# Patient Record
Sex: MALE | Race: WHITE | NOT HISPANIC OR LATINO | Employment: FULL TIME | URBAN - METROPOLITAN AREA
[De-identification: names, ages, dates, MRNs, and addresses within clinical notes are randomized per-mention and may not be internally consistent; named-entity substitution may affect disease eponyms.]

---

## 2017-02-10 ENCOUNTER — ALLSCRIPTS OFFICE VISIT (OUTPATIENT)
Dept: OTHER | Facility: OTHER | Age: 62
End: 2017-02-10

## 2017-03-01 ENCOUNTER — GENERIC CONVERSION - ENCOUNTER (OUTPATIENT)
Dept: OTHER | Facility: OTHER | Age: 62
End: 2017-03-01

## 2017-03-01 ENCOUNTER — APPOINTMENT (OUTPATIENT)
Dept: LAB | Facility: HOSPITAL | Age: 62
End: 2017-03-01
Attending: INTERNAL MEDICINE
Payer: COMMERCIAL

## 2017-03-01 DIAGNOSIS — I10 ESSENTIAL (PRIMARY) HYPERTENSION: ICD-10-CM

## 2017-03-01 LAB
ALBUMIN SERPL BCP-MCNC: 3.8 G/DL (ref 3.5–5)
ALP SERPL-CCNC: 44 U/L (ref 46–116)
ALT SERPL W P-5'-P-CCNC: 35 U/L (ref 12–78)
ANION GAP SERPL CALCULATED.3IONS-SCNC: 9 MMOL/L (ref 4–13)
AST SERPL W P-5'-P-CCNC: 22 U/L (ref 5–45)
BILIRUB SERPL-MCNC: 0.8 MG/DL (ref 0.2–1)
BUN SERPL-MCNC: 11 MG/DL (ref 5–25)
CALCIUM SERPL-MCNC: 9.1 MG/DL (ref 8.3–10.1)
CHLORIDE SERPL-SCNC: 101 MMOL/L (ref 100–108)
CHOLEST SERPL-MCNC: 158 MG/DL (ref 50–200)
CO2 SERPL-SCNC: 30 MMOL/L (ref 21–32)
CREAT SERPL-MCNC: 0.84 MG/DL (ref 0.6–1.3)
ERYTHROCYTE [DISTWIDTH] IN BLOOD BY AUTOMATED COUNT: 12.7 % (ref 11.6–15.1)
GFR SERPL CREATININE-BSD FRML MDRD: >60 ML/MIN/1.73SQ M
GLUCOSE SERPL-MCNC: 103 MG/DL (ref 65–140)
HCT VFR BLD AUTO: 43.1 % (ref 36.5–49.3)
HDLC SERPL-MCNC: 52 MG/DL (ref 40–60)
HGB BLD-MCNC: 15 G/DL (ref 12–17)
LDLC SERPL CALC-MCNC: 84 MG/DL (ref 0–100)
MCH RBC QN AUTO: 32.3 PG (ref 26.8–34.3)
MCHC RBC AUTO-ENTMCNC: 34.8 G/DL (ref 31.4–37.4)
MCV RBC AUTO: 93 FL (ref 82–98)
PLATELET # BLD AUTO: 195 THOUSANDS/UL (ref 149–390)
PMV BLD AUTO: 11.6 FL (ref 8.9–12.7)
POTASSIUM SERPL-SCNC: 3.9 MMOL/L (ref 3.5–5.3)
PROT SERPL-MCNC: 6.8 G/DL (ref 6.4–8.2)
RBC # BLD AUTO: 4.64 MILLION/UL (ref 3.88–5.62)
SODIUM SERPL-SCNC: 140 MMOL/L (ref 136–145)
TRIGL SERPL-MCNC: 109 MG/DL
TSH SERPL DL<=0.05 MIU/L-ACNC: 1.58 UIU/ML (ref 0.36–3.74)
WBC # BLD AUTO: 8.01 THOUSAND/UL (ref 4.31–10.16)

## 2017-03-01 PROCEDURE — 80053 COMPREHEN METABOLIC PANEL: CPT

## 2017-03-01 PROCEDURE — 85027 COMPLETE CBC AUTOMATED: CPT

## 2017-03-01 PROCEDURE — 36415 COLL VENOUS BLD VENIPUNCTURE: CPT

## 2017-03-01 PROCEDURE — 80061 LIPID PANEL: CPT

## 2017-03-01 PROCEDURE — 84443 ASSAY THYROID STIM HORMONE: CPT

## 2017-03-30 ENCOUNTER — GENERIC CONVERSION - ENCOUNTER (OUTPATIENT)
Dept: OTHER | Facility: OTHER | Age: 62
End: 2017-03-30

## 2017-05-02 ENCOUNTER — TRANSCRIBE ORDERS (OUTPATIENT)
Dept: ADMINISTRATIVE | Facility: HOSPITAL | Age: 62
End: 2017-05-02

## 2017-05-02 ENCOUNTER — ALLSCRIPTS OFFICE VISIT (OUTPATIENT)
Dept: OTHER | Facility: OTHER | Age: 62
End: 2017-05-02

## 2017-05-02 DIAGNOSIS — R07.89 OTHER CHEST PAIN: Primary | ICD-10-CM

## 2017-05-02 DIAGNOSIS — R07.89 OTHER CHEST PAIN: ICD-10-CM

## 2017-05-15 ENCOUNTER — HOSPITAL ENCOUNTER (OUTPATIENT)
Dept: NON INVASIVE DIAGNOSTICS | Facility: CLINIC | Age: 62
Discharge: HOME | End: 2017-05-15
Payer: COMMERCIAL

## 2017-05-15 ENCOUNTER — GENERIC CONVERSION - ENCOUNTER (OUTPATIENT)
Dept: OTHER | Facility: OTHER | Age: 62
End: 2017-05-15

## 2017-05-15 DIAGNOSIS — R07.89 OTHER CHEST PAIN: ICD-10-CM

## 2017-05-15 LAB
CHEST PAIN STATEMENT: NORMAL
MAX DIASTOLIC BP: 69 MMHG
MAX HEART RATE: 150 BPM
MAX PREDICTED HEART RATE: 161 BPM
MAX. SYSTOLIC BP: 164 MMHG
PROTOCOL NAME: NORMAL
TARGET HR FORMULA: NORMAL
TEST INDICATION: NORMAL
TIME IN EXERCISE PHASE: 545 S

## 2017-05-15 PROCEDURE — A9502 TC99M TETROFOSMIN: HCPCS

## 2017-05-15 PROCEDURE — 78452 HT MUSCLE IMAGE SPECT MULT: CPT

## 2017-05-15 PROCEDURE — 93017 CV STRESS TEST TRACING ONLY: CPT

## 2017-11-03 ENCOUNTER — ALLSCRIPTS OFFICE VISIT (OUTPATIENT)
Dept: OTHER | Facility: OTHER | Age: 62
End: 2017-11-03

## 2017-11-04 NOTE — PROGRESS NOTES
Assessment  1  Acute maxillary sinusitis, recurrence not specified (461 0) (J01 00)    Plan  Acute maxillary sinusitis, recurrence not specified    · Amoxicillin-Pot Clavulanate 875-125 MG Oral Tablet; TAKE 1 TABLET EVERY 12  HOURS WITH MEALS UNTIL GONE  H/O venous thrombosis and embolism    · Xarelto 15 MG Oral Tablet    Chief Complaint  Pt is here complaining cough, congestion  symptoms not improving over one week  CR      History of Present Illness  Cold Symptoms:   [facility] Lloyd presents with complaints of gradual onset of constant episodes of moderate cold symptoms  Episodes started about 1 week ago  Symptoms are not improved by OTC cold medications  Symptoms are made worse by activity  Symptoms are worsening  Associated symptoms include runny nose,-- post nasal drainage,-- sore throat,-- productive cough,-- facial pressure,-- facial pain,-- fatigue-- and-- weakness  Review of Systems    Constitutional: as noted in HPI  Cardiovascular: no chest pain  Respiratory: as noted in HPI  Active Problems  1  Active advance directive (V49 89) (Z78 9)   2  Benign prostatic hyperplasia without lower urinary tract symptoms, unspecified   morphology   3  Essential hypertension (401 9) (I10)   4  H/O venous thrombosis and embolism (V12 51) (Z86 718)   5  Hearing loss of left ear (389 9) (H91 92)   6  Hyperlipidemia (272 4) (E78 5)   7  Impaired fasting glucose (790 21) (R73 01)   8  Denied: History of Mental health problem   9  No advance directive on file (V49 89) (Z78 9)   10  Organic impotence (607 84) (N52 9)   11  Denied: History of Substance abuse    Past Medical History  Active Problems And Past Medical History Reviewed: The active problems and past medical history were reviewed and updated today        Social History   · Active advance directive (V49 89) (Z78 9)   · Cultural background   · non-   · Good dental hygiene   · Living Situation: Supportive and safe   ·    · Never smoker   · No advance directive on file (V49 98) (Z78 9)   · No drug use   · Primary language is English   · Racial background   · white   · Social alcohol use   · Spouse/family support    Current Meds   1  Fluticasone Propionate 50 MCG/ACT Nasal Suspension; USE 2 SPRAYS IN EACH   NOSTRIL ONCE DAILY; Therapy: 17ETP7651 to (Last Rx:06Srp2163)  Requested for: 07Ial8349 Ordered   2  Simvastatin 10 MG Oral Tablet; Take 1 tablet daily; Therapy: 01GXF5602 to (Evaluate:21Apr2018)  Requested for: 26Apr2017; Last   Rx:[id number] Ordered   3  Valsartan-Hydrochlorothiazide 320-25 MG Oral Tablet; TAKE 1 TABLET DAILY   PATIENTMAKE APPOINTMENT FOR JULY; Therapy: 66UFY2655 to (Evaluate:85Son7212)  Requested for: 08Jkd0585; Last   Rx:78Rlf7306 Ordered   4  Xarelto 15 MG Oral Tablet; Take 1 tablet daily; Therapy: 85QAX8862 to (Evaluate:17Uqd9965) Recorded   5  Xarelto 20 MG Oral Tablet; take 1 tablet by mouth every day; Therapy: 84UAI3514 to (Evaluate:25Dex7715)  Requested for: 55NGP3662; Last   Rx:30Xba7984 Ordered    The medication list was reviewed and updated today  Allergies  1  ACE Inhibitors   2  Vancocin HCl    Vitals   Recorded: 51CSW8519 11:36AM   Temperature 99 2 F   Heart Rate 82, L Radial   Pulse Quality Normal, L Radial   Systolic 775, LUE, Sitting   Diastolic 94, LUE, Sitting   Height 6 ft 1 in   Weight 232 lb    BMI Calculated 30 61   BSA Calculated 2 29     Physical Exam    Constitutional   General appearance: No acute distress, well appearing and well nourished  Ears, Nose, Mouth, and Throat   Nasal mucosa, septum, and turbinates: Abnormal   There was a purulent discharge from both nares  The bilateral nasal mucosa was edematous  Pulmonary   Auscultation of lungs: Clear to auscultation, equal breath sounds bilaterally, no wheezes, no rales, no rhonci  Cardiovascular   Auscultation of heart: Normal rate and rhythm, normal S1 and S2, without murmurs           Signatures   Electronically signed by : Bonita Bryan TI King ; Nov  3 2017 11:52AM EST                       (Author)

## 2017-11-27 ENCOUNTER — GENERIC CONVERSION - ENCOUNTER (OUTPATIENT)
Dept: OTHER | Facility: OTHER | Age: 62
End: 2017-11-27

## 2018-01-09 ENCOUNTER — TRANSCRIBE ORDERS (OUTPATIENT)
Dept: ADMINISTRATIVE | Facility: HOSPITAL | Age: 63
End: 2018-01-09

## 2018-01-09 ENCOUNTER — ALLSCRIPTS OFFICE VISIT (OUTPATIENT)
Dept: OTHER | Facility: OTHER | Age: 63
End: 2018-01-09

## 2018-01-09 DIAGNOSIS — R39.14 BENIGN PROSTATIC HYPERPLASIA WITH INCOMPLETE BLADDER EMPTYING: ICD-10-CM

## 2018-01-09 DIAGNOSIS — N40.1 INCOMPLETE EMPTYING OF BLADDER DUE TO BENIGN PROSTATIC HYPERPLASIA: Primary | ICD-10-CM

## 2018-01-09 DIAGNOSIS — N40.1 BENIGN PROSTATIC HYPERPLASIA WITH INCOMPLETE BLADDER EMPTYING: ICD-10-CM

## 2018-01-09 DIAGNOSIS — R33.9 INCOMPLETE EMPTYING OF BLADDER DUE TO BENIGN PROSTATIC HYPERPLASIA: Primary | ICD-10-CM

## 2018-01-10 NOTE — PROGRESS NOTES
Assessment   1  Essential hypertension (401 9) (I10)   2  Benign prostatic hyperplasia with incomplete bladder emptying (600 01,788 21) (N40 1,R39 14)   3  Other male erectile dysfunction (607 84) (N52 8)    Plan   Benign prostatic hyperplasia with incomplete bladder emptying    · 1 - Alicia Martinez MD, Nyla Alejandra (Urology) Co-Management  *  Status: Active  Requested for: 66LXV1423  Care Summary provided  : Yes  Essential hypertension    · Valsartan-Hydrochlorothiazide 320-25 MG Oral Tablet; TAKE 1 TABLET DAILY    PATIENTMAKE APPOINTMENT FOR JULY   · (1) COMPREHENSIVE METABOLIC PANEL; Status:Active; Requested KKU:76LIJ5776;    · (1) LIPID PANEL, FASTING; Status:Active; Requested for:01Mar2018;    · (1) TSH; Status:Active; Requested FJX:65EKQ2023; Other male erectile dysfunction    · Sildenafil Citrate 25 MG Oral Tablet (Viagra); TAKE AS DIRECTED    Chief Complaint   Chief Complaint Free Text Note Form: Pt is here for lab review  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  CR      History of Present Illness   Hypertension (Follow-Up): The patient presents for follow-up of essential hypertension  The patient states he has been doing well with his blood pressure control since the last visit  Symptoms: denies dyspnea-- and-- denies chest pain  Medications: the patient is adherent with his medication regimen  -- He denies medication side effects  Benign Prostatic Hyperplasia (Brief): The patient is being seen for an initial evaluation of benign prostatic hyperplasia  Symptoms:  sensation of incomplete bladder emptying--       The patient presents with complaints of nocturia (1x /night  )  Associated symptoms:  no abdominal pain-- and-- no hematuria  (PSA is increasing: it was 3 1 in 2016 and increased to 4 0 in 11/2017)    Erectile Dysfunction:    [facility] Lloyd presents with complaints of gradual onset of mild erectile dysfunction about months ago  Symptoms are worsening        Associated symptoms include inability to maintain erection  HPI: PSA is increasing      Review of Systems   Complete-Male:      Constitutional: not feeling tired  Eyes: no eyesight problems  Cardiovascular: no chest pain-- and-- no palpitations  Respiratory: no shortness of breath-- and-- no cough  Gastrointestinal: no abdominal pain  Genitourinary: as noted in HPI  Neurological: no headache  Psychiatric: no anxiety-- and-- no depression  Active Problems   1  Essential hypertension (401 9) (I10)   2  H/O venous thrombosis and embolism (V12 51) (Z86 718)   3  Hearing loss of left ear (389 9) (H91 92)   4  Hyperlipidemia (272 4) (E78 5)   5  Impaired fasting glucose (790 21) (R73 01)    Past Medical History   1  Acute maxillary sinusitis, recurrence not specified (461 0) (J01 00)   2  History of Asthmatic bronchitis (493 90) (J45 909)   3  History of Cervical disc disease (722 91) (M50 90)   4  History of Cervicalgia (723 1) (M54 2)   5  Fatigue (780 79) (R53 83)   6  History of Hypercholesterolemia (272 0) (E78 00)   7  Other chest pain (786 59) (R07 89)   8  History of Screening for colon cancer (V76 51) (Z12 11)   9  History of Screening for prostate cancer (V76 44) (Z12 5)  Active Problems And Past Medical History Reviewed: The active problems and past medical history were reviewed and updated today  Surgical History   1  History of Back Surgery   2  History of Cholecystectomy    Family History   Mother    1  Family history of CAD (coronary artery disease)  Sister    2  Family history of CAD (coronary artery disease)  Family History    3  Family history of Cardiovascular disease   4  Denied: Family history of substance abuse   5  Family history of Ischemic heart disease   6  Family history of Malignancy   7   Denied: Family history of Mental problem    Social History    · Cultural background   · Good dental hygiene   · Living Situation: Supportive and safe   ·    · Never smoker   · No drug use   · Primary language is English   · Racial background   · Social alcohol use   · Spouse/family support    Current Meds    1  Fluticasone Propionate 50 MCG/ACT Nasal Suspension; USE 2 SPRAYS IN EACH NOSTRIL ONCE     DAILY; Therapy: 30TDQ9455 to (Last Rx:33Eda0168)  Requested for: 90Gvl8285 Ordered   2  Simvastatin 10 MG Oral Tablet; Take 1 tablet daily; Therapy: 39KFK2111 to (Evaluate:21Apr2018)  Requested for: 26Apr2017; Last Rx:35Epv3843     Ordered   3  Valsartan-Hydrochlorothiazide 320-25 MG Oral Tablet; TAKE 1 TABLET DAILY PATIENTMAKE     APPOINTMENT FOR JULY; Therapy: 33YBD9469 to (Evaluate:21Apr2018)  Requested for: 26Apr2017; Last Rx:11Nkq0222     Ordered   4  Xarelto 20 MG Oral Tablet; take 1 tablet by mouth every day; Therapy: 35QQV3599 to (Evaluate:23Sep2018)  Requested for: 98MYW2982; Last Rx:79Tnf5171     Ordered  Medication List Reviewed: The medication list was reviewed and updated today  Allergies   1  ACE Inhibitors   2  Vancocin HCl    Vitals   Vital Signs    Recorded: 28GND8021 02:16PM   Heart Rate 83   Systolic 165, LUE, Sitting   Diastolic 78, LUE, Sitting   BP CUFF SIZE Large   Height 6 ft 1 in   Weight 237 lb    BMI Calculated 31 27   BSA Calculated 2 31     Physical Exam        Constitutional      General appearance: No acute distress, well appearing and well nourished  Ears, Nose, Mouth, and Throat      Oropharynx: Normal with no erythema, edema, exudate or lesions  Pulmonary      Auscultation of lungs: Clear to auscultation, equal breath sounds bilaterally, no wheezes, no rales, no rhonci  Cardiovascular      Auscultation of heart: Normal rate and rhythm, normal S1 and S2, without murmurs  Abdomen      Abdomen: Non-tender, no masses         Psychiatric      Orientation to person, place and time: Normal        Mood and affect: Normal           Signatures    Electronically signed by : TI Marquez ; Jan 9 2018  2:51PM EST (Author)

## 2018-01-11 NOTE — RESULT NOTES
Verified Results  (1) COMPREHENSIVE METABOLIC PANEL 56LVK8064 89:31JF Brain Hashimoto     Test Name Result Flag Reference   GLUCOSE,RANDM 99 mg/dL     If the patient is fasting, the ADA then defines impaired fasting glucose as > 100 mg/dL and diabetes as > or equal to 123 mg/dL  SODIUM 141 mmol/L  136-145   POTASSIUM 3 8 mmol/L  3 5-5 3   CHLORIDE 103 mmol/L  100-108   CARBON DIOXIDE 31 mmol/L  21-32   ANION GAP (CALC) 7 mmol/L  4-13   BLOOD UREA NITROGEN 10 mg/dL  5-25   CREATININE 0 91 mg/dL  0 60-1 30   Standardized to IDMS reference method   CALCIUM 9 1 mg/dL  8 3-10 1   BILI, TOTAL 0 70 mg/dL  0 20-1 00   ALK PHOSPHATAS 45 U/L L    ALT (SGPT) 33 U/L  12-78   AST(SGOT) 20 U/L  5-45   ALBUMIN 3 5 g/dL  3 5-5 0   TOTAL PROTEIN 6 5 g/dL  6 4-8 2   eGFR Non-African American      >60 0 ml/min/1 73sq [facility] Disease Education Program recommendations are as follows:  GFR calculation is accurate only with a steady state creatinine  Chronic Kidney disease less than 60 ml/min/1 73 sq  meters  Kidney failure less than 15 ml/min/1 73 sq  meters  (1) PSA (SCREEN) (Dx V76 44 Screen for Prostate Cancer) 27Oct2016 08:31AM Brain Hashimoto     Test Name Result Flag Reference   PROSTATE SPECIFIC ANTIGEN 3 0 ng/mL  0 0-4 0   American Urological Association Guidelines define biochemical recurrence of prostate cancer as a detectable or rising PSA value post-radical prostatectomy that is greater than or equal to 0 2 ng/mL with a second confirmatory level of greater than or equal to 0 2 ng/mL  (1) HEMOGLOBIN A1C 27Oct2016 08:31AM Brain Hashimoto     Test Name Result Flag Reference   HEMOGLOBIN A1C 6 1 %  4 2-6 3   EST  AVG   GLUCOSE 128 mg/dl

## 2018-01-11 NOTE — RESULT NOTES
Verified Results  NM MYOCARDIAL PERFUSION SPECT (RX STRESS AND/OR REST) 27JOG3970 08:14AM Claudia Carver     Test Name Result Flag Reference   NM MYOCARDIAL PERFUSION SPECT (RX STRESS AND/OR REST) (Report)     [address] [place], [address]   [phone]     Rest/Stress Gated SPECT Myocardial Perfusion Imaging After Exercise     Patient: Jorge Hill   MR number: [id number]   Account number: [de-identified]   : 1955   Age: 63 years   Gender: Male   Status: Outpatient   Location: [facility] [place]   Height: 73 in   Weight: 233 lb   BP: 144/ 80 mmHg     Allergies: ACE INHIBITORS, VANCOMYCIN     Diagnosis: R07 89 - Other chest pain     Primary Physician: Adri Carlton MD   RN: Stiven Marroquin RN   Referring Physician: Adri Carlton MD   Technician: Abernathy Room   Group: [facility]   Interpreting Physician: Apryl Calderón MD     INDICATIONS: Detection of coronary artery disease  HISTORY: The patient is a 63year old  male  Chest pain status: recent onset chest pain  Other symptoms: dyspnea of recent onset  Coronary artery disease risk factors: dyslipidemia, hypertension, and family history of premature   coronary artery disease  Cardiovascular history: none significant  PHYSICAL EXAM: Baseline physical exam screening: no wheezes audible  REST ECG: Normal sinus rhythm  PROCEDURE: The study was performed at the [facility]  Treadmill exercise testing was performed, using the Gold protocol  Gated SPECT myocardial perfusion imaging was performed after stress and at rest  Systolic   blood pressure was 144 mmHg, at the start of the study  Diastolic blood pressure was 80 mmHg, at the start of the study  The heart rate was 83 bpm, at the start of the study  IV double checked       GOLD PROTOCOL:   HR bpm SBP mmHg DBP mmHg Symptoms ST change Rhythm/conduct   Baseline 83 144 80 none none NSR   Stage 1 118 155 77 none none sinus tach   Stage 2 133 160 70 dyspnea none sinus tach   Stage 3 150 164 69 dyspnea, fatigue none sinus tach   Recovery 1 127 140 65 subsiding none sinus tach   Recovery 2 115 -- -- subsiding -- --   Recovery 3 97 139 74 none -- --   Recovery 5 104 124 78 none -- --   No medications or fluids given  STRESS SUMMARY: Duration of exercise was 9 min and 5 sec  The patient exercised to protocol stage 3  Maximal work rate was 10 4 METs  Maximal heart rate during stress was 150 bpm ( 93 % of maximal predicted heart rate)  The heart rate   response to stress was normal  There was resting hypertension with an appropriate blood pressure response to stress  The rate-pressure product for the peak heart rate and blood pressure was 66521  There was no chest pain during stress  The   stress test was terminated due to dyspnea and fatigue  Pre oxygen saturation: 96 %  Peak oxygen saturation: 95 %  The stress ECG was negative for ischemia  There were no stress arrhythmias or conduction abnormalities  ISOTOPE ADMINISTRATION:   Resting isotope administration Stress isotope administration   Agent Tetrofosmin Tetrofosmin   Dose 10 71 mCi 30 95 mCi   Date 05/15/2017 05/15/2017   Injection time 08:35 09:51   Imaging time 09:11 10:32   Injection-image interval 36 min 35 min     The radiopharmaceutical was injected one minute before the end of exercise  MYOCARDIAL PERFUSION IMAGING:   The image quality was good  Left ventricular size was normal  The TID ratio was 0 9  PERFUSION DEFECTS:   - There were no perfusion defects  GATED SPECT:   The calculated left ventricular ejection fraction was 59 %  Left ventricular ejection fraction was within normal limits by visual estimate  There was no left ventricular regional abnormality  SUMMARY:   - Stress results: Duration of exercise was 9 min and 5 sec  Target heart rate was achieved   There was resting hypertension with an appropriate blood pressure response to stress  There was no chest pain during stress  - ECG conclusions: The stress ECG was negative for ischemia  - Perfusion imaging: There were no perfusion defects    - Gated SPECT: The calculated left ventricular ejection fraction was 59 %  Left ventricular ejection fraction was within normal limits by visual estimate  There was no left ventricular regional abnormality  IMPRESSIONS: Normal study after pharmacologic stress without reproduction of symptoms  Myocardial perfusion imaging was normal at rest and with stress   Left ventricular systolic function was normal      Prepared and signed by     Too Bal MD   Signed 05/15/2017 14:44:06

## 2018-01-13 VITALS
BODY MASS INDEX: 30.75 KG/M2 | HEART RATE: 82 BPM | HEIGHT: 73 IN | SYSTOLIC BLOOD PRESSURE: 138 MMHG | WEIGHT: 232 LBS | TEMPERATURE: 99.2 F | DIASTOLIC BLOOD PRESSURE: 94 MMHG

## 2018-01-13 NOTE — RESULT NOTES
Verified Results  * XR CHEST PA & LATERAL 15Apr2016 03:00PM Jil Richardson [facility] Number: [de-identified]     Test Name Result Flag Reference   XR CHEST PA & LATERAL (Report)     CHEST      INDICATION: Intermittent cough for several months  COMPARISON: November 27, 2011     VIEWS: Frontal and lateral projections; 3 images     FINDINGS:        Cardiomediastinal silhouette appears unremarkable  Lungs are hypoinflated  There is mild elevation of the right hemidiaphragm  Linear scarring again seen within the left lung base  Visualized osseous structures appear within normal limits for the patient's age  IMPRESSION:     No active pulmonary disease         Workstation performed: [id number]     Signed by:   Nabil Self MD   4/15/16

## 2018-01-14 VITALS
HEIGHT: 73 IN | WEIGHT: 233 LBS | RESPIRATION RATE: 16 BRPM | HEART RATE: 78 BPM | BODY MASS INDEX: 30.88 KG/M2 | DIASTOLIC BLOOD PRESSURE: 86 MMHG | SYSTOLIC BLOOD PRESSURE: 120 MMHG

## 2018-01-14 VITALS
WEIGHT: 231.31 LBS | BODY MASS INDEX: 30.66 KG/M2 | DIASTOLIC BLOOD PRESSURE: 74 MMHG | HEIGHT: 73 IN | SYSTOLIC BLOOD PRESSURE: 110 MMHG | TEMPERATURE: 97.4 F

## 2018-01-15 NOTE — RESULT NOTES
Verified Results  (1) PT WITH INR [id number] 12:00AM Alex Berumen     Test Name Result Flag Reference   INR 3 1 H 0 8-1 2   Reference interval is for non-anticoagulated patients                   Suggested INR therapeutic range for Vitamin K                 antagonist therapy:                    Standard Dose (moderate intensity                                   therapeutic range):       2 0 - 3 0                    Higher intensity therapeutic range       2 5 - 3 5   Prothrombin Time 32 9 sec H 9 1-12 0

## 2018-01-17 NOTE — RESULT NOTES
Verified Results  (1) COMPREHENSIVE METABOLIC PANEL [id number] 90:95RA Maame Fake Order Number: [de-identified]     Test Name Result Flag Reference   GLUCOSE,RANDM 103 mg/dL     If the patient is fasting, the ADA then defines impaired fasting glucose as > 100 mg/dL and diabetes as > or equal to 123 mg/dL  SODIUM 140 mmol/L  136-145   POTASSIUM 3 9 mmol/L  3 5-5 3   CHLORIDE 101 mmol/L  100-108   CARBON DIOXIDE 30 mmol/L  21-32   ANION GAP (CALC) 9 mmol/L  4-13   BLOOD UREA NITROGEN 11 mg/dL  5-25   CREATININE 0 84 mg/dL  0 60-1 30   Standardized to IDMS reference method   CALCIUM 9 1 mg/dL  8 3-10 1   BILI, TOTAL 0 80 mg/dL  0 20-1 00   ALK PHOSPHATAS 44 U/L L    ALT (SGPT) 35 U/L  12-78   AST(SGOT) 22 U/L  5-45   ALBUMIN 3 8 g/dL  3 5-5 0   TOTAL PROTEIN 6 8 g/dL  6 4-8 2   eGFR Non-African American      >60 0 ml/min/1 73sq m   - Patient Instructions: This is a fasting blood test  Water,black tea or black  coffee only after 9:00pm the night before test Drink 2 glasses of water the morning of test - Patient Instructions: This bloodwork is non-fasting  Please drink two glasses of   water morning of bloodwork  National Kidney Disease Education Program recommendations are as follows:  GFR calculation is accurate only with a steady state creatinine  Chronic Kidney disease less than 60 ml/min/1 73 sq  meters  Kidney failure less than 15 ml/min/1 73 sq  meters  (1) LIPID PANEL, FASTING [id number] 10:22AM Maame Fake Order Number: [[email or web address]-identified]     Test Name Result Flag Reference   CHOLESTEROL 158 mg/dL     HDL,DIRECT 52 mg/dL  40-60   Specimen collection should occur prior to Metamizole administration due to the potential for falsely depressed results  LDL CHOLESTEROL CALCULATED 84 mg/dL  0-100   - Patient Instructions:  This is a fasting blood test  Water,black tea or black  coffee only after 9:00pm the night before test   Drink 2 glasses of water the morning of test     - Patient Instructions: This is a fasting blood test  Water,black tea or black  coffee only after 9:00pm the night before test Drink 2 glasses of water the morning of test - Patient Instructions: This bloodwork is non-fasting  Please drink two glasses of   water morning of bloodwork  Triglyceride:         Normal              <150 mg/dl       Borderline High    150-199 mg/dl       High               200-499 mg/dl       Very High          >499 mg/dl  Cholesterol:         Desirable        <200 mg/dl      Borderline High  200-239 mg/dl      High             >239 mg/dl  HDL Cholesterol:        High    >59 mg/dL      Low     <41 mg/dL  LDL CALCULATED:    This screening LDL is a calculated result  It does not have the accuracy of the Direct Measured LDL in the monitoring of patients with hyperlipidemia and/or statin therapy  Direct Measure LDL (OHM590) must be ordered separately in these patients  TRIGLYCERIDES 109 mg/dL  <=150   Specimen collection should occur prior to N-Acetylcysteine or Metamizole administration due to the potential for falsely depressed results  (1) TSH [id number] 10:22AM Vida Valenzuela Order Number: [de-identified]     Test Name Result Flag Reference   TSH 1 581 uIU/mL  0 358-3 740   - Patient Instructions: This bloodwork is non-fasting  Please drink two glasses of water morning of bloodwork  - Patient Instructions: This is a fasting blood test  Water,black tea or black  coffee only after 9:00pm the night before test Drink 2 glasses of water the morning of test - Patient Instructions: This bloodwork is non-fasting  Please drink two glasses of   water morning of bloodwork  Patients undergoing fluorescein dye angiography may retain small amounts of fluorescein in the body for 48-72 hours post procedure  Samples containing fluorescein can produce falsely depressed TSH values  If the patient had this procedure,a specimen should be resubmitted post fluorescein clearance  (1) CBC/ PLT (NO DIFF) [id number] 10:22AM [facility] Order Number: [de-identified]     Test Name Result Flag Reference   HEMATOCRIT 43 1 %  36 5-49 3   HEMOGLOBIN 15 0 g/dL  12 0-17 0   MCHC 34 8 g/dL  31 4-37 4   MCH 32 3 pg  26 8-34 3   MCV 93 fL  82-98   PLATELET COUNT 151 Thousands/uL  149-390   RBC COUNT 4 64 Million/uL  3 88-5 62   RDW 12 7 %  11 6-15 1   WBC COUNT 8 01 Thousand/uL  4 31-10 16   MPV 11 6 fL  8 9-12 7

## 2018-01-18 NOTE — RESULT NOTES
Verified Results  (1) PT WITH INR [id number] 10:00AM Mello Abel     Test Name Result Flag Reference   INR 2 42 H 0 86-1 16   PT 25 6 seconds H 12 0-14 3

## 2018-01-23 VITALS
HEIGHT: 73 IN | DIASTOLIC BLOOD PRESSURE: 78 MMHG | SYSTOLIC BLOOD PRESSURE: 116 MMHG | BODY MASS INDEX: 31.41 KG/M2 | HEART RATE: 83 BPM | WEIGHT: 237 LBS

## 2018-01-31 ENCOUNTER — OFFICE VISIT (OUTPATIENT)
Dept: UROLOGY | Facility: AMBULATORY SURGERY CENTER | Age: 63
End: 2018-01-31
Payer: COMMERCIAL

## 2018-01-31 VITALS
HEART RATE: 68 BPM | SYSTOLIC BLOOD PRESSURE: 132 MMHG | WEIGHT: 236.8 LBS | DIASTOLIC BLOOD PRESSURE: 78 MMHG | BODY MASS INDEX: 31.38 KG/M2 | HEIGHT: 73 IN

## 2018-01-31 DIAGNOSIS — R97.20 ELEVATED PSA: Primary | ICD-10-CM

## 2018-01-31 DIAGNOSIS — N40.1 INCOMPLETE EMPTYING OF BLADDER DUE TO BENIGN PROSTATIC HYPERPLASIA: ICD-10-CM

## 2018-01-31 DIAGNOSIS — N52.9 ERECTILE DYSFUNCTION, UNSPECIFIED ERECTILE DYSFUNCTION TYPE: ICD-10-CM

## 2018-01-31 DIAGNOSIS — N48.6 PEYRONIE DISEASE: ICD-10-CM

## 2018-01-31 DIAGNOSIS — R33.9 INCOMPLETE EMPTYING OF BLADDER DUE TO BENIGN PROSTATIC HYPERPLASIA: ICD-10-CM

## 2018-01-31 LAB
SL AMB  POCT GLUCOSE, UA: NORMAL
SL AMB LEUKOCYTE ESTERASE,UA: NORMAL
SL AMB POCT BILIRUBIN,UA: NORMAL
SL AMB POCT BLOOD,UA: NORMAL
SL AMB POCT CLARITY,UA: NORMAL
SL AMB POCT COLOR,UA: YELLOW
SL AMB POCT KETONES,UA: NORMAL
SL AMB POCT NITRITE,UA: NORMAL
SL AMB POCT PH,UA: 5
SL AMB POCT SPECIFIC GRAVITY,UA: 1.02

## 2018-01-31 PROCEDURE — 81002 URINALYSIS NONAUTO W/O SCOPE: CPT | Performed by: UROLOGY

## 2018-01-31 PROCEDURE — 99244 OFF/OP CNSLTJ NEW/EST MOD 40: CPT | Performed by: UROLOGY

## 2018-01-31 RX ORDER — RIVAROXABAN 20 MG/1
20 TABLET, FILM COATED ORAL DAILY
Refills: 3 | COMMUNITY
Start: 2018-01-04 | End: 2018-10-10 | Stop reason: SDUPTHER

## 2018-01-31 RX ORDER — SILDENAFIL 25 MG/1
25 TABLET, FILM COATED ORAL DAILY PRN
COMMUNITY
End: 2018-04-16

## 2018-01-31 RX ORDER — FLUTICASONE PROPIONATE 50 MCG
2 SPRAY, SUSPENSION (ML) NASAL AS NEEDED
Refills: 3 | COMMUNITY
Start: 2017-11-03

## 2018-01-31 RX ORDER — VALSARTAN AND HYDROCHLOROTHIAZIDE 320; 25 MG/1; MG/1
TABLET, FILM COATED ORAL
COMMUNITY
End: 2018-04-13 | Stop reason: SDUPTHER

## 2018-01-31 RX ORDER — SIMVASTATIN 10 MG
TABLET ORAL DAILY
COMMUNITY
Start: 2018-01-15 | End: 2018-06-25 | Stop reason: SDUPTHER

## 2018-01-31 NOTE — PROGRESS NOTES
1/31/2018    Hailee Coles  1955  [id number]      Assessment  -Lower urinary symptoms due to BPH  -Peyronie's Disease  -Erectile dysfunction  -Elevated BPH    Discussion/Plan  Ayanna Morales is a 63 y o  male referred by PCP, Dr Kerri Singh, for incomplete bladder emptying      BPH: We discussed in detail the medication options, with alpha blockers and Cialis, for treating BPH and his lower urinary symptoms, patient deferring medication at this time as symptoms are not bothersome  ED: Patient will continue taking sildenafil for erectile dysfunction  We reviewed providing patient with generic prescription, including potential risks and benefits  Peyronie's Disease: Discussed peyronie's disease in detail, including causes and treatment options  We will continue to monitor, and follow up in 1 year to assess for any changes or worsening condition  Elevated PSA: We reviewed patient's PSA, and will repeat in 3 months  If PSA remains elevated, we discussed potential in office biopsy  -All questions answered, patient and wife agrees with plan      History of Present Illness  63 y o  male with a history of lower urinary symptoms, erectile dysfunction, and elevated PSA presenting today as new consult  Patient states having urinary hesitancy, difficulty starting stream, and nocturia x1 for the last several years  He denies any previous history of enlarged prostate, and has not been treated before  His PSA in 2016 was 3 1  He states PSA was drawn November 2017 which was reported to be 4 0  He denies any significant history of prostate cancer  Complaints of obtaining and sustaining erections, with reports of slight penile curvature to dorsal right side  He denies any pain with erections, or difficulty with voiding and intercourse  Patient was provided with samples of Viagra from PCP, which he states has been successful  Review of Systems  Review of Systems   Constitutional: Negative  HENT: Negative      Respiratory: Negative  Cardiovascular: Negative  Gastrointestinal: Negative  Genitourinary: Positive for decreased urine volume and difficulty urinating (Difficulty starting urinary stream, hesistancy)  Negative for dysuria, flank pain, frequency (Nocturia x2), hematuria, penile pain and urgency  Musculoskeletal: Negative  Skin: Negative  Neurological: Negative  Psychiatric/Behavioral: Negative  Past Medical History  Past Medical History:   Diagnosis Date    Asthmatic bronchitis     Cervical disc disease     Cervicalgia     DVT [facility]deep venous thrombosis) ([facility] [address] ) 2004    Erectile dysfunction     Hypertension     Screening for colon cancer     Screening for prostate cancer        Past Social History  Past Surgical History:   Procedure Laterality Date    BACK SURGERY  2004    CHOLECYSTECTOMY  2009       Past Family History  Family History   Problem Relation Age of Onset    Coronary artery disease Mother     Hypertrophic cardiomyopathy Mother     Thyroid disease Mother     Coronary artery disease Sister     Irritable bowel syndrome Sister     Crohn's disease Sister        Past Social history  Social History     Social History    Marital status: /Civil Union     Spouse name: N/A    Number of children: N/A    Years of education: N/A     Occupational History    Not on file       Social History Main Topics    Smoking status: Never Smoker    Smokeless tobacco: Never Used    Alcohol use Yes      Comment: Socail     Drug use: No    Sexual activity: Yes     Other Topics Concern    Not on file     Social History Narrative    No narrative on file       Current Medications  Current Outpatient Prescriptions   Medication Sig Dispense Refill    fluticasone (FLONASE) 50 mcg/act nasal spray 2 sprays daily  3    sildenafil (VIAGRA) 25 MG tablet Take 25 mg by mouth daily as needed for erectile dysfunction      simvastatin (ZOCOR) 10 mg tablet       valsartan-hydrochlorothiazide (DIOVAN-HCT) 320-25 MG per tablet Take by mouth      XARELTO 20 MG tablet Take 20 mg by mouth daily  3     No current facility-administered medications for this visit  Allergies  Allergies   Allergen Reactions    Ace Inhibitors     Vancomycin        Past Medical History, Social History, Family History, medications and allergies were reviewed  Vitals  Vitals:    01/31/18 1345   BP: 132/78   Pulse: 68   Weight: 107 kg (236 lb 12 8 oz)   Height: 6' 1" (1 854 m)       Physical Exam  Physical Exam   Constitutional: He is oriented to person, place, and time  He appears well-developed and well-nourished  HENT:   Head: Normocephalic  Eyes: Pupils are equal, round, and reactive to light  Neck: Normal range of motion  Cardiovascular: Normal rate  Pulmonary/Chest: Effort normal    Abdominal: Soft  Genitourinary: Rectum normal and penis normal  Prostate is enlarged  Prostate is not tender  Musculoskeletal: Normal range of motion  Neurological: He is alert and oriented to person, place, and time  He has normal reflexes  Skin: Skin is warm  Psychiatric: He has a normal mood and affect   His behavior is normal  Judgment and thought content normal        Results  Lab Results   Component Value Date    PSA 3 0 10/27/2016    PSA 3 1 04/12/2016    PSA 2 3 06/21/2014     Lab Results   Component Value Date    GLUCOSE 103 03/01/2017    CALCIUM 9 1 03/01/2017     03/01/2017    K 3 9 03/01/2017    CO2 30 03/01/2017     03/01/2017    BUN 11 03/01/2017    CREATININE 0 84 03/01/2017     Lab Results   Component Value Date    WBC 8 01 03/01/2017    HGB 15 0 03/01/2017    HCT 43 1 03/01/2017    MCV 93 03/01/2017     03/01/2017     Recent Results (from the past 1 hour(s))   POCT urine dip    Collection Time: 01/31/18  1:56 PM   Result Value Ref Range     COLOR,UA yellow      CLARITY,UA transparent      SPECIFIC GRAVITY,UA 1 020      PH,UA 5 0     LEUKOCYTE ESTERASE,UA -      NITRITE,UA -     GLUCOSE, UA - [place] trace      Lab Results   Component Value Date    PSA 3 0 10/27/2016    PSA 3 1 04/12/2016    PSA 2 3 06/21/2014

## 2018-01-31 NOTE — PATIENT INSTRUCTIONS
Benign Prostatic Hypertrophy   WHAT YOU NEED TO KNOW:   Benign prostatic hypertrophy (BPH) is a condition that causes your prostate gland to grow larger than normal  The prostate gland is the male sex gland that produces a fluid that is part of semen  It is about the size of a walnut and it is located under the bladder  As the prostate grows, it can squeeze the urethra  This can block urine flow and cause urinary problems  DISCHARGE INSTRUCTIONS:   Medicines:   · Alpha blockers: This medicine relaxes the muscles in your prostate and bladder  It may help you urinate more easily  · 5 alpha reductase inhibitors: These medicines block the production of a hormone that causes the prostate to get larger  It may help to slow the growth of the prostate or shrink the prostate  · Take your medicine as directed  Contact your healthcare provider if you think your medicine is not helping or if you have side effects  Tell him of her if you are allergic to any medicine  Keep a list of the medicines, vitamins, and herbs you take  Include the amounts, and when and why you take them  Bring the list or the pill bottles to follow-up visits  Carry your medicine list with you in case of an emergency  Follow up with your healthcare provider as directed:  Write down your questions so you remember to ask them during your visits  Manage BPH:   · Do not let your bladder get too full before you empty it  Urinate when you feel the urge  · Limit alcohol  Do not drink large amounts of any liquid at one time  · Decrease the amount of salt you eat  Examples of salty foods are chips, cured meats, and canned soups  Do not use table salt  · Healthcare providers may tell you not to eat spicy foods such as chilli peppers  This may help you find out if spicy food makes your BPH symptoms worse  · You may have sex if you feel well  Contact your healthcare provider if:   · There is a large amount of blood in your urine  · Your signs and symptoms get worse  · You have a fever  · You have questions or concerns about your condition or care  Return to the emergency department if:   · You are unable to urinate  · Your bladder feels very full and painful  © 2017 [employer] Information is for End User's use only and may not be sold, redistributed or otherwise used for commercial purposes  All illustrations and images included in CareNotes® are the copyrighted property of A [employer] M [employer]  or K[employer]em [employer]  The above information is an  only  It is not intended as medical advice for individual conditions or treatments  Talk to your doctor, nurse or pharmacist before following any medical regimen to see if it is safe and effective for you

## 2018-01-31 NOTE — LETTER
January 31, 2018     MD Briseida Payne  [address] [address]    Patient: Norm Alfonso   YOB: 1955   Date of Visit: 1/31/2018       Dear Dr Adrienne Nava: Thank you for referring Funmilayo Owens to me for evaluation  Below are my notes for this consultation  If you have questions, please do not hesitate to call me  I look forward to following your patient along with you  Sincerely,        Yaz Leon MD        CC: No Recipients  JENNA Anna  1/31/2018  2:22 PM  Incomplete  1/31/2018    Kristin Yenangelic Coles  1955  [id number]      Assessment  -***    Discussion/Plan  Xander Christy is a 63 y o  male being managed by Dr Ravi Waller       -***  -***  -All questions answered, patient agrees with plan      History of Present Illness  63 y o  male with a history of *** presents today for follow up   ***       Review of Systems  Review of Systems   Constitutional: Negative  HENT: Negative  Respiratory: Negative  Cardiovascular: Negative  Gastrointestinal: Negative  Genitourinary: Positive for decreased urine volume and difficulty urinating (Difficulty starting urinary stream)  Negative for dysuria, flank pain, frequency (Nocturia x2), hematuria, penile pain and urgency  Musculoskeletal: Negative  Skin: Negative  Neurological: Negative  Psychiatric/Behavioral: Negative          Past Medical History  Past Medical History:   Diagnosis Date    Asthmatic bronchitis     Cervical disc disease     Cervicalgia     DVT (deep venous thrombosis) ([facility] [address] ) 2004    Erectile dysfunction     Hypertension     Screening for colon cancer     Screening for prostate cancer        Past Social History  Past Surgical History:   Procedure Laterality Date    BACK SURGERY  2004    CHOLECYSTECTOMY  2009       Past Family History  Family History   Problem Relation Age of Onset    Coronary artery disease Mother     Hypertrophic cardiomyopathy Mother     Thyroid disease Mother  Coronary artery disease Sister     Irritable bowel syndrome Sister     Crohn's disease Sister        Past Social history  Social History     Social History    Marital status: /Civil Union     Spouse name: N/A    Number of children: N/A    Years of education: N/A     Occupational History    Not on file  Social History Main Topics    Smoking status: Never Smoker    Smokeless tobacco: Never Used    Alcohol use Yes      Comment: Socail     Drug use: No    Sexual activity: Yes     Other Topics Concern    Not on file     Social History Narrative    No narrative on file       Current Medications  Current Outpatient Prescriptions   Medication Sig Dispense Refill    fluticasone (FLONASE) 50 mcg/act nasal spray 2 sprays daily  3    sildenafil (VIAGRA) 25 MG tablet Take 25 mg by mouth daily as needed for erectile dysfunction      simvastatin (ZOCOR) 10 mg tablet       valsartan-hydrochlorothiazide (DIOVAN-HCT) 320-25 MG per tablet Take by mouth      XARELTO 20 MG tablet Take 20 mg by mouth daily  3     No current facility-administered medications for this visit  Allergies  Allergies   Allergen Reactions    Ace Inhibitors     Vancomycin        Past Medical History, Social History, Family History, medications and allergies were reviewed  Vitals  Vitals:    01/31/18 1345   BP: 132/78   Pulse: 68   Weight: 107 kg (236 lb 12 8 oz)   Height: 6' 1" (1 854 m)       Physical Exam  Physical Exam   Constitutional: He is oriented to person, place, and time  He appears well-developed and well-nourished  HENT:   Head: Normocephalic  Eyes: Pupils are equal, round, and reactive to light  Neck: Normal range of motion  Cardiovascular: Normal rate  Pulmonary/Chest: Effort normal    Abdominal: Soft  Genitourinary: Rectum normal  Prostate is enlarged  Prostate is not tender  Musculoskeletal: Normal range of motion  Neurological: He is alert and oriented to person, place, and time   He has normal reflexes  Skin: Skin is warm  Psychiatric: He has a normal mood and affect   His behavior is normal  Judgment and thought content normal        Results  Lab Results   Component Value Date    PSA 3 0 10/27/2016    PSA 3 1 04/12/2016    PSA 2 3 06/21/2014     Lab Results   Component Value Date    GLUCOSE 103 03/01/2017    CALCIUM 9 1 03/01/2017     03/01/2017    K 3 9 03/01/2017    CO2 30 03/01/2017     03/01/2017    BUN 11 03/01/2017    CREATININE 0 84 03/01/2017     Lab Results   Component Value Date    WBC 8 01 03/01/2017    HGB 15 0 03/01/2017    HCT 43 1 03/01/2017    MCV 93 03/01/2017     03/01/2017     Recent Results (from the past 1 hour(s))   POCT urine dip    Collection Time: 01/31/18  1:56 PM   Result Value Ref Range     COLOR,UA yellow      CLARITY,UA transparent      SPECIFIC GRAVITY,UA 1 020      PH,UA 5 0     LEUKOCYTE ESTERASE,UA -      NITRITE,UA -     GLUCOSE, UA -      Vertell Kyle -      BLOOD,UA trace      Lab Results   Component Value Date    PSA 3 0 10/27/2016    PSA 3 1 04/12/2016    PSA 2 3 06/21/2014

## 2018-01-31 NOTE — LETTER
January 31, 2018     MD Briseida Freitas  [address] [address]    Patient: Mateo Hutchinson   YOB: 1955   Date of Visit: 1/31/2018       Dear Dr Mega Martin: Thank you for referring Annia Williamson to me for evaluation  Below are my notes for this consultation  If you have questions, please do not hesitate to call me  I look forward to following your patient along with you  Sincerely,        Roslyn Owens MD        CC: No Recipients  JENNA Rowley  1/31/2018  3:20 PM  Attested  1/31/2018    Moses Coles  1955  [id number]      Assessment  -Lower urinary symptoms due to BPH  -Peyronie's Disease  -Erectile dysfunction  -Elevated BPH    Discussion/Plan  Mian Roberson is a 63 y o  male referred by PCP, Dr Mega Martin, for incomplete bladder emptying      BPH: We discussed in detail the medication options, with alpha blockers and Cialis, for treating BPH and his lower urinary symptoms, patient deferring medication at this time as symptoms are not bothersome  ED: Patient will continue taking sildenafil for erectile dysfunction  We reviewed providing patient with generic prescription, including potential risks and benefits  Peyronie's Disease: Discussed peyronie's disease in detail, including causes and treatment options  We will continue to monitor, and follow up in 1 year to assess for any changes or worsening condition  Elevated PSA: We reviewed patient's PSA, and will repeat in 3 months  If PSA remains elevated, we discussed potential in office biopsy  -All questions answered, patient and wife agrees with plan      History of Present Illness  63 y o  male with a history of lower urinary symptoms, erectile dysfunction, and elevated PSA presenting today as new consult  Patient states having urinary hesitancy, difficulty starting stream, and nocturia x1 for the last several years  He denies any previous history of enlarged prostate, and has not been treated before    His PSA in 2016 was 3 1  He states PSA was drawn November 2017 which was reported to be 4 0  He denies any significant history of prostate cancer  Complaints of obtaining and sustaining erections, with reports of slight penile curvature to dorsal right side  He denies any pain with erections, or difficulty with voiding and intercourse  Patient was provided with samples of Viagra from PCP, which he states has been successful  Review of Systems  Review of Systems   Constitutional: Negative  HENT: Negative  Respiratory: Negative  Cardiovascular: Negative  Gastrointestinal: Negative  Genitourinary: Positive for decreased urine volume and difficulty urinating (Difficulty starting urinary stream, hesistancy)  Negative for dysuria, flank pain, frequency (Nocturia x2), hematuria, penile pain and urgency  Musculoskeletal: Negative  Skin: Negative  Neurological: Negative  Psychiatric/Behavioral: Negative  Past Medical History  Past Medical History:   Diagnosis Date    Asthmatic bronchitis     Cervical disc disease     Cervicalgia     DVT (deep venous thrombosis) ([facility] [address] ) 2004    Erectile dysfunction     Hypertension     Screening for colon cancer     Screening for prostate cancer        Past Social History  Past Surgical History:   Procedure Laterality Date    BACK SURGERY  2004    CHOLECYSTECTOMY  2009       Past Family History  Family History   Problem Relation Age of Onset    Coronary artery disease Mother     Hypertrophic cardiomyopathy Mother     Thyroid disease Mother     Coronary artery disease Sister     Irritable bowel syndrome Sister     Crohn's disease Sister        Past Social history  Social History     Social History    Marital status: /Civil Union     Spouse name: N/A    Number of children: N/A    Years of education: N/A     Occupational History    Not on file       Social History Main Topics    Smoking status: Never Smoker    Smokeless tobacco: Never Used    Alcohol use Yes      Comment: Socail     Drug use: No    Sexual activity: Yes     Other Topics Concern    Not on file     Social History Narrative    No narrative on file       Current Medications  Current Outpatient Prescriptions   Medication Sig Dispense Refill    fluticasone (FLONASE) 50 mcg/act nasal spray 2 sprays daily  3    sildenafil (VIAGRA) 25 MG tablet Take 25 mg by mouth daily as needed for erectile dysfunction      simvastatin (ZOCOR) 10 mg tablet       valsartan-hydrochlorothiazide (DIOVAN-HCT) 320-25 MG per tablet Take by mouth      XARELTO 20 MG tablet Take 20 mg by mouth daily  3     No current facility-administered medications for this visit  Allergies  Allergies   Allergen Reactions    Ace Inhibitors     Vancomycin        Past Medical History, Social History, Family History, medications and allergies were reviewed  Vitals  Vitals:    01/31/18 1345   BP: 132/78   Pulse: 68   Weight: 107 kg (236 lb 12 8 oz)   Height: 6' 1" (1 854 m)       Physical Exam  Physical Exam   Constitutional: He is oriented to person, place, and time  He appears well-developed and well-nourished  HENT:   Head: Normocephalic  Eyes: Pupils are equal, round, and reactive to light  Neck: Normal range of motion  Cardiovascular: Normal rate  Pulmonary/Chest: Effort normal    Abdominal: Soft  Genitourinary: Rectum normal and penis normal  Prostate is enlarged  Prostate is not tender  Musculoskeletal: Normal range of motion  Neurological: He is alert and oriented to person, place, and time  He has normal reflexes  Skin: Skin is warm  Psychiatric: He has a normal mood and affect   His behavior is normal  Judgment and thought content normal        Results  Lab Results   Component Value Date    PSA 3 0 10/27/2016    PSA 3 1 04/12/2016    PSA 2 3 06/21/2014     Lab Results   Component Value Date    GLUCOSE 103 03/01/2017    CALCIUM 9 1 03/01/2017     03/01/2017    K 3 9 03/01/2017    CO2 30 03/01/2017     03/01/2017    BUN 11 03/01/2017    CREATININE 0 84 03/01/2017     Lab Results   Component Value Date    WBC 8 01 03/01/2017    HGB 15 0 03/01/2017    HCT 43 1 03/01/2017    MCV 93 03/01/2017     03/01/2017     Recent Results (from the past 1 hour(s))   POCT urine dip    Collection Time: 01/31/18  1:56 PM   Result Value Ref Range     COLOR,UA yellow      CLARITY,UA transparent      SPECIFIC GRAVITY,UA 1 020      PH,UA 5 0     LEUKOCYTE ESTERASE,UA -      NITRITE,UA -     GLUCOSE, UA -      Ali Kind -      BLOOD,UA trace      Lab Results   Component Value Date    PSA 3 0 10/27/2016    PSA 3 1 04/12/2016    PSA 2 3 06/21/2014                         Attestation signed by Tonia Lam MD at 1/31/2018  4:43 PM:  I saw and examined the patient with the nurse practitioner  I supervised the nurse practitioner throughout the visit  I agree with the findings and plan above

## 2018-02-22 ENCOUNTER — OFFICE VISIT (OUTPATIENT)
Dept: BARIATRICS | Facility: CLINIC | Age: 63
End: 2018-02-22
Payer: COMMERCIAL

## 2018-02-22 VITALS
HEART RATE: 76 BPM | RESPIRATION RATE: 16 BRPM | SYSTOLIC BLOOD PRESSURE: 134 MMHG | DIASTOLIC BLOOD PRESSURE: 86 MMHG | BODY MASS INDEX: 31.95 KG/M2 | WEIGHT: 241.06 LBS | HEIGHT: 73 IN | TEMPERATURE: 97.6 F

## 2018-02-22 DIAGNOSIS — I10 ESSENTIAL HYPERTENSION: ICD-10-CM

## 2018-02-22 DIAGNOSIS — E66.9 OBESITY, CLASS I, BMI 30-34.9: ICD-10-CM

## 2018-02-22 DIAGNOSIS — R73.01 IMPAIRED FASTING GLUCOSE: ICD-10-CM

## 2018-02-22 DIAGNOSIS — E78.5 HYPERLIPIDEMIA, UNSPECIFIED HYPERLIPIDEMIA TYPE: ICD-10-CM

## 2018-02-22 DIAGNOSIS — R63.5 WEIGHT GAIN: Primary | ICD-10-CM

## 2018-02-22 DIAGNOSIS — R29.818 SUSPECTED SLEEP APNEA: ICD-10-CM

## 2018-02-22 PROBLEM — Z86.711 HISTORY OF PULMONARY EMBOLISM: Status: ACTIVE | Noted: 2018-02-22

## 2018-02-22 PROBLEM — E66.811 OBESITY, CLASS I, BMI 30-34.9: Status: ACTIVE | Noted: 2018-02-22

## 2018-02-22 PROCEDURE — 99204 OFFICE O/P NEW MOD 45 MIN: CPT | Performed by: INTERNAL MEDICINE

## 2018-02-22 NOTE — ASSESSMENT & PLAN NOTE
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

## 2018-02-22 NOTE — ASSESSMENT & PLAN NOTE
-Discussed options of [facility]-Intensive Lifestyle Intervention Program, Very Low Calorie Diet-VLCD and Conservative Program and the role of weight loss medications   -Initial weight loss goal of 5-10% weight loss for improved health  -Screening labs-reviewed from 11/2017 and within acceptable limits  -Patient is interested in pursuing [facility]-alternate

## 2018-02-22 NOTE — PROGRESS NOTES
Assessment/Plan:    Hyperlipidemia  Stable on statin, LDL, HDL, trig reviewed from 11/2017    Essential hypertension  Stable on valsartan/hctz    Impaired fasting glucose  Gluc 103  Consider metformin  Lifestyle changes for now  Obesity, Class I, BMI 30-34 9  -Discussed options of Healthy[facility]-Intensive Lifestyle Intervention Program, Very Low Calorie Diet-VLCD and Conservative Program and the role of weight loss medications   -Initial weight loss goal of 5-10% weight loss for improved health  -Screening labs-reviewed from 11/2017 and within acceptable limits  -Patient is interested in pursuing         History of pulmonary embolism  2004 w/ DVT/PE  Stopped coumadin and had a repeat DVT  Suspected due to inactive lifestyle and currently  Now on Xarelto        Follow up with Non-Surgical Dietician for HealthyCORE-alternate start, 6 week medical provider follow up  Diagnoses and all orders for this visit:    Weight gain    Impaired fasting glucose    Essential hypertension    Hyperlipidemia, unspecified hyperlipidemia type    Obesity, Class I, BMI 30-34 9          Subjective:   Chief Complaint   Patient presents with    Consult     Pt is here for MWM consult       Patient ID: Adán Severe  is a 63 y o  male with excess weight here to pursue weight managment      Past Medical History:   Diagnosis Date    Acute maxillary sinusitis     Asthmatic bronchitis     Benign prostatic hyperplasia with urinary incontinence without sensory awareness     Cervical disc disease     Cervicalgia     Chest pain     DVT (deep venous thrombosis) ([facility] [address] ) 2004    Erectile dysfunction     Fatigue     Hypercholesterolemia     Hyperlipidemia     Hypertension     IFG (impaired fasting glucose)     Screening for colon cancer     Screening for prostate cancer          HPI:  Obesity/Excess Weight:  Severity: Mild  Onset:  Past 20 years    Modifiers: Diet and Exercise  Contributing factors: Poor Food Choices and Insufficient Physical Activity  Associated symptoms: fatigue and increased joint pain    Goals: feel better, get off medications,keep     The following portions of the patient's history were reviewed and updated as appropriate: allergies, current medications, past family history, past medical history, past social history, past surgical history and problem list     Review of Systems   Constitutional: Negative for fever  HENT: Positive for sore throat  Respiratory: Positive for shortness of breath (w/ exertion)  Cardiovascular: Negative for chest pain and palpitations  Gastrointestinal: Negative for abdominal pain, constipation and diarrhea  Endocrine: Negative for cold intolerance  Genitourinary: Positive for difficulty urinating  Musculoskeletal: Positive for arthralgias  Skin: Negative for rash  Neurological: Negative for dizziness and headaches  Psychiatric/Behavioral: The patient is not nervous/anxious  Objective:     Physical Exam   Constitutional: He is oriented to person, place, and time  He appears well-developed and well-nourished  HENT:   Head: Normocephalic and atraumatic  Eyes: Conjunctivae are normal    Neck: Normal range of motion  Neck supple  Cardiovascular: Normal rate, regular rhythm and normal heart sounds  Pulmonary/Chest: Effort normal and breath sounds normal    Abdominal: Soft  There is no tenderness  Musculoskeletal: Normal range of motion  He exhibits edema  Neurological: He is alert and oriented to person, place, and time  Psychiatric: He has a normal mood and affect  Nursing note and vitals reviewed

## 2018-02-28 ENCOUNTER — OFFICE VISIT (OUTPATIENT)
Dept: BARIATRICS | Facility: CLINIC | Age: 63
End: 2018-02-28

## 2018-02-28 VITALS — HEIGHT: 73 IN | BODY MASS INDEX: 31.7 KG/M2 | WEIGHT: 239.2 LBS

## 2018-02-28 DIAGNOSIS — R63.5 ABNORMAL WEIGHT GAIN: ICD-10-CM

## 2018-02-28 PROCEDURE — RECHECK

## 2018-02-28 PROCEDURE — WMPRO12

## 2018-03-01 DIAGNOSIS — I10 ESSENTIAL (PRIMARY) HYPERTENSION: ICD-10-CM

## 2018-03-12 ENCOUNTER — LAB (OUTPATIENT)
Dept: LAB | Facility: HOSPITAL | Age: 63
End: 2018-03-12
Attending: INTERNAL MEDICINE
Payer: COMMERCIAL

## 2018-03-12 DIAGNOSIS — I10 ESSENTIAL (PRIMARY) HYPERTENSION: ICD-10-CM

## 2018-03-12 LAB
ALBUMIN SERPL BCP-MCNC: 3.8 G/DL (ref 3.5–5)
ALP SERPL-CCNC: 35 U/L (ref 46–116)
ALT SERPL W P-5'-P-CCNC: 53 U/L (ref 12–78)
ANION GAP SERPL CALCULATED.3IONS-SCNC: 7 MMOL/L (ref 4–13)
AST SERPL W P-5'-P-CCNC: 38 U/L (ref 5–45)
BILIRUB SERPL-MCNC: 0.9 MG/DL (ref 0.2–1)
BUN SERPL-MCNC: 16 MG/DL (ref 5–25)
CALCIUM SERPL-MCNC: 8.7 MG/DL (ref 8.3–10.1)
CHLORIDE SERPL-SCNC: 101 MMOL/L (ref 100–108)
CHOLEST SERPL-MCNC: 130 MG/DL (ref 50–200)
CO2 SERPL-SCNC: 31 MMOL/L (ref 21–32)
CREAT SERPL-MCNC: 0.86 MG/DL (ref 0.6–1.3)
GFR SERPL CREATININE-BSD FRML MDRD: 94 ML/MIN/1.73SQ M
GLUCOSE P FAST SERPL-MCNC: 90 MG/DL (ref 65–99)
HDLC SERPL-MCNC: 43 MG/DL (ref 40–60)
LDLC SERPL CALC-MCNC: 73 MG/DL (ref 0–100)
POTASSIUM SERPL-SCNC: 3.7 MMOL/L (ref 3.5–5.3)
PROT SERPL-MCNC: 6.6 G/DL (ref 6.4–8.2)
SODIUM SERPL-SCNC: 139 MMOL/L (ref 136–145)
TRIGL SERPL-MCNC: 69 MG/DL
TSH SERPL DL<=0.05 MIU/L-ACNC: 2.29 UIU/ML (ref 0.36–3.74)

## 2018-03-12 PROCEDURE — 36415 COLL VENOUS BLD VENIPUNCTURE: CPT

## 2018-03-12 PROCEDURE — 84443 ASSAY THYROID STIM HORMONE: CPT

## 2018-03-12 PROCEDURE — 80061 LIPID PANEL: CPT

## 2018-03-12 PROCEDURE — 80053 COMPREHEN METABOLIC PANEL: CPT

## 2018-03-15 ENCOUNTER — OFFICE VISIT (OUTPATIENT)
Dept: BARIATRICS | Facility: CLINIC | Age: 63
End: 2018-03-15

## 2018-03-15 VITALS — HEIGHT: 73 IN | WEIGHT: 232.1 LBS | BODY MASS INDEX: 30.76 KG/M2

## 2018-03-15 DIAGNOSIS — R63.5 ABNORMAL WEIGHT GAIN: Primary | ICD-10-CM

## 2018-03-15 PROCEDURE — RECHECK

## 2018-03-15 NOTE — PROGRESS NOTES
Weight Management Medical Nutrition Assessment  Is here for f/u  Current wt: 232 1 lbs  Loss of 7 1 lbs x 2 wks  Did well with travel this week despite being in a hotel  Was able to choose fruit vs  Chocolate and candy at work seminar  Going to bed early as was his goal  Reports some difficulty with bowels  Prior to start of program was eating raisin bran and then started non fiber containing replacement which may be contributing to this  Recommend he switch to fiber containing replacement which he was accepting of  Anthropometric Measurements  Start Weight (lbs): 239 2 lbs  Today: 232 1 lbs   Ideal Body Weight (lbs): 184 lbs  Goal Weight (lbs): 200 lbs  Highest: 241 lbs, Lowest: 215 lbs    Weight Loss History  Previous weight loss attempts: High Protein/Low CHO diets (Atkins, Union, etc )    Food and Nutrition Related History  Wake up: 6:00  Breakfast: 7-7:30: replacement, lactaid  Snack: 10: pb on low chacho bread OR nuts OR yogurt OR cottage cheese OR bar  Lunch: 12-1: meal replacement + salad OR when out chicken caesar salad  Snack: as above  Dinner: 6 p m : 6 oz lean protein, small baked potatoes, 1/2 cup veggies, low fat sour cream  Snack: 8 p m  As above    Go to bed: 11-11:30  Beverages: water, diet soda and coffee/tea  Volume of beverage intake: >80 oz water, 2-5 cups coffee black  Weekends: Same, sometimes easier    Cravings: 8 p m  Frequency of Eating out: depends on schedule   Food restrictions: lactose intol to milk & ice cream  Cooking:wife  Food Shopping: wife    Physical Activity Intake  Activity: n/a  Frequency:never  Physical limitations/barriers to exercise: n/a    Estimated Needs  [employer] Needs: 1917, wt loss w/o exercise 1290-8792 calories  Protein: 100-125 gm (1 2-1 5g/kg IBW)  Fluid:98 oz (35mL/kg IBW)    Nutrition Diagnosis  Yes;     Overweight/obesity  related to Excess energy intake as evidenced by  BMI more than normative standard for age and sex (obesity-grade I 30-34  9)       Nutrition Intervention  Meal Plan  Breakfast: replacement  Snack: food snack or bar  Lunch: replacement  Snack: food snack or bar  Dinner: 6 oz lean pro, 2 carbs, veggies, 2 fat  Snack: food snack or bar    Nutrition Education: Healthy Core    Nutrition Counseling:  Strategies: meal planning, portion sizes, healthy snack choices, hydration, fiber intake, protein intake, exercise, food journal     Monitoring and Evaluation:  Evaluation criteria:   Portions: measuring carb  Dining out: making better choices  Exercise: evaluating day to determine where exercise can be incorporated    Barriers to learning:none  Readiness to change: Preparation  Comprehension: good  Expected Compliance: good

## 2018-03-29 NOTE — PROGRESS NOTES
Weight Management Medical Nutrition Assessment  Is here for f/u  Current wt: 227 3 lbs  Loss of  11 9 lbs x 4 wks  Traveled this week and reports some undesirable choices but in hindsight did make good choices as compared to the past  Difficulty continues with bowels despite using fiber containing supplement  Recommend he go back to Raisin Bran but portion out  Discuss high protein milk in conjunction  Also discuss he can do this every other day which may be enough to help with bowels  Anthropometric Measurements  Start Weight (lbs): 239 2 lbs  Today:  227 3  Lbs  %TBW Loss: 5%   Ideal Body Weight (lbs): 184 lbs  Goal Weight (lbs): 200 lbs  Highest: 241 lbs, Lowest: 215 lbs    Weight Loss History  Previous weight loss attempts: High Protein/Low CHO diets (Atkins, Union, etc )    Food and Nutrition Related History  Wake up: 6:00  Breakfast: 7-7:30: replacement, lactaid  Snack: 10:   yogurt w/nuts OR cottage cheese    Lunch: 12-1: meal replacement + bread OR when out chicken caesar salad  Snack: as above  Dinner: 6 p m : 6 oz lean protein, small baked potatoes, 1/2 cup veggies, low fat sour cream OR prepared meal from [employer]  Snack: 8 p m  skip    Go to bed: 11-11:30  Beverages: water, diet soda and coffee/tea  Volume of beverage intake: >80 oz water, 2-5 cups coffee black  Weekends: Same, sometimes easier    Cravings: 8 p m better  Frequency of Eating out: depends on schedule   Food restrictions: lactose intol to milk & ice cream  Cooking:wife  Food Shopping: wife    Physical Activity Intake  Activity: n/a  Frequency:never  Physical limitations/barriers to exercise: n/a    Estimated Needs  [employer] Needs:  1895, wt loss w/o exercise 2370-2668  calories  Protein: 100-125 gm (1 2-1 5g/kg IBW)  Fluid:98 oz (35mL/kg IBW)    Nutrition Diagnosis  Yes;     Overweight/obesity  related to Excess energy intake as evidenced by  BMI more than normative standard for age and sex (obesity-grade I 30-34  9)       Nutrition Intervention  Meal Plan  Breakfast: replacement OR 1 cup raisin bran w/1 cup low fat lactaid or fairlife  Snack: food snack or bar  Lunch: replacement  Snack: food snack or bar  Dinner: 6 oz lean pro, 2 carbs, veggies, 2 fat  Snack: food snack or bar    Nutrition Education: Healthy Core    Nutrition Counseling:  Strategies: meal planning, portion sizes, healthy snack choices, hydration, fiber intake, protein intake, exercise, food journal     Monitoring and Evaluation:  Evaluation criteria:   Portions: measuring carb  Dining out: making better choices  Exercise: evaluating day to determine where exercise can be incorporated  Bowel improvement    Barriers to learning:none  Readiness to change: Action  Comprehension: good  Expected Compliance: good

## 2018-03-30 ENCOUNTER — OFFICE VISIT (OUTPATIENT)
Dept: BARIATRICS | Facility: CLINIC | Age: 63
End: 2018-03-30

## 2018-03-30 VITALS — WEIGHT: 227.3 LBS | HEIGHT: 73 IN | BODY MASS INDEX: 30.12 KG/M2

## 2018-03-30 DIAGNOSIS — R63.5 ABNORMAL WEIGHT GAIN: Primary | ICD-10-CM

## 2018-03-30 PROCEDURE — RECHECK

## 2018-04-03 ENCOUNTER — OFFICE VISIT (OUTPATIENT)
Dept: SLEEP CENTER | Facility: HOSPITAL | Age: 63
End: 2018-04-03

## 2018-04-03 VITALS
BODY MASS INDEX: 30.3 KG/M2 | HEART RATE: 72 BPM | WEIGHT: 228.6 LBS | SYSTOLIC BLOOD PRESSURE: 120 MMHG | HEIGHT: 73 IN | DIASTOLIC BLOOD PRESSURE: 82 MMHG

## 2018-04-03 DIAGNOSIS — N52.9 ERECTILE DYSFUNCTION: ICD-10-CM

## 2018-04-03 DIAGNOSIS — R73.01 IMPAIRED FASTING GLUCOSE: ICD-10-CM

## 2018-04-03 DIAGNOSIS — J31.0 CHRONIC RHINITIS: ICD-10-CM

## 2018-04-03 DIAGNOSIS — I10 ESSENTIAL HYPERTENSION: ICD-10-CM

## 2018-04-03 DIAGNOSIS — R29.818 SUSPECTED SLEEP APNEA: Primary | ICD-10-CM

## 2018-04-03 DIAGNOSIS — E66.9 OBESITY, CLASS I, BMI 30-34.9: ICD-10-CM

## 2018-04-03 PROBLEM — R63.5 WEIGHT GAIN: Status: ACTIVE | Noted: 2018-04-03

## 2018-04-03 NOTE — PROGRESS NOTES
Consultation - Sleep Center   Vic Coles  63 y o  male  DFM:[birth year]  DZV:[id number]    Physician Requesting Consult: Citlalli Rivas MD     Reason for Consult : [At your kind request] I saw this patient for initial sleep evaluation today  The patient is here to evaluate for suspected Obstructive Sleep Apnea  Medications, Past, Family and Social Histories were reviewed  He  has a past medical history of Acute maxillary sinusitis; Asthmatic bronchitis; Benign prostatic hyperplasia with urinary incontinence without sensory awareness; Cervical disc disease; Cervicalgia; Chest pain; DVT (deep venous thrombosis) ([facility] [address] ) (2004); Erectile dysfunction; Fatigue; Hypercholesterolemia; Hyperlipidemia; Hypertension; IFG (impaired fasting glucose); Screening for colon cancer; and Screening for prostate cancer  He has a current medication list which includes the following prescription(s): fluticasone, sildenafil, simvastatin, valsartan-hydrochlorothiazide, and xarelto  HPI:  He reports loud snoring that started a year ago in association with weight gain, but states it has significantly decrease since 10 lb intentional weight loss in the past 6 weeks  He has never experienced or been told about breathing difficulties during sleep  He reported no features of movement disorder of parasomnia  Sleep Routine:  He is spending 8 hr in bed  He typically falls asleep in 30 min  Sleep is interrupted no more than once  He awakens spontaneously and is not always refreshed  He had excessive daytime drowsiness in the past and would doze off whenever sedentary but states this no longer occurs  He rated himself at Total score: 6 /24 on the [employer] Sleepiness Scale  Habits:  [ reports that he has never smoked  He has never used smokeless tobacco ], [ reports that he drinks alcohol ], [ reports that he does not use drugs ], [he uses moderate amount of caffeine until around 3:00 p m    He does not exercise but plans to start a program shortly ]   Family History:  [Negative for sleep disturbance ]    ROS: reviewed & as attached  [Significant for nasal congestion, postnasal drip and frequent sinus headaches  Presently he is not reporting any respiratory symptoms  He reports only occasional difficulty with concentration or morning headaches ]     EXAM: Blood pressure 120/82, pulse 72, height 6' 1" (1 854 m), weight 104 kg (228 lb 9 6 oz)  Body mass index is 30 16 kg/m²  General:This is a [well] groomed male, well appearing [at] their stated age, in no distress  Speech is clear and coherent  Psych: Alert, orientated & cooperative  Mental state appears normal Judgement & insight [are good]  Extremities:Skin is warm and dry  Color and hydration are good  There is [no] pedal edema  Head and neck: Craniofacial anatomy slight prognathia  TMJ is normal   There is no sinus tenderness  Neck Circumference: 42 cm,  [appears thick] [& there is extra fatty tissue]  There [are no abnormal masses or] Lymhadenopathy  Thyroid is [normal]  Trachea is [central]  Nasal airway: The nares are narrow  Septum is [central ] Mucous membranes appeared [normal]  Turbinates are [normal ]  There is [no] rhinorrhea  Oral airway: [is crowded ] Base of tongue is at Modified Mallampati class [IV]  Hard palate [appears normal]  Soft palate [is redundant]  There is [no] tonsillar hypertrophy  [place] are normal]  CVS: Heart sounds are [regular]  There [are no] murmur[s]  Resp: Effort is [normal]  Air entry is [good] bilaterally  There are [no] wheezes or rales  Abdomen: obese, soft & non-tender  CNS: is intact  Gait is normal  Rombergs is negative  MSK: Muscle bulk, tone and power are [normal]  IMPRESSION:     1  Suspected sleep apnea  Ambulatory referral to Sleep Medicine   2  Chronic rhinitis     3  Obesity, Class I, BMI 30-34 9  Ambulatory referral to Sleep Medicine   4  Essential hypertension  Ambulatory referral to Sleep Medicine   5  Impaired fasting glucose  Ambulatory referral to Sleep Medicine   6  Erectile dysfunction      7  comorbidities as outlined above    PLAN:   1  With respect to above conditions, I counseled on pathophysiology, diagnosis, treatment options, risks and benefits; interrelationship and effects on symptoms and comorbidities; risks of no treatment; costs and insurance aspects  2  I advised on weight reduction, avoiding sleeping supine, using alcohol or sedating medications close to bed time and on safe driving practices  3  Nocturnal polysomnography is necessary to evaluate for sleep disordered breathing  However, in view of improvement of his symptoms, he declined a diagnostic study at this time  4  He plans to continue with his efforts at weight reduction  5   I also advised adding regular nasal saline rinse prior to using Flonase  If symptoms persists, he may warrant ENT evaluation  6   He wanted to Follow-up in around 6 months and will consider further evaluation if symptoms persist or escalate  Thank you for allowing me to participate in the care of this patient  I will keep you apprised of developments      Sincerely,    Brian Castellanos MD  Board Certified Specialist

## 2018-04-03 NOTE — PROGRESS NOTES
Review of Systems      Genitourinary erectile dysfunction   Cardiology none   Gastrointestinal none   Neurology headaches and decreased concentration   Constitutional weight change of 10 or more pounds in the past year loss of 10 pounds   Integumentary none   Psychiatry anxiety and irritability   Musculoskeletal none   Pulmonary none   ENT nasal or sinus congestion, post nasal drip and ringing in ears   Endocrine none   Hematological none

## 2018-04-11 ENCOUNTER — OFFICE VISIT (OUTPATIENT)
Dept: BARIATRICS | Facility: CLINIC | Age: 63
End: 2018-04-11
Payer: COMMERCIAL

## 2018-04-11 VITALS
BODY MASS INDEX: 29.33 KG/M2 | DIASTOLIC BLOOD PRESSURE: 82 MMHG | HEART RATE: 82 BPM | RESPIRATION RATE: 16 BRPM | HEIGHT: 73 IN | WEIGHT: 221.31 LBS | SYSTOLIC BLOOD PRESSURE: 124 MMHG | TEMPERATURE: 97.6 F

## 2018-04-11 DIAGNOSIS — R63.5 ABNORMAL WEIGHT GAIN: ICD-10-CM

## 2018-04-11 DIAGNOSIS — I10 ESSENTIAL HYPERTENSION: Primary | ICD-10-CM

## 2018-04-11 DIAGNOSIS — E66.3 OVERWEIGHT (BMI 25.0-29.9): ICD-10-CM

## 2018-04-11 PROCEDURE — 99214 OFFICE O/P EST MOD 30 MIN: CPT | Performed by: INTERNAL MEDICINE

## 2018-04-11 NOTE — PATIENT INSTRUCTIONS
Minimum calories 8546-7399 calories  Get in at least 64oz of water  25-35 grams of fiber  Walk 3 days per week 15-20 minutes

## 2018-04-11 NOTE — PROGRESS NOTES
Assessment/Plan:    Overweight (BMI 25 0-29 9)  -Patient is pursuing Healthy[facility]-Intensive Lifestyle Intervention Program  -Initial weight loss goal of 5-10% weight loss for improved health (met)  -Obese-->overweight    Initial: 241 1  Current: 221 5  Change: -19 6 lbs (8%)        Essential hypertension  Stable on valsartan/hctz  Denies dizziness or lightheadedness  Continue  Follow up in approximately 2 months with Non-Surgical Physician/Advanced Practitioner  Diagnoses and all orders for this visit:    Essential hypertension    Abnormal weight gain    Overweight (BMI 25 0-29  9)          Subjective:   Chief Complaint   Patient presents with    Follow-up     Pt is here for MW follow up         Patient ID: Kym Johnson  is a 63 y o  male with excess weight/obesity here to pursue weight managment  Patient is pursuing Healthy[facility]-Intensive Lifestyle Intervention Program      HPI    Stopped extra alcohol and extra snacks  Travel has been a struggle  Doing well, voices no complaints      B-shake  L-bar  D-frozen meals-healthy choice    The following portions of the patient's history were reviewed and updated as appropriate: allergies, current medications, past family history, past medical history, past social history, past surgical history and problem list     Review of Systems   Respiratory: Negative for shortness of breath  Cardiovascular: Negative for chest pain and palpitations  Gastrointestinal: Positive for constipation  Neurological: Negative for dizziness and headaches  Objective:    /82 (BP Location: Left arm, Patient Position: Sitting, Cuff Size: Large)   Pulse 82   Temp 97 6 °F (36 4 °C) (Tympanic)   Resp 16   Ht 6' 1" (1 854 m)   Wt 100 kg (221 lb 5 oz)   BMI 29 20 kg/m²      Physical Exam   Constitutional: He is oriented to person, place, and time  He appears well-developed and well-nourished  HENT:   Head: Normocephalic and atraumatic     Eyes: Conjunctivae are normal    Neck: Normal range of motion  Neck supple  Cardiovascular: Normal rate, regular rhythm and normal heart sounds  Pulmonary/Chest: Effort normal and breath sounds normal    Abdominal: Soft  There is no tenderness  Musculoskeletal: Normal range of motion  He exhibits no edema  Neurological: He is alert and oriented to person, place, and time  Psychiatric: He has a normal mood and affect  Nursing note and vitals reviewed

## 2018-04-11 NOTE — ASSESSMENT & PLAN NOTE
-Patient is pursuing HealthyCORE-Intensive Lifestyle Intervention Program  -Initial weight loss goal of 5-10% weight loss for improved health (met)  -Obese-->overweight    Initial: 241 1  Current: 221 5  Change: -19 6 lbs (8%)

## 2018-04-12 ENCOUNTER — TELEPHONE (OUTPATIENT)
Dept: UROLOGY | Facility: AMBULATORY SURGERY CENTER | Age: 63
End: 2018-04-12

## 2018-04-12 LAB — PSA SERPL-MCNC: 3.5 NG/ML (ref 0–4)

## 2018-04-13 ENCOUNTER — OFFICE VISIT (OUTPATIENT)
Dept: BARIATRICS | Facility: CLINIC | Age: 63
End: 2018-04-13

## 2018-04-13 VITALS — BODY MASS INDEX: 29.51 KG/M2 | HEIGHT: 73 IN | WEIGHT: 222.7 LBS

## 2018-04-13 DIAGNOSIS — I10 ESSENTIAL HYPERTENSION: Primary | ICD-10-CM

## 2018-04-13 DIAGNOSIS — R63.5 ABNORMAL WEIGHT GAIN: Primary | ICD-10-CM

## 2018-04-13 PROCEDURE — RECHECK

## 2018-04-13 RX ORDER — VALSARTAN AND HYDROCHLOROTHIAZIDE 320; 25 MG/1; MG/1
TABLET, FILM COATED ORAL
Qty: 90 TABLET | Refills: 3 | Status: SHIPPED | OUTPATIENT
Start: 2018-04-13 | End: 2018-07-17 | Stop reason: ALTCHOICE

## 2018-04-13 NOTE — PROGRESS NOTES
I have reviewed the notes, assessments, and/or procedures , I CONCUR with her/his documentation of Jayne Coles

## 2018-04-13 NOTE — PROGRESS NOTES
Weight Management Medical Nutrition Assessment  Is here for f/u  Current wt: 222 7 lbs  Loss of  16 5 lbs x 6 wks  Feeling more confident with travel, packing snacks and making good choices  Constipation appears to be improved with addition of Raisin Bran as needed  Using [employer] milk  Discussed incorporating exercise to preserve lean body mass  He will try to incorporate walking 3x/wk as recommended by MD at last visit  He will continue to use 2 replacements at this time  Sometimes finds he skips daytime snacks and is working on being more diligent with this  Typically not having HS snack and stressed importance of this if he finds he has skipped an earlier snack  Anthropometric Measurements  Start Weight (lbs): 239 2 lbs  Today:  222 7  Lbs  %TBW Loss: 7%   Ideal Body Weight (lbs): 184 lbs  Goal Weight (lbs): 200 lbs  Highest: 241 lbs, Lowest: 215 lbs    Weight Loss History  Previous weight loss attempts: High Protein/Low CHO diets (Atkins, Union, etc )    Food and Nutrition Related History  Wake up: 6:00  Breakfast: 7-7:30: replacement, lactaid OR a few times raisin bran  Snack: 10:   yogurt w/nuts OR cottage cheese  OR apple or banana  Lunch: 12-1: meal replacement + salad  Snack: as above  Dinner: 6 p m : 6 oz lean protein, some carb, veggies  Snack: 8 p m  skip    Go to bed: 11-11:30  Beverages: water, diet soda and coffee/tea  Volume of beverage intake: >80 oz water, 2-5 cups coffee black  Weekends: Same, sometimes easier    Cravings: 8 p m better  Frequency of Eating out: depends on schedule   Food restrictions: lactose intol to milk & ice cream  Cooking:wife  Food Shopping: wife    Physical Activity Intake  Activity: n/a  Frequency:never  Physical limitations/barriers to exercise: n/a    Estimated Needs  [employer] Needs:  2479, wt loss w/o exercise 1852-0154  calories  Protein: 100-125 gm (1 2-1 5g/kg IBW)  Fluid:98 oz (35mL/kg IBW)    Nutrition Diagnosis  Yes;     Overweight/obesity related to Excess energy intake as evidenced by  BMI more than normative standard for age and sex (overweight 25-29  9)       Nutrition Intervention  Meal Plan  Breakfast: replacement OR 1 cup raisin bran w/1 cup low fat lactaid or fairlife  Snack: food snack or bar  Lunch: replacement  Snack: food snack or bar  Dinner: 6 oz lean pro, 2 carbs, veggies, 2 fat  Snack: food snack or bar    Nutrition Education: Healthy Core    Nutrition Counseling:  Strategies: meal planning, portion sizes, healthy snack choices, hydration, fiber intake, protein intake, exercise, food journal     Monitoring and Evaluation:  Evaluation criteria:   Portions: measuring carb  Dining out: making better choices  Exercise: evaluating day to determine where exercise can be incorporated       Barriers to learning:none  Readiness to change: Action  Comprehension: very good  Expected Compliance: very good

## 2018-04-16 ENCOUNTER — OFFICE VISIT (OUTPATIENT)
Dept: UROLOGY | Facility: HOSPITAL | Age: 63
End: 2018-04-16
Payer: COMMERCIAL

## 2018-04-16 VITALS
WEIGHT: 224.2 LBS | DIASTOLIC BLOOD PRESSURE: 82 MMHG | SYSTOLIC BLOOD PRESSURE: 122 MMHG | BODY MASS INDEX: 29.72 KG/M2 | HEART RATE: 72 BPM | HEIGHT: 73 IN

## 2018-04-16 DIAGNOSIS — R39.11 BENIGN PROSTATIC HYPERPLASIA WITH URINARY HESITANCY: ICD-10-CM

## 2018-04-16 DIAGNOSIS — R97.20 ELEVATED PSA: ICD-10-CM

## 2018-04-16 DIAGNOSIS — Z12.5 SCREENING FOR PROSTATE CANCER: ICD-10-CM

## 2018-04-16 DIAGNOSIS — N52.9 ERECTILE DYSFUNCTION, UNSPECIFIED ERECTILE DYSFUNCTION TYPE: Primary | ICD-10-CM

## 2018-04-16 DIAGNOSIS — N40.1 BENIGN PROSTATIC HYPERPLASIA WITH URINARY HESITANCY: ICD-10-CM

## 2018-04-16 PROCEDURE — 99213 OFFICE O/P EST LOW 20 MIN: CPT | Performed by: NURSE PRACTITIONER

## 2018-04-16 RX ORDER — SILDENAFIL CITRATE 20 MG/1
TABLET ORAL
Qty: 90 TABLET | Refills: 3 | Status: SHIPPED | OUTPATIENT
Start: 2018-04-16 | End: 2021-11-10 | Stop reason: ALTCHOICE

## 2018-04-16 NOTE — PROGRESS NOTES
4/16/2018    Jayne Coles  1955  [id number]        Assessment  BPH with lower urinary tract symptoms  Peyronies disease  ED  Elevated PSA    Discussion  Jason Osborne is a 63 y o  male being managed by Dr Anaid Stanford  His PSA is 3 5, previously 4 0  His baseline PSA has been around 3 0  We discussed treatment options for his lower urinary tract symptoms  He defers medical management at this time  He was instructed on diet and hydration modifications to prevent bladder irritation  We also discussed ED treatment options  He was prescribed sildenafil 20 mg  Use and side effects reviewed  He will return in 1 year for follow-up with PSA  He was instructed to call with any worsening symptoms  All questions were answered  History of Present Illness  63 y o  male with a history of BPH, ED, peyronie's disease and elevated PSA presents today for 3 month follow up  He was last evaluated in January 2018 at which time he is found to have a PSA of 4 0  He returns today with repeat PS A  He continues to have lower urinary tract symptoms including weak stream, nocturia 1 time a night and occasional urinary urgency and hesitancy  He does not feel symptoms have worsened since his last visit  He was unable to obtained prescription for generic sildenafil due to ordering issues  He denies any changes in his overall health and is otherwise doing well  Review of Systems  Review of Systems   Constitutional: Negative  HENT: Negative  Respiratory: Negative  Cardiovascular: Negative  Gastrointestinal: Negative  Genitourinary:        As per HPI   Musculoskeletal: Negative  Skin: Negative  Neurological: Negative  Hematological: Negative  Urinary Incontinence Screening    Flowsheet Row Most Recent Value   Urinary Incontinence   Urinary Incontinence? No   Incomplete emptying? No   Urinary frequency? No   Urinary urgency? Yes   Urinary hesitancy?   Yes [OCC]   Dysuria (painful difficult urination)? No   Nocturia (waking up to use the bathroom)? Yes [1X]   Straining (having to push to go)? Yes   Weak stream?  Yes   Intermittent stream?  Yes   Post void dribbling? No          Past Medical History  Past Medical History:   Diagnosis Date    Acute maxillary sinusitis     Asthmatic bronchitis     Benign prostatic hyperplasia with urinary incontinence without sensory awareness     Cervical disc disease     Cervicalgia     Chest pain     DVT (deep venous thrombosis) ([address] ) 2004    Erectile dysfunction     Fatigue     Hypercholesterolemia     Hyperlipidemia     Hypertension     IFG (impaired fasting glucose)     Screening for colon cancer     Screening for prostate cancer        Past Surgical History  Past Surgical History:   Procedure Laterality Date    BACK SURGERY  2004    CHOLECYSTECTOMY  2009        Past Family History  Family History   Problem Relation Age of Onset    Coronary artery disease Mother     Hypertrophic cardiomyopathy Mother     Thyroid disease Mother     Coronary artery disease Sister     Irritable bowel syndrome Sister     Crohn's disease Sister        Past Social history  Social History     Social History    Marital status: /Civil Union     Spouse name: N/A    Number of children: N/A    Years of education: N/A     Occupational History    Not on file       Social History Main Topics    Smoking status: Never Smoker    Smokeless tobacco: Never Used    Alcohol use Yes      Comment: SOCIAL    Drug use: No    Sexual activity: Yes     Other Topics Concern    Not on file     Social History Narrative    No narrative on file       Current Medications  Current Outpatient Prescriptions   Medication Sig Dispense Refill    fluticasone (FLONASE) 50 mcg/act nasal spray 2 sprays as needed    3    simvastatin (ZOCOR) 10 mg tablet daily        valsartan-hydrochlorothiazide (DIOVAN-HCT) 320-25 MG per tablet TAKE 1 TABLET DAILY PATIENTMAKE APPOINTMENT FOR JULY 90 tablet 3    XARELTO 20 MG tablet Take 20 mg by mouth daily  3    sildenafil (REVATIO) 20 mg tablet Use 3 to 5 tablets as needed 90 tablet 3     No current facility-administered medications for this visit  Allergies  Allergies   Allergen Reactions    Vancomycin Rash     Peeling of skin    Ace Inhibitors Cough       Past Medical History, Social History, Family History, medications and allergies were reviewed  Vitals  Vitals:    04/16/18 0915   BP: 122/82   Pulse: 72   Weight: 102 kg (224 lb 3 2 oz)   Height: 6' 1" (1 854 m)       Physical Exam  Skin: warm, dry, intact  Pulmonary: Non-labored breathing  Abdomen: Soft, non-tender, non-distended  Musculoskeletal: AROM with no joint deformity or tenderness    Neurology: Alert and oriented      Results  Lab Results   Component Value Date    PSA 3 0 10/27/2016    PSA 3 1 04/12/2016    PSA 2 3 06/21/2014     Lab Results   Component Value Date    GLUCOSE 103 03/01/2017    CALCIUM 8 7 03/12/2018     03/12/2018    K 3 7 03/12/2018    CO2 31 03/12/2018     03/12/2018    BUN 16 03/12/2018    CREATININE 0 86 03/12/2018     Lab Results   Component Value Date    WBC 8 01 03/01/2017    HGB 15 0 03/01/2017    HCT 43 1 03/01/2017    MCV 93 03/01/2017     03/01/2017

## 2018-04-18 ENCOUNTER — OFFICE VISIT (OUTPATIENT)
Dept: URGENT CARE | Facility: CLINIC | Age: 63
End: 2018-04-18
Payer: COMMERCIAL

## 2018-04-18 VITALS
OXYGEN SATURATION: 95 % | WEIGHT: 226 LBS | DIASTOLIC BLOOD PRESSURE: 80 MMHG | HEART RATE: 78 BPM | RESPIRATION RATE: 16 BRPM | TEMPERATURE: 97.9 F | SYSTOLIC BLOOD PRESSURE: 130 MMHG | HEIGHT: 73 IN | BODY MASS INDEX: 29.95 KG/M2

## 2018-04-18 DIAGNOSIS — J01.00 ACUTE MAXILLARY SINUSITIS, RECURRENCE NOT SPECIFIED: Primary | ICD-10-CM

## 2018-04-18 PROCEDURE — 99213 OFFICE O/P EST LOW 20 MIN: CPT | Performed by: EMERGENCY MEDICINE

## 2018-04-18 RX ORDER — AMOXICILLIN AND CLAVULANATE POTASSIUM 875; 125 MG/1; MG/1
1 TABLET, FILM COATED ORAL EVERY 12 HOURS SCHEDULED
Qty: 14 TABLET | Refills: 0 | Status: SHIPPED | OUTPATIENT
Start: 2018-04-18 | End: 2018-04-25

## 2018-04-19 NOTE — PROGRESS NOTES
This patient became ill over the last few days with frontal and paranasal sinus pressure more so on the right side than the left  Also some fullness in the right ear  No sore throat  Some nasal congestion  No cough  No fever or chills  Patient is alert oriented pleasant and in no distress  He does not appear toxic  He states he is prone to sinus infections  Pupils equal react to light sclerae white conjunctiva pink  Nose is congested  Throat is clear and moist without inflammation  Tms are normal   Neck is supple without nodes  Lungs are clear    1  Acute maxillary sinusitis, recurrence not specified

## 2018-05-02 ENCOUNTER — OFFICE VISIT (OUTPATIENT)
Dept: FAMILY MEDICINE CLINIC | Facility: HOSPITAL | Age: 63
End: 2018-05-02
Payer: COMMERCIAL

## 2018-05-02 VITALS
BODY MASS INDEX: 28.89 KG/M2 | HEIGHT: 73 IN | SYSTOLIC BLOOD PRESSURE: 100 MMHG | WEIGHT: 218 LBS | RESPIRATION RATE: 16 BRPM | DIASTOLIC BLOOD PRESSURE: 68 MMHG | HEART RATE: 87 BPM

## 2018-05-02 DIAGNOSIS — H60.502 ACUTE OTITIS EXTERNA OF LEFT EAR, UNSPECIFIED TYPE: Primary | ICD-10-CM

## 2018-05-02 PROBLEM — R63.5 WEIGHT GAIN: Status: RESOLVED | Noted: 2018-04-03 | Resolved: 2018-05-02

## 2018-05-02 PROCEDURE — 99213 OFFICE O/P EST LOW 20 MIN: CPT | Performed by: INTERNAL MEDICINE

## 2018-05-02 PROCEDURE — 3008F BODY MASS INDEX DOCD: CPT | Performed by: INTERNAL MEDICINE

## 2018-05-02 RX ORDER — CIPROFLOXACIN AND DEXAMETHASONE 3; 1 MG/ML; MG/ML
4 SUSPENSION/ DROPS AURICULAR (OTIC) 2 TIMES DAILY
Qty: 7.5 ML | Refills: 0 | Status: SHIPPED | OUTPATIENT
Start: 2018-05-02 | End: 2018-10-29 | Stop reason: ALTCHOICE

## 2018-05-02 NOTE — PROGRESS NOTES
Assessment/Plan:    No problem-specific Assessment & Plan notes found for this encounter  Diagnoses and all orders for this visit:    Acute otitis externa of left ear, unspecified type  -     ciprofloxacin-dexamethasone (CIPRODEX) otic suspension; Administer 4 drops into the left ear 2 (two) times a day          Subjective:      Patient ID: Merced Butler is a 63 y o  male  Earache    There is pain in the left (pt injured ear canal with q-tip that resulted in bleeding) ear  This is a new problem  The current episode started in the past 7 days  The problem has been unchanged  There has been no fever  The pain is mild  He has tried nothing for the symptoms  The following portions of the patient's history were reviewed and updated as appropriate: current medications, past family history, past medical history, past social history, past surgical history and problem list     Review of Systems   HENT: Positive for ear pain  Objective:    /68   Pulse 87   Resp 16   Ht 6' 1" (1 854 m)   Wt 98 9 kg (218 lb)   BMI 28 76 kg/m²      Physical Exam   Constitutional: He appears well-developed  No distress  HENT:   Head: Normocephalic  Mouth/Throat: No oropharyngeal exudate     Dried blood in the left external canal   Eyes: Conjunctivae are normal            Crispin Meek MD

## 2018-06-24 ENCOUNTER — PATIENT MESSAGE (OUTPATIENT)
Dept: FAMILY MEDICINE CLINIC | Facility: HOSPITAL | Age: 63
End: 2018-06-24

## 2018-06-24 DIAGNOSIS — E78.00 HYPERCHOLESTEREMIA: Primary | ICD-10-CM

## 2018-06-25 RX ORDER — SIMVASTATIN 10 MG
10 TABLET ORAL DAILY
Qty: 30 TABLET | Refills: 5 | Status: SHIPPED | OUTPATIENT
Start: 2018-06-25 | End: 2018-12-22 | Stop reason: SDUPTHER

## 2018-06-25 NOTE — TELEPHONE ENCOUNTER
From: Arsenio Coles  To: Sai Gallegos MD  Sent: 6/24/2018 11:04 AM EDT  Subject: Prescription Question    I presently am taking simvastatin, 10 mg   I need a refill and it is not listed on medication refill page  Thank you!   Tye Andrews

## 2018-07-17 ENCOUNTER — TELEPHONE (OUTPATIENT)
Dept: FAMILY MEDICINE CLINIC | Facility: HOSPITAL | Age: 63
End: 2018-07-17

## 2018-07-17 DIAGNOSIS — I10 ESSENTIAL HYPERTENSION: Primary | ICD-10-CM

## 2018-07-17 RX ORDER — LOSARTAN POTASSIUM AND HYDROCHLOROTHIAZIDE 25; 100 MG/1; MG/1
1 TABLET ORAL DAILY
Qty: 90 TABLET | Refills: 3 | Status: SHIPPED | OUTPATIENT
Start: 2018-07-17 | End: 2019-04-22 | Stop reason: SDUPTHER

## 2018-10-10 DIAGNOSIS — Z86.711 HISTORY OF PULMONARY EMBOLISM: Primary | ICD-10-CM

## 2018-10-10 RX ORDER — RIVAROXABAN 20 MG/1
TABLET, FILM COATED ORAL
Qty: 90 TABLET | Refills: 3 | Status: SHIPPED | OUTPATIENT
Start: 2018-10-10 | End: 2019-10-25 | Stop reason: SDUPTHER

## 2018-10-29 ENCOUNTER — OFFICE VISIT (OUTPATIENT)
Dept: URGENT CARE | Facility: CLINIC | Age: 63
End: 2018-10-29
Payer: COMMERCIAL

## 2018-10-29 VITALS
BODY MASS INDEX: 29.29 KG/M2 | RESPIRATION RATE: 16 BRPM | TEMPERATURE: 97 F | SYSTOLIC BLOOD PRESSURE: 140 MMHG | WEIGHT: 221 LBS | DIASTOLIC BLOOD PRESSURE: 90 MMHG | HEIGHT: 73 IN | OXYGEN SATURATION: 96 % | HEART RATE: 80 BPM

## 2018-10-29 DIAGNOSIS — J01.00 ACUTE NON-RECURRENT MAXILLARY SINUSITIS: Primary | ICD-10-CM

## 2018-10-29 PROCEDURE — 99213 OFFICE O/P EST LOW 20 MIN: CPT | Performed by: NURSE PRACTITIONER

## 2018-10-29 RX ORDER — AMOXICILLIN AND CLAVULANATE POTASSIUM 875; 125 MG/1; MG/1
1 TABLET, FILM COATED ORAL EVERY 12 HOURS SCHEDULED
Qty: 20 TABLET | Refills: 0 | Status: SHIPPED | OUTPATIENT
Start: 2018-10-29 | End: 2018-11-08

## 2018-10-29 NOTE — PATIENT INSTRUCTIONS
Rest, increase fluids  Follow up with pcp   Take meds as directed  Take motrin and tylenol for fever and pain   Go to ER if symptoms worsen   Treatment plan discussed and verbalized understanding    Take or use flonase as directed  Use zyrtec or allegra daily to help with symptoms   Rest increase fluids

## 2018-10-29 NOTE — PROGRESS NOTES
NAME: Nolvia Tovar is a 66 y o  male  : 1955    MRN: [id number]      Assessment and Plan   Acute non-recurrent maxillary sinusitis [J01 00]  1  Acute non-recurrent maxillary sinusitis  amoxicillin-clavulanate (AUGMENTIN) 875-125 mg per tablet       Leela Anna was seen today for sinusitis  Diagnoses and all orders for this visit:    Acute non-recurrent maxillary sinusitis  -     amoxicillin-clavulanate (AUGMENTIN) 875-125 mg per tablet; Take 1 tablet by mouth every 12 (twelve) hours for 10 days        Patient Instructions   Patient Instructions   Rest, increase fluids  Follow up with pcp   Take meds as directed  Take motrin and tylenol for fever and pain   Go to ER if symptoms worsen   Treatment plan discussed and verbalized understanding    Take or use flonase as directed  Use zyrtec or allegra daily to help with symptoms   Rest increase fluids  Proceed to ER if symptoms worsen  Chief Complaint     Chief Complaint   Patient presents with    Sinusitis     Pt states he feels like he has a sinus infection  C/o headache, cough and congestion x 5 days          History of Present Illness     Pt has been having sinus congestion since Wednesday, taking sinus and cough medicine over the counter no relief, and taking mucus relief med and no change  Pt was also using flonase as well  He is currently not using any ear drop that may be on chart  Review of Systems   Review of Systems   Constitutional: Negative for fatigue and fever  HENT: Positive for congestion, postnasal drip, sinus pain, sinus pressure and sore throat  Negative for ear pain and sneezing  Eyes: Negative  Respiratory: Positive for cough  Cardiovascular: Negative  Gastrointestinal: Negative  Genitourinary: Negative  Musculoskeletal: Negative  Neurological: Positive for headaches           Current Medications       Current Outpatient Prescriptions:     fluticasone (FLONASE) 50 mcg/act nasal spray, 2 sprays as needed  , Disp: , Rfl: 3    losartan-hydrochlorothiazide (HYZAAR) 100-25 MG per tablet, Take 1 tablet by mouth daily, Disp: 90 tablet, Rfl: 3    sildenafil (REVATIO) 20 mg tablet, Use 3 to 5 tablets as needed, Disp: 90 tablet, Rfl: 3    simvastatin (ZOCOR) 10 mg tablet, Take 1 tablet (10 mg total) by mouth daily, Disp: 30 tablet, Rfl: 5    XARELTO 20 MG tablet, TAKE 1 TABLET BY MOUTH EVERY DAY, Disp: 90 tablet, Rfl: 3    amoxicillin-clavulanate (AUGMENTIN) 875-125 mg per tablet, Take 1 tablet by mouth every 12 (twelve) hours for 10 days, Disp: 20 tablet, Rfl: 0    Current Allergies     Allergies as of 10/29/2018 - Reviewed 10/29/2018   Allergen Reaction Noted    Vancomycin Rash 01/21/2015    Ace inhibitors Cough 05/15/2017              Past Medical History:   Diagnosis Date    Acute maxillary sinusitis     Asthmatic bronchitis     Benign prostatic hyperplasia with urinary incontinence without sensory awareness     Cervical disc disease     Cervicalgia     Chest pain     DVT (deep venous thrombosis) ([facility] [address] ) 2004    Erectile dysfunction     Fatigue     Hypercholesterolemia     Hyperlipidemia     Hypertension     IFG (impaired fasting glucose)     Screening for colon cancer     Screening for prostate cancer        Past Surgical History:   Procedure Laterality Date    BACK SURGERY  2004    CHOLECYSTECTOMY  2009       Family History   Problem Relation Age of Onset    Coronary artery disease Mother     Hypertrophic cardiomyopathy Mother     Thyroid disease Mother     Coronary artery disease Sister     Irritable bowel syndrome Sister     Crohn's disease Sister     Substance Abuse Sister     Mental illness Sister     Substance Abuse Brother     Mental illness Brother          Medications have been verified      The following portions of the patient's history were reviewed and updated as appropriate: allergies, current medications, past family history, past medical history, past social history, past surgical history and problem list     Objective   /90   Pulse 80   Temp (!) 97 °F (36 1 °C)   Resp 16   Ht 6' 1" (1 854 m)   Wt 100 kg (221 lb)   SpO2 96%   BMI 29 16 kg/m²      Physical Exam     Physical Exam   Constitutional: He is oriented to person, place, and time  Vital signs are normal  He appears well-developed and well-nourished  He is cooperative  HENT:   Head: Normocephalic  Right Ear: Hearing and tympanic membrane normal    Left Ear: Hearing and tympanic membrane normal    Nose: Mucosal edema and sinus tenderness present  Mouth/Throat: Uvula is midline, oropharynx is clear and moist and mucous membranes are normal    Turbinates are red and swollen b/l, the left side is wores than the right, thick mucus in the left nare, the TM is dull to light reflex in both ears, worse in left ear, some wax noted as well, discussed debrox use with pt  Throat normal   Neck: Trachea normal and normal range of motion  Neck supple  Cardiovascular: Normal rate, regular rhythm and normal pulses  Pulmonary/Chest: Effort normal and breath sounds normal    Neurological: He is alert and oriented to person, place, and time         JENNA Serra

## 2018-11-19 PROBLEM — R29.818 SUSPECTED SLEEP APNEA: Status: RESOLVED | Noted: 2018-02-22 | Resolved: 2018-11-19

## 2018-11-20 ENCOUNTER — OFFICE VISIT (OUTPATIENT)
Dept: FAMILY MEDICINE CLINIC | Facility: HOSPITAL | Age: 63
End: 2018-11-20
Payer: COMMERCIAL

## 2018-11-20 VITALS
SYSTOLIC BLOOD PRESSURE: 128 MMHG | TEMPERATURE: 97.5 F | HEART RATE: 72 BPM | WEIGHT: 219 LBS | RESPIRATION RATE: 16 BRPM | HEIGHT: 73 IN | DIASTOLIC BLOOD PRESSURE: 76 MMHG | BODY MASS INDEX: 29.03 KG/M2

## 2018-11-20 DIAGNOSIS — J01.00 ACUTE MAXILLARY SINUSITIS, RECURRENCE NOT SPECIFIED: Primary | ICD-10-CM

## 2018-11-20 DIAGNOSIS — I10 ESSENTIAL HYPERTENSION: ICD-10-CM

## 2018-11-20 PROCEDURE — 3078F DIAST BP <80 MM HG: CPT | Performed by: INTERNAL MEDICINE

## 2018-11-20 PROCEDURE — 99214 OFFICE O/P EST MOD 30 MIN: CPT | Performed by: INTERNAL MEDICINE

## 2018-11-20 PROCEDURE — 1036F TOBACCO NON-USER: CPT | Performed by: INTERNAL MEDICINE

## 2018-11-20 PROCEDURE — 3074F SYST BP LT 130 MM HG: CPT | Performed by: INTERNAL MEDICINE

## 2018-11-20 PROCEDURE — 3008F BODY MASS INDEX DOCD: CPT | Performed by: INTERNAL MEDICINE

## 2018-11-20 RX ORDER — PREDNISONE 10 MG/1
TABLET ORAL
Qty: 18 TABLET | Refills: 0 | Status: SHIPPED | OUTPATIENT
Start: 2018-11-20 | End: 2019-04-15 | Stop reason: ALTCHOICE

## 2018-11-20 RX ORDER — LEVOFLOXACIN 750 MG/1
750 TABLET ORAL EVERY 24 HOURS
Qty: 7 TABLET | Refills: 0 | Status: SHIPPED | OUTPATIENT
Start: 2018-11-20 | End: 2018-11-27

## 2018-11-20 NOTE — PROGRESS NOTES
Assessment/Plan:    Essential hypertension  bp is controlled on losartan/hctz       Diagnoses and all orders for this visit:    Acute maxillary sinusitis, recurrence not specified  -     predniSONE 10 mg tablet; 30mg qdx3d then 20mg qdx3d then 10mg qdx3d  -     levofloxacin (LEVAQUIN) 750 mg tablet; Take 1 tablet (750 mg total) by mouth every 24 hours for 7 days    Essential hypertension  -     Basic metabolic panel; Future          Subjective:      Patient ID: Micheline Frausto is a 66 y o  male  URI    This is a new problem  Episode onset: about 4 weeks ago  The problem has been unchanged  There has been no fever  Associated symptoms include coughing, ear pain, headaches, rhinorrhea and sinus pain  Pertinent negatives include no abdominal pain, chest pain, diarrhea, rash or wheezing  He has tried decongestant (augmentin and flonase haven't helped) for the symptoms  The treatment provided no relief  The following portions of the patient's history were reviewed and updated as appropriate: current medications, past family history, past medical history, past social history, past surgical history and problem list     Review of Systems   HENT: Positive for ear pain, rhinorrhea and sinus pain  Respiratory: Positive for cough  Negative for shortness of breath and wheezing  Cardiovascular: Negative for chest pain  Gastrointestinal: Negative for abdominal pain and diarrhea  Skin: Negative for rash  Neurological: Positive for headaches  Objective:    /76   Pulse 72   Temp 97 5 °F (36 4 °C) (Tympanic)   Resp 16   Ht 6' 1" (1 854 m)   Wt 99 3 kg (219 lb)   BMI 28 89 kg/m²      Physical Exam   Constitutional: He is oriented to person, place, and time  He appears well-developed  No distress  HENT:   Head: Normocephalic  Erythema and clear discharge in the nostrils   Eyes: Conjunctivae are normal    Neck: Neck supple  Cardiovascular: Normal rate and regular rhythm      Pulmonary/Chest: Effort normal and breath sounds normal    Lymphadenopathy:     He has no cervical adenopathy  Neurological: He is alert and oriented to person, place, and time  Psychiatric: He has a normal mood and affect             Gregory Ramon MD

## 2018-12-01 LAB
BUN SERPL-MCNC: 16 MG/DL (ref 8–27)
BUN/CREAT SERPL: 22 (ref 10–24)
CALCIUM SERPL-MCNC: 9.9 MG/DL (ref 8.6–10.2)
CHLORIDE SERPL-SCNC: 100 MMOL/L (ref 96–106)
CO2 SERPL-SCNC: 29 MMOL/L (ref 20–29)
CREAT SERPL-MCNC: 0.73 MG/DL (ref 0.76–1.27)
GLUCOSE SERPL-MCNC: 90 MG/DL (ref 65–99)
LABCORP COMMENT: NORMAL
POTASSIUM SERPL-SCNC: 3.7 MMOL/L (ref 3.5–5.2)
SL AMB EGFR AFRICAN AMERICAN: 116 ML/MIN/1.73
SL AMB EGFR NON AFRICAN AMERICAN: 100 ML/MIN/1.73
SODIUM SERPL-SCNC: 141 MMOL/L (ref 134–144)

## 2018-12-22 DIAGNOSIS — E78.00 HYPERCHOLESTEREMIA: ICD-10-CM

## 2018-12-22 RX ORDER — SIMVASTATIN 10 MG
TABLET ORAL
Qty: 30 TABLET | Refills: 1 | Status: SHIPPED | OUTPATIENT
Start: 2018-12-22 | End: 2019-02-23 | Stop reason: SDUPTHER

## 2018-12-31 ENCOUNTER — OFFICE VISIT (OUTPATIENT)
Dept: URGENT CARE | Facility: CLINIC | Age: 63
End: 2018-12-31
Payer: COMMERCIAL

## 2018-12-31 DIAGNOSIS — Z23 NEED FOR INFLUENZA VACCINATION: Primary | ICD-10-CM

## 2018-12-31 PROCEDURE — 90682 RIV4 VACC RECOMBINANT DNA IM: CPT

## 2018-12-31 PROCEDURE — 90686 IIV4 VACC NO PRSV 0.5 ML IM: CPT

## 2018-12-31 PROCEDURE — 90471 IMMUNIZATION ADMIN: CPT

## 2019-02-23 DIAGNOSIS — E78.00 HYPERCHOLESTEREMIA: ICD-10-CM

## 2019-02-24 RX ORDER — SIMVASTATIN 10 MG
TABLET ORAL
Qty: 30 TABLET | Refills: 1 | Status: SHIPPED | OUTPATIENT
Start: 2019-02-24 | End: 2019-04-29 | Stop reason: SDUPTHER

## 2019-04-15 ENCOUNTER — OFFICE VISIT (OUTPATIENT)
Dept: FAMILY MEDICINE CLINIC | Facility: HOSPITAL | Age: 64
End: 2019-04-15
Payer: COMMERCIAL

## 2019-04-15 VITALS
HEART RATE: 73 BPM | DIASTOLIC BLOOD PRESSURE: 90 MMHG | RESPIRATION RATE: 16 BRPM | BODY MASS INDEX: 30.88 KG/M2 | TEMPERATURE: 97.7 F | SYSTOLIC BLOOD PRESSURE: 148 MMHG | HEIGHT: 73 IN | WEIGHT: 233 LBS

## 2019-04-15 DIAGNOSIS — R40.0 DAYTIME SOMNOLENCE: ICD-10-CM

## 2019-04-15 DIAGNOSIS — I10 ESSENTIAL HYPERTENSION: Primary | ICD-10-CM

## 2019-04-15 DIAGNOSIS — N52.8 OTHER MALE ERECTILE DYSFUNCTION: ICD-10-CM

## 2019-04-15 PROCEDURE — 99214 OFFICE O/P EST MOD 30 MIN: CPT | Performed by: INTERNAL MEDICINE

## 2019-04-15 RX ORDER — DOXAZOSIN 2 MG/1
2 TABLET ORAL
Qty: 30 TABLET | Refills: 3 | Status: SHIPPED | OUTPATIENT
Start: 2019-04-15 | End: 2019-07-06 | Stop reason: SDUPTHER

## 2019-04-17 LAB
ALBUMIN SERPL-MCNC: 4.4 G/DL (ref 3.6–4.8)
ALBUMIN/GLOB SERPL: 2.4 {RATIO} (ref 1.2–2.2)
ALP SERPL-CCNC: 43 IU/L (ref 39–117)
ALT SERPL-CCNC: 22 IU/L (ref 0–44)
AST SERPL-CCNC: 26 IU/L (ref 0–40)
BASOPHILS # BLD AUTO: 0 X10E3/UL (ref 0–0.2)
BASOPHILS NFR BLD AUTO: 0 %
BILIRUB SERPL-MCNC: 0.9 MG/DL (ref 0–1.2)
BUN SERPL-MCNC: 14 MG/DL (ref 8–27)
BUN/CREAT SERPL: 17 (ref 10–24)
CALCIUM SERPL-MCNC: 9.8 MG/DL (ref 8.6–10.2)
CHLORIDE SERPL-SCNC: 98 MMOL/L (ref 96–106)
CHOLEST SERPL-MCNC: 168 MG/DL (ref 100–199)
CO2 SERPL-SCNC: 26 MMOL/L (ref 20–29)
CREAT SERPL-MCNC: 0.82 MG/DL (ref 0.76–1.27)
EOSINOPHIL # BLD AUTO: 0.1 X10E3/UL (ref 0–0.4)
EOSINOPHIL NFR BLD AUTO: 1 %
ERYTHROCYTE [DISTWIDTH] IN BLOOD BY AUTOMATED COUNT: 13.3 % (ref 12.3–15.4)
GLOBULIN SER-MCNC: 1.8 G/DL (ref 1.5–4.5)
GLUCOSE SERPL-MCNC: 109 MG/DL (ref 65–99)
HCT VFR BLD AUTO: 47.4 % (ref 37.5–51)
HDLC SERPL-MCNC: 46 MG/DL
HGB BLD-MCNC: 15.4 G/DL (ref 13–17.7)
IMM GRANULOCYTES # BLD: 0 X10E3/UL (ref 0–0.1)
IMM GRANULOCYTES NFR BLD: 0 %
LABCORP COMMENT: NORMAL
LDLC SERPL CALC-MCNC: 89 MG/DL (ref 0–99)
LYMPHOCYTES # BLD AUTO: 2.1 X10E3/UL (ref 0.7–3.1)
LYMPHOCYTES NFR BLD AUTO: 32 %
MCH RBC QN AUTO: 31.6 PG (ref 26.6–33)
MCHC RBC AUTO-ENTMCNC: 32.5 G/DL (ref 31.5–35.7)
MCV RBC AUTO: 97 FL (ref 79–97)
MONOCYTES # BLD AUTO: 0.5 X10E3/UL (ref 0.1–0.9)
MONOCYTES NFR BLD AUTO: 8 %
NEUTROPHILS # BLD AUTO: 3.9 X10E3/UL (ref 1.4–7)
NEUTROPHILS NFR BLD AUTO: 59 %
PLATELET # BLD AUTO: 181 X10E3/UL (ref 150–379)
POTASSIUM SERPL-SCNC: 3.9 MMOL/L (ref 3.5–5.2)
PROT SERPL-MCNC: 6.2 G/DL (ref 6–8.5)
RBC # BLD AUTO: 4.88 X10E6/UL (ref 4.14–5.8)
SL AMB EGFR AFRICAN AMERICAN: 110 ML/MIN/1.73
SL AMB EGFR NON AFRICAN AMERICAN: 95 ML/MIN/1.73
SL AMB VLDL CHOLESTEROL CALC: 33 MG/DL (ref 5–40)
SODIUM SERPL-SCNC: 142 MMOL/L (ref 134–144)
TRIGL SERPL-MCNC: 167 MG/DL (ref 0–149)
TSH SERPL DL<=0.005 MIU/L-ACNC: 2.24 UIU/ML (ref 0.45–4.5)
WBC # BLD AUTO: 6.6 X10E3/UL (ref 3.4–10.8)

## 2019-04-22 DIAGNOSIS — I10 ESSENTIAL HYPERTENSION: ICD-10-CM

## 2019-04-22 RX ORDER — LOSARTAN POTASSIUM AND HYDROCHLOROTHIAZIDE 25; 100 MG/1; MG/1
1 TABLET ORAL DAILY
Qty: 14 TABLET | Refills: 0 | Status: SHIPPED | OUTPATIENT
Start: 2019-04-22 | End: 2019-08-05 | Stop reason: SDUPTHER

## 2019-04-25 ENCOUNTER — TELEPHONE (OUTPATIENT)
Dept: FAMILY MEDICINE CLINIC | Facility: HOSPITAL | Age: 64
End: 2019-04-25

## 2019-04-29 DIAGNOSIS — E78.00 HYPERCHOLESTEREMIA: ICD-10-CM

## 2019-04-29 RX ORDER — SIMVASTATIN 10 MG
TABLET ORAL
Qty: 30 TABLET | Refills: 1 | Status: SHIPPED | OUTPATIENT
Start: 2019-04-29 | End: 2019-06-30 | Stop reason: SDUPTHER

## 2019-06-30 DIAGNOSIS — E78.00 HYPERCHOLESTEREMIA: ICD-10-CM

## 2019-07-01 RX ORDER — SIMVASTATIN 10 MG
TABLET ORAL
Qty: 30 TABLET | Refills: 1 | Status: SHIPPED | OUTPATIENT
Start: 2019-07-01 | End: 2019-08-25 | Stop reason: SDUPTHER

## 2019-07-06 DIAGNOSIS — I10 ESSENTIAL HYPERTENSION: ICD-10-CM

## 2019-07-07 RX ORDER — DOXAZOSIN 2 MG/1
2 TABLET ORAL
Qty: 90 TABLET | Refills: 1 | Status: SHIPPED | OUTPATIENT
Start: 2019-07-07 | End: 2020-01-11

## 2019-08-04 DIAGNOSIS — I10 ESSENTIAL HYPERTENSION: ICD-10-CM

## 2019-08-05 RX ORDER — LOSARTAN POTASSIUM AND HYDROCHLOROTHIAZIDE 25; 100 MG/1; MG/1
TABLET ORAL
Qty: 90 TABLET | Refills: 3 | Status: SHIPPED | OUTPATIENT
Start: 2019-08-05 | End: 2020-07-19

## 2019-08-13 ENCOUNTER — OFFICE VISIT (OUTPATIENT)
Dept: FAMILY MEDICINE CLINIC | Facility: HOSPITAL | Age: 64
End: 2019-08-13
Payer: COMMERCIAL

## 2019-08-13 VITALS — SYSTOLIC BLOOD PRESSURE: 128 MMHG | BODY MASS INDEX: 30.6 KG/M2 | DIASTOLIC BLOOD PRESSURE: 80 MMHG | WEIGHT: 231.9 LBS

## 2019-08-13 DIAGNOSIS — B34.9 ACUTE VIRAL SYNDROME: Primary | ICD-10-CM

## 2019-08-13 PROCEDURE — 99214 OFFICE O/P EST MOD 30 MIN: CPT | Performed by: INTERNAL MEDICINE

## 2019-08-13 RX ORDER — METHYLPREDNISOLONE 4 MG/1
TABLET ORAL
Qty: 21 TABLET | Refills: 0 | Status: SHIPPED | OUTPATIENT
Start: 2019-08-13 | End: 2020-08-24 | Stop reason: ALTCHOICE

## 2019-08-13 RX ORDER — LOSARTAN POTASSIUM 25 MG/1
25 TABLET ORAL
COMMUNITY
End: 2020-08-24 | Stop reason: ALTCHOICE

## 2019-08-13 NOTE — PROGRESS NOTES
Subjective:   No chief complaint on file  Patient ID: Yareli Mcfadden is a 66 y o  male  Has had loose bowels for about 2 weeks    Also c/o sore and achy muscles , fatigue for 2 weeks  No fever,  No rash  The following portions of the patient's history were reviewed and updated as appropriate: allergies, current medications, past family history, past medical history, past social history, past surgical history and problem list     Review of Systems   Constitutional: Positive for fatigue and fever  HENT: Negative for hearing loss  Eyes: Negative for visual disturbance  Respiratory: Negative for cough, chest tightness, shortness of breath and wheezing  Cardiovascular: Negative for chest pain, palpitations and leg swelling  Gastrointestinal: Positive for abdominal distention, abdominal pain and diarrhea  Negative for nausea  Genitourinary: Negative for dysuria and hematuria  Musculoskeletal: Positive for arthralgias and back pain  Neurological: Positive for weakness  Negative for dizziness, numbness and headaches  Psychiatric/Behavioral: Negative for confusion and dysphoric mood  All other systems reviewed and are negative  Current Outpatient Medications on File Prior to Visit   Medication Sig Dispense Refill    doxazosin (CARDURA) 2 mg tablet TAKE 1 TABLET (2 MG TOTAL) BY MOUTH DAILY AT BEDTIME 90 tablet 1    fluticasone (FLONASE) 50 mcg/act nasal spray 2 sprays as needed    3    losartan (COZAAR) 25 mg tablet Take 25 mg by mouth      losartan-hydrochlorothiazide (HYZAAR) 100-25 MG per tablet TAKE 1 TABLET DAILY 90 tablet 3    sildenafil (REVATIO) 20 mg tablet Use 3 to 5 tablets as needed 90 tablet 3    simvastatin (ZOCOR) 10 mg tablet TAKE 1 TABLET BY MOUTH EVERY DAY 30 tablet 1    XARELTO 20 MG tablet TAKE 1 TABLET BY MOUTH EVERY DAY 90 tablet 3     No current facility-administered medications on file prior to visit          Objective:  Vitals:    08/13/19 1143   BP: 128/80   Weight: 105 kg (231 lb 14 4 oz)      Physical Exam   Constitutional: He is oriented to person, place, and time  He appears well-developed and well-nourished  Eyes: Pupils are equal, round, and reactive to light  Neck: Normal range of motion  Neck supple  No thyromegaly present  Cardiovascular: Normal rate, regular rhythm, normal heart sounds and intact distal pulses  No murmur heard  Pulmonary/Chest: Effort normal and breath sounds normal  He has no wheezes  He has no rales  Abdominal: Soft  Bowel sounds are normal  There is no tenderness  Musculoskeletal: Normal range of motion  He exhibits no edema, tenderness or deformity  Lymphadenopathy:     He has no cervical adenopathy  Neurological: He is alert and oriented to person, place, and time  He has normal reflexes  Skin: Skin is warm and dry  Psychiatric: He has a normal mood and affect  Nursing note and vitals reviewed  Assessment/Plan:    No problem-specific Assessment & Plan notes found for this encounter  Diagnoses and all orders for this visit:    Acute viral syndrome  -     Lyme Antibody Profile with reflex to WB; Future  -     Parvovirus B19 antibody, IgG and IgM; Future  -     CBC and differential; Future  -     Comprehensive metabolic panel; Future  -     EBV acute panel; Future  -     methylPREDNISolone 4 MG tablet therapy pack; Use as directed on package    Other orders  -     losartan (COZAAR) 25 mg tablet;  Take 25 mg by mouth

## 2019-08-13 NOTE — PATIENT INSTRUCTIONS
Rest and fluids    Clear liquids are advised and advance to [facility]; Imodium OTC can help  Rx sent for medrol pack to pharmacy  OK to use tylenol with this  Diarrhea is usually a self-limited event and can be caused by an infection, some food intolerance, medication side effect, or even significant dehydration in elderly people  If diarrhea is not severe, you should limit diet to clear liquids for a few days or a week  Be sure you are getting enough liquids daily and even add 5 oz of gatorade daily for electrolyte support  If symptoms are continuous, OTC imodium can effectively halt diarrhea for a day or two  As you get better, avoid all diary in diet for 7 days and slowly advance to the BRAT diet (banana, rice,applesauce,toast) for 2 days before beginning regular food  If abdominal bloating, fever, blood in stool or other concerning symptoms occur, call office or seek care at ER, office or urgent care

## 2019-08-15 ENCOUNTER — TELEPHONE (OUTPATIENT)
Dept: FAMILY MEDICINE CLINIC | Facility: HOSPITAL | Age: 64
End: 2019-08-15

## 2019-08-16 LAB
ALBUMIN SERPL-MCNC: 4.4 G/DL (ref 3.6–4.8)
ALBUMIN/GLOB SERPL: 2.1 {RATIO} (ref 1.2–2.2)
ALP SERPL-CCNC: 39 IU/L (ref 39–117)
ALT SERPL-CCNC: 30 IU/L (ref 0–44)
AST SERPL-CCNC: 23 IU/L (ref 0–40)
B BURGDOR IGG+IGM SER-ACNC: <0.91 ISR (ref 0–0.9)
B BURGDOR IGM SER IA-ACNC: <0.8 INDEX (ref 0–0.79)
B19V IGG SER IA-ACNC: 5 INDEX (ref 0–0.8)
B19V IGM SER IA-ACNC: 0.1 INDEX (ref 0–0.8)
BASOPHILS # BLD AUTO: 0 X10E3/UL (ref 0–0.2)
BASOPHILS NFR BLD AUTO: 0 %
BILIRUB SERPL-MCNC: 0.6 MG/DL (ref 0–1.2)
BUN SERPL-MCNC: 10 MG/DL (ref 8–27)
BUN/CREAT SERPL: 12 (ref 10–24)
CALCIUM SERPL-MCNC: 10 MG/DL (ref 8.6–10.2)
CHLORIDE SERPL-SCNC: 99 MMOL/L (ref 96–106)
CO2 SERPL-SCNC: 25 MMOL/L (ref 20–29)
CREAT SERPL-MCNC: 0.85 MG/DL (ref 0.76–1.27)
EBV EA IGG SER-ACNC: <9 U/ML (ref 0–8.9)
EBV NA IGG SER IA-ACNC: 20.9 U/ML (ref 0–17.9)
EBV PATRN SPEC IB-IMP: ABNORMAL
EBV VCA IGG SER IA-ACNC: 104 U/ML (ref 0–17.9)
EBV VCA IGM SER IA-ACNC: <36 U/ML (ref 0–35.9)
EOSINOPHIL # BLD AUTO: 0.1 X10E3/UL (ref 0–0.4)
EOSINOPHIL NFR BLD AUTO: 1 %
ERYTHROCYTE [DISTWIDTH] IN BLOOD BY AUTOMATED COUNT: 13.6 % (ref 12.3–15.4)
GLOBULIN SER-MCNC: 2.1 G/DL (ref 1.5–4.5)
GLUCOSE SERPL-MCNC: 100 MG/DL (ref 65–99)
HCT VFR BLD AUTO: 44.7 % (ref 37.5–51)
HGB BLD-MCNC: 15.5 G/DL (ref 13–17.7)
IMM GRANULOCYTES # BLD: 0 X10E3/UL (ref 0–0.1)
IMM GRANULOCYTES NFR BLD: 0 %
LYMPHOCYTES # BLD AUTO: 1.8 X10E3/UL (ref 0.7–3.1)
LYMPHOCYTES NFR BLD AUTO: 22 %
MCH RBC QN AUTO: 32.3 PG (ref 26.6–33)
MCHC RBC AUTO-ENTMCNC: 34.7 G/DL (ref 31.5–35.7)
MCV RBC AUTO: 93 FL (ref 79–97)
MONOCYTES # BLD AUTO: 0.8 X10E3/UL (ref 0.1–0.9)
MONOCYTES NFR BLD AUTO: 10 %
NEUTROPHILS # BLD AUTO: 5.5 X10E3/UL (ref 1.4–7)
NEUTROPHILS NFR BLD AUTO: 67 %
PLATELET # BLD AUTO: 205 X10E3/UL (ref 150–450)
POTASSIUM SERPL-SCNC: 3.9 MMOL/L (ref 3.5–5.2)
PROT SERPL-MCNC: 6.5 G/DL (ref 6–8.5)
RBC # BLD AUTO: 4.8 X10E6/UL (ref 4.14–5.8)
SL AMB EGFR AFRICAN AMERICAN: 109 ML/MIN/1.73
SL AMB EGFR NON AFRICAN AMERICAN: 94 ML/MIN/1.73
SODIUM SERPL-SCNC: 137 MMOL/L (ref 134–144)
WBC # BLD AUTO: 8.2 X10E3/UL (ref 3.4–10.8)

## 2019-08-19 ENCOUNTER — TELEPHONE (OUTPATIENT)
Dept: FAMILY MEDICINE CLINIC | Facility: HOSPITAL | Age: 64
End: 2019-08-19

## 2019-08-19 NOTE — RESULT ENCOUNTER NOTE
Please call pt  Final test results also indicate a reactivation of EBV virus which is a form of mono  Likely that his immune system was depressed by the EBV virus and he contracted parvovirus as well  Both are viral syndromes which improve slowly over time  No specific treatment  Continue rest, fluids, NSAIDs as needed  Might take several months to feel better

## 2019-08-19 NOTE — TELEPHONE ENCOUNTER
Pt is aware DD      ----- Message from Suyapa Gomes MD sent at 8/19/2019  9:30 AM EDT -----  Regarding: RE: Parvovirus  The medrol is only 6 days and he should finish all of this  As for diarrhea, it can be and can last for several weeks  No diary in diet  Stay hydrated with water and 4 oz gatorade daily  Add fiber capsules twice a day, use immodium if needed  If develops, fever over 100, severe abdominal pain or blood in stool, call us asap  Otherwise if diarrhea still present in 4 weeks, should see GI    ----- Message -----  From: David Anderson  Sent: 8/19/2019   8:34 AM EDT  To: Suyapa Gomes MD  Subject: FW: Parvovirus                                       ----- Message -----  From: Susie Ferris MD  Sent: 8/16/2019   1:54 PM EDT  To: David Anderson  Subject: RE: Presley Ramesh, Dr Parag Buckley saw the patient!  ----- Message -----  From: David Anderson  Sent: 8/15/2019   4:49 PM EDT  To: Susie Ferris MD  Subject: Parvovirus                                       I gave pt message he wanted to know if diarrhea is a symptom of parvovirus and also should he continue on the methylprednisolone?  Please advise DD

## 2019-08-25 DIAGNOSIS — E78.00 HYPERCHOLESTEREMIA: ICD-10-CM

## 2019-08-25 RX ORDER — SIMVASTATIN 10 MG
TABLET ORAL
Qty: 30 TABLET | Refills: 1 | Status: SHIPPED | OUTPATIENT
Start: 2019-08-25 | End: 2019-09-23 | Stop reason: SDUPTHER

## 2019-08-26 ENCOUNTER — CONSULT (OUTPATIENT)
Dept: GASTROENTEROLOGY | Facility: CLINIC | Age: 64
End: 2019-08-26
Payer: COMMERCIAL

## 2019-08-26 VITALS
HEART RATE: 76 BPM | SYSTOLIC BLOOD PRESSURE: 140 MMHG | BODY MASS INDEX: 30.22 KG/M2 | WEIGHT: 228 LBS | HEIGHT: 73 IN | DIASTOLIC BLOOD PRESSURE: 84 MMHG

## 2019-08-26 DIAGNOSIS — R10.10 PAIN OF UPPER ABDOMEN: Primary | ICD-10-CM

## 2019-08-26 DIAGNOSIS — R19.4 CHANGE IN BOWEL HABIT: ICD-10-CM

## 2019-08-26 DIAGNOSIS — Z86.010 HISTORY OF COLON POLYPS: ICD-10-CM

## 2019-08-26 PROCEDURE — 99214 OFFICE O/P EST MOD 30 MIN: CPT | Performed by: INTERNAL MEDICINE

## 2019-08-26 NOTE — PROGRESS NOTES
[facility] - Outpatient Follow-up Note  Ayanna Coles 66 y o  male MRN: [id number]  Encounter: [id number]    ASSESSMENT AND PLAN:      1  Pain of upper abdomen  Vague generalized abdominal discomfort associated with change in bowels  The differential diagnosis includes a post infectious gastroparesis or irritable bowel like syndrome  Small intestine bacterial overgrowth would be a possibility as would celiac disease  He did have a negative colonoscopy a little more than a year ago which is reassuring  Ulcer disease is possible but would seem to be less likely he had normal blood work including liver enzymes making common duct stone unlikely  The present time I would like to repeat his blood work and check celiac studies I would also like to obtain of breath test for bacterial overgrowth will see him in follow-up  - CBC; Future  - C-reactive protein; Future  - Celiac Disease Antibody Profile; Future  - Comprehensive metabolic panel; Future  - CBC  - C-reactive protein  - Celiac Disease Antibody Profile  - Comprehensive metabolic panel    2  Change in bowel habit  As above    3  History of colon polyps  Last colonoscopy 2018 3 year recall      Followup Appointment:  6 weeks  ______________________________________________________________________    Chief Complaint   Patient presents with    Abdominal Pain    Diarrhea     HPI:  Patient is a very pleasant 62-year-old male known to us from the past with a history of colon polyps and a deep vein thrombosis  He underwent colonoscopy in October of 03005 with removal of benign polyps  At that time a 3 year recall was recommended  He was well until about a month ago when he developed constellation of symptoms including generalized pain including in his upper abdomen associated with joint aches  He felt weak tired and run down with somewhat of a loss of appetite and loose stools    He was felt to have a viral syndrome he was put steroids for his joint was  He has improved gradually over the last couple weeks but still reports abdominal discomfort alternating diarrhea and constipation he reports his stomach made nor eases at the height of this  He has only lost about 5 lb he does have some early satiety some nausea no vomiting no rectal bleeding or melena      Historical Information   Past Medical History:   Diagnosis Date    Acute maxillary sinusitis     Asthmatic bronchitis     Benign prostatic hyperplasia with urinary incontinence without sensory awareness     Cervical disc disease     Cervicalgia     Chest pain     DVT [facility]deep [facility]) ([facility] [address] ) 2004    Erectile dysfunction     Fatigue     Hypercholesterolemia     Hyperlipidemia     Hypertension     IFG (impaired fasting glucose)     Screening for colon cancer     Screening for prostate cancer      Past Surgical History:   Procedure Laterality Date    BACK SURGERY  2004    CHOLECYSTECTOMY  2009     Social History     Substance and Sexual Activity   Alcohol Use Yes    Comment: SOCIAL     Social History     Substance and Sexual Activity   Drug Use No     Social History     Tobacco Use   Smoking Status Never Smoker   Smokeless Tobacco Never Used     Family History   Problem Relation Age of Onset    Coronary artery disease Mother     Hypertrophic cardiomyopathy Mother     Thyroid disease Mother     Coronary artery disease Sister     Irritable bowel syndrome Sister     Crohn's disease Sister     Substance Abuse Sister     Mental illness Sister     Substance Abuse Brother     Mental illness Brother     Heart disease Family         cardiovascular disease, ischemic heart disease    Cancer Family          Current Outpatient Medications:     doxazosin (CARDURA) 2 mg tablet    fluticasone (FLONASE) 50 mcg/act nasal spray    losartan (COZAAR) 25 mg tablet    losartan-hydrochlorothiazide (HYZAAR) 100-25 MG per tablet    methylPREDNISolone 4 MG tablet therapy pack    sildenafil (REVATIO) 20 mg tablet    simvastatin (ZOCOR) 10 mg tablet    XARELTO 20 MG tablet  Allergies   Allergen Reactions    Vancomycin Rash     Peeling of skin    Ace Inhibitors Cough       10 Point REVIEW OF SYSTEMS IS OTHERWISE NEGATIVE  PHYSICAL EXAM:    Blood pressure 140/84, pulse 76, height 6' 1" (1 854 m), weight 103 kg (228 lb)  Body mass index is 30 08 kg/m²  General Appearance: NAD, cooperative, alert  Eyes: Anicteric, PERRLA, EOMI  ENT:  Normocephalic, atraumatic, normal mucosa  Neck:  Supple, symmetrical, trachea midline  Resp:  Clear to auscultation bilaterally; no rales, rhonchi or wheezing; respirations unlabored   CV:  S1 S2, Regular rate and rhythm; no murmur, rub, or gallop  GI:  Soft, non-tender, non-distended; normal bowel sounds; no masses, no organomegaly   Rectal: Deferred  :  Deferred   Musculoskeletal: No cyanosis, clubbing or edema  Normal ROM  Skin:  No jaundice, rashes, or lesions   Heme/Lymph: No palpable cervical lymphadenopathy  Psych: Normal affect, good eye contact  Neuro: No gross deficits, AAOx3    Lab Results:   Lab Results   Component Value Date    WBC 8 2 08/13/2019    HGB 15 5 08/13/2019    HCT 44 7 08/13/2019    MCV 93 08/13/2019     08/13/2019     Lab Results   Component Value Date     06/21/2014    K 3 9 08/13/2019    CL 99 08/13/2019    CO2 25 08/13/2019    ANIONGAP 8 06/21/2014    BUN 10 08/13/2019    CREATININE 0 85 08/13/2019    GLUCOSE 93 06/21/2014    GLUF 90 03/12/2018    CALCIUM 8 7 03/12/2018    AST 23 08/13/2019    ALT 30 08/13/2019    ALKPHOS 35 (L) 03/12/2018    PROT 6 4 06/21/2014    BILITOT 0 7 06/21/2014    EGFR 94 03/12/2018     No results found for: IRON, TIBC, FERRITIN  No results found for: LIPASE    Radiology Results:   No results found

## 2019-09-10 LAB
ALBUMIN SERPL-MCNC: 4.5 G/DL (ref 3.6–4.8)
ALBUMIN/GLOB SERPL: 2.3 {RATIO} (ref 1.2–2.2)
ALP SERPL-CCNC: 43 IU/L (ref 39–117)
ALT SERPL-CCNC: 25 IU/L (ref 0–44)
AST SERPL-CCNC: 23 IU/L (ref 0–40)
BILIRUB SERPL-MCNC: 0.6 MG/DL (ref 0–1.2)
BUN SERPL-MCNC: 13 MG/DL (ref 8–27)
BUN/CREAT SERPL: 16 (ref 10–24)
CALCIUM SERPL-MCNC: 9.8 MG/DL (ref 8.6–10.2)
CHLORIDE SERPL-SCNC: 102 MMOL/L (ref 96–106)
CO2 SERPL-SCNC: 25 MMOL/L (ref 20–29)
CREAT SERPL-MCNC: 0.79 MG/DL (ref 0.76–1.27)
CRP SERPL-MCNC: 1 MG/L (ref 0–10)
ENDOMYSIUM IGA SER QL: NEGATIVE
ERYTHROCYTE [DISTWIDTH] IN BLOOD BY AUTOMATED COUNT: 13.8 % (ref 12.3–15.4)
GLIADIN PEPTIDE IGA SER-ACNC: 3 UNITS (ref 0–19)
GLIADIN PEPTIDE IGG SER-ACNC: 5 UNITS (ref 0–19)
GLOBULIN SER-MCNC: 2 G/DL (ref 1.5–4.5)
GLUCOSE SERPL-MCNC: 96 MG/DL (ref 65–99)
HCT VFR BLD AUTO: 44.2 % (ref 37.5–51)
HGB BLD-MCNC: 15.1 G/DL (ref 13–17.7)
IGA SERPL-MCNC: 123 MG/DL (ref 61–437)
MCH RBC QN AUTO: 32 PG (ref 26.6–33)
MCHC RBC AUTO-ENTMCNC: 34.2 G/DL (ref 31.5–35.7)
MCV RBC AUTO: 94 FL (ref 79–97)
PLATELET # BLD AUTO: 194 X10E3/UL (ref 150–450)
POTASSIUM SERPL-SCNC: 4.2 MMOL/L (ref 3.5–5.2)
PROT SERPL-MCNC: 6.5 G/DL (ref 6–8.5)
RBC # BLD AUTO: 4.72 X10E6/UL (ref 4.14–5.8)
SL AMB EGFR AFRICAN AMERICAN: 111 ML/MIN/1.73
SL AMB EGFR NON AFRICAN AMERICAN: 96 ML/MIN/1.73
SODIUM SERPL-SCNC: 140 MMOL/L (ref 134–144)
TTG IGA SER-ACNC: <2 U/ML (ref 0–3)
TTG IGG SER-ACNC: <2 U/ML (ref 0–5)
WBC # BLD AUTO: 7.3 X10E3/UL (ref 3.4–10.8)

## 2019-09-11 ENCOUNTER — TELEPHONE (OUTPATIENT)
Dept: GASTROENTEROLOGY | Facility: CLINIC | Age: 64
End: 2019-09-11

## 2019-09-11 NOTE — TELEPHONE ENCOUNTER
Pt states he is calling to cancel an appt  Pt sched'd for breath test 9/23; states all his issues have results  Pt will call if he has ques or concerns    Appt cancelled

## 2019-09-23 DIAGNOSIS — E78.00 HYPERCHOLESTEREMIA: ICD-10-CM

## 2019-09-23 RX ORDER — SIMVASTATIN 10 MG
TABLET ORAL
Qty: 30 TABLET | Refills: 1 | Status: SHIPPED | OUTPATIENT
Start: 2019-09-23 | End: 2019-10-21 | Stop reason: SDUPTHER

## 2019-10-21 DIAGNOSIS — E78.00 HYPERCHOLESTEREMIA: ICD-10-CM

## 2019-10-21 RX ORDER — SIMVASTATIN 10 MG
TABLET ORAL
Qty: 30 TABLET | Refills: 1 | Status: SHIPPED | OUTPATIENT
Start: 2019-10-21 | End: 2019-11-18 | Stop reason: SDUPTHER

## 2019-10-25 DIAGNOSIS — Z86.711 HISTORY OF PULMONARY EMBOLISM: ICD-10-CM

## 2019-10-25 RX ORDER — RIVAROXABAN 20 MG/1
TABLET, FILM COATED ORAL
Qty: 90 TABLET | Refills: 2 | Status: SHIPPED | OUTPATIENT
Start: 2019-10-25 | End: 2020-06-24

## 2019-11-18 DIAGNOSIS — E78.00 HYPERCHOLESTEREMIA: ICD-10-CM

## 2019-11-19 ENCOUNTER — TELEPHONE (OUTPATIENT)
Dept: FAMILY MEDICINE CLINIC | Facility: HOSPITAL | Age: 64
End: 2019-11-19

## 2019-11-19 RX ORDER — SIMVASTATIN 10 MG
TABLET ORAL
Qty: 30 TABLET | Refills: 1 | Status: SHIPPED | OUTPATIENT
Start: 2019-11-19 | End: 2019-12-22 | Stop reason: SDUPTHER

## 2019-11-21 ENCOUNTER — IMMUNIZATIONS (OUTPATIENT)
Dept: FAMILY MEDICINE CLINIC | Facility: HOSPITAL | Age: 64
End: 2019-11-21
Payer: COMMERCIAL

## 2019-11-21 DIAGNOSIS — Z23 ENCOUNTER FOR IMMUNIZATION: ICD-10-CM

## 2019-11-21 PROCEDURE — 90471 IMMUNIZATION ADMIN: CPT

## 2019-11-21 PROCEDURE — 90682 RIV4 VACC RECOMBINANT DNA IM: CPT

## 2019-12-22 DIAGNOSIS — E78.00 HYPERCHOLESTEREMIA: ICD-10-CM

## 2019-12-22 RX ORDER — SIMVASTATIN 10 MG
TABLET ORAL
Qty: 30 TABLET | Refills: 1 | Status: SHIPPED | OUTPATIENT
Start: 2019-12-22 | End: 2020-01-14

## 2020-01-11 DIAGNOSIS — I10 ESSENTIAL HYPERTENSION: ICD-10-CM

## 2020-01-11 RX ORDER — DOXAZOSIN 2 MG/1
2 TABLET ORAL
Qty: 90 TABLET | Refills: 0 | Status: SHIPPED | OUTPATIENT
Start: 2020-01-11 | End: 2020-04-17

## 2020-01-14 DIAGNOSIS — E78.00 HYPERCHOLESTEREMIA: ICD-10-CM

## 2020-01-14 RX ORDER — SIMVASTATIN 10 MG
TABLET ORAL
Qty: 30 TABLET | Refills: 0 | Status: SHIPPED | OUTPATIENT
Start: 2020-01-14 | End: 2020-02-17

## 2020-02-16 DIAGNOSIS — E78.00 HYPERCHOLESTEREMIA: ICD-10-CM

## 2020-02-17 RX ORDER — SIMVASTATIN 10 MG
TABLET ORAL
Qty: 30 TABLET | Refills: 0 | Status: SHIPPED | OUTPATIENT
Start: 2020-02-17 | End: 2020-03-19

## 2020-03-19 DIAGNOSIS — E78.00 HYPERCHOLESTEREMIA: ICD-10-CM

## 2020-03-19 RX ORDER — SIMVASTATIN 10 MG
TABLET ORAL
Qty: 30 TABLET | Refills: 0 | Status: SHIPPED | OUTPATIENT
Start: 2020-03-19 | End: 2020-04-12 | Stop reason: SDUPTHER

## 2020-04-12 DIAGNOSIS — E78.00 HYPERCHOLESTEREMIA: ICD-10-CM

## 2020-04-12 RX ORDER — SIMVASTATIN 10 MG
TABLET ORAL
Qty: 30 TABLET | Refills: 0 | Status: SHIPPED | OUTPATIENT
Start: 2020-04-12 | End: 2020-05-15

## 2020-04-17 DIAGNOSIS — I10 ESSENTIAL HYPERTENSION: ICD-10-CM

## 2020-04-17 RX ORDER — DOXAZOSIN 2 MG/1
TABLET ORAL
Qty: 90 TABLET | Refills: 0 | Status: SHIPPED | OUTPATIENT
Start: 2020-04-17 | End: 2020-07-15

## 2020-05-15 DIAGNOSIS — E78.00 HYPERCHOLESTEREMIA: ICD-10-CM

## 2020-05-15 RX ORDER — SIMVASTATIN 10 MG
TABLET ORAL
Qty: 30 TABLET | Refills: 0 | Status: SHIPPED | OUTPATIENT
Start: 2020-05-15 | End: 2020-06-14

## 2020-06-14 DIAGNOSIS — E78.00 HYPERCHOLESTEREMIA: ICD-10-CM

## 2020-06-14 RX ORDER — SIMVASTATIN 10 MG
TABLET ORAL
Qty: 30 TABLET | Refills: 0 | Status: SHIPPED | OUTPATIENT
Start: 2020-06-14 | End: 2020-07-19

## 2020-06-24 DIAGNOSIS — Z86.711 HISTORY OF PULMONARY EMBOLISM: ICD-10-CM

## 2020-06-24 RX ORDER — RIVAROXABAN 20 MG/1
TABLET, FILM COATED ORAL
Qty: 90 TABLET | Refills: 2 | Status: SHIPPED | OUTPATIENT
Start: 2020-06-24 | End: 2021-03-16 | Stop reason: SDUPTHER

## 2020-07-13 ENCOUNTER — TELEPHONE (OUTPATIENT)
Dept: FAMILY MEDICINE CLINIC | Facility: HOSPITAL | Age: 65
End: 2020-07-13

## 2020-07-13 NOTE — TELEPHONE ENCOUNTER
Pt scheduled a physical on 8/24  Does Dr Snehal Clayton want pt to have bloodwork prior to appt? Pt must go to [facility] for his bloodwork

## 2020-07-15 DIAGNOSIS — I10 ESSENTIAL HYPERTENSION: ICD-10-CM

## 2020-07-15 RX ORDER — DOXAZOSIN 2 MG/1
TABLET ORAL
Qty: 90 TABLET | Refills: 0 | Status: SHIPPED | OUTPATIENT
Start: 2020-07-15 | End: 2020-10-14

## 2020-07-19 DIAGNOSIS — I10 ESSENTIAL HYPERTENSION: ICD-10-CM

## 2020-07-19 DIAGNOSIS — E78.00 HYPERCHOLESTEREMIA: ICD-10-CM

## 2020-07-19 RX ORDER — SIMVASTATIN 10 MG
TABLET ORAL
Qty: 30 TABLET | Refills: 0 | Status: SHIPPED | OUTPATIENT
Start: 2020-07-19 | End: 2020-08-14

## 2020-07-19 RX ORDER — LOSARTAN POTASSIUM AND HYDROCHLOROTHIAZIDE 25; 100 MG/1; MG/1
TABLET ORAL
Qty: 90 TABLET | Refills: 3 | Status: SHIPPED | OUTPATIENT
Start: 2020-07-19 | End: 2021-07-01 | Stop reason: SDUPTHER

## 2020-08-14 DIAGNOSIS — E78.00 HYPERCHOLESTEREMIA: ICD-10-CM

## 2020-08-14 RX ORDER — SIMVASTATIN 10 MG
TABLET ORAL
Qty: 30 TABLET | Refills: 0 | Status: SHIPPED | OUTPATIENT
Start: 2020-08-14 | End: 2020-09-15

## 2020-08-24 ENCOUNTER — OFFICE VISIT (OUTPATIENT)
Dept: FAMILY MEDICINE CLINIC | Facility: HOSPITAL | Age: 65
End: 2020-08-24
Payer: COMMERCIAL

## 2020-08-24 VITALS
HEIGHT: 73 IN | WEIGHT: 227 LBS | TEMPERATURE: 98.1 F | RESPIRATION RATE: 16 BRPM | BODY MASS INDEX: 30.09 KG/M2 | DIASTOLIC BLOOD PRESSURE: 70 MMHG | SYSTOLIC BLOOD PRESSURE: 120 MMHG | HEART RATE: 82 BPM

## 2020-08-24 DIAGNOSIS — Z12.5 SCREENING FOR PROSTATE CANCER: ICD-10-CM

## 2020-08-24 DIAGNOSIS — Z00.00 ANNUAL PHYSICAL EXAM: Primary | ICD-10-CM

## 2020-08-24 DIAGNOSIS — Z13.6 SCREENING FOR CARDIOVASCULAR CONDITION: ICD-10-CM

## 2020-08-24 DIAGNOSIS — Z13.1 SCREENING FOR DIABETES MELLITUS: ICD-10-CM

## 2020-08-24 DIAGNOSIS — Z23 ENCOUNTER FOR IMMUNIZATION: ICD-10-CM

## 2020-08-24 PROCEDURE — 1036F TOBACCO NON-USER: CPT | Performed by: INTERNAL MEDICINE

## 2020-08-24 PROCEDURE — 99396 PREV VISIT EST AGE 40-64: CPT | Performed by: INTERNAL MEDICINE

## 2020-08-24 PROCEDURE — 3074F SYST BP LT 130 MM HG: CPT | Performed by: INTERNAL MEDICINE

## 2020-08-24 PROCEDURE — 3008F BODY MASS INDEX DOCD: CPT | Performed by: INTERNAL MEDICINE

## 2020-08-24 PROCEDURE — 3078F DIAST BP <80 MM HG: CPT | Performed by: INTERNAL MEDICINE

## 2020-08-24 PROCEDURE — 3725F SCREEN DEPRESSION PERFORMED: CPT | Performed by: INTERNAL MEDICINE

## 2020-08-24 NOTE — PROGRESS NOTES
Depression Screening Follow-up Plan: Patient's depression screening was positive with a PHQ-2 score of 0  Their PHQ-9 score was   Clinically patient does not have depression  No treatment is required  ADULT ANNUAL [address] [facility]    NAME: Moses Coles  AGE: 60 y o  SEX: male  : 1955     DATE: 2020     Assessment and Plan:     Problem List Items Addressed This Visit     None      Visit Diagnoses     Annual physical exam    -  Primary    Screening for prostate cancer        Relevant Orders    PSA, total and free    Screening for diabetes mellitus        Relevant Orders    Comprehensive metabolic panel    Screening for cardiovascular condition        Relevant Orders    Lipid panel    Encounter for immunization        Relevant Medications    Zoster Vac Recomb Adjuvanted (Shingrix) 50 MCG/0 5ML SUSR    BMI 29 0-29 9,adult              Immunizations and preventive care screenings were discussed with patient today  Appropriate education was printed on patient's after visit summary  Counseling:  · Exercise: the importance of regular exercise/physical activity was discussed  Recommend exercise 3-5 times per week for at least 30 minutes  No follow-ups on file  Chief Complaint:     Chief Complaint   Patient presents with    Annual Exam      History of Present Illness:     Adult Annual Physical   Patient here for a comprehensive physical exam  The patient reports no problems  Diet and Physical Activity  · Diet/Nutrition: well balanced diet  · Exercise: walking  Depression Screening  PHQ-9 Depression Screening    PHQ-9:    Frequency of the following problems over the past two weeks:       Little interest or pleasure in doing things:  0 - not at all  Feeling down, depressed, or hopeless:  0 - not at all  PHQ-2 Score:  0       General Health  · Sleep: sleeps well     · Hearing: normal - bilateral   · Vision: wears glasses  · Dental: regular dental visits   Health  · Symptoms include: urinary frequency     Review of Systems:     Review of Systems   Constitutional: Negative for fever  HENT: Negative for congestion  Eyes: Negative for visual disturbance  Respiratory: Negative for cough and shortness of breath  Cardiovascular: Negative for chest pain, palpitations and leg swelling  Gastrointestinal: Negative for abdominal pain, blood in stool and diarrhea  Endocrine: Negative for polydipsia and polyphagia  Genitourinary: Negative for difficulty urinating, dysuria and hematuria  Musculoskeletal: Negative for joint swelling, myalgias and neck stiffness  Skin: Negative for rash  Neurological: Negative for weakness, numbness and headaches  Hematological: Negative for adenopathy  Psychiatric/Behavioral: Negative for dysphoric mood  All other systems reviewed and are negative       Past Medical History:     Past Medical History:   Diagnosis Date    Acute maxillary sinusitis     Asthmatic bronchitis     Benign prostatic hyperplasia with urinary incontinence without sensory awareness     Cervical disc disease     Cervicalgia     Chest pain     [facility]    Erectile dysfunction     Fatigue     Hypercholesterolemia     Hyperlipidemia     Hypertension     IFG (impaired fasting glucose)     Screening for colon cancer     Screening for prostate cancer       Past Surgical History:     Past Surgical History:   Procedure Laterality Date    BACK SURGERY  2004    CHOLECYSTECTOMY  2009      Family History:     Family History   Problem Relation Age of Onset    Coronary artery disease Mother     Hypertrophic cardiomyopathy Mother     Thyroid disease Mother     Coronary artery disease Sister     Irritable bowel syndrome Sister     Crohn's disease Sister     Substance Abuse Sister     Mental illness Sister     Substance Abuse Brother     Mental illness Brother     Heart disease Family         cardiovascular disease, ischemic heart disease    Cancer Family       Social History:        Social History     Socioeconomic History    Marital status: /Civil Union     Spouse name: None    Number of children: None    Years of education: None    Highest education level: None   Occupational History    None   Social Needs    Financial resource strain: None    Food insecurity     Worry: None     Inability: None    Transportation needs     Medical: No     Non-medical: No   Tobacco Use    Smoking status: Never Smoker    Smokeless tobacco: Never Used   Substance and Sexual Activity    Alcohol use: Yes     Comment: SOCIAL    Drug use: No    Sexual activity: Yes   Lifestyle    Physical activity     Days per week: 0 days     Minutes per session: 0 min    Stress: Only a little   Relationships    Social connections     Talks on phone: Twice a week     Gets together: Twice a week     Attends Uats[employer] service: Never     Active member of club or organization: No     Attends meetings of clubs or organizations: Never     Relationship status:     Intimate partner violence     Fear of current or ex partner: No     Emotionally abused: No     Physically abused: No     Forced sexual activity: No   Other Topics Concern    None   Social History Narrative    Lives with wife  Feels safe at home  Has living will  Sees dentist reg       Good dental hygiene    Living situation: supportive and safe      Current Medications:     Current Outpatient Medications   Medication Sig Dispense Refill    doxazosin (CARDURA) 2 mg tablet TAKE 1 TABLET BY MOUTH EVERYDAY AT BEDTIME 90 tablet 0    fluticasone (FLONASE) 50 mcg/act nasal spray 2 sprays as needed    3    losartan-hydrochlorothiazide (HYZAAR) 100-25 MG per tablet TAKE 1 TABLET DAILY 90 tablet 3    sildenafil (REVATIO) 20 mg tablet Use 3 to 5 tablets as needed 90 tablet 3    simvastatin (ZOCOR) 10 mg tablet TAKE 1 TABLET BY MOUTH EVERY DAY 30 tablet 0    XARELTO 20 MG tablet TAKE 1 TABLET BY MOUTH EVERY DAY 90 tablet 2    Zoster Vac Recomb Adjuvanted (Shingrix) 50 MCG/0 5ML SUSR 0 5mL IM for one dose, followed by 0 5mL IM 2-6 months after first dose 1 each 1     No current facility-administered medications for this visit  Allergies: Allergies   Allergen Reactions    Vancomycin Rash     Peeling of skin    Ace Inhibitors Cough      Physical Exam:     /70   Pulse 82   Temp 98 1 °F (36 7 °C) (Tympanic)   Resp 16   Ht 6' 1" (1 854 m)   Wt 103 kg (227 lb)   BMI 29 95 kg/m²     Physical Exam  Constitutional:       Appearance: He is well-developed  HENT:      Head: Normocephalic  Eyes:      Conjunctiva/sclera: Conjunctivae normal    Neck:      Musculoskeletal: Neck supple  Cardiovascular:      Rate and Rhythm: Normal rate and regular rhythm  Pulmonary:      Effort: No respiratory distress  Breath sounds: No wheezing or rales  Abdominal:      Tenderness: There is no abdominal tenderness  Musculoskeletal:         General: No tenderness  Lymphadenopathy:      Cervical: No cervical adenopathy  Skin:     General: Skin is warm and dry  Findings: No erythema  Neurological:      Mental Status: He is alert and oriented to person, place, and time  Cranial Nerves: No cranial nerve deficit  Mariely Meeks MD  [facility]  BMI Counseling: Body mass index is 29 95 kg/m²  The BMI is above normal  Nutrition recommendations include reducing portion sizes

## 2020-08-24 NOTE — PATIENT INSTRUCTIONS

## 2020-09-12 LAB
ALBUMIN SERPL-MCNC: 4.3 G/DL (ref 3.8–4.8)
ALBUMIN/GLOB SERPL: 2.3 {RATIO} (ref 1.2–2.2)
ALP SERPL-CCNC: 37 IU/L (ref 39–117)
ALT SERPL-CCNC: 26 IU/L (ref 0–44)
AST SERPL-CCNC: 28 IU/L (ref 0–40)
BILIRUB SERPL-MCNC: 0.9 MG/DL (ref 0–1.2)
BUN SERPL-MCNC: 15 MG/DL (ref 8–27)
BUN/CREAT SERPL: 18 (ref 10–24)
CALCIUM SERPL-MCNC: 10.1 MG/DL (ref 8.6–10.2)
CHLORIDE SERPL-SCNC: 100 MMOL/L (ref 96–106)
CHOLEST SERPL-MCNC: 156 MG/DL (ref 100–199)
CHOLEST/HDLC SERPL: 3.3 RATIO (ref 0–5)
CO2 SERPL-SCNC: 27 MMOL/L (ref 20–29)
CREAT SERPL-MCNC: 0.82 MG/DL (ref 0.76–1.27)
GLOBULIN SER-MCNC: 1.9 G/DL (ref 1.5–4.5)
GLUCOSE SERPL-MCNC: 107 MG/DL (ref 65–99)
HDLC SERPL-MCNC: 48 MG/DL
LDLC SERPL CALC-MCNC: 92 MG/DL (ref 0–99)
POTASSIUM SERPL-SCNC: 4 MMOL/L (ref 3.5–5.2)
PROT SERPL-MCNC: 6.2 G/DL (ref 6–8.5)
PSA FREE MFR SERPL: 16.6 %
PSA FREE SERPL-MCNC: 0.63 NG/ML
PSA SERPL-MCNC: 3.8 NG/ML (ref 0–4)
SL AMB EGFR AFRICAN AMERICAN: 109 ML/MIN/1.73
SL AMB EGFR NON AFRICAN AMERICAN: 94 ML/MIN/1.73
SL AMB VLDL CHOLESTEROL CALC: 16 MG/DL (ref 5–40)
SODIUM SERPL-SCNC: 138 MMOL/L (ref 134–144)
TRIGL SERPL-MCNC: 86 MG/DL (ref 0–149)

## 2020-09-15 DIAGNOSIS — E78.00 HYPERCHOLESTEREMIA: ICD-10-CM

## 2020-09-15 RX ORDER — SIMVASTATIN 10 MG
TABLET ORAL
Qty: 30 TABLET | Refills: 0 | Status: SHIPPED | OUTPATIENT
Start: 2020-09-15 | End: 2020-10-15

## 2020-10-09 ENCOUNTER — OFFICE VISIT (OUTPATIENT)
Dept: OBGYN CLINIC | Facility: CLINIC | Age: 65
End: 2020-10-09
Payer: COMMERCIAL

## 2020-10-09 ENCOUNTER — APPOINTMENT (OUTPATIENT)
Dept: RADIOLOGY | Facility: CLINIC | Age: 65
End: 2020-10-09
Payer: COMMERCIAL

## 2020-10-09 VITALS
HEIGHT: 73 IN | SYSTOLIC BLOOD PRESSURE: 120 MMHG | DIASTOLIC BLOOD PRESSURE: 82 MMHG | TEMPERATURE: 98.4 F | WEIGHT: 225 LBS | BODY MASS INDEX: 29.82 KG/M2 | HEART RATE: 81 BPM

## 2020-10-09 DIAGNOSIS — M25.512 LEFT SHOULDER PAIN, UNSPECIFIED CHRONICITY: ICD-10-CM

## 2020-10-09 DIAGNOSIS — M75.102 TEAR OF LEFT ROTATOR CUFF, UNSPECIFIED TEAR EXTENT, UNSPECIFIED WHETHER TRAUMATIC: Primary | ICD-10-CM

## 2020-10-09 DIAGNOSIS — M19.012 PRIMARY OSTEOARTHRITIS OF LEFT SHOULDER: ICD-10-CM

## 2020-10-09 PROCEDURE — 20610 DRAIN/INJ JOINT/BURSA W/O US: CPT | Performed by: ORTHOPAEDIC SURGERY

## 2020-10-09 PROCEDURE — 3074F SYST BP LT 130 MM HG: CPT | Performed by: ORTHOPAEDIC SURGERY

## 2020-10-09 PROCEDURE — 73030 X-RAY EXAM OF SHOULDER: CPT

## 2020-10-09 PROCEDURE — 99203 OFFICE O/P NEW LOW 30 MIN: CPT | Performed by: ORTHOPAEDIC SURGERY

## 2020-10-09 PROCEDURE — 3079F DIAST BP 80-89 MM HG: CPT | Performed by: ORTHOPAEDIC SURGERY

## 2020-10-09 PROCEDURE — 1036F TOBACCO NON-USER: CPT | Performed by: ORTHOPAEDIC SURGERY

## 2020-10-09 RX ORDER — METHYLPREDNISOLONE ACETATE 40 MG/ML
1 INJECTION, SUSPENSION INTRA-ARTICULAR; INTRALESIONAL; INTRAMUSCULAR; SOFT TISSUE
Status: COMPLETED | OUTPATIENT
Start: 2020-10-09 | End: 2020-10-09

## 2020-10-09 RX ORDER — LIDOCAINE HYDROCHLORIDE 10 MG/ML
3 INJECTION, SOLUTION INFILTRATION; PERINEURAL
Status: COMPLETED | OUTPATIENT
Start: 2020-10-09 | End: 2020-10-09

## 2020-10-09 RX ADMIN — METHYLPREDNISOLONE ACETATE 1 ML: 40 INJECTION, SUSPENSION INTRA-ARTICULAR; INTRALESIONAL; INTRAMUSCULAR; SOFT TISSUE at 14:41

## 2020-10-09 RX ADMIN — LIDOCAINE HYDROCHLORIDE 3 ML: 10 INJECTION, SOLUTION INFILTRATION; PERINEURAL at 14:41

## 2020-10-13 ENCOUNTER — EVALUATION (OUTPATIENT)
Dept: PHYSICAL THERAPY | Facility: CLINIC | Age: 65
End: 2020-10-13
Payer: COMMERCIAL

## 2020-10-13 DIAGNOSIS — M19.012 PRIMARY OSTEOARTHRITIS OF LEFT SHOULDER: ICD-10-CM

## 2020-10-13 DIAGNOSIS — M25.512 LEFT SHOULDER PAIN, UNSPECIFIED CHRONICITY: ICD-10-CM

## 2020-10-13 DIAGNOSIS — M75.102 TEAR OF LEFT ROTATOR CUFF, UNSPECIFIED TEAR EXTENT, UNSPECIFIED WHETHER TRAUMATIC: Primary | ICD-10-CM

## 2020-10-13 PROCEDURE — 97161 PT EVAL LOW COMPLEX 20 MIN: CPT | Performed by: PHYSICAL THERAPIST

## 2020-10-13 PROCEDURE — 97110 THERAPEUTIC EXERCISES: CPT | Performed by: PHYSICAL THERAPIST

## 2020-10-14 DIAGNOSIS — I10 ESSENTIAL HYPERTENSION: ICD-10-CM

## 2020-10-14 RX ORDER — DOXAZOSIN 2 MG/1
TABLET ORAL
Qty: 90 TABLET | Refills: 0 | Status: SHIPPED | OUTPATIENT
Start: 2020-10-14 | End: 2021-03-08 | Stop reason: SDUPTHER

## 2020-10-15 DIAGNOSIS — E78.00 HYPERCHOLESTEREMIA: ICD-10-CM

## 2020-10-15 RX ORDER — SIMVASTATIN 10 MG
TABLET ORAL
Qty: 30 TABLET | Refills: 0 | Status: SHIPPED | OUTPATIENT
Start: 2020-10-15 | End: 2020-11-07

## 2020-10-16 ENCOUNTER — OFFICE VISIT (OUTPATIENT)
Dept: PHYSICAL THERAPY | Facility: CLINIC | Age: 65
End: 2020-10-16
Payer: COMMERCIAL

## 2020-10-16 DIAGNOSIS — M25.512 LEFT SHOULDER PAIN, UNSPECIFIED CHRONICITY: Primary | ICD-10-CM

## 2020-10-16 DIAGNOSIS — M19.012 PRIMARY OSTEOARTHRITIS OF LEFT SHOULDER: ICD-10-CM

## 2020-10-16 PROCEDURE — 97112 NEUROMUSCULAR REEDUCATION: CPT | Performed by: PHYSICAL THERAPIST

## 2020-10-16 PROCEDURE — 97110 THERAPEUTIC EXERCISES: CPT | Performed by: PHYSICAL THERAPIST

## 2020-10-20 ENCOUNTER — OFFICE VISIT (OUTPATIENT)
Dept: PHYSICAL THERAPY | Facility: CLINIC | Age: 65
End: 2020-10-20
Payer: COMMERCIAL

## 2020-10-20 DIAGNOSIS — M25.512 LEFT SHOULDER PAIN, UNSPECIFIED CHRONICITY: Primary | ICD-10-CM

## 2020-10-20 PROCEDURE — 97112 NEUROMUSCULAR REEDUCATION: CPT | Performed by: PHYSICAL THERAPIST

## 2020-10-20 PROCEDURE — 97110 THERAPEUTIC EXERCISES: CPT | Performed by: PHYSICAL THERAPIST

## 2020-10-23 ENCOUNTER — OFFICE VISIT (OUTPATIENT)
Dept: PHYSICAL THERAPY | Facility: CLINIC | Age: 65
End: 2020-10-23
Payer: COMMERCIAL

## 2020-10-23 DIAGNOSIS — M75.102 TEAR OF LEFT ROTATOR CUFF, UNSPECIFIED TEAR EXTENT, UNSPECIFIED WHETHER TRAUMATIC: ICD-10-CM

## 2020-10-23 DIAGNOSIS — M19.012 PRIMARY OSTEOARTHRITIS OF LEFT SHOULDER: ICD-10-CM

## 2020-10-23 DIAGNOSIS — M25.512 LEFT SHOULDER PAIN, UNSPECIFIED CHRONICITY: Primary | ICD-10-CM

## 2020-10-23 PROCEDURE — 97112 NEUROMUSCULAR REEDUCATION: CPT

## 2020-10-23 PROCEDURE — 97110 THERAPEUTIC EXERCISES: CPT

## 2020-10-27 ENCOUNTER — OFFICE VISIT (OUTPATIENT)
Dept: PHYSICAL THERAPY | Facility: CLINIC | Age: 65
End: 2020-10-27
Payer: COMMERCIAL

## 2020-10-27 DIAGNOSIS — M25.512 LEFT SHOULDER PAIN, UNSPECIFIED CHRONICITY: Primary | ICD-10-CM

## 2020-10-27 PROCEDURE — 97112 NEUROMUSCULAR REEDUCATION: CPT | Performed by: PHYSICAL THERAPIST

## 2020-10-27 PROCEDURE — 97110 THERAPEUTIC EXERCISES: CPT | Performed by: PHYSICAL THERAPIST

## 2020-10-30 ENCOUNTER — OFFICE VISIT (OUTPATIENT)
Dept: PHYSICAL THERAPY | Facility: CLINIC | Age: 65
End: 2020-10-30
Payer: COMMERCIAL

## 2020-10-30 DIAGNOSIS — M25.512 LEFT SHOULDER PAIN, UNSPECIFIED CHRONICITY: Primary | ICD-10-CM

## 2020-10-30 PROCEDURE — 97112 NEUROMUSCULAR REEDUCATION: CPT | Performed by: PHYSICAL THERAPIST

## 2020-10-30 PROCEDURE — 97110 THERAPEUTIC EXERCISES: CPT | Performed by: PHYSICAL THERAPIST

## 2020-11-03 ENCOUNTER — OFFICE VISIT (OUTPATIENT)
Dept: PHYSICAL THERAPY | Facility: CLINIC | Age: 65
End: 2020-11-03
Payer: COMMERCIAL

## 2020-11-03 DIAGNOSIS — M75.102 TEAR OF LEFT ROTATOR CUFF, UNSPECIFIED TEAR EXTENT, UNSPECIFIED WHETHER TRAUMATIC: ICD-10-CM

## 2020-11-03 DIAGNOSIS — M25.512 LEFT SHOULDER PAIN, UNSPECIFIED CHRONICITY: Primary | ICD-10-CM

## 2020-11-03 DIAGNOSIS — M19.012 PRIMARY OSTEOARTHRITIS OF LEFT SHOULDER: ICD-10-CM

## 2020-11-03 PROCEDURE — 97110 THERAPEUTIC EXERCISES: CPT

## 2020-11-03 PROCEDURE — 97112 NEUROMUSCULAR REEDUCATION: CPT

## 2020-11-06 ENCOUNTER — OFFICE VISIT (OUTPATIENT)
Dept: PHYSICAL THERAPY | Facility: CLINIC | Age: 65
End: 2020-11-06
Payer: COMMERCIAL

## 2020-11-06 DIAGNOSIS — M19.012 PRIMARY OSTEOARTHRITIS OF LEFT SHOULDER: ICD-10-CM

## 2020-11-06 DIAGNOSIS — M25.512 LEFT SHOULDER PAIN, UNSPECIFIED CHRONICITY: Primary | ICD-10-CM

## 2020-11-06 DIAGNOSIS — E78.00 HYPERCHOLESTEREMIA: ICD-10-CM

## 2020-11-06 DIAGNOSIS — M75.102 TEAR OF LEFT ROTATOR CUFF, UNSPECIFIED TEAR EXTENT, UNSPECIFIED WHETHER TRAUMATIC: ICD-10-CM

## 2020-11-06 PROCEDURE — 97110 THERAPEUTIC EXERCISES: CPT

## 2020-11-06 PROCEDURE — 97112 NEUROMUSCULAR REEDUCATION: CPT

## 2020-11-07 RX ORDER — SIMVASTATIN 10 MG
TABLET ORAL
Qty: 30 TABLET | Refills: 0 | Status: SHIPPED | OUTPATIENT
Start: 2020-11-07 | End: 2020-12-18 | Stop reason: SDUPTHER

## 2020-11-10 ENCOUNTER — OFFICE VISIT (OUTPATIENT)
Dept: PHYSICAL THERAPY | Facility: CLINIC | Age: 65
End: 2020-11-10
Payer: COMMERCIAL

## 2020-11-10 DIAGNOSIS — M25.512 LEFT SHOULDER PAIN, UNSPECIFIED CHRONICITY: Primary | ICD-10-CM

## 2020-11-10 PROCEDURE — 97110 THERAPEUTIC EXERCISES: CPT | Performed by: PHYSICAL THERAPIST

## 2020-11-10 PROCEDURE — 97112 NEUROMUSCULAR REEDUCATION: CPT | Performed by: PHYSICAL THERAPIST

## 2020-11-13 ENCOUNTER — OFFICE VISIT (OUTPATIENT)
Dept: PHYSICAL THERAPY | Facility: CLINIC | Age: 65
End: 2020-11-13
Payer: COMMERCIAL

## 2020-11-13 DIAGNOSIS — M25.512 LEFT SHOULDER PAIN, UNSPECIFIED CHRONICITY: Primary | ICD-10-CM

## 2020-11-13 DIAGNOSIS — M75.102 TEAR OF LEFT ROTATOR CUFF, UNSPECIFIED TEAR EXTENT, UNSPECIFIED WHETHER TRAUMATIC: ICD-10-CM

## 2020-11-13 PROCEDURE — 97112 NEUROMUSCULAR REEDUCATION: CPT

## 2020-11-13 PROCEDURE — 97110 THERAPEUTIC EXERCISES: CPT

## 2020-11-17 ENCOUNTER — OFFICE VISIT (OUTPATIENT)
Dept: PHYSICAL THERAPY | Facility: CLINIC | Age: 65
End: 2020-11-17
Payer: COMMERCIAL

## 2020-11-17 DIAGNOSIS — M25.512 LEFT SHOULDER PAIN, UNSPECIFIED CHRONICITY: Primary | ICD-10-CM

## 2020-11-17 PROCEDURE — 97110 THERAPEUTIC EXERCISES: CPT | Performed by: PHYSICAL THERAPIST

## 2020-11-17 PROCEDURE — 97112 NEUROMUSCULAR REEDUCATION: CPT | Performed by: PHYSICAL THERAPIST

## 2020-11-17 PROCEDURE — 97140 MANUAL THERAPY 1/> REGIONS: CPT | Performed by: PHYSICAL THERAPIST

## 2020-11-20 ENCOUNTER — OFFICE VISIT (OUTPATIENT)
Dept: PHYSICAL THERAPY | Facility: CLINIC | Age: 65
End: 2020-11-20
Payer: COMMERCIAL

## 2020-11-20 DIAGNOSIS — M25.512 LEFT SHOULDER PAIN, UNSPECIFIED CHRONICITY: Primary | ICD-10-CM

## 2020-11-20 DIAGNOSIS — M75.102 TEAR OF LEFT ROTATOR CUFF, UNSPECIFIED TEAR EXTENT, UNSPECIFIED WHETHER TRAUMATIC: ICD-10-CM

## 2020-11-20 DIAGNOSIS — M19.012 PRIMARY OSTEOARTHRITIS OF LEFT SHOULDER: ICD-10-CM

## 2020-11-20 PROCEDURE — 97110 THERAPEUTIC EXERCISES: CPT | Performed by: PHYSICAL THERAPIST

## 2020-11-20 PROCEDURE — 97140 MANUAL THERAPY 1/> REGIONS: CPT | Performed by: PHYSICAL THERAPIST

## 2020-11-24 ENCOUNTER — OFFICE VISIT (OUTPATIENT)
Dept: PHYSICAL THERAPY | Facility: CLINIC | Age: 65
End: 2020-11-24
Payer: COMMERCIAL

## 2020-11-24 DIAGNOSIS — M25.512 LEFT SHOULDER PAIN, UNSPECIFIED CHRONICITY: Primary | ICD-10-CM

## 2020-11-24 DIAGNOSIS — M75.102 TEAR OF LEFT ROTATOR CUFF, UNSPECIFIED TEAR EXTENT, UNSPECIFIED WHETHER TRAUMATIC: ICD-10-CM

## 2020-11-24 DIAGNOSIS — M19.012 PRIMARY OSTEOARTHRITIS OF LEFT SHOULDER: ICD-10-CM

## 2020-11-24 PROCEDURE — 97110 THERAPEUTIC EXERCISES: CPT

## 2020-11-24 PROCEDURE — 97112 NEUROMUSCULAR REEDUCATION: CPT

## 2020-11-24 PROCEDURE — 97140 MANUAL THERAPY 1/> REGIONS: CPT

## 2020-11-27 ENCOUNTER — EVALUATION (OUTPATIENT)
Dept: PHYSICAL THERAPY | Facility: CLINIC | Age: 65
End: 2020-11-27
Payer: COMMERCIAL

## 2020-11-27 DIAGNOSIS — M25.512 LEFT SHOULDER PAIN, UNSPECIFIED CHRONICITY: Primary | ICD-10-CM

## 2020-11-27 PROCEDURE — 97110 THERAPEUTIC EXERCISES: CPT | Performed by: PHYSICAL THERAPIST

## 2020-11-27 PROCEDURE — 97140 MANUAL THERAPY 1/> REGIONS: CPT | Performed by: PHYSICAL THERAPIST

## 2020-11-27 PROCEDURE — 97112 NEUROMUSCULAR REEDUCATION: CPT | Performed by: PHYSICAL THERAPIST

## 2020-12-18 DIAGNOSIS — E78.00 HYPERCHOLESTEREMIA: ICD-10-CM

## 2020-12-18 RX ORDER — SIMVASTATIN 10 MG
10 TABLET ORAL DAILY
Qty: 30 TABLET | Refills: 5 | Status: SHIPPED | OUTPATIENT
Start: 2020-12-18 | End: 2021-06-14 | Stop reason: SDUPTHER

## 2021-01-28 ENCOUNTER — OFFICE VISIT (OUTPATIENT)
Dept: OBGYN CLINIC | Facility: MEDICAL CENTER | Age: 66
End: 2021-01-28
Payer: COMMERCIAL

## 2021-01-28 VITALS
DIASTOLIC BLOOD PRESSURE: 78 MMHG | BODY MASS INDEX: 29.82 KG/M2 | WEIGHT: 225 LBS | SYSTOLIC BLOOD PRESSURE: 115 MMHG | HEIGHT: 73 IN | HEART RATE: 80 BPM

## 2021-01-28 DIAGNOSIS — S46.012D TRAUMATIC TEAR OF LEFT ROTATOR CUFF, UNSPECIFIED TEAR EXTENT, SUBSEQUENT ENCOUNTER: Primary | ICD-10-CM

## 2021-01-28 PROCEDURE — 1036F TOBACCO NON-USER: CPT | Performed by: ORTHOPAEDIC SURGERY

## 2021-01-28 PROCEDURE — 3074F SYST BP LT 130 MM HG: CPT | Performed by: ORTHOPAEDIC SURGERY

## 2021-01-28 PROCEDURE — 99213 OFFICE O/P EST LOW 20 MIN: CPT | Performed by: ORTHOPAEDIC SURGERY

## 2021-01-28 PROCEDURE — 3008F BODY MASS INDEX DOCD: CPT | Performed by: ORTHOPAEDIC SURGERY

## 2021-01-28 PROCEDURE — 3078F DIAST BP <80 MM HG: CPT | Performed by: ORTHOPAEDIC SURGERY

## 2021-01-28 NOTE — PROGRESS NOTES
Ortho Sports Medicine Shoulder Follow Up Visit     Assesment:     63 y o  male left shoulder possible rotator cuff tear      Plan:    Conservative treatment:    Ice to shoulder 1-2 times daily, for 20 minutes at a time  Imaging: All imaging from today was reviewed by myself and explained to the patient  We will obtain an MRI of the shoulder to rule out rotator cuff tear  Follow up in 1-2 weeks for MRI review  Will make a definitive treatment plan based on the results of the MRI  Injection:    No Injection planned at this time  Surgery:     No surgery is recommended at this point, continue with conservative treatment plan as noted  Follow up:    Return for 1 week after MRI  Chief Complaint   Patient presents with    Left Shoulder - Follow-up         History of Present Illness: The patient is returns for follow up of Left shoulder pain  Since the prior visit, He reports no improvement  Patient states that he has had new injuries of the left shoulder that is creating an exacerbation of pain  Prior to this, he received a corticosteroid injection and tried greater than 6 weeks of physical therapy  He states that with this he had 98% improvement  He denied having pain have full range of motion and strength  Patient states that then right before Christmas with holding open a door when his grandson came pushing through on the other side  He rosy the left shoulder causing excruciating pain  Patient states that he used ice and analgesics but it took many days before the pain subsided slightly  He then had weakness and difficulty with ROM  Patient then states about 3 weeks ago he was playing around with his grandchildren when he felt a snap sensation in the biceps region  He denies having any swelling or bruising at that time  Patient states that now he is having new pain within the anterior and lateral aspect of the shoulder    He states that this is present with any reaching out type motion  He is experiencing weakness, he has to support the left arm with the right  He also has difficulty sleeping at night due to pain  Pain is improved by rest   Pain is aggravated by overhead activity, reaching back, rotation, lifting  and exercising  Symptoms include clicking, catching and popping  The patient has weakness  The patient has tried rest, ice, NSAIDS, physical therapy and injection          Shoulder Surgical History:  None    Past Medical, Social and Family History:  Past Medical History:   Diagnosis Date    Acute maxillary sinusitis     Asthmatic bronchitis     Benign prostatic hyperplasia with urinary incontinence without sensory awareness     Cervical disc disease     Cervicalgia     Chest pain     [facility]deep [facility]) ([facility] [address] ) 2004    Erectile dysfunction     Fatigue     Hypercholesterolemia     Hyperlipidemia     Hypertension     IFG (impaired fasting glucose)     Screening for colon cancer     Screening for prostate cancer      Past Surgical History:   Procedure Laterality Date    BACK SURGERY  2004    CHOLECYSTECTOMY  2009     Allergies   Allergen Reactions    Vancomycin Rash     Peeling of skin    Ace Inhibitors Cough     Current Outpatient Medications on File Prior to Visit   Medication Sig Dispense Refill    doxazosin (CARDURA) 2 mg tablet TAKE 1 TABLET BY MOUTH EVERYDAY AT BEDTIME 90 tablet 0    fluticasone (FLONASE) 50 mcg/act nasal spray 2 sprays as needed    3    losartan-hydrochlorothiazide (HYZAAR) 100-25 MG per tablet TAKE 1 TABLET DAILY 90 tablet 3    sildenafil (REVATIO) 20 mg tablet Use 3 to 5 tablets as needed 90 tablet 3    simvastatin (ZOCOR) 10 mg tablet Take 1 tablet (10 mg total) by mouth daily 30 tablet 5    XARELTO 20 MG tablet TAKE 1 TABLET BY MOUTH EVERY DAY 90 tablet 2    Zoster Vac Recomb Adjuvanted (Shingrix) 50 MCG/0 5ML SUSR 0 5mL IM for one dose, followed by 0 5mL IM 2-6 months after first dose 1 each 1 No current facility-administered medications on file prior to visit  Social History     Socioeconomic History    Marital status: /Civil Union     Spouse name: Not on file    Number of children: Not on file    Years of education: Not on file    Highest education level: Not on file   Occupational History    Not on file   Social Needs    Financial resource strain: Not on file    Food insecurity     Worry: Not on file     Inability: Not on file    Transportation needs     Medical: No     Non-medical: No   Tobacco Use    Smoking status: Never Smoker    Smokeless tobacco: Never Used   Substance and Sexual Activity    Alcohol use: Yes     Comment: SOCIAL    Drug use: No    Sexual activity: Yes   Lifestyle    Physical activity     Days per week: 0 days     Minutes per session: 0 min    Stress: Only a little   Relationships    Social connections     Talks on phone: Twice a week     Gets together: Twice a week     Attends Mu-ism service: Never     Active member of club or organization: No     Attends meetings of clubs or organizations: Never     Relationship status:     Intimate partner violence     Fear of current or ex partner: No     Emotionally abused: No     Physically abused: No     Forced sexual activity: No   Other Topics Concern    Not on file   Social History Narrative    Lives with wife  Feels safe at home  Has living will  Sees dentist reg  Good dental hygiene    Living situation: supportive and safe       I have reviewed the past medical, surgical, social and family history, medications and allergies as documented in the EMR  Review of systems: ROS is negative other than that noted in the HPI  Constitutional: Negative for fatigue and fever  Physical Exam:    Blood pressure 115/78, pulse 80, height 6' 1" (1 854 m), weight 102 kg (225 lb)      General/Constitutional: NAD, well developed, well nourished  HENT: Normocephalic, atraumatic  CV: Intact distal pulses, regular rate  Resp: No respiratory distress or labored breathing  Lymphatic: No lymphadenopathy palpated  Neuro: Alert and Oriented x 3, no focal deficits  Psych: Normal mood, normal affect, normal judgement, normal behavior  Skin: Warm, dry, no rashes, no erythema      Shoulder focused exam:       RIGHT LEFT    Scapula Atrophy Negative Negative     Winging Negative Negative     Protraction Negative Negative    Rotator cuff SS 5/5 4+/5     IS 5/5 5/5     SubS 5/5 5/5    ROM     170 with pain     ER0 60 60                   IRb T6    T6    TTP: AC Negative Negative     Biceps Negative Negative     Coracoid Negative Negative    Special Tests: O'Briens Negative Negative     Morris-shear Negative Negative     Cross body Adduction Negative Negative     Speeds  Negative Negative     Kirill's Negative Negative     Whipple Negative Positive 4+/5        Neer Negative Negative     Parrish Negative Negative    Instability: Apprehension & relocation not tested not tested     Load & shift not tested not tested    Other: Crank Negative Negative               UE NV Exam: +2 Radial pulses bilaterally  Sensation intact to light touch C5-T1 bilaterally, Radial/median/ulnar nerve motor intact    Cervical ROM is full without pain, numbness or tingling      Shoulder Imaging    No imaging was performed today

## 2021-02-09 ENCOUNTER — HOSPITAL ENCOUNTER (OUTPATIENT)
Dept: RADIOLOGY | Facility: HOSPITAL | Age: 66
Discharge: HOME | End: 2021-02-09
Payer: COMMERCIAL

## 2021-02-09 DIAGNOSIS — S46.012D TRAUMATIC TEAR OF LEFT ROTATOR CUFF, UNSPECIFIED TEAR EXTENT, SUBSEQUENT ENCOUNTER: ICD-10-CM

## 2021-02-09 PROCEDURE — G1004 CDSM NDSC: HCPCS

## 2021-02-09 PROCEDURE — 73221 MRI JOINT UPR EXTREM W/O DYE: CPT

## 2021-02-11 ENCOUNTER — APPOINTMENT (OUTPATIENT)
Dept: LAB | Facility: MEDICAL CENTER | Age: 66
End: 2021-02-11
Payer: COMMERCIAL

## 2021-02-11 ENCOUNTER — TELEPHONE (OUTPATIENT)
Dept: SURGICAL ONCOLOGY | Facility: CLINIC | Age: 66
End: 2021-02-11

## 2021-02-11 ENCOUNTER — OFFICE VISIT (OUTPATIENT)
Dept: OBGYN CLINIC | Facility: MEDICAL CENTER | Age: 66
End: 2021-02-11
Payer: COMMERCIAL

## 2021-02-11 ENCOUNTER — CLINICAL SUPPORT (OUTPATIENT)
Dept: URGENT CARE | Facility: MEDICAL CENTER | Age: 66
End: 2021-02-11
Payer: COMMERCIAL

## 2021-02-11 VITALS
TEMPERATURE: 97.7 F | WEIGHT: 230 LBS | BODY MASS INDEX: 30.48 KG/M2 | DIASTOLIC BLOOD PRESSURE: 70 MMHG | SYSTOLIC BLOOD PRESSURE: 138 MMHG | HEIGHT: 73 IN

## 2021-02-11 DIAGNOSIS — S46.012A TRAUMATIC INCOMPLETE TEAR OF LEFT ROTATOR CUFF, INITIAL ENCOUNTER: Primary | ICD-10-CM

## 2021-02-11 DIAGNOSIS — S46.012A STRAIN OF TENDON OF LEFT ROTATOR CUFF, INITIAL ENCOUNTER: Primary | ICD-10-CM

## 2021-02-11 DIAGNOSIS — S46.012A TRAUMATIC INCOMPLETE TEAR OF LEFT ROTATOR CUFF, INITIAL ENCOUNTER: ICD-10-CM

## 2021-02-11 DIAGNOSIS — Z01.818 PRE-OP TESTING: ICD-10-CM

## 2021-02-11 DIAGNOSIS — M19.012 OSTEOARTHRITIS OF LEFT AC (ACROMIOCLAVICULAR) JOINT: ICD-10-CM

## 2021-02-11 DIAGNOSIS — Z01.818 OTHER SPECIFIED PRE-OPERATIVE EXAMINATION: ICD-10-CM

## 2021-02-11 LAB
ATRIAL RATE: 67 BPM
BASOPHILS # BLD AUTO: 0.05 THOUSANDS/ΜL (ref 0–0.1)
BASOPHILS NFR BLD AUTO: 1 % (ref 0–1)
EOSINOPHIL # BLD AUTO: 0.12 THOUSAND/ΜL (ref 0–0.61)
EOSINOPHIL NFR BLD AUTO: 2 % (ref 0–6)
ERYTHROCYTE [DISTWIDTH] IN BLOOD BY AUTOMATED COUNT: 12.3 % (ref 11.6–15.1)
HCT VFR BLD AUTO: 44.1 % (ref 36.5–49.3)
HGB BLD-MCNC: 15.1 G/DL (ref 12–17)
IMM GRANULOCYTES # BLD AUTO: 0.02 THOUSAND/UL (ref 0–0.2)
IMM GRANULOCYTES NFR BLD AUTO: 0 % (ref 0–2)
LYMPHOCYTES # BLD AUTO: 2.26 THOUSANDS/ΜL (ref 0.6–4.47)
LYMPHOCYTES NFR BLD AUTO: 28 % (ref 14–44)
MCH RBC QN AUTO: 32.2 PG (ref 26.8–34.3)
MCHC RBC AUTO-ENTMCNC: 34.2 G/DL (ref 31.4–37.4)
MCV RBC AUTO: 94 FL (ref 82–98)
MONOCYTES # BLD AUTO: 0.79 THOUSAND/ΜL (ref 0.17–1.22)
MONOCYTES NFR BLD AUTO: 10 % (ref 4–12)
NEUTROPHILS # BLD AUTO: 4.84 THOUSANDS/ΜL (ref 1.85–7.62)
NEUTS SEG NFR BLD AUTO: 59 % (ref 43–75)
NRBC BLD AUTO-RTO: 0 /100 WBCS
P AXIS: 46 DEGREES
PLATELET # BLD AUTO: 203 THOUSANDS/UL (ref 149–390)
PMV BLD AUTO: 11.9 FL (ref 8.9–12.7)
PR INTERVAL: 174 MS
QRS AXIS: -12 DEGREES
QRSD INTERVAL: 112 MS
QT INTERVAL: 366 MS
QTC INTERVAL: 386 MS
RBC # BLD AUTO: 4.69 MILLION/UL (ref 3.88–5.62)
T WAVE AXIS: 19 DEGREES
VENTRICULAR RATE: 67 BPM
WBC # BLD AUTO: 8.08 THOUSAND/UL (ref 4.31–10.16)

## 2021-02-11 PROCEDURE — 99214 OFFICE O/P EST MOD 30 MIN: CPT | Performed by: ORTHOPAEDIC SURGERY

## 2021-02-11 PROCEDURE — 36415 COLL VENOUS BLD VENIPUNCTURE: CPT

## 2021-02-11 PROCEDURE — 93005 ELECTROCARDIOGRAM TRACING: CPT

## 2021-02-11 PROCEDURE — 85025 COMPLETE CBC W/AUTO DIFF WBC: CPT

## 2021-02-11 PROCEDURE — 93010 ELECTROCARDIOGRAM REPORT: CPT | Performed by: INTERNAL MEDICINE

## 2021-02-11 RX ORDER — TRAMADOL HYDROCHLORIDE 50 MG/1
50 TABLET ORAL EVERY 6 HOURS SCHEDULED
Status: CANCELLED | OUTPATIENT
Start: 2021-02-11

## 2021-02-11 RX ORDER — CHLORHEXIDINE GLUCONATE 0.12 MG/ML
15 RINSE ORAL ONCE
Status: CANCELLED | OUTPATIENT
Start: 2021-02-11 | End: 2021-02-11

## 2021-02-11 RX ORDER — ACETAMINOPHEN 325 MG/1
650 TABLET ORAL EVERY 6 HOURS PRN
Status: CANCELLED | OUTPATIENT
Start: 2021-02-11

## 2021-02-11 RX ORDER — CEFAZOLIN SODIUM 2 G/50ML
2000 SOLUTION INTRAVENOUS ONCE
Status: CANCELLED | OUTPATIENT
Start: 2021-03-09 | End: 2021-02-11

## 2021-02-11 NOTE — TELEPHONE ENCOUNTER
New Patient Encounter    New Patient Intake Form   Patient Details:  Nikita Mcpherson  1955  [id number]    Background Information:  [address] starts by opening a telephone encounter and gathering the following information   Who is calling to schedule? If not self, relationship to patient? provider   Referring Provider Dr Fior Castrejon   What is the diagnosis? Z01 818 (ICD-10-CM) - Pre-op testing   Is this diagnosis confirmed? Pt had PE and DVT in 2004   When was the diagnosis? 2004   Is there a confirmed diagnosis from a biopsy/tissue reviewed by pathology? Were outside slides requested? NA   Is patient aware of diagnosis? No   Is there a personal history and what kind? Is there a family history and what kind? Reason for visit? Pre op clearance   Have you had any imaging or labs done? If so: when, where? yes  [facility]   Are records in [place]? yes   If patient has a prior history of breast cancer were old records obtained? NA   Was the patient told to bring a disk? no   Does the patient smoke or Vape? no   If yes, how many packs or cartridges per day? Scheduling Information:   Preferred [place]:  Any     Are there any dates/time the patient cannot be seen? Miscellaneous:    After completing the above information, please route to Financial Counselor and the appropriate Nurse Navigator for review

## 2021-02-11 NOTE — PROGRESS NOTES
Ortho Sports Medicine Shoulder Follow Up Visit     Assesment:     63 y o  male left shoulder rotator cuff tear    Plan:    Conservative treatment:    Ice to shoulder 1-2 times daily, for 20 minutes at a time  Post-op PT written  Post-op Sling given       Imaging: All imaging from today was reviewed by myself and explained to the patient  Injection:    No Injection planned at this time  Surgery: All of the risks and benefits of operative treatment were explained to the patient, as well as the risks and benefits of any alternative treatment options, including nonoperative care  This risks of surgery include, but are not limited to, infection, bleeding, blood clot, damage to nerves/arteries, need for further surgyer, cardiovascular risk, anesthesia risk, and continued pain  The patient understood this and elects to proceed forward with surgical intervention  We will proceed forward with surgical arthroscopy of the shoulder with rotator cuff repair with possible arthroscopic bicep tenodesis vs tenotomy and open distal clavicle excision  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 and is currently on his were also  Due to this a referral to hematology was written to provide surgical clearance and postop DVT recommendations  We would like them to comment on is Xarelto should be continued or few should be placed on a different blood thinner postoperatively  Follow up:    Return for 1 week post-op  Chief Complaint   Patient presents with    Left Shoulder - Follow-up         History of Present Illness: The patient is returns for follow up of Left shoulder MRI  Since the prior visit, He reports no improvement  Patient continues to have left shoulder pain that is worse at night and wakes him up out of sleep  He does have pain that radiates into the biceps but never past the elbow    He continues to have weakness and difficulty living his day-to-day life due to left shoulder pain     Pain is improved by rest   Pain is aggravated by overhead activity, reaching back, rotation and lifting   Symptoms include clicking, catching and popping  The patient has weakness  The patient has tried rest, ice, NSAIDS, physical therapy and injection  Shoulder Surgical History:  None    Past Medical, Social and Family History:  Past Medical History:   Diagnosis Date    Acute maxillary sinusitis     Asthmatic bronchitis     Benign prostatic hyperplasia with urinary incontinence without sensory awareness     Cervical disc disease     Cervicalgia     Chest pain     DVT (deep venous thrombosis) ([facility] [address] ) 2004    Erectile dysfunction     Fatigue     Hypercholesterolemia     Hyperlipidemia     Hypertension     IFG (impaired fasting glucose)     Screening for colon cancer     Screening for prostate cancer      Past Surgical History:   Procedure Laterality Date    BACK SURGERY  2004    CHOLECYSTECTOMY  2009     Allergies   Allergen Reactions    Vancomycin Rash     Peeling of skin    Ace Inhibitors Cough     Current Outpatient Medications on File Prior to Visit   Medication Sig Dispense Refill    doxazosin (CARDURA) 2 mg tablet TAKE 1 TABLET BY MOUTH EVERYDAY AT BEDTIME 90 tablet 0    fluticasone (FLONASE) 50 mcg/act nasal spray 2 sprays as needed    3    losartan-hydrochlorothiazide (HYZAAR) 100-25 MG per tablet TAKE 1 TABLET DAILY 90 tablet 3    sildenafil (REVATIO) 20 mg tablet Use 3 to 5 tablets as needed 90 tablet 3    simvastatin (ZOCOR) 10 mg tablet Take 1 tablet (10 mg total) by mouth daily 30 tablet 5    XARELTO 20 MG tablet TAKE 1 TABLET BY MOUTH EVERY DAY 90 tablet 2    Zoster Vac Recomb Adjuvanted (Shingrix) 50 MCG/0 5ML SUSR 0 5mL IM for one dose, followed by 0 5mL IM 2-6 months after first dose 1 each 1     No current facility-administered medications on file prior to visit        Social History     Socioeconomic History    Marital status: /Civil Union Spouse name: Not on file    Number of children: Not on file    Years of education: Not on file    Highest education level: Not on file   Occupational History    Not on file   Social Needs    Financial resource strain: Not on file    Food insecurity     Worry: Not on file     Inability: Not on file    Transportation needs     Medical: No     Non-medical: No   Tobacco Use    Smoking status: Never Smoker    Smokeless tobacco: Never Used   Substance and Sexual Activity    Alcohol use: Yes     Comment: SOCIAL    Drug use: No    Sexual activity: Yes   Lifestyle    Physical activity     Days per week: 0 days     Minutes per session: 0 min    Stress: Only a little   Relationships    Social connections     Talks on phone: Twice a week     Gets together: Twice a week     Attends [employer] witness service: Never     Active member of club or organization: No     Attends meetings of clubs or organizations: Never     Relationship status:     Intimate partner violence     Fear of current or ex partner: No     Emotionally abused: No     Physically abused: No     Forced sexual activity: No   Other Topics Concern    Not on file   Social History Narrative    Lives with wife  Feels safe at home  Has living will  Sees dentist reg  Good dental hygiene    Living situation: supportive and safe       I have reviewed the past medical, surgical, social and family history, medications and allergies as documented in the EMR  Review of systems: ROS is negative other than that noted in the HPI  Constitutional: Negative for fatigue and fever  Physical Exam:    Blood pressure 138/70, temperature 97 7 °F (36 5 °C), height 6' 1" (1 854 m), weight 104 kg (230 lb)      General/Constitutional: NAD, well developed, well nourished  HENT: Normocephalic, atraumatic  CV: Intact distal pulses, regular rate  Resp: No respiratory distress or labored breathing  Lymphatic: No lymphadenopathy palpated  Neuro: Alert and Oriented x 3, no focal deficits  Psych: Normal mood, normal affect, normal judgement, normal behavior  Skin: Warm, dry, no rashes, no erythema      Shoulder focused exam:       RIGHT LEFT    Scapula Atrophy Negative Negative     Winging Negative Negative     Protraction Negative Negative    Rotator cuff SS 5/5 4+/5     IS 5/5 5/5     SubS 5/5 5/5    ROM     170 with pain     ER0 60 60                   IRb T6    T6    TTP: AC Negative Positive     Biceps Negative Negative     Coracoid Negative Negative    Special Tests: O'Briens Negative Negative     Morris-shear Negative Negative     Cross body Adduction Negative Negative     Speeds  Negative Negative     Kirill's Negative Negative     Whipple Negative Positive 4+/5       Neer Negative Negative     Parrish Negative Negative    Instability: Apprehension & relocation not tested not tested     Load & shift not tested not tested    Other: Crank Negative Negative               UE NV Exam: +2 Radial pulses bilaterally  Sensation intact to light touch C5-T1 bilaterally, Radial/median/ulnar nerve motor intact    Cervical ROM is full without pain, numbness or tingling      Shoulder Imaging    MRI of the Left shoulder was reviewed and demonstrated anterior leading edge supraspinatus tear with bicep tendonitis  I have reviewed the radiologist report and agree with their impression

## 2021-02-11 NOTE — H&P (VIEW-ONLY)
Ortho Sports Medicine Shoulder Follow Up Visit     Assesment:     63 y o  male left shoulder rotator cuff tear    Plan:    Conservative treatment:    Ice to shoulder 1-2 times daily, for 20 minutes at a time  Post-op PT written  Post-op Sling given       Imaging: All imaging from today was reviewed by myself and explained to the patient  Injection:    No Injection planned at this time  Surgery: All of the risks and benefits of operative treatment were explained to the patient, as well as the risks and benefits of any alternative treatment options, including nonoperative care  This risks of surgery include, but are not limited to, infection, bleeding, blood clot, damage to nerves/arteries, need for further surgyer, cardiovascular risk, anesthesia risk, and continued pain  The patient understood this and elects to proceed forward with surgical intervention  We will proceed forward with surgical arthroscopy of the shoulder with rotator cuff repair with possible arthroscopic bicep tenodesis vs tenotomy and open distal clavicle excision  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 and is currently on his were also  Due to this a referral to hematology was written to provide surgical clearance and postop DVT recommendations  We would like them to comment on is Xarelto should be continued or few should be placed on a different blood thinner postoperatively  Follow up:    Return for 1 week post-op  Chief Complaint   Patient presents with    Left Shoulder - Follow-up         History of Present Illness: The patient is returns for follow up of Left shoulder MRI  Since the prior visit, He reports no improvement  Patient continues to have left shoulder pain that is worse at night and wakes him up out of sleep  He does have pain that radiates into the biceps but never past the elbow    He continues to have weakness and difficulty living his day-to-day life due to left shoulder pain     Pain is improved by rest   Pain is aggravated by overhead activity, reaching back, rotation and lifting   Symptoms include clicking, catching and popping  The patient has weakness  The patient has tried rest, ice, NSAIDS, physical therapy and injection  Shoulder Surgical History:  None    Past Medical, Social and Family History:  Past Medical History:   Diagnosis Date    Acute maxillary sinusitis     Asthmatic bronchitis     Benign prostatic hyperplasia with urinary incontinence without sensory awareness     Cervical disc disease     Cervicalgia     Chest pain     DVT (deep venous thrombosis) ([facility] [address] ) 2004    Erectile dysfunction     Fatigue     Hypercholesterolemia     Hyperlipidemia     Hypertension     IFG (impaired fasting glucose)     Screening for colon cancer     Screening for prostate cancer      Past Surgical History:   Procedure Laterality Date    BACK SURGERY  2004    CHOLECYSTECTOMY  2009     Allergies   Allergen Reactions    Vancomycin Rash     Peeling of skin    Ace Inhibitors Cough     Current Outpatient Medications on File Prior to Visit   Medication Sig Dispense Refill    doxazosin (CARDURA) 2 mg tablet TAKE 1 TABLET BY MOUTH EVERYDAY AT BEDTIME 90 tablet 0    fluticasone (FLONASE) 50 mcg/act nasal spray 2 sprays as needed    3    losartan-hydrochlorothiazide (HYZAAR) 100-25 MG per tablet TAKE 1 TABLET DAILY 90 tablet 3    sildenafil (REVATIO) 20 mg tablet Use 3 to 5 tablets as needed 90 tablet 3    simvastatin (ZOCOR) 10 mg tablet Take 1 tablet (10 mg total) by mouth daily 30 tablet 5    XARELTO 20 MG tablet TAKE 1 TABLET BY MOUTH EVERY DAY 90 tablet 2    Zoster Vac Recomb Adjuvanted (Shingrix) 50 MCG/0 5ML SUSR 0 5mL IM for one dose, followed by 0 5mL IM 2-6 months after first dose 1 each 1     No current facility-administered medications on file prior to visit        Social History     Socioeconomic History    Marital status: /Civil Union Spouse name: Not on file    Number of children: Not on file    Years of education: Not on file    Highest education level: Not on file   Occupational History    Not on file   Social Needs    Financial resource strain: Not on file    Food insecurity     Worry: Not on file     Inability: Not on file    Transportation needs     Medical: No     Non-medical: No   Tobacco Use    Smoking status: Never Smoker    Smokeless tobacco: Never Used   Substance and Sexual Activity    Alcohol use: Yes     Comment: SOCIAL    Drug use: No    Sexual activity: Yes   Lifestyle    Physical activity     Days per week: 0 days     Minutes per session: 0 min    Stress: Only a little   Relationships    Social connections     Talks on phone: Twice a week     Gets together: Twice a week     Attends Congregation service: Never     Active member of club or organization: No     Attends meetings of clubs or organizations: Never     Relationship status:     Intimate partner violence     Fear of current or ex partner: No     Emotionally abused: No     Physically abused: No     Forced sexual activity: No   Other Topics Concern    Not on file   Social History Narrative    Lives with wife  Feels safe at home  Has living will  Sees dentist reg  Good dental hygiene    Living situation: supportive and safe       I have reviewed the past medical, surgical, social and family history, medications and allergies as documented in the EMR  Review of systems: ROS is negative other than that noted in the HPI  Constitutional: Negative for fatigue and fever  Physical Exam:    Blood pressure 138/70, temperature 97 7 °F (36 5 °C), height 6' 1" (1 854 m), weight 104 kg (230 lb)      General/Constitutional: NAD, well developed, well nourished  HENT: Normocephalic, atraumatic  CV: Intact distal pulses, regular rate  Resp: No respiratory distress or labored breathing  Lymphatic: No lymphadenopathy palpated  Neuro: Alert and Oriented x 3, no focal deficits  Psych: Normal mood, normal affect, normal judgement, normal behavior  Skin: Warm, dry, no rashes, no erythema      Shoulder focused exam:       RIGHT LEFT    Scapula Atrophy Negative Negative     Winging Negative Negative     Protraction Negative Negative    Rotator cuff SS 5/5 4+/5     IS 5/5 5/5     SubS 5/5 5/5    ROM     170 with pain     ER0 60 60                   IRb T6    T6    TTP: AC Negative Positive     Biceps Negative Negative     Coracoid Negative Negative    Special Tests: O'Briens Negative Negative     Morris-shear Negative Negative     Cross body Adduction Negative Negative     Speeds  Negative Negative     Kirill's Negative Negative     Whipple Negative Positive 4+/5       Neer Negative Negative     Parrish Negative Negative    Instability: Apprehension & relocation not tested not tested     Load & shift not tested not tested    Other: Crank Negative Negative               UE NV Exam: +2 Radial pulses bilaterally  Sensation intact to light touch C5-T1 bilaterally, Radial/median/ulnar nerve motor intact    Cervical ROM is full without pain, numbness or tingling      Shoulder Imaging    MRI of the Left shoulder was reviewed and demonstrated anterior leading edge supraspinatus tear with bicep tendonitis  I have reviewed the radiologist report and agree with their impression

## 2021-02-26 ENCOUNTER — CONSULT (OUTPATIENT)
Dept: HEMATOLOGY ONCOLOGY | Facility: CLINIC | Age: 66
End: 2021-02-26
Payer: COMMERCIAL

## 2021-02-26 VITALS
OXYGEN SATURATION: 97 % | HEART RATE: 75 BPM | TEMPERATURE: 98.6 F | BODY MASS INDEX: 31.04 KG/M2 | DIASTOLIC BLOOD PRESSURE: 78 MMHG | HEIGHT: 73 IN | SYSTOLIC BLOOD PRESSURE: 150 MMHG | WEIGHT: 234.2 LBS | RESPIRATION RATE: 18 BRPM

## 2021-02-26 DIAGNOSIS — Z01.818 PRE-OP TESTING: ICD-10-CM

## 2021-02-26 DIAGNOSIS — Z86.718 HISTORY OF DVT IN ADULTHOOD: ICD-10-CM

## 2021-02-26 DIAGNOSIS — S46.012A TRAUMATIC INCOMPLETE TEAR OF LEFT ROTATOR CUFF, INITIAL ENCOUNTER: ICD-10-CM

## 2021-02-26 DIAGNOSIS — Z79.01 CHRONIC ANTICOAGULATION: Primary | ICD-10-CM

## 2021-02-26 PROCEDURE — 99244 OFF/OP CNSLTJ NEW/EST MOD 40: CPT | Performed by: INTERNAL MEDICINE

## 2021-02-26 NOTE — PROGRESS NOTES
[facility] - Hematology & Medical Oncology  Outpatient Visit Encounter Note      Alexei Ngo 63 y o  male [id number] Date:  2/26/2021    HEMATOLOGICAL HISTORY        Clotting History DVT and PE, 2004 from provoked causes - immobility he tells me, negative thrombophilia work-up   Repeat DVT in [id number] when he came off the Coumadin and then indefinite AC since  Bleeding History Denies   Cancer History Skin Cancer         SUBJECTIVE      Alexei Ngo is a 63 y o  here for new consultation with me today  The patient is referred by Dr Cammie Espinal and the reason for consultation is perioperative anticoagulation management  In review of her chart and talking with the patient, he is diagnosed with left shoulder rotator cuff tear and his orthopedic surgeon plans for surgical arthroscopy of the shoulder with rotator cuff repair with possible arthroscopic bicep tenodesis vs tenotomy and open distal clavicle excision  From a hematological perspective, he has two lifetime episodes of VTE  His first episode is when he was hospitalized and immobile in 2004 and got DVT and PE at same time  He was placed on Coumadin and then when he came off of it (can't remember the time frame), he got another DVT in [place] and was placed on Coumadin  He switched over to Xarelto some years ago  He denies bleeding history  He denies thrombophilia history as well  Besides his chronic complaint of the shoulder pain and issues with rotator cuff, he has no specific complaints  I have reviewed the relevant past medical, surgical, social and family history  I have also reviewed allergies and medications for this patient      Review of Systems  ROS      OBJECTIVE     Physical Exam  Vitals:    02/26/21 1355   BP: 150/78   BP Location: Right arm   Patient Position: Sitting   Cuff Size: Large   Pulse: 75   Resp: 18   Temp: 98 6 °F (37 °C)   TempSrc: Tympanic   SpO2: 97%   Weight: 106 kg (234 lb 3 2 oz)   Height: 6' 1" (1 854 m)       Physical Exam  Constitutional:       General: He is not in acute distress  Appearance: He is not ill-appearing, toxic-appearing or diaphoretic  HENT:      Head: Normocephalic and atraumatic  Eyes:      Extraocular Movements: Extraocular movements intact  Neck:      Musculoskeletal: Normal range of motion  Cardiovascular:      Rate and Rhythm: Normal rate  Pulmonary:      Effort: Pulmonary effort is normal  No respiratory distress  Skin:     General: Skin is dry  Neurological:      General: No focal deficit present  Mental Status: He is alert and oriented to person, place, and time  Cranial Nerves: No cranial nerve deficit  Gait: Gait normal           Imaging  Relevant imaging reviewed in chart    Labs  Relevant labs reviewed in chart   ASSESSMENT & PLAN      Diagnosis ICD-10-CM Associated Orders   1  Chronic anticoagulation  Z79 01    2  History of DVT in adulthood  Z86 718    3  Traumatic incomplete tear of left rotator cuff, initial encounter  S46 [id number] Ambulatory referral to Hematology / Oncology   4  Pre-op testing  Z01 818 Ambulatory referral to Hematology / Oncology         74-year-old male with history of 2 lifetime episodes of VTE - 1st in 2004 with concordant DVT/PE and 2nd in 2004/5 with DVT felt to be from possible inadequate anticoagulation timing  Since the second episode, he has been on indefinite anticoagulation with Coumadin and then Xarelto  He denies bleeding complications from anticoagulation  Based on his history, he has a moderate thrombotic risk given recurrent episodes  With the planned shoulder surgery, he is undergoing a hig risk bleeding procedure   Based on these two important classifications, I recommend the following for perioperative anticoagulation:      · March 7th = last day of full dose Xarelto  · March 8th = no Xarelto  · March 9th = no Xarelto  · March 10th (Date of surgery) = no Xarelto  · March 11th (POD1) = no Xarelto  · March 12th (POD2) = if no major bleeding episodes (upon surgeon eval), then resume full dose Xarelto        Follow Up   3/19 to assess his hematological recovery from surgery      All questions were answered to the patient's satisfaction during this encounter  They appreciated and thanked me for spending time with them  The patient knows the contact information for our office and know to reach out for any relevant concerns related to this encounter  For all other listed problems and medical diagnosis in his chart - they are managed by PCP and/or other specialists, which patient acknowledges  Dr Jam Neal MD  Hematology & Medical Oncology

## 2021-03-01 ENCOUNTER — TELEPHONE (OUTPATIENT)
Dept: HEMATOLOGY ONCOLOGY | Facility: CLINIC | Age: 66
End: 2021-03-01

## 2021-03-01 NOTE — TELEPHONE ENCOUNTER
Patient advised of above  Verified understanding of above and does have a call in to his surgeon  He wanted to follow up on his additional question  Will the vaccine effect him stopping his Xarelto prior to surgery?

## 2021-03-01 NOTE — TELEPHONE ENCOUNTER
Please advise patient from a hematologic standpoint he is ok to get the vaccine, however, he will have to check with his surgeon on when the best time to get the vaccine

## 2021-03-01 NOTE — TELEPHONE ENCOUNTER
Patient is scheduled to get the COVID vaccine on Thursday 3/4/21  Patient is asking if Dr Jeremy Brown thinks this will be ok from a hematology standpoint  Will the vaccine effect him stopping his Xarelto prior to surgery? Patient forgot to tell Dr Jeremy Brown that he does wear a compression stocking on his left leg where he had his DVT    He develops swelling in his foot if he does not wear his stocking    Will send to RN to review with MD  [phone]

## 2021-03-03 ENCOUNTER — TELEPHONE (OUTPATIENT)
Dept: OBGYN CLINIC | Facility: MEDICAL CENTER | Age: 66
End: 2021-03-03

## 2021-03-03 ENCOUNTER — TELEPHONE (OUTPATIENT)
Dept: OBGYN CLINIC | Facility: OTHER | Age: 66
End: 2021-03-03

## 2021-03-03 DIAGNOSIS — S46.012A TRAUMATIC INCOMPLETE TEAR OF LEFT ROTATOR CUFF, INITIAL ENCOUNTER: ICD-10-CM

## 2021-03-03 DIAGNOSIS — Z01.818 PRE-OP TESTING: ICD-10-CM

## 2021-03-03 PROCEDURE — U0003 INFECTIOUS AGENT DETECTION BY NUCLEIC ACID (DNA OR RNA); SEVERE ACUTE RESPIRATORY SYNDROME CORONAVIRUS 2 (SARS-COV-2) (CORONAVIRUS DISEASE [COVID-19]), AMPLIFIED PROBE TECHNIQUE, MAKING USE OF HIGH THROUGHPUT TECHNOLOGIES AS DESCRIBED BY CMS-2020-01-R: HCPCS | Performed by: PHYSICIAN ASSISTANT

## 2021-03-03 PROCEDURE — U0005 INFEC AGEN DETEC AMPLI PROBE: HCPCS | Performed by: PHYSICIAN ASSISTANT

## 2021-03-03 NOTE — TELEPHONE ENCOUNTER
Patient's surgery date and location were changed  [address] on 3/09 from 3/10 at [facility]

## 2021-03-03 NOTE — TELEPHONE ENCOUNTER
Patient called in to find out if getting the First COVID Vaccine on 03/04 would do any harm in his upcoming surgery on 03/09      C/b # [phone]

## 2021-03-03 NOTE — TELEPHONE ENCOUNTER
Patient left message regarding Covid immunization  Since his shoulder is very painful, will have that surgery next week and reschedule immunization for a later time  He was in line at the Covid tent for testing preop when I returned his call

## 2021-03-04 LAB — SARS-COV-2 RNA RESP QL NAA+PROBE: NEGATIVE

## 2021-03-04 NOTE — PRE-PROCEDURE INSTRUCTIONS
Pre-Surgery Instructions:   Medication Instructions    doxazosin (CARDURA) 2 mg tablet Instructed patient per Anesthesia Guidelines  continue, take night of sx as usual    fluticasone (FLONASE) 50 mcg/act nasal spray Instructed patient per Anesthesia Guidelines  continue, take dos if needed    losartan-hydrochlorothiazide (HYZAAR) 100-25 MG per tablet Instructed patient per Anesthesia Guidelines  continue, do not take dos    Probiotic Product (PROBIOTIC PO) Instructed patient per Anesthesia Guidelines  continue, do not take dos    sildenafil (REVATIO) 20 mg tablet Instructed patient per Anesthesia Guidelines  continue, do not take dos    simvastatin (ZOCOR) 10 mg tablet Instructed patient per Anesthesia Guidelines  continue, may take dos    VITAMIN D PO Instructed patient per Anesthesia Guidelines  continue, do not take dos    XARELTO 20 MG tablet Patient was instructed by Physician and understands  last dose 3/6    ACE/ARB Med Class    Continue this medication up to the evening before surgery/procedure, but do not take the morning of the day of surgery  Alpha-1 adrenergic blocker Med Class    Continue to take this medication on your normal schedule  If this is an oral medication and you take it in the morning, then you may take this medicine with a sip of water  Direct Xa Inhibitor Med Class    Stop taking this medication at least 3 days prior to surgery/procedure with prescribing Physician and Surgeon consultation  Statin Med Class    Continue to take this medication on your normal schedule  If this is an oral medication and you take it in the morning, then you may take this medicine with a sip of water  Vitamin Med Class    You may continue to take any vitamin that your surgeon has prescribed to you up to the day before surgery  If your surgeon has not specifically prescribed this vitamin or instructed you to continue then stop taking 7 days prior to surgery      You will receive a phone call from hospital for arrival time  Please call surgeons office if any changes in your condition  Wear easy on/off clothing; consider type of surgery;  Valuables, jewelry piercing's please keep at home  **COVID-19  education/surgical guidelines      Please: No contact lenses or eye make up, artificial eyelashes    Please bring special ordered sling or braces if needed for  Your particular surgery  Patient will bring sling/immobilizer dos    Please secure transportation     Follow pre surgery showering or cleaning instructions as  Reviewed by nurse or surgeons office      Questions answered and concerns addressed

## 2021-03-05 ENCOUNTER — OFFICE VISIT (OUTPATIENT)
Dept: FAMILY MEDICINE CLINIC | Facility: HOSPITAL | Age: 66
End: 2021-03-05
Payer: COMMERCIAL

## 2021-03-05 VITALS
BODY MASS INDEX: 31.28 KG/M2 | SYSTOLIC BLOOD PRESSURE: 130 MMHG | HEIGHT: 73 IN | HEART RATE: 69 BPM | RESPIRATION RATE: 16 BRPM | WEIGHT: 236 LBS | DIASTOLIC BLOOD PRESSURE: 72 MMHG

## 2021-03-05 DIAGNOSIS — I10 ESSENTIAL HYPERTENSION: ICD-10-CM

## 2021-03-05 DIAGNOSIS — N40.1 BENIGN PROSTATIC HYPERPLASIA WITH URINARY HESITANCY: ICD-10-CM

## 2021-03-05 DIAGNOSIS — Z86.711 HISTORY OF PULMONARY EMBOLISM: ICD-10-CM

## 2021-03-05 DIAGNOSIS — R39.11 BENIGN PROSTATIC HYPERPLASIA WITH URINARY HESITANCY: ICD-10-CM

## 2021-03-05 DIAGNOSIS — M75.122 NONTRAUMATIC COMPLETE TEAR OF LEFT ROTATOR CUFF: Primary | ICD-10-CM

## 2021-03-05 PROCEDURE — 3725F SCREEN DEPRESSION PERFORMED: CPT | Performed by: INTERNAL MEDICINE

## 2021-03-05 PROCEDURE — 1036F TOBACCO NON-USER: CPT | Performed by: INTERNAL MEDICINE

## 2021-03-05 PROCEDURE — 99243 OFF/OP CNSLTJ NEW/EST LOW 30: CPT | Performed by: INTERNAL MEDICINE

## 2021-03-05 NOTE — PATIENT INSTRUCTIONS
Do not take Xarelto on March 7th and 8th and 9th! Take doxazosin on March 8th in the evening  Hold losartan on March 9th in the morning  Do not take any medications in the morning of March 9th

## 2021-03-05 NOTE — ASSESSMENT & PLAN NOTE
Patient may undergo the planned left rotator cuff surgery  He has low cardiovascular risk  He needs no further cardiac risk assessment      His preop EKG shows normal sinus rhythm    I gave him instructions on how to take losartan, doxazosin and Xarelto

## 2021-03-05 NOTE — ASSESSMENT & PLAN NOTE
Blood pressure is controlled on doxazosin and losartan/HCTZ    CBC was normal    I asked patient not to take any medication the morning of the surgery

## 2021-03-05 NOTE — PROGRESS NOTES
Assessment/Plan:    Functional capacity:  Very good:  Patient walks 3 miles 3 times a day on a treadmill at a pace of 3 4 mph without any chest pain or shortness of breath    Medical conditions that increase cardiac risk:  None    Denies history of coronary disease, CHF, arrhythmia, TIA, CVA, diabetes, peripheral vascular disease, chronic kidney disease  Cardiac Risk Estimation:  Low cardiovascular risk    Patient may undergo the planned surgical procedure! Nontraumatic complete tear of left rotator cuff  Patient may undergo the planned left rotator cuff surgery  He has low cardiovascular risk  He needs no further cardiac risk assessment  His preop EKG shows normal sinus rhythm    I gave him instructions on how to take losartan, doxazosin and Xarelto    Essential hypertension  Blood pressure is controlled on doxazosin and losartan/HCTZ    CBC was normal    I asked patient not to take any medication the morning of the surgery    Benign prostatic hyperplasia with urinary hesitancy  Urination is stable, will continue doxazosin and he will take a dose in the evening on March 8th    History of pulmonary embolism  Patient's history of DVT and P E  He has been on Xarelto  He denies any signs of bleeding  He will hold Xarelto on March 7th, March 8th and March 9th and resume it on March 10th after surgery       Diagnoses and all orders for this visit:    Nontraumatic complete tear of left rotator cuff    Essential hypertension    History of pulmonary embolism    Benign prostatic hyperplasia with urinary hesitancy              Subjective:     Robert Carter is a 63 y o  male who presents to the office today for a preoperative consultation at the request of surgeon Dr Caridad Garcia who plans on performing left rotator cuff surgery on March 9   This consultation is requested for preoperative evaluation       Past Medical History:   Diagnosis Date    Acute maxillary sinusitis     Asthmatic bronchitis     Benign prostatic hyperplasia with urinary incontinence without sensory awareness     Cervical disc disease     Cervicalgia     Chest pain     [facility]VT [facility]deep [facility] thrombosis) ([facility] [address] ) 2004    Erectile dysfunction     Fatigue     Hypercholesterolemia     Hyperlipidemia     Hypertension     IFG (impaired fasting glucose)     [facility]     Screening for colon cancer     Screening for prostate cancer      Past Surgical History:   Procedure Laterality Date    BACK SURGERY  2004    CHOLECYSTECTOMY  2009     Social History   Family History   Problem Relation Age of Onset    Coronary artery disease Mother     Hypertrophic cardiomyopathy Mother     Thyroid disease Mother     Coronary artery disease Sister     Irritable bowel syndrome Sister     Crohn's disease Sister     Substance Abuse Sister     Mental illness Sister     Substance Abuse Brother     Mental illness Brother     Heart disease Family         cardiovascular disease, ischemic heart disease    Cancer Family      Patient has had shoulder pain since college after a pitching incident  His pain got worse the last several months  Patient underwent physical therapy with improvement  He had an injury couple of weeks ago and since then he has not been able to elevate his left arm  MRI shows left rotator cuff tear patient is scheduled for surgery        Allergies   Allergen Reactions    Vancomycin Rash     Peeling of skin    Ace Inhibitors Cough       Current Outpatient Medications:     doxazosin (CARDURA) 2 mg tablet, TAKE 1 TABLET BY MOUTH EVERYDAY AT BEDTIME, Disp: 90 tablet, Rfl: 0    fluticasone (FLONASE) 50 mcg/act nasal spray, 2 sprays as needed  , Disp: , Rfl: 3    losartan-hydrochlorothiazide (HYZAAR) 100-25 MG per tablet, TAKE 1 TABLET DAILY, Disp: 90 tablet, Rfl: 3    Multiple Vitamin (MULTIVITAMIN ADULT PO), Take by mouth, Disp: , Rfl:     Probiotic Product (PROBIOTIC PO), Take by mouth, Disp: , Rfl:    sildenafil (REVATIO) 20 mg tablet, Use 3 to 5 tablets as needed, Disp: 90 tablet, Rfl: 3    simvastatin (ZOCOR) 10 mg tablet, Take 1 tablet (10 mg total) by mouth daily, Disp: 30 tablet, Rfl: 5    VITAMIN D PO, Take by mouth, Disp: , Rfl:     XARELTO 20 MG tablet, TAKE 1 TABLET BY MOUTH EVERY DAY, Disp: 90 tablet, Rfl: 2    Zoster Vac Recomb Adjuvanted (Shingrix) 50 MCG/0 5ML SUSR, 0 5mL IM for one dose, followed by 0 5mL IM 2-6 months after first dose, Disp: 1 each, Rfl: 1       The following portions of the patient's history were reviewed and updated as appropriate: current medications, past family history, past medical history, past social history, past surgical history and problem list     Review of Systems   Constitutional: Negative for fever  HENT: Negative for congestion  Eyes: Negative for visual disturbance  Respiratory: Negative for cough and shortness of breath  Cardiovascular: Negative for chest pain, palpitations and leg swelling  Gastrointestinal: Negative for abdominal pain, blood in stool and diarrhea  Endocrine: Negative for polydipsia  Genitourinary: Negative for difficulty urinating and hematuria  Musculoskeletal:        Left shoulder pain   Skin: Negative for rash  Neurological: Negative for weakness, numbness and headaches  Psychiatric/Behavioral: Negative for dysphoric mood  All other systems reviewed and are negative  Objective:    /72   Pulse 69   Resp 16   Ht 6' 1" (1 854 m)   Wt 107 kg (236 lb)   BMI 31 14 kg/m²      Physical Exam  HENT:      Head: Normocephalic  Eyes:      Conjunctiva/sclera: Conjunctivae normal    Neck:      Musculoskeletal: Neck supple  Cardiovascular:      Rate and Rhythm: Normal rate and regular rhythm  Heart sounds: No murmur  Pulmonary:      Effort: No respiratory distress  Breath sounds: No wheezing or rales  Abdominal:      General: Bowel sounds are normal       Tenderness:  There is no abdominal tenderness  Musculoskeletal:      Comments: Patient is not able to abduct his left arm to 90° due to pain   Skin:     General: Skin is warm and dry  Neurological:      Mental Status: He is alert and oriented to person, place, and time  Motor: No weakness  Gait: Gait normal    Psychiatric:         Mood and Affect: Mood normal              I reviewed available labs, imaging and ECG          Ramez Lomeli MD

## 2021-03-05 NOTE — ASSESSMENT & PLAN NOTE
Patient's history of DVT and P E  He has been on Xarelto  He denies any signs of bleeding      He will hold Xarelto on March 7th, March 8th and March 9th and resume it on March 10th after surgery

## 2021-03-08 ENCOUNTER — ANESTHESIA EVENT (OUTPATIENT)
Dept: PERIOP | Facility: HOSPITAL | Age: 66
End: 2021-03-08
Payer: COMMERCIAL

## 2021-03-08 DIAGNOSIS — I10 ESSENTIAL HYPERTENSION: ICD-10-CM

## 2021-03-08 RX ORDER — DOXAZOSIN 2 MG/1
2 TABLET ORAL
Qty: 90 TABLET | Refills: 1 | Status: SHIPPED | OUTPATIENT
Start: 2021-03-08 | End: 2021-08-30 | Stop reason: SDUPTHER

## 2021-03-09 ENCOUNTER — HOSPITAL ENCOUNTER (OUTPATIENT)
Facility: HOSPITAL | Age: 66
Setting detail: OUTPATIENT SURGERY
Discharge: HOME | End: 2021-03-09
Attending: ORTHOPAEDIC SURGERY | Admitting: ORTHOPAEDIC SURGERY
Payer: COMMERCIAL

## 2021-03-09 ENCOUNTER — ANESTHESIA (OUTPATIENT)
Dept: PERIOP | Facility: HOSPITAL | Age: 66
End: 2021-03-09
Payer: COMMERCIAL

## 2021-03-09 VITALS
RESPIRATION RATE: 18 BRPM | OXYGEN SATURATION: 94 % | SYSTOLIC BLOOD PRESSURE: 132 MMHG | DIASTOLIC BLOOD PRESSURE: 74 MMHG | HEART RATE: 85 BPM | TEMPERATURE: 97.6 F

## 2021-03-09 DIAGNOSIS — Z86.711 HISTORY OF PULMONARY EMBOLISM: ICD-10-CM

## 2021-03-09 DIAGNOSIS — M75.122 NONTRAUMATIC COMPLETE TEAR OF LEFT ROTATOR CUFF: Primary | ICD-10-CM

## 2021-03-09 PROCEDURE — C9290 INJ, BUPIVACAINE LIPOSOME: HCPCS | Performed by: ANESTHESIOLOGY

## 2021-03-09 PROCEDURE — 29828 SHO ARTHRS SRG BICP TENODSIS: CPT | Performed by: PHYSICIAN ASSISTANT

## 2021-03-09 PROCEDURE — 23120 CLAVICULECTOMY PARTIAL: CPT | Performed by: ORTHOPAEDIC SURGERY

## 2021-03-09 PROCEDURE — 23120 CLAVICULECTOMY PARTIAL: CPT | Performed by: PHYSICIAN ASSISTANT

## 2021-03-09 PROCEDURE — C1713 ANCHOR/SCREW BN/BN,TIS/BN: HCPCS | Performed by: ORTHOPAEDIC SURGERY

## 2021-03-09 PROCEDURE — 29827 SHO ARTHRS SRG RT8TR CUF RPR: CPT | Performed by: PHYSICIAN ASSISTANT

## 2021-03-09 PROCEDURE — 29827 SHO ARTHRS SRG RT8TR CUF RPR: CPT | Performed by: ORTHOPAEDIC SURGERY

## 2021-03-09 PROCEDURE — 29828 SHO ARTHRS SRG BICP TENODSIS: CPT | Performed by: ORTHOPAEDIC SURGERY

## 2021-03-09 DEVICE — SPEEDBRG IMP SYS W/BIO-COMP SWVLK
Type: IMPLANTABLE DEVICE | Site: SHOULDER | Status: FUNCTIONAL
Brand: ARTHREX®

## 2021-03-09 DEVICE — SUTR ANCH,BIO-COMP P-LCK,2.9X 12.5MM
Type: IMPLANTABLE DEVICE | Site: SHOULDER | Status: FUNCTIONAL
Brand: ARTHREX®

## 2021-03-09 RX ORDER — MAGNESIUM HYDROXIDE 1200 MG/15ML
LIQUID ORAL AS NEEDED
Status: DISCONTINUED | OUTPATIENT
Start: 2021-03-09 | End: 2021-03-09 | Stop reason: HOSPADM

## 2021-03-09 RX ORDER — BUPIVACAINE HYDROCHLORIDE 5 MG/ML
INJECTION, SOLUTION PERINEURAL
Status: COMPLETED | OUTPATIENT
Start: 2021-03-09 | End: 2021-03-09

## 2021-03-09 RX ORDER — NEOSTIGMINE METHYLSULFATE 1 MG/ML
INJECTION INTRAVENOUS AS NEEDED
Status: DISCONTINUED | OUTPATIENT
Start: 2021-03-09 | End: 2021-03-09

## 2021-03-09 RX ORDER — ONDANSETRON 2 MG/ML
4 INJECTION INTRAMUSCULAR; INTRAVENOUS ONCE AS NEEDED
Status: DISCONTINUED | OUTPATIENT
Start: 2021-03-09 | End: 2021-03-09 | Stop reason: HOSPADM

## 2021-03-09 RX ORDER — DEXAMETHASONE SODIUM PHOSPHATE 10 MG/ML
INJECTION, SOLUTION INTRAMUSCULAR; INTRAVENOUS AS NEEDED
Status: DISCONTINUED | OUTPATIENT
Start: 2021-03-09 | End: 2021-03-09

## 2021-03-09 RX ORDER — FENTANYL CITRATE 50 UG/ML
INJECTION, SOLUTION INTRAMUSCULAR; INTRAVENOUS AS NEEDED
Status: DISCONTINUED | OUTPATIENT
Start: 2021-03-09 | End: 2021-03-09

## 2021-03-09 RX ORDER — GLYCOPYRROLATE 0.2 MG/ML
INJECTION INTRAMUSCULAR; INTRAVENOUS AS NEEDED
Status: DISCONTINUED | OUTPATIENT
Start: 2021-03-09 | End: 2021-03-09

## 2021-03-09 RX ORDER — HYDROMORPHONE HCL/PF 1 MG/ML
0.5 SYRINGE (ML) INJECTION
Status: DISCONTINUED | OUTPATIENT
Start: 2021-03-09 | End: 2021-03-09 | Stop reason: HOSPADM

## 2021-03-09 RX ORDER — PROPOFOL 10 MG/ML
INJECTION, EMULSION INTRAVENOUS AS NEEDED
Status: DISCONTINUED | OUTPATIENT
Start: 2021-03-09 | End: 2021-03-09

## 2021-03-09 RX ORDER — TRAMADOL HYDROCHLORIDE 50 MG/1
50 TABLET ORAL EVERY 6 HOURS SCHEDULED
Status: DISCONTINUED | OUTPATIENT
Start: 2021-03-09 | End: 2021-03-09 | Stop reason: HOSPADM

## 2021-03-09 RX ORDER — OXYCODONE HYDROCHLORIDE 5 MG/1
5 TABLET ORAL EVERY 4 HOURS PRN
Qty: 15 TABLET | Refills: 0 | Status: SHIPPED | OUTPATIENT
Start: 2021-03-09 | End: 2021-11-10 | Stop reason: ALTCHOICE

## 2021-03-09 RX ORDER — LIDOCAINE HYDROCHLORIDE 10 MG/ML
INJECTION, SOLUTION EPIDURAL; INFILTRATION; INTRACAUDAL; PERINEURAL AS NEEDED
Status: DISCONTINUED | OUTPATIENT
Start: 2021-03-09 | End: 2021-03-09

## 2021-03-09 RX ORDER — ROCURONIUM BROMIDE 10 MG/ML
INJECTION, SOLUTION INTRAVENOUS AS NEEDED
Status: DISCONTINUED | OUTPATIENT
Start: 2021-03-09 | End: 2021-03-09

## 2021-03-09 RX ORDER — ONDANSETRON 2 MG/ML
INJECTION INTRAMUSCULAR; INTRAVENOUS AS NEEDED
Status: DISCONTINUED | OUTPATIENT
Start: 2021-03-09 | End: 2021-03-09

## 2021-03-09 RX ORDER — CEFAZOLIN SODIUM 2 G/50ML
2000 SOLUTION INTRAVENOUS ONCE
Status: DISCONTINUED | OUTPATIENT
Start: 2021-03-09 | End: 2021-03-09 | Stop reason: HOSPADM

## 2021-03-09 RX ORDER — SODIUM CHLORIDE, SODIUM LACTATE, POTASSIUM CHLORIDE, CALCIUM CHLORIDE 600; 310; 30; 20 MG/100ML; MG/100ML; MG/100ML; MG/100ML
125 INJECTION, SOLUTION INTRAVENOUS CONTINUOUS
Status: CANCELLED | OUTPATIENT
Start: 2021-03-09

## 2021-03-09 RX ORDER — MIDAZOLAM HYDROCHLORIDE 2 MG/2ML
INJECTION, SOLUTION INTRAMUSCULAR; INTRAVENOUS AS NEEDED
Status: DISCONTINUED | OUTPATIENT
Start: 2021-03-09 | End: 2021-03-09

## 2021-03-09 RX ORDER — SODIUM CHLORIDE, SODIUM LACTATE, POTASSIUM CHLORIDE, CALCIUM CHLORIDE 600; 310; 30; 20 MG/100ML; MG/100ML; MG/100ML; MG/100ML
125 INJECTION, SOLUTION INTRAVENOUS CONTINUOUS
Status: DISCONTINUED | OUTPATIENT
Start: 2021-03-09 | End: 2021-03-09 | Stop reason: HOSPADM

## 2021-03-09 RX ORDER — ACETAMINOPHEN 325 MG/1
650 TABLET ORAL EVERY 6 HOURS PRN
Status: DISCONTINUED | OUTPATIENT
Start: 2021-03-09 | End: 2021-03-09 | Stop reason: HOSPADM

## 2021-03-09 RX ORDER — FENTANYL CITRATE/PF 50 MCG/ML
25 SYRINGE (ML) INJECTION
Status: DISCONTINUED | OUTPATIENT
Start: 2021-03-09 | End: 2021-03-09 | Stop reason: HOSPADM

## 2021-03-09 RX ORDER — CEFAZOLIN SODIUM 2 G/50ML
SOLUTION INTRAVENOUS AS NEEDED
Status: DISCONTINUED | OUTPATIENT
Start: 2021-03-09 | End: 2021-03-09

## 2021-03-09 RX ORDER — EPHEDRINE SULFATE 50 MG/ML
INJECTION INTRAVENOUS AS NEEDED
Status: DISCONTINUED | OUTPATIENT
Start: 2021-03-09 | End: 2021-03-09

## 2021-03-09 RX ORDER — CHLORHEXIDINE GLUCONATE 0.12 MG/ML
15 RINSE ORAL ONCE
Status: DISCONTINUED | OUTPATIENT
Start: 2021-03-09 | End: 2021-03-09 | Stop reason: HOSPADM

## 2021-03-09 RX ADMIN — SODIUM CHLORIDE, SODIUM LACTATE, POTASSIUM CHLORIDE, AND CALCIUM CHLORIDE: .6; .31; .03; .02 INJECTION, SOLUTION INTRAVENOUS at 11:33

## 2021-03-09 RX ADMIN — DEXAMETHASONE SODIUM PHOSPHATE 4 MG: 10 INJECTION, SOLUTION INTRAMUSCULAR; INTRAVENOUS at 11:58

## 2021-03-09 RX ADMIN — BUPIVACAINE HYDROCHLORIDE 10 ML: 5 INJECTION, SOLUTION PERINEURAL at 11:25

## 2021-03-09 RX ADMIN — GLYCOPYRROLATE 0.4 MG: 0.2 INJECTION, SOLUTION INTRAMUSCULAR; INTRAVENOUS at 13:05

## 2021-03-09 RX ADMIN — PHENYLEPHRINE HYDROCHLORIDE 100 MCG: 10 INJECTION INTRAVENOUS at 11:53

## 2021-03-09 RX ADMIN — PHENYLEPHRINE HYDROCHLORIDE 100 MCG: 10 INJECTION INTRAVENOUS at 12:35

## 2021-03-09 RX ADMIN — EPHEDRINE SULFATE 5 MG: 50 INJECTION, SOLUTION INTRAVENOUS at 12:55

## 2021-03-09 RX ADMIN — PROPOFOL 200 MG: 10 INJECTION, EMULSION INTRAVENOUS at 11:39

## 2021-03-09 RX ADMIN — MIDAZOLAM HYDROCHLORIDE 2 MG: 1 INJECTION, SOLUTION INTRAMUSCULAR; INTRAVENOUS at 11:15

## 2021-03-09 RX ADMIN — BUPIVACAINE 10 ML: 13.3 INJECTION, SUSPENSION, LIPOSOMAL INFILTRATION at 11:25

## 2021-03-09 RX ADMIN — ONDANSETRON HYDROCHLORIDE 4 MG: 2 INJECTION, SOLUTION INTRAMUSCULAR; INTRAVENOUS at 12:59

## 2021-03-09 RX ADMIN — EPHEDRINE SULFATE 5 MG: 50 INJECTION, SOLUTION INTRAVENOUS at 12:32

## 2021-03-09 RX ADMIN — EPHEDRINE SULFATE 10 MG: 50 INJECTION, SOLUTION INTRAVENOUS at 12:14

## 2021-03-09 RX ADMIN — ROCURONIUM BROMIDE 50 MG: 10 INJECTION, SOLUTION INTRAVENOUS at 11:39

## 2021-03-09 RX ADMIN — CEFAZOLIN SODIUM 2000 MG: 2 SOLUTION INTRAVENOUS at 11:47

## 2021-03-09 RX ADMIN — EPHEDRINE SULFATE 5 MG: 50 INJECTION, SOLUTION INTRAVENOUS at 12:30

## 2021-03-09 RX ADMIN — PHENYLEPHRINE HYDROCHLORIDE 100 MCG: 10 INJECTION INTRAVENOUS at 12:04

## 2021-03-09 RX ADMIN — NEOSTIGMINE METHYLSULFATE 2 MG: 1 INJECTION INTRAVENOUS at 13:05

## 2021-03-09 RX ADMIN — FENTANYL CITRATE 50 MCG: 50 INJECTION INTRAMUSCULAR; INTRAVENOUS at 12:24

## 2021-03-09 RX ADMIN — PHENYLEPHRINE HYDROCHLORIDE 100 MCG: 10 INJECTION INTRAVENOUS at 12:55

## 2021-03-09 RX ADMIN — FENTANYL CITRATE 50 MCG: 50 INJECTION INTRAMUSCULAR; INTRAVENOUS at 11:15

## 2021-03-09 RX ADMIN — PHENYLEPHRINE HYDROCHLORIDE 100 MCG: 10 INJECTION INTRAVENOUS at 11:51

## 2021-03-09 RX ADMIN — LIDOCAINE HYDROCHLORIDE 100 MG: 10 INJECTION, SOLUTION EPIDURAL; INFILTRATION; INTRACAUDAL; PERINEURAL at 11:39

## 2021-03-09 NOTE — ANESTHESIA POSTPROCEDURE EVALUATION
Post-Op Assessment Note    CV Status:  Stable  Pain Score: 0    Pain management: adequate  Multimodal analgesia used between 6 hours prior to anesthesia start to PACU discharge    Mental Status:  Alert   Hydration Status:  Stable   PONV Controlled:  None   Airway Patency:  Patent      Post Op Vitals Reviewed: Yes      Staff: CRNA         No complications documented      /90 (03/09/21 1326)    Temp 97 6 °F (36 4 °C) (03/09/21 1331)    Pulse 78 (03/09/21 1331)   Resp 16 (03/09/21 1331)    SpO2 100 % (03/09/21 1331)

## 2021-03-09 NOTE — DISCHARGE INSTRUCTIONS
POSTOPERATIVE INSTRUCTIONS following SHOULDER SURGERY    MEDICATIONS:  · Resume all home medications unless otherwise instructed by your surgeon  · Pain Medication:  Oxycodone 5 mg 1 tab oral every 4-6 hours as needed for pain  · If you were given a regional anesthetic (nerve block), please begin taking the pain medication as soon as you get home, even if you have minimal or no pain  DO NOT WAIT FOR THE NERVE BLOCK TO WEAR OFF  · Possible side effects include nausea, constipation, and urinary retention  If you experience these side effects, please call our office for assistance  · Pain med refills are authorized only during office hours (8am-4pm, Mon-Fri)  · Anti-Inflammatory/Pain:   Advil 400 mg oral every 6 hours for 3-5 days for pain control and inflammation (over the counter) and Tylenol 650 mg oral every 6 hours for 3-5 days for pain control (over the counter)  · TAKE WITH FOOD  Stop if you experience nausea, reflux, or stomach pain  · Nausea Medication:  none  · Fill prescription ONLY if you expericnce severe nausea  Blood Thinner:  Restart Xarelto on 3/11/2021 per the direction of Hematology     WOUND CARE:  · Keep the dressing clean and dry  Light drainage may occur the first 2 days postop  · You may remove the dressings and get the incision wet in the shower 4 days after surgery  DO NOT remove steri-strips or sutures  DO NOT immerse the incision under water  Carefully pat the incision dry  If there is wound drainage, re-apply a fresh dry gauze dressing    · Please call our office ([phone]) if you experience either of the following:  · Sudden increase in swelling, redness, or warmth at the surgical site  · Excessive incisional drainage that persists beyond the 3rd day after surgery  · Oral temperature greater than 101 degrees, not relieved with Tylenol  · Shortness of breath, chest pain, nausea, or any other concerning symptoms    SWELLING CONTROL:  · Cold Therapy:  Apply ice (20 min on, 20 min off) as often as you feel is necessary  Use ice every few hours during the first 3-4 days post operatively  SLING:  · Wear your sling AT ALL TIMES (including sleep) until your first postoperative office visit  You may remove the sling for showering but must keep your arm at your side  ACTIVITY:   · DO NOT lift, carry, push, or pull anything with your operative arm  · Shoulder:  DO NOT actively (on your own) raise your operative arm away from the side of you body or rotate it out away from your body unless instructed by your surgeon or physical therapist   · Place a pillow behind the elbow while lying down  · Sleeping in a more upright position (recliner) may be more comfortable initially  · Wrist / Finger Motion:  With the sling on, move your wrist and fingers through a full range of motion 20 times per hour while awake  PHYSICAL THERAPY:  · Begin therapy 5 TO 7 DAYS AFTER SURGERY  You were given a prescription for therapy at your preoperative office visit  If you do not have physical therapy scheduled yet, please call our office for assistance      FOLLOW-UP APPOINTMENT:  · 7-10 days after surgery with:    DO Ruddy Villa  [facility]  [phone]

## 2021-03-09 NOTE — OP NOTE
OPERATIVE REPORT  PATIENT NAME: Treasure Juarez    :  1955  MRN: [id number]  Pt Location:  OR ROOM 12    SURGERY DATE: 3/9/2021    Surgeon(s) and Role:     * Eliot Ibarra DO - Primary     * Lukasz Kirk PA-C - Assisting    Preop Diagnosis:  Traumatic incomplete tear of left rotator cuff, initial encounter [S46 012A]  Osteoarthritis of left AC (acromioclavicular) joint [M19 012]    Post-Op Diagnosis Codes:     * Traumatic incomplete tear of left rotator cuff, initial encounter [S46 012A]     * Osteoarthritis of left AC (acromioclavicular) joint [M19 012]    Procedure(s) (LRB):  REPAIR ROTATOR CUFF  ARTHROSCOPIC (Left)  ARTHROSCOPIC BICEPS TENODESIS (Left)  DISTAL CLAVICLE EXCISION (Left)    Specimen(s):  * No specimens in log *    Estimated Blood Loss:   Minimal    Drains:  * No LDAs found *    Anesthesia Type:   General w/ Regional    Operative Indications:  Traumatic incomplete tear of left rotator cuff, initial encounter [S46 012A]  Osteoarthritis of left AC (acromioclavicular) joint [M44 305]    Complications:   None    Procedure and Technique:    Operation:    1  Surgical arthroscopy of the Left shoulder with rotator cuff repair  2  Surgical arthroscopy of the Left shoulder with arthroscopic biceps tenodesis  3  Left shoulder open distal clavicle excision      Anesthesia:  General with interscalene block     Blood Loss:  Minimal      Indications:   Mr Vanessa Vera is a 63 y o  male with a full thickness rotator cuff tear    An MRI revealed the tear of suprespinatus  Due to the MRI findings and the fact that the patient had not improved with a conservative approach we discussed rotator cuff repair surgery  We reviewed risks and benefits of surgery  It was agreed  to proceed with surgery to address the pathology that was causing the patient's symptoms  Findings:    Arthroscopic evaluation of the Left shoulder revealed the following:   Glenoid articular surface: No notable chondral defects  Humeral head articular surface: No notable chondral defects except for some abrasion adjacent to the long head of the biceps tendon  Labrum: There was tearing of the superior labrum that was involved the bicipital attachment to the superior labrum   The labrum in this region was fragmented and getting entrapped between the humeral head and glenoid  Biceps tendon:significant erythema of tendon    Rotator cuff: The infraspinatus, teres minor, and subscapularis tendons were entirely intact without evidence of disruption on the articular or bursal surfaces  However, there was a rotator cuff tear involving the supraspinatus tendon, which measured approximately 20 mm from anterior to posterior  Inferior recess: No loose bodies or ligament disruption  Subacromial bursa: Markedly hypertrophic and hyperemic  Acromion: The anterior aspect of the acromion displayed an inferiorly directed spur  Distal clavicle: No appreciable inferior spurring  Procedure:    In the pre-operative holding area, the patient identified the correct operative extremity and I marked that extremity with my initials, using a permanent marker  The patient was then brought to the operating room and positioned supine  Following satisfactory induction of anesthesia, the patient was positioned in the beach chair position  The entire left operative extremity was draped free and the left shoulder was prepped in the usual sterile fashion for surgery of the shoulder  Before any surgical instrumentation was passed to me by the surgical technician, a formalized time-out occurred, which involves the surgeon, circulating nurse, and anesthesia staff all verifying the correct operative extremity  My initials were visible on the prepped and draped operative field  The anatomic landmarks of the scapular spine, acromion, clavicle, and coracoid process were marked   Subsequently, a posterior portal was marked in the soft recess between the glenoid and humeral head  The posterior portal was established with a scalpel  The arthroscope was introduced through this portal  Under direct visualization, the anterior portal was established with a localizing needle followed by a scalpel  A probe was then introduced into the anterior portal  A systematic diagnostic arthroscopy evaluated the following: glenoid articular surface, humeral head articular surface, labrum, biceps tendon, rotator cuff, and inferior recess  The superior labral complex had detached from the glenoid rim  Specifically, this separation extended from the anterior insertion of the long head of the biceps tendon to approximately the 10 o'clock position  The labrum in this region was grossly unstable to probing  Further, some radial tearing and central fraying was noted in this region  Consequently, the superior labrum was gently debrided with the motorized shaver  A birds beak instrument was used to pass a cinch stitch around the proximal biceps tendon, in a loop n james fashion  The labral attachment of the long head of the biceps tendon was detached using a mets  Electrocautery was then used to debride any fraying of the labrum  A 2 9 mm push lock anchor was then inserted to secure the biceps tendon into the biceps groove just above the subscapularis tendon, completing the arthroscopic biceps tenodesis  The arthroscope was withdrawn from the glenohumeral joint and placed into the subacromial bursa via a separate approach and entry  Under direct visualization, the lateral portal was established with a localizing needle followed by a scalpel  A shaver was introduced into the subacromial space and the space was further established  The acromion, distal clavicle, and rotator cuff were arthroscopically evaluated  The size and mobility of the rotator cuff tear was assessed with the tissue grasper   A bleeding bed on the rotator cuff footprint was established with the arthroscopic shaver and ed[place]  2 [employer] Speedbridge swivel lock anchor(s) was (were) placed into the greater tuberosity   Two fibertape sutures exited each of the anchors, attached together at their tips for passage through the rotator cuff  They were passed arthroscopically through the rotator cuff tear and utilizing arthroscopic techniques the rotator cuff was repaired back to the greater tuberosity by passing the fibertape in a typical double row speed bridge fashion with 2 lateral row swivel lock anchors placed  In this manner, the rotator cuff was repaired anatomically back to the greater tuberosity   There was no additional pathology  All particulate debris was removed  The shoulder was copiously rinsed and then drained  The portals were closed with interrupted 4 -0 monocryl suture, using a buried technique  In order to address the acromioclavicular joint osteoarthritis, an incision was planned starting at the posterior aspect of the acromioclavicular joint and extending anteriorly for approximately 5 cm parallel to Enrike's lines  The incision was established with a scalpel and dissection was carried sharply to the deltotrapezial fascia  Meticulous hemostasis was achieved, using the electrocautery tool  The deltotrapezial fascia and periosteal sleeve were incised longitudinally with respect to the clavicle, about 5 mm posterior to the midline of the clavicle  This dissection was continued from the posterior aspect of the acromioclavicular joint towards the anterolateral corner of the acromion for approximately 1 cm  Following this longitudinal division of the fascia and periosteal sleeve, subperiosteal dissection was continued anteriorly and posteriorly to allow sufficient visualization of the distal 12 mm of the clavicle  Mini Hohmann retractors were placed subperiosteally around the anterior and posterior aspects to optimize exposure  The clavicle was marked 10 mm proximal to its distal end   Subsequently, the microsagittal saw was utilized to resect the distal clavicle  The fragments of the intra-articular disk were resected, with care taken to preserve the acromioclavicular joint capsule  The acromioclavicular joint was copiously irrigated and then drained  The deltotrapezial fascia and periosteal sleeve were securely repaired with #1 Vicryl, using a figure-of-eight technique  Again, the wound was copiously rinsed and then drained  The deep dermal layer was approximated with 2-0 Vicryl suture, using an interrupted, buried technique  The subcuticular layer was closed with 4-0 Monocryl suture, utilizing a running technique  The skin was cleansed with sterile saline and dried before Steri-Strips were applied  Finally, a sterile dressing was secured by tape, followed by a shoulder brace  I was present for all critical portions of the operation, which included the entire diagnostic arthroscopy, limited debridement of the superior labrum, subacromial decompression with partial acromioplasty, rotator cuff repair, and open subpectoral biceps tenodesis, and immediately available to return  The patient tolerated the procedure without complication and was transported to the recovery room in stable condition       I was present for the entire procedure, A qualified resident physician was not available and A physician assistant was required during the procedure for retraction tissue handling,dissection and suturing    Patient Disposition:  PACU     SIGNATURE: Bibi Larios DO  DATE: March 9, 2021  TIME: 2:10 PM

## 2021-03-09 NOTE — ANESTHESIA PROCEDURE NOTES
Peripheral Block    Patient location during procedure: holding area  Start time: 3/9/2021 11:15 AM  Reason for block: at surgeon's request and post-op pain management  Staffing  Anesthesiologist: Ely Davis MD  Performed: anesthesiologist   Preanesthetic Checklist  Completed: patient identified, site marked, surgical consent, pre-op evaluation, timeout performed, IV checked, risks and benefits discussed and monitors and equipment checked  Peripheral Block  Patient position: supine  Prep: ChloraPrep  Patient monitoring: heart rate, cardiac monitor, continuous pulse ox and frequent blood pressure checks  Block type: interscalene  Laterality: left  Injection technique: single-shot  Procedures: ultrasound guided, Ultrasound guidance required for the procedure to increase accuracy and safety of medication placement and decrease risk of complications    Ultrasound permanent image savedbupivacaine (MARCAINE) 0 5 % perineural infiltration, 10 mL  Needle  Needle type: Stimuplex   Needle gauge: 22 G  Needle length: 10 cm  Needle localization: ultrasound guidance  Needle insertion depth: 5 cm  Assessment  Injection assessment: incremental injection, local visualized surrounding nerve on ultrasound, negative aspiration for heme, negative aspiration for CSF and transient paresthesias  Paresthesia pain: immediately resolved  Heart rate change: no  Slow fractionated injection: no  Post-procedure:  site cleaned  patient tolerated the procedure well with no immediate complications

## 2021-03-09 NOTE — ANESTHESIA PREPROCEDURE EVALUATION
Procedure:  REPAIR ROTATOR CUFF  ARTHROSCOPIC (Left Shoulder)  DISTAL CLAVICLE EXCISION- open - Baby bennetts or qamar retractors (Left Shoulder)    Relevant Problems   CARDIO   (+) Essential hypertension   (+) Other hyperlipidemia      /RENAL   (+) Benign prostatic hyperplasia with urinary hesitancy      NEURO/PSYCH   (+) History of DVT in adulthood   (+) History of pulmonary embolism             Anesthesia Plan  ASA Score- 2     Anesthesia Type- general and regional with ASA Monitors  Additional Monitors:   Airway Plan: ETT  Plan Factors-    Chart reviewed  Induction- intravenous  Postoperative Plan-     Informed Consent- Anesthetic plan and risks discussed with patient  I personally reviewed this patient with the CRNA  Discussed and agreed on the Anesthesia Plan with the CRNA  Sixto Choudhary

## 2021-03-10 DIAGNOSIS — Z23 ENCOUNTER FOR IMMUNIZATION: ICD-10-CM

## 2021-03-15 ENCOUNTER — OFFICE VISIT (OUTPATIENT)
Dept: OBGYN CLINIC | Facility: MEDICAL CENTER | Age: 66
End: 2021-03-15

## 2021-03-15 VITALS
TEMPERATURE: 97.9 F | WEIGHT: 235 LBS | BODY MASS INDEX: 31.14 KG/M2 | DIASTOLIC BLOOD PRESSURE: 84 MMHG | SYSTOLIC BLOOD PRESSURE: 134 MMHG | HEART RATE: 70 BPM | HEIGHT: 73 IN

## 2021-03-15 DIAGNOSIS — M19.012 OSTEOARTHRITIS OF LEFT AC (ACROMIOCLAVICULAR) JOINT: ICD-10-CM

## 2021-03-15 DIAGNOSIS — S46.012D TRAUMATIC TEAR OF LEFT ROTATOR CUFF, UNSPECIFIED TEAR EXTENT, SUBSEQUENT ENCOUNTER: Primary | ICD-10-CM

## 2021-03-15 PROCEDURE — 99024 POSTOP FOLLOW-UP VISIT: CPT | Performed by: ORTHOPAEDIC SURGERY

## 2021-03-15 PROCEDURE — 3008F BODY MASS INDEX DOCD: CPT | Performed by: INTERNAL MEDICINE

## 2021-03-15 NOTE — PROGRESS NOTES
Shoulder Post Operative Visit     Assesment:     63 y o  male 6 days  S/p Surgical Arthroscopy of the left shoulder with rotator cuff repair, arthroscopic biceps tenodesis and open distal clavicle excision, DOS: 3/9/2021    Plan:    Post-Operative treatment:    Ice to shoulder 1-2 times daily, for 20 minutes at a time  Let pain guide return to activities  He is to start formal physical therapy to work on passive ROM  Instructed the best ways to shower, by demonstrating a pendulum to be able to wash arm and underarm  Explained that the new steri strips will fall of on their own  He is to follow up with the hematologist in regards to how long he should be taking the blood thinner  Imaging: All imaging from today was reviewed by myself and explained to the patient  Sling:  at all times other than showering    DVT Prophylaxis:  Ambulation    Follow up:   5 weeks    Patient was advised that if they have any fevers, chills, chest pain, shortness of breath, redness or drainage from the incision, please let our office know immediately  Chief Complaint   Patient presents with    Left Shoulder - Post-op       History of Present Illness: The patient is a 63 y o  male who is being evaluated post operatively 6 days status post  Arthroscopy of the left shoulder with rotator cuff repair, arthroscopic biceps tenodesis and open distal clavicle excision, DOS: 3/9/2021  Since the prior visit, He reports minimal improvement  Pain is well controlled  The patient is using ice to control swelling  They have not started physical therapy  He starts this week  The patient is ambulating for DVT ppx  The patient is using their sling at all times other than showering    The patient denies any fevers, chills, calf pain, chest pain/shortness of breath, redness or drainage from the incision           I have reviewed the past medical, surgical, social and family history, medications and allergies as documented in the EMR  Review of systems: ROS is negative other than that noted in the HPI  Constitutional: Negative for fatigue and fever  Physical Exam:    Blood pressure 134/84, pulse 70, temperature 97 9 °F (36 6 °C), height 6' 1" (1 854 m), weight 107 kg (235 lb)  General/Constitutional: NAD, well developed, well nourished  HENT: Normocephalic, atraumatic  CV: Intact distal pulses, regular rate  Resp: No respiratory distress or labored breathing  Lymphatic: No lymphadenopathy palpated  Neuro: Alert and Oriented x 3, no focal deficits  Psych: Normal mood, normal affect, normal judgement, normal behavior  Skin: Warm, dry, no rashes, no erythema    Shoulder focused exam:       RIGHT LEFT    Scapula Atrophy Negative Negative     Winging Negative Negative     Protraction Negative Negative    Rotator cuff SS 5/5 Deferred due to post op period     IS 5/5 Deferred due to post op period     SubS 5/5 Deferred due to post op period    ROM  Deferred due to post op period     ER0 60 Deferred due to post op period     ER90 90 Deferred due to post op period     IR90 T6 Deferred due to post op period     IRb T6 Deferred due to post op period    TTP:  None Along the clavicle excision    Stability:  stable stable      Incisions show no erythema, no drainage, ecchymosis noted anterior chest and top of the shoulder  Thumb retropulsion intact, cross- finger intact, AIN intact         UE NV Exam: +2 Radial pulses bilaterally  Sensation intact to light touch C5-T1 bilaterally, Radial/median/ulnar nerve motor intact      Scribe Attestation    I,:  Melani Parnell am acting as a scribe while in the presence of the attending physician :       I,:  Isaiah Boxer, DO personally performed the services described in this documentation    as scribed in my presence :

## 2021-03-16 DIAGNOSIS — Z86.711 HISTORY OF PULMONARY EMBOLISM: ICD-10-CM

## 2021-03-17 ENCOUNTER — EVALUATION (OUTPATIENT)
Dept: PHYSICAL THERAPY | Facility: CLINIC | Age: 66
End: 2021-03-17
Payer: COMMERCIAL

## 2021-03-17 DIAGNOSIS — S46.012A TRAUMATIC INCOMPLETE TEAR OF LEFT ROTATOR CUFF, INITIAL ENCOUNTER: ICD-10-CM

## 2021-03-17 PROCEDURE — 97161 PT EVAL LOW COMPLEX 20 MIN: CPT | Performed by: PHYSICAL THERAPIST

## 2021-03-17 PROCEDURE — 97110 THERAPEUTIC EXERCISES: CPT | Performed by: PHYSICAL THERAPIST

## 2021-03-17 NOTE — PROGRESS NOTES
PT Evaluation     Today's date: 3/17/2021  Patient name: Alison Higgins  : 1955  MRN: [id number]  Referring provider: Nathan Nation  Dx:   Encounter Diagnosis     ICD-10-CM    1  Traumatic incomplete tear of left rotator cuff, initial encounter  S46 012A Ambulatory referral to Physical Therapy                  Assessment  Assessment details: Alison Higgins is a 63 y o  male who presents to physical therapy 8 days s/p Left rotator cuff repair  Naima Flor presents with pain, abnormal posture, and limitations in range of motion, strength, joint mobility, flexibility, and functional ability  The current limitations are affecting Norman's ability to function at prior level  He would benefit from skilled physical therapy to address the current impairments and functional limitations to enable him to return to daily activities at maximal level   Thank you for the referral     Impairments: abnormal gait, abnormal or restricted ROM, activity intolerance, impaired balance, impaired physical strength, lacks appropriate home exercise program, weight-bearing intolerance and poor posture     Symptom irritability: lowUnderstanding of Dx/Px/POC: good   Prognosis: good    Goals  STG  Patient will decrease pain at worst to 3/10  Patient will demonstrate full pain free L shoulder PROM  Patient will d/c sling  Patient will be independent with ADLs  Patient will be independent with basic HEP    LTG  Patient will decrease pain at worst to 1/10  Patient will demonstrate L shoulder strength of 4+/5 in all planes  Patient will demonstrate full pain free L shoulder AROM  Patient will report ability to play with his grandchildren without limitations  Patient will be independent with comprehensive HEP    Plan  Patient would benefit from: skilled physical therapy  Planned modality interventions: cryotherapy and thermotherapy: hydrocollator packs  Planned therapy interventions: manual therapy, neuromuscular re-education, patient education, therapeutic activities, therapeutic exercise, therapeutic training and home exercise program  Frequency: 2x week  Duration in weeks: 6  Treatment plan discussed with: patient        Subjective Evaluation    History of Present Illness  Date of surgery: 3/9/2021  Mechanism of injury: surgery  Mechanism of injury: Stan Anderson is a 63 y o  male presenting to physical therapy on 21  He is currently 8 days s/p left rotator cuff repair  DOS (3/9/20) He reports there were no complications with the surgery and everything went well  He was on oxycodone for several days but is now only using Tylenol now  He mentions sleeping is very difficult, he is having trouble getting comfortable  He is unable to complete ADLs/IADLs independently, he gets help from his wife  His next follow up with surgeon is   Pain  Current pain ratin  At best pain ratin  At worst pain ratin  Quality: dull ache and sharp  Relieving factors: ice and rest    Social Support  Lives with: spouse    Employment status: working ([employer] manager )  Patient Goals  Patient goals for therapy: decreased pain, independence with ADLs/IADLs, increased strength, return to sport/leisure activities and increased motion  Patient goal: playing with grandchildren         Objective    Observation: incisions clean dry intact, covered with steristrips, no signs of infection    Palpation: no tenderness noted upon examination    L Shoulder PROM:  - Flexion: 30 degrees P!  - Abduction: 55 degrees P!  - Internal Rotation: to stomach  - External Rotation: to neutral P! Shoulder Strength: deferred at this time            Precautions: RTC Repair DOS 3/9/21      Manuals 3/17            R shoulder PROM                                                    Neuro Re-Ed             Scap Retractions 5"x10                                                                                          Ther Ex             Pendulums 20x            Elbow PROM 20x            Wrist AROM 20x Towel Squeeze 3"x20            UT Stretch 20"x5                                                   Ther Activity                                       Gait Training                                       Modalities             CP PRN

## 2021-03-18 ENCOUNTER — TELEPHONE (OUTPATIENT)
Dept: HEMATOLOGY ONCOLOGY | Facility: MEDICAL CENTER | Age: 66
End: 2021-03-18

## 2021-03-18 NOTE — TELEPHONE ENCOUNTER
Inbound Calls to Reschedule/Cancel Appointments   Patient Details:     Jazmine Lentz     1955     [id number]           Who is calling? Patient    Date of original appointment 03/19/2021   Visit Type Virtual    Who is the Provider and Location? [facility] ``   Is the patient on active treatment? Chemo? Radiation? Yes    The patient would like to cancel, reschedule or make into a virtual appointment? Virtual    Reason for rescheduling or cancelation? Platform Using [employer]    Next Steps:           [employer]: After completing the above information, please route to [employer] and [id number] [facility] Team: Please review and reach out to the patient

## 2021-03-19 ENCOUNTER — OFFICE VISIT (OUTPATIENT)
Dept: PHYSICAL THERAPY | Facility: CLINIC | Age: 66
End: 2021-03-19
Payer: COMMERCIAL

## 2021-03-19 DIAGNOSIS — S46.012A TRAUMATIC INCOMPLETE TEAR OF LEFT ROTATOR CUFF, INITIAL ENCOUNTER: Primary | ICD-10-CM

## 2021-03-19 PROCEDURE — 97140 MANUAL THERAPY 1/> REGIONS: CPT

## 2021-03-19 NOTE — PROGRESS NOTES
Daily Note     Today's date: 3/19/2021  Patient name: Valeriy Choudhury  : 1955  MRN: [id number]  Referring provider: Kristofer Francis  Dx:   Encounter Diagnosis     ICD-10-CM    1  Traumatic incomplete tear of left rotator cuff, initial encounter  S46 012A                   Subjective: Pt states that pain is well controlled with only about 1/10 soreness today  Pt states that he is more anxious than anything due to nature of surgery and making sure to follow protocol  Objective: See treatment diary below      Assessment: Pt educated on expectations and guidelines for PROM milestones  Pt tolerates PROM well with minimal discomfort  Pt will benefit from continued skilled PT  Plan: Continue per plan of care        Precautions: RTC Repair DOS 3/9/21      Manuals 3/17 3/19           R shoulder PROM  DL 25'                                                  Neuro Re-Ed             Scap Retractions 5"x10                                                                                          Ther Ex             Pendulums 20x            Elbow PROM 20x            Wrist AROM 20x            Towel Squeeze 3"x20            UT Stretch 20"x5                                                   Ther Activity                                       Gait Training                                       Modalities             CP PRN

## 2021-03-23 ENCOUNTER — OFFICE VISIT (OUTPATIENT)
Dept: PHYSICAL THERAPY | Facility: CLINIC | Age: 66
End: 2021-03-23
Payer: COMMERCIAL

## 2021-03-23 DIAGNOSIS — S46.012A TRAUMATIC INCOMPLETE TEAR OF LEFT ROTATOR CUFF, INITIAL ENCOUNTER: Primary | ICD-10-CM

## 2021-03-23 PROCEDURE — 97140 MANUAL THERAPY 1/> REGIONS: CPT

## 2021-03-23 NOTE — PROGRESS NOTES
Daily Note     Today's date: 3/23/2021  Patient name: Clara Rodriguez  : 1955  MRN: [id number]  Referring provider: Janell Florian PA-C  Dx: No diagnosis found  Subjective: pt reports no L shoulder pain, upon arrival to therapy  Notes he is sleeping in a recliner, a few hours at a time, 2* pain  Objective: See treatment diary below      Assessment: Performed pendulum exercises  PROM to L shoulder, per protocol, discomfort at times passive flexion  Will monitor  Patient would benefit from continued PT      Plan: Continue per plan of care        Precautions: RTC Repair DOS 3/9/21      Manuals 3/17 3/19 3/23          R shoulder PROM  DL 25' MO 25'                                                 Neuro Re-Ed             Scap Retractions 5"x10                                                                                          Ther Ex             Pendulums 20x  20x          Elbow PROM 20x            Wrist AROM 20x            Towel Squeeze 3"x20            UT Stretch 20"x5                                                   Ther Activity                                       Gait Training                                       Modalities             CP PRN

## 2021-03-25 ENCOUNTER — IMMUNIZATIONS (OUTPATIENT)
Dept: FAMILY MEDICINE CLINIC | Facility: HOSPITAL | Age: 66
End: 2021-03-25

## 2021-03-25 DIAGNOSIS — Z23 ENCOUNTER FOR IMMUNIZATION: Primary | ICD-10-CM

## 2021-03-25 PROCEDURE — 91300 SARS-COV-2 / COVID-19 MRNA VACCINE (PFIZER-BIONTECH) 30 MCG: CPT

## 2021-03-25 PROCEDURE — 0001A SARS-COV-2 / COVID-19 MRNA VACCINE (PFIZER-BIONTECH) 30 MCG: CPT

## 2021-03-26 ENCOUNTER — OFFICE VISIT (OUTPATIENT)
Dept: PHYSICAL THERAPY | Facility: CLINIC | Age: 66
End: 2021-03-26
Payer: COMMERCIAL

## 2021-03-26 DIAGNOSIS — S46.012A TRAUMATIC INCOMPLETE TEAR OF LEFT ROTATOR CUFF, INITIAL ENCOUNTER: Primary | ICD-10-CM

## 2021-03-26 PROCEDURE — 97140 MANUAL THERAPY 1/> REGIONS: CPT

## 2021-03-26 NOTE — PROGRESS NOTES
Daily Note     Today's date: 3/26/2021  Patient name: Chary Payment  : 1955  MRN: [id number]  Referring provider: Priya Augustine  Dx:   Encounter Diagnosis     ICD-10-CM    1  Traumatic incomplete tear of left rotator cuff, initial encounter  S46 012A                   Subjective: "Pretty good, actually " Compliant w/HEP  Objective: See treatment diary below      Assessment: W/u w/pendulum exercises  Discomfort with passive flexion initially, otherwise comfortable throughout PROM to L shoulder  Tolerated treatment well  Will monitor  Patient would benefit from continued PT      Plan: Continue per plan of care        Precautions: RTC Repair DOS 3/9/21      Manuals 3/17 3/19 3/23 3/26         R shoulder PROM  DL 25' MO 25' MO  25'                                                Neuro Re-Ed             Scap Retractions 5"x10                                                                                          Ther Ex             Pendulums 20x  20x 20x         Elbow PROM 20x            Wrist AROM 20x            Towel Squeeze 3"x20            UT Stretch 20"x5                                                   Ther Activity                                       Gait Training                                       Modalities             CP PRN

## 2021-03-30 ENCOUNTER — OFFICE VISIT (OUTPATIENT)
Dept: PHYSICAL THERAPY | Facility: CLINIC | Age: 66
End: 2021-03-30
Payer: COMMERCIAL

## 2021-03-30 DIAGNOSIS — S46.012A TRAUMATIC INCOMPLETE TEAR OF LEFT ROTATOR CUFF, INITIAL ENCOUNTER: Primary | ICD-10-CM

## 2021-03-30 PROCEDURE — 97140 MANUAL THERAPY 1/> REGIONS: CPT

## 2021-03-30 NOTE — PROGRESS NOTES
Daily Note     Today's date: 3/30/2021  Patient name: Sulma Cleaning  : 1955  MRN: [id number]  Referring provider: Inocencio Salomon PA-C  Dx:   Encounter Diagnosis     ICD-10-CM    1  Traumatic incomplete tear of left rotator cuff, initial encounter  S46 012A                   Subjective: "It's getting better " Notes "I got 4 hours of sleep last night propped up in bed, then another 2 hours on the recliner "      Objective: See treatment diary below      Assessment: Performed pendulum exercises as warm up  PROM to L shoulder, per protocol, able to julio same  Will monitor  Patient would benefit from continued PT      Plan: Continue per plan of care        Precautions: RTC Repair DOS 3/9/21      Manuals 3/17 3/19 3/23 3/26 3/30        R shoulder PROM  DL 25' MO 25' MO  25' MO  25'                                               Neuro Re-Ed             Scap Retractions 5"x10                                                                                          Ther Ex             Pendulums 20x  20x 20x x20        Elbow PROM 20x            Wrist AROM 20x            Towel Squeeze 3"x20            UT Stretch 20"x5                                                   Ther Activity                                       Gait Training                                       Modalities             CP PRN

## 2021-04-02 ENCOUNTER — OFFICE VISIT (OUTPATIENT)
Dept: PHYSICAL THERAPY | Facility: CLINIC | Age: 66
End: 2021-04-02
Payer: COMMERCIAL

## 2021-04-02 DIAGNOSIS — S46.012A TRAUMATIC INCOMPLETE TEAR OF LEFT ROTATOR CUFF, INITIAL ENCOUNTER: Primary | ICD-10-CM

## 2021-04-02 PROCEDURE — 97140 MANUAL THERAPY 1/> REGIONS: CPT

## 2021-04-02 NOTE — PROGRESS NOTES
Daily Note     Today's date: 2021  Patient name: Adán Severe  : 1955  MRN: [id number]  Referring provider: Patricio Rice  Dx:   Encounter Diagnosis     ICD-10-CM    1  Traumatic incomplete tear of left rotator cuff, initial encounter  S46 012A                   Subjective: "I feel pretty good, I did my exercises prior to coming "      Objective: See treatment diary below      Assessment: Performed pendulums for w/u  PROM to L shoulder per protocol, motions are improving  Tolerated treatment well  Patient would benefit from continued PT      Plan: Continue per plan of care        Precautions: RTC Repair DOS 3/9/21      Manuals 3/17 3/19 3/23 3/26 3/30 4/2       R shoulder PROM  DL 25' MO 25' MO  25' MO  25' MO  25'                                              Neuro Re-Ed             Scap Retractions 5"x10                                                                                          Ther Ex             Pendulums 20x  20x 20x x20 x20       Elbow PROM 20x            Wrist AROM 20x            Towel Squeeze 3"x20            UT Stretch 20"x5                                                   Ther Activity                                       Gait Training                                       Modalities             CP PRN

## 2021-04-06 ENCOUNTER — OFFICE VISIT (OUTPATIENT)
Dept: PHYSICAL THERAPY | Facility: CLINIC | Age: 66
End: 2021-04-06
Payer: COMMERCIAL

## 2021-04-06 DIAGNOSIS — S46.012A TRAUMATIC INCOMPLETE TEAR OF LEFT ROTATOR CUFF, INITIAL ENCOUNTER: Primary | ICD-10-CM

## 2021-04-06 PROCEDURE — 97140 MANUAL THERAPY 1/> REGIONS: CPT

## 2021-04-06 NOTE — PROGRESS NOTES
Daily Note     Today's date: 2021  Patient name: Beth Dillard  : 1955  MRN: [id number]  Referring provider: Lavinia Valentin  Dx:   Encounter Diagnosis     ICD-10-CM    1  Traumatic incomplete tear of left rotator cuff, initial encounter  S46 012A                   Subjective: Pt reports his L shoulder feels good  Notes he is sleeping  In 2 segments, about 4 hours in bed and 2 hours in recliner/sofa  Objective: See treatment diary below      Assessment: Rec PROM to L shoulder, per protocol  Tolerated treatment well  Patient would benefit from continued PT      Plan: Continue per plan of care        Precautions: RTC Repair DOS 3/9/21      Manuals 3/17 3/19 3/23 3/26 3/30 4/2 4/6      R shoulder PROM  DL 25' MO 25' MO  25' MO  25' MO  25' MO  25'                                             Neuro Re-Ed             Scap Retractions 5"x10                                                                                          Ther Ex             Pendulums 20x  20x 20x x20 x20 x20      Elbow PROM 20x            Wrist AROM 20x            Towel Squeeze 3"x20            UT Stretch 20"x5                                                   Ther Activity                                       Gait Training                                       Modalities             CP PRN

## 2021-04-09 ENCOUNTER — OFFICE VISIT (OUTPATIENT)
Dept: PHYSICAL THERAPY | Facility: CLINIC | Age: 66
End: 2021-04-09
Payer: COMMERCIAL

## 2021-04-09 DIAGNOSIS — S46.012A TRAUMATIC INCOMPLETE TEAR OF LEFT ROTATOR CUFF, INITIAL ENCOUNTER: Primary | ICD-10-CM

## 2021-04-09 PROCEDURE — 97140 MANUAL THERAPY 1/> REGIONS: CPT

## 2021-04-09 NOTE — PROGRESS NOTES
Daily Note     Today's date: 2021  Patient name: Micheline Frausto  : 1955  MRN: [id number]  Referring provider: Ariel Estrada  Dx:   Encounter Diagnosis     ICD-10-CM    1  Traumatic incomplete tear of left rotator cuff, initial encounter  S46 012A                   Subjective: Pt reports he has been doing well  Post PT, minor soreness but not enough to warrant ice  Pt with minimal pain while PROM has been performed in previous sessions  Objective: See treatment diary below      Assessment: Pt able to tolerate PROM to at least 100° flexion and 95° ABD with no pain  Pt progressing well at 4 weeks out  Will continue toward goal of full PROM with no/minimal pain per protocol  Pt will benefit from continued skilled PT  Plan: Continue per plan of care        Precautions: RTC Repair DOS 3/9/21      Manuals 3/17 3/19 3/23 3/26 3/30 4/2 4/6 4/9     R shoulder PROM  DL 25' MO 25' MO  25' MO  25' MO  25' MO  25' DL 25'                                             Neuro Re-Ed             Scap Retractions 5"x10                                                                                          Ther Ex             Pendulums 20x  20x 20x x20 x20 x20 x20     Elbow PROM 20x            Wrist AROM 20x            Towel Squeeze 3"x20            UT Stretch 20"x5                                                   Ther Activity                                       Gait Training                                       Modalities             CP PRN

## 2021-04-13 ENCOUNTER — OFFICE VISIT (OUTPATIENT)
Dept: PHYSICAL THERAPY | Facility: CLINIC | Age: 66
End: 2021-04-13
Payer: COMMERCIAL

## 2021-04-13 DIAGNOSIS — S46.012A TRAUMATIC INCOMPLETE TEAR OF LEFT ROTATOR CUFF, INITIAL ENCOUNTER: Primary | ICD-10-CM

## 2021-04-13 PROCEDURE — 97140 MANUAL THERAPY 1/> REGIONS: CPT

## 2021-04-13 NOTE — PROGRESS NOTES
Daily Note     Today's date: 2021  Patient name: Mateo Hutchinson  : 1955  MRN: [id number]  Referring provider: Judge Aaliyah PA-C  Dx: No diagnosis found  Subjective: "Pretty good " Notes his sleeping is improving  "I did something stupid yesterday," was out of his sling, at the computer, and without thinking started typing using his L hand  Objective: See treatment diary below      Assessment: W/u with pendulum exercises  PROM to L shoulder, per protocol, able to julio same  Will monitor  Patient would benefit from continued PT      Plan: Continue per plan of care        Precautions: RTC Repair DOS 3/9/21      Manuals 3/17 3/19 3/23 3/26 3/30 4/2 4/6 4/9 4/13    R shoulder PROM  DL 25' MO 25' MO  25' MO  25' MO  25' MO  25' DL 25'  MO  25'                                           Neuro Re-Ed             Scap Retractions 5"x10                                                                                          Ther Ex             Pendulums 20x  20x 20x x20 x20 x20 x20 x20    Elbow PROM 20x            Wrist AROM 20x            Towel Squeeze 3"x20            UT Stretch 20"x5                                                   Ther Activity                                       Gait Training                                       Modalities             CP PRN

## 2021-04-16 ENCOUNTER — OFFICE VISIT (OUTPATIENT)
Dept: PHYSICAL THERAPY | Facility: CLINIC | Age: 66
End: 2021-04-16
Payer: COMMERCIAL

## 2021-04-16 ENCOUNTER — IMMUNIZATIONS (OUTPATIENT)
Dept: FAMILY MEDICINE CLINIC | Facility: HOSPITAL | Age: 66
End: 2021-04-16

## 2021-04-16 DIAGNOSIS — Z23 ENCOUNTER FOR IMMUNIZATION: Primary | ICD-10-CM

## 2021-04-16 DIAGNOSIS — S46.012A TRAUMATIC INCOMPLETE TEAR OF LEFT ROTATOR CUFF, INITIAL ENCOUNTER: Primary | ICD-10-CM

## 2021-04-16 PROCEDURE — 97140 MANUAL THERAPY 1/> REGIONS: CPT

## 2021-04-16 PROCEDURE — 91300 SARS-COV-2 / COVID-19 MRNA VACCINE (PFIZER-BIONTECH) 30 MCG: CPT

## 2021-04-16 PROCEDURE — 0002A SARS-COV-2 / COVID-19 MRNA VACCINE (PFIZER-BIONTECH) 30 MCG: CPT

## 2021-04-16 NOTE — PROGRESS NOTES
Daily Note     Today's date: 2021  Patient name: Maggy Aguirre  : 1955  MRN: [id number]  Referring provider: Barak Santana  Dx:   Encounter Diagnosis     ICD-10-CM    1  Traumatic incomplete tear of left rotator cuff, initial encounter  S46 012A                   Subjective: "It feels good "      Objective: See treatment diary below      Assessment: Pt rec PROM to L shoulder, per protocol, motions improve with repetitions  Tolerated treatment well  Patient would benefit from continued PT      Plan: Continue per plan of care        Precautions: RTC Repair DOS 3/9/21      Manuals 3/17 3/19 3/23 3/26 3/30 4/2 4/6 4/9 4/13 4/16   R shoulder PROM  DL 25' MO 25' MO  25' MO  25' MO  25' MO  25' DL 22'  MO  25' MO  25'                                          Neuro Re-Ed             Scap Retractions 5"x10                                                                                          Ther Ex             Pendulums 20x  20x 20x x20 x20 x20 x20 x20 x20   Elbow PROM 20x            Wrist AROM 20x            Towel Squeeze 3"x20            UT Stretch 20"x5                                                   Ther Activity                                       Gait Training                                       Modalities             CP PRN

## 2021-04-19 ENCOUNTER — OFFICE VISIT (OUTPATIENT)
Dept: OBGYN CLINIC | Facility: MEDICAL CENTER | Age: 66
End: 2021-04-19

## 2021-04-19 VITALS
WEIGHT: 235 LBS | SYSTOLIC BLOOD PRESSURE: 148 MMHG | DIASTOLIC BLOOD PRESSURE: 87 MMHG | BODY MASS INDEX: 31.14 KG/M2 | TEMPERATURE: 97.6 F | HEIGHT: 73 IN | HEART RATE: 67 BPM

## 2021-04-19 DIAGNOSIS — Z98.890 S/P ARTHROSCOPY OF LEFT SHOULDER: ICD-10-CM

## 2021-04-19 DIAGNOSIS — Z98.890 S/P ROTATOR CUFF REPAIR: Primary | ICD-10-CM

## 2021-04-19 PROCEDURE — 99024 POSTOP FOLLOW-UP VISIT: CPT | Performed by: ORTHOPAEDIC SURGERY

## 2021-04-19 PROCEDURE — 3008F BODY MASS INDEX DOCD: CPT | Performed by: INTERNAL MEDICINE

## 2021-04-19 NOTE — PROGRESS NOTES
Shoulder Post Operative Visit     Assesment:     63 y o  male 5 weeks S/p Surgical Arthroscopy of the left shoulder with rotator cuff repair, arthroscopic biceps tenodesis and open distal clavicle excision, DOS: 3/9/2021, with improvement     Plan:    Post-Operative treatment:    Ice to shoulder 1-2 times daily, for 20 minutes at a time  PT for ROM and strengthening to shoulder, rotator cuff, scapular stabilizers  Imaging: All imaging from today was reviewed by myself and explained to the patient  Sling:  will begin to wean from the sling with the guidance of their physical therapist over next week     DVT Prophylaxis: He is to follow up with the hematologist in regards to how long he should be taking the blood thinner    Follow up:   6 weeks    Patient was advised that if they have any fevers, chills, chest pain, shortness of breath, redness or drainage from the incision, please let our office know immediately  Chief Complaint   Patient presents with    Left Shoulder - Post-op       History of Present Illness: The patient is a 63 y o  male who is being evaluated post operatively 5 weeks S/p Surgical Arthroscopy of the left shoulder with rotator cuff repair, arthroscopic biceps tenodesis and open distal clavicle excision, DOS: 3/9/2021  Since the prior visit, He reports significant improvement  Pain is well controlled  The patient is using ice to control swelling  They have started physical therapy and has been working on passive ROM and stretching  The patient is ambulating for DVT ppx  The patient is using their sling at all times other than showering    The patient denies any fevers, chills, calf pain, chest pain/shortness of breath, redness or drainage from the incision  I have reviewed the past medical, surgical, social and family history, medications and allergies as documented in the EMR      Review of systems: ROS is negative other than that noted in the HPI   Constitutional: Negative for fatigue and fever  Physical Exam:    Blood pressure 148/87, pulse 67, temperature 97 6 °F (36 4 °C), height 6' 1" (1 854 m), weight 107 kg (235 lb)      General/Constitutional: NAD, well developed, well nourished  HENT: Normocephalic, atraumatic  CV: Intact distal pulses, regular rate  Resp: No respiratory distress or labored breathing  Lymphatic: No lymphadenopathy palpated  Neuro: Alert and Oriented x 3, no focal deficits  Psych: Normal mood, normal affect, normal judgement, normal behavior  Skin: Warm, dry, no rashes, no erythema    Shoulder focused exam:    RIGHT LEFT    Scapula Atrophy Negative Negative     Winging Negative Negative     Protraction Negative Negative    Rotator cuff SS 5/5 5/5     IS 5/5 5/5     SubS 5/5 5/5    ROM  120 passive     ER0 60 40 psasive     ER90 90 90     IR90 T6 T6     IRb T6 T6    TTP:  None None    Stability:  stable stable      Incisions show no erythema, no drainage      UE NV Exam: +2 Radial pulses bilaterally  Sensation intact to light touch C5-T1 bilaterally, Radial/median/ulnar nerve motor intact      Scribe Attestation    I,:  Goldy Roy am acting as a scribe while in the presence of the attending physician :       I,:  Sasha Ibarra, DO personally performed the services described in this documentation    as scribed in my presence :

## 2021-04-20 ENCOUNTER — OFFICE VISIT (OUTPATIENT)
Dept: PHYSICAL THERAPY | Facility: CLINIC | Age: 66
End: 2021-04-20
Payer: COMMERCIAL

## 2021-04-20 DIAGNOSIS — S46.012A TRAUMATIC INCOMPLETE TEAR OF LEFT ROTATOR CUFF, INITIAL ENCOUNTER: Primary | ICD-10-CM

## 2021-04-20 PROCEDURE — 97140 MANUAL THERAPY 1/> REGIONS: CPT

## 2021-04-20 PROCEDURE — 97112 NEUROMUSCULAR REEDUCATION: CPT

## 2021-04-20 NOTE — PROGRESS NOTES
Daily Note     Today's date: 2021  Patient name: Adán Severe  : 1955  MRN: [id number]  Referring provider: Patricio Rice  Dx:   Encounter Diagnosis     ICD-10-CM    1  Traumatic incomplete tear of left rotator cuff, initial encounter  S46 012A                   Subjective: Pt reports he had follow up with Dr Luís May which went well  Pt states that he is weaning off of sling and wearing it about 75% of the time  Objective: See treatment diary below      Assessment: Pt is 6 weeks, therefore appropriate for phase II of RTC repair protocol  Initiated gentle AAROM exercises  Pt tolerates very well with no pain, will monitor response and NV will add AAROM exercises to HEP  Pt will benefit from continued skilled PT per protocol  Plan: Continue per plan of care  Precautions: RTC Repair DOS 3/9/21      Manuals 4/20  3/23 3/26 3/30 4/2 4/6 4/9 4/13 4/16   R shoulder PROM 25°  MO 25' MO  25' MO  25' MO  25' MO  25' DL 25'  MO  25' MO  25'                                          Neuro Re-Ed             Scap Retractions                                                                                           Ther Ex             Pendulums   20x 20x x20 x20 x20 x20 x20 x20   Elbow PROM             Wrist AROM             Towel Squeeze             UT Stretch             AAROM supine wand flexion 10x10"            Standing pball abduction on table AAROM 10x10"            Pulley 10x10" flex/abd ea               AAROM supine wand ER                          Ther Activity                                       Gait Training                                       Modalities             CP PRN

## 2021-04-23 ENCOUNTER — OFFICE VISIT (OUTPATIENT)
Dept: PHYSICAL THERAPY | Facility: CLINIC | Age: 66
End: 2021-04-23
Payer: COMMERCIAL

## 2021-04-23 DIAGNOSIS — S46.012A TRAUMATIC INCOMPLETE TEAR OF LEFT ROTATOR CUFF, INITIAL ENCOUNTER: Primary | ICD-10-CM

## 2021-04-23 PROCEDURE — 97110 THERAPEUTIC EXERCISES: CPT

## 2021-04-23 PROCEDURE — 97140 MANUAL THERAPY 1/> REGIONS: CPT

## 2021-04-23 NOTE — PROGRESS NOTES
Daily Note     Today's date: 2021  Patient name: Maggy Aguirre  : 1955  MRN: [id number]  Referring provider: Barak Santana  Dx:   Encounter Diagnosis     ICD-10-CM    1  Traumatic incomplete tear of left rotator cuff, initial encounter  S46 012A                   Subjective: Pt reports that since last PT visit, has been doing well weaning out of sling  He reports mostly just wearing it to sleep  Denies any pain coming into PT today  Objective: See treatment diary below      Assessment: Pt tolerating exercises very well with no pain noted, just with subjective of gentle stretch  Updated HEP to include all AAROM per chart  Pt will benefit from continued skilled PT per protocol  Plan: Continue per plan of care  Precautions: RTC Repair DOS 3/9/21      Manuals 4/20 4/23  3/26 3/30 4/2 4/6 4/9 4/13 4/16   R shoulder PROM 25° 25'  MO  25' MO  25' MO  25' MO  25' DL 25'  MO  25' MO  25'                                          Neuro Re-Ed             Scap Retractions                                                                                           Ther Ex             Pendulums  between exercises prn  20x x20 x20 x20 x20 x20 x20   Elbow PROM             Wrist AROM             Towel Squeeze             UT Stretch             AAROM supine wand flexion 10x10" 10x10"           Standing pball abduction on table AAROM 10x10" 20x5"           Pulley 10x10" flex/abd ea    20x5"           AAROM supine wand ER  20x5"                        Ther Activity                                       Gait Training                                       Modalities             CP PRN

## 2021-04-27 ENCOUNTER — EVALUATION (OUTPATIENT)
Dept: PHYSICAL THERAPY | Facility: CLINIC | Age: 66
End: 2021-04-27
Payer: COMMERCIAL

## 2021-04-27 DIAGNOSIS — S46.012A TRAUMATIC INCOMPLETE TEAR OF LEFT ROTATOR CUFF, INITIAL ENCOUNTER: Primary | ICD-10-CM

## 2021-04-27 PROCEDURE — 97110 THERAPEUTIC EXERCISES: CPT

## 2021-04-27 PROCEDURE — 97140 MANUAL THERAPY 1/> REGIONS: CPT

## 2021-04-27 NOTE — PROGRESS NOTES
Daily Note/Re-evaluation     Today's date: 2021  Patient name: Norm Alfonso  : 1955  MRN: [id number]  Referring provider: Kyaw Alvarez  Dx:   Encounter Diagnosis     ICD-10-CM    1  Traumatic incomplete tear of left rotator cuff, initial encounter  S46 012A                   Subjective: Pt reports stretches are going very well with minimal pain  Pt does report arm out to side stretch (abduction) is the most difficult  Other than abduction, pt denies any significant pain or soreness  Pt has been only wearing sling to sleep  Objective: See treatment diary below    Assessment  Assessment details: Norm Alfonso is a 63 y o  male s/p L RTC repair, 7 weeks since DOS  Pt with significant improvement in PROM with findings appropriate per milestone ROM  Pt is in phase II per protocol, therefore initiated AAROM and to begin initiating gentle AROM in supine and progress to standing up to shoulder height  Pt will benefit from continued skilled PT per protocol to return to prior ADL's       Impairments: abnormal gait, abnormal or restricted ROM, activity intolerance, impaired balance, impaired physical strength, lacks appropriate home exercise program, weight-bearing intolerance and poor posture     Symptom irritability: lowUnderstanding of Dx/Px/POC: good   Prognosis: good    Goals  STG  Patient will decrease pain at worst to 3/10 (ongoing)  Patient will demonstrate full pain free L shoulder PROM (ongoing)  Patient will d/c sling (MET)  Patient will be independent with ADLs (MET)  Patient will be independent with basic HEP (ongoing)    LTG  Patient will decrease pain at worst to 1/10 (ongoing)  Patient will demonstrate L shoulder strength of 4+/5 in all planes (ongoing)  Patient will demonstrate full pain free L shoulder AROM (ongoing)  Patient will report ability to play with his grandchildren without limitations (ongoing)  Patient will be independent with comprehensive HEP (ongoing)    Plan  Patient would benefit from: skilled physical therapy  Planned modality interventions: cryotherapy and thermotherapy: hydrocollator packs  Planned therapy interventions: manual therapy, neuromuscular re-education, patient education, therapeutic activities, therapeutic exercise, therapeutic training and home exercise program  Frequency: 2x week  Duration in weeks: 6  Treatment plan discussed with: patient        Subjective Evaluation    History of Present Illness  Date of surgery: 3/9/2021  Pain  Current pain ratin  At best pain ratin  At worst pain ratin (w/ stretch)  Quality: dull ache and sharp  Relieving factors: ice and rest    Social Support  Lives with: spouse    Employment status: working ([employer] manager )  Patient Goals  Patient goals for therapy: decreased pain, independence with ADLs/IADLs, increased strength, return to sport/leisure activities and increased motion  Patient goal: playing with grandchildren          Objective    Observation: smooth,scars    Palpation: no tenderness noted upon examination    L Shoulder PROM:  - Flexion: 140°  - Abduction: 110°  - Internal Rotation @ 90 ABD: 45°  - External Rotation @ 90°: 35°    Shoulder Strength: deferred at this time  Plan: Continue per plan of care  Precautions: RTC Repair DOS 3/9/21      Manuals 4/20 4/23 4/27  3/30 4/2 4/6 4/9 4/13 4/16   R shoulder PROM 25° 25' 25'  MO  25' MO  25' MO  25' DL 25'  MO  25' MO  25'                                          Neuro Re-Ed             Scap Retractions                                                                                           Ther Ex             Pendulums  between exercises prn  between exercises prn    x20 x20 x20 x20 x20 x20   Elbow PROM             Wrist AROM             Towel Squeeze             UT Stretch             AAROM supine wand flexion 10x10" 10x10" 10x10"          Standing pball abduction on table AAROM 10x10" 20x5" 10x10"          Pulley 10x10" flex/abd ea    20x5" 20x5"          AAROM supine wand ER  20x5" 20x 5"                       Ther Activity                                       Gait Training                                       Modalities             CP PRN

## 2021-04-30 ENCOUNTER — OFFICE VISIT (OUTPATIENT)
Dept: PHYSICAL THERAPY | Facility: CLINIC | Age: 66
End: 2021-04-30
Payer: COMMERCIAL

## 2021-04-30 DIAGNOSIS — S46.012A TRAUMATIC INCOMPLETE TEAR OF LEFT ROTATOR CUFF, INITIAL ENCOUNTER: Primary | ICD-10-CM

## 2021-04-30 PROCEDURE — 97110 THERAPEUTIC EXERCISES: CPT

## 2021-04-30 PROCEDURE — 97140 MANUAL THERAPY 1/> REGIONS: CPT

## 2021-04-30 NOTE — PROGRESS NOTES
Daily Note     Today's date: 2021  Patient name: María Cabral  : 1955  MRN: [id number]  Referring provider: Anish Freeman PA-C  Dx:   Encounter Diagnosis     ICD-10-CM    1  Traumatic incomplete tear of left rotator cuff, initial encounter  S46 012A                   Subjective: Pt arrives with no complaints in the shoulder  Continues with most difficulty performing AAROM with abduction, other stretches with no pain  Objective: See treatment diary below      Assessment: Per protocol, continuing with AAROM with no complaints  Pt progressing well with ROM  Per ability, will attempt supine AROM flexion NV  Pt will benefit from continued skilled PT per protocol  Plan: Continue per plan of care  Precautions: RTC Repair DOS 3/9/21      Manuals    R shoulder PROM 25° 25' 25' 20'  MO  25' MO  25' DL 25'  MO  25' MO  25'                                          Neuro Re-Ed             Scap Retractions                                                                                           Ther Ex             Pendulums  between exercises prn  between exercises prn  x20 x20 x20 x20 x20   Elbow PROM             Wrist AROM             Towel Squeeze             UT Stretch             AAROM supine wand flexion 10x10" 10x10" 10x10" 10x10"         Standing pball abduction on table AAROM 10x10" 20x5" 10x10" 10x10"         Pulley 10x10" flex/abd ea  20x5" 20x5" 20x5"         AAROM supine wand ER  20x5" 20x 5" 20x 5"         Supine flex    Trial AROM NV  Finger ladder    Trial NV flex/abd            Ther Activity                                       Gait Training                                       Modalities             CP PRN

## 2021-05-04 ENCOUNTER — OFFICE VISIT (OUTPATIENT)
Dept: PHYSICAL THERAPY | Facility: CLINIC | Age: 66
End: 2021-05-04
Payer: COMMERCIAL

## 2021-05-04 DIAGNOSIS — S46.012A TRAUMATIC INCOMPLETE TEAR OF LEFT ROTATOR CUFF, INITIAL ENCOUNTER: Primary | ICD-10-CM

## 2021-05-04 PROCEDURE — 97140 MANUAL THERAPY 1/> REGIONS: CPT

## 2021-05-04 PROCEDURE — 97110 THERAPEUTIC EXERCISES: CPT

## 2021-05-04 NOTE — PROGRESS NOTES
Daily Note     Today's date: 2021  Patient name: Nolvia Tovar  : 1955  MRN: [id number]  Referring provider: Gricelda Laboy  Dx:   Encounter Diagnosis     ICD-10-CM    1  Traumatic incomplete tear of left rotator cuff, initial encounter  S46 012A                   Subjective: "Pretty good actually, every once in awhile I feel it "      Objective: See treatment diary below      Assessment: Performed new exercises and remaining ex program w/o difficulty or discomfort  Able to julio PROM to L shoulder  Tolerated treatment well  Will monitor  Patient would benefit from continued PT      Plan: Continue per plan of care  Precautions: RTC Repair DOS 3/9/21      Manuals    R shoulder PROM 25° 25' 25' 20' 20'  MO  25' DL 25'  MO  25' MO  25'                                          Neuro Re-Ed             Scap Retractions                                                                                           Ther Ex             Pendulums  between exercises prn  between exercises prn  x20 x20 x20 x20   Elbow PROM             Wrist AROM             Towel Squeeze             UT Stretch             AAROM supine wand flexion 10x10" 10x10" 10x10" 10x10" 10x10"        Standing pball abduction on table AAROM 10x10" 20x5" 10x10" 10x10" 10x10"        Pulley 10x10" flex/abd ea    20x5" 20x5" 20x5" 20x5"        AAROM supine wand ER  20x5" 20x 5" 20x 5" 20x5"        Supine flex    Trial AROM NV    10x        Finger ladder    Trial NV flex/abd    5x5"   ea        Ther Activity                                       Gait Training                                       Modalities             CP PRN

## 2021-05-07 ENCOUNTER — OFFICE VISIT (OUTPATIENT)
Dept: PHYSICAL THERAPY | Facility: CLINIC | Age: 66
End: 2021-05-07
Payer: COMMERCIAL

## 2021-05-07 DIAGNOSIS — S46.012A TRAUMATIC INCOMPLETE TEAR OF LEFT ROTATOR CUFF, INITIAL ENCOUNTER: Primary | ICD-10-CM

## 2021-05-07 PROCEDURE — 97140 MANUAL THERAPY 1/> REGIONS: CPT

## 2021-05-07 PROCEDURE — 97110 THERAPEUTIC EXERCISES: CPT

## 2021-05-07 NOTE — PROGRESS NOTES
Daily Note     Today's date: 2021  Patient name: Mateo Hutchinson  : 1955  MRN: [id number]  Referring provider: Kevin Brito  Dx:   Encounter Diagnosis     ICD-10-CM    1  Traumatic incomplete tear of left rotator cuff, initial encounter  S46 012A                   Subjective: Pt cont to report his L shoulder is feeling better  Objective: See treatment diary below      Assessment: Performed exercise program w/mild discomfort ladder ABD only  PROM to L shoulder, ROM is improving  Tolerated treatment well  Will monitor  Patient would benefit from continued PT      Plan: Continue per plan of care  Precautions: RTC Repair DOS 3/9/21      Manuals    R shoulder PROM 25° 25' 25' 20' 20' 20'  DL 25'  MO  25' MO  25'                                          Neuro Re-Ed             Scap Retractions                                                                                           Ther Ex             Pendulums  between exercises prn  between exercises prn  x20 x20 x20   Elbow PROM             Wrist AROM             Towel Squeeze             UT Stretch             AAROM supine wand flexion 10x10" 10x10" 10x10" 10x10" 10x10" 10x10"       Standing pball abduction on table AAROM 10x10" 20x5" 10x10" 10x10" 10x10" 10x10"       Pulley 10x10" flex/abd ea    20x5" 20x5" 20x5" 20x5" 20x5"       AAROM supine wand ER  20x5" 20x 5" 20x 5" 20x5" 20x5"       Supine flex    Trial AROM NV    10x  10x       Finger ladder    Trial NV flex/abd    5x5"   ea  5x5"   ea       Ther Activity                                       Gait Training                                       Modalities             CP PRN

## 2021-05-11 ENCOUNTER — OFFICE VISIT (OUTPATIENT)
Dept: PHYSICAL THERAPY | Facility: CLINIC | Age: 66
End: 2021-05-11
Payer: COMMERCIAL

## 2021-05-11 DIAGNOSIS — S46.012A TRAUMATIC INCOMPLETE TEAR OF LEFT ROTATOR CUFF, INITIAL ENCOUNTER: Primary | ICD-10-CM

## 2021-05-11 PROCEDURE — 97140 MANUAL THERAPY 1/> REGIONS: CPT

## 2021-05-11 PROCEDURE — 97110 THERAPEUTIC EXERCISES: CPT

## 2021-05-11 NOTE — PROGRESS NOTES
Daily Note     Today's date: 2021  Patient name: María Cabral  : 1955  MRN: [id number]  Referring provider: Anish Freeman PA-C  Dx: No diagnosis found  Subjective: Pt cont to report his L shoulder is feeling better  Objective: See treatment diary below      Assessment: Able to julio exercise progressions  Performed remaining ex program w/o complaint  PROM to L shoulder cont to improve,IR most limited  Tolerated treatment well  Patient would benefit from continued PT      Plan: Continue per plan of care  Precautions: RTC Repair DOS 3/9/21      Manuals    R shoulder PROM 25° 25' 25' 20' 20' 20' 20'  MO  25' MO  25'                                          Neuro Re-Ed             Scap Retractions                                                                                           Ther Ex             Pendulums  between exercises prn  between exercises prn  x20 x20   Elbow PROM             Wrist AROM             Towel Squeeze             UT Stretch             AAROM supine wand flexion 10x10" 10x10" 10x10" 10x10" 10x10" 10x10" 10x10"      Standing pball abduction on table AAROM 10x10" 20x5" 10x10" 10x10" 10x10" 10x10" 10x10"      Pulley 10x10" flex/abd ea    20x5" 20x5" 20x5" 20x5" 20x5" 20x5"      AAROM supine wand ER  20x5" 20x 5" 20x 5" 20x5" 20x5" 20x5"      Supine flex    Trial AROM NV    10x  10x  12x      Finger ladder    Trial NV flex/abd    5x5"   ea  5x5"   ea 10x5"  ea      Ther Activity                                       Gait Training                                       Modalities             CP PRN

## 2021-05-13 ENCOUNTER — OFFICE VISIT (OUTPATIENT)
Dept: PHYSICAL THERAPY | Facility: CLINIC | Age: 66
End: 2021-05-13
Payer: COMMERCIAL

## 2021-05-13 DIAGNOSIS — S46.012A TRAUMATIC INCOMPLETE TEAR OF LEFT ROTATOR CUFF, INITIAL ENCOUNTER: Primary | ICD-10-CM

## 2021-05-13 PROCEDURE — 97140 MANUAL THERAPY 1/> REGIONS: CPT

## 2021-05-13 PROCEDURE — 97110 THERAPEUTIC EXERCISES: CPT

## 2021-05-13 NOTE — PROGRESS NOTES
Daily Note     Today's date: 2021  Patient name: Sury Esparza  : 1955  MRN: [id number]  Referring provider: Andrey Alvarenga  Dx:   Encounter Diagnosis     ICD-10-CM    1  Traumatic incomplete tear of left rotator cuff, initial encounter  S46 012A                   Subjective: Patient reports his shoulder is doing well, offers no new complaints  Objective: See treatment diary below      Assessment: Tolerated treatment well  Patient would benefit from continued PT  Progress as pt is able tot tolerate  Plan: Continue per plan of care  Precautions: RTC Repair DOS 3/9/21      Manuals    L shoulder PROM 25° 25' 25' 20' 20' 20' 20' HS  MO  25'                                          Neuro Re-Ed             Scap Retractions                                                                                           Ther Ex             Pendulums  between exercises prn  between exercises prn        x20   Elbow PROM             Wrist AROM             Towel Squeeze             UT Stretch             AAROM supine wand flexion 10x10" 10x10" 10x10" 10x10" 10x10" 10x10" 10x10" 10x10"     Standing pball abduction on table AAROM 10x10" 20x5" 10x10" 10x10" 10x10" 10x10" 10x10" 10x10"     Pulley 10x10" flex/abd ea    20x5" 20x5" 20x5" 20x5" 20x5" 20x5" 20x5"     AAROM supine wand ER  20x5" 20x 5" 20x 5" 20x5" 20x5" 20x5" 20x5"     Supine flex    Trial AROM NV    10x  10x  12x 15x     Finger ladder    Trial NV flex/abd    5x5"   ea  5x5"   ea 10x5"  ea 10x5"     Ther Activity                                       Gait Training                                       Modalities             CP PRN

## 2021-05-14 ENCOUNTER — APPOINTMENT (OUTPATIENT)
Dept: PHYSICAL THERAPY | Facility: CLINIC | Age: 66
End: 2021-05-14
Payer: COMMERCIAL

## 2021-05-18 ENCOUNTER — OFFICE VISIT (OUTPATIENT)
Dept: PHYSICAL THERAPY | Facility: CLINIC | Age: 66
End: 2021-05-18
Payer: COMMERCIAL

## 2021-05-18 DIAGNOSIS — S46.012A TRAUMATIC INCOMPLETE TEAR OF LEFT ROTATOR CUFF, INITIAL ENCOUNTER: Primary | ICD-10-CM

## 2021-05-18 PROCEDURE — 97110 THERAPEUTIC EXERCISES: CPT

## 2021-05-18 PROCEDURE — 97140 MANUAL THERAPY 1/> REGIONS: CPT

## 2021-05-18 NOTE — PROGRESS NOTES
Daily Note     Today's date: 2021  Patient name: Sury Esparza  : 1955  MRN: [id number]  Referring provider: Andrey Alvarenga  Dx:   Encounter Diagnosis     ICD-10-CM    1  Traumatic incomplete tear of left rotator cuff, initial encounter  S46 012A                   Subjective: Pt reports no pain, adding "I'm actually sleeping through the night  Objective: See treatment diary below      Assessment: Performed new exercises and remaining ex program w/o difficulty or discomfort  PROM to L shoulder, mild discomfort initial ABD  Tolerated treatment well  Patient would benefit from continued PT      Plan: Continue per plan of care  Precautions: RTC Repair DOS 3/9/21      Manuals     L shoulder PROM 25° 25' 25' 20' 20' 20' 20' HS 15' MO                                           Neuro Re-Ed             Scap Retractions                                                                                           Ther Ex             Pendulums  between exercises prn  between exercises prn  Elbow PROM             Wrist AROM             Towel Squeeze             UT Stretch             AAROM supine wand flexion 10x10" 10x10" 10x10" 10x10" 10x10" 10x10" 10x10" 10x10" 10x10"    Standing pball abduction on table AAROM 10x10" 20x5" 10x10" 10x10" 10x10" 10x10" 10x10" 10x10" 10x10"    Pulley 10x10" flex/abd ea    20x5" 20x5" 20x5" 20x5" 20x5" 20x5" 20x5" 20x5"    AAROM supine wand ER  20x5" 20x 5" 20x 5" 20x5" 20x5" 20x5" 20x5" 20x5"    Supine flex    Trial AROM NV    10x  10x  12x 15x 15x    Finger ladder    Trial NV flex/abd    5x5"   ea  5x5"   ea 10x5"  ea 10x5" 10x5"    S/l ER          15x    Prone ext         90* to hip 10x      Ther Activity                                       Gait Training                                       Modalities             CP PRN

## 2021-05-21 ENCOUNTER — OFFICE VISIT (OUTPATIENT)
Dept: PHYSICAL THERAPY | Facility: CLINIC | Age: 66
End: 2021-05-21
Payer: COMMERCIAL

## 2021-05-21 DIAGNOSIS — S46.012A TRAUMATIC INCOMPLETE TEAR OF LEFT ROTATOR CUFF, INITIAL ENCOUNTER: Primary | ICD-10-CM

## 2021-05-21 PROCEDURE — 97110 THERAPEUTIC EXERCISES: CPT

## 2021-05-21 PROCEDURE — 97140 MANUAL THERAPY 1/> REGIONS: CPT

## 2021-05-21 NOTE — PROGRESS NOTES
Daily Note     Today's date: 2021  Patient name: Scott Sena  : 1955  MRN: [id number]  Referring provider: Figueroa Rowland  Dx:   Encounter Diagnosis     ICD-10-CM    1  Traumatic incomplete tear of left rotator cuff, initial encounter  S46 012A                   Subjective: Pt cont to report improvement  Objective: See treatment diary below      Assessment: Added rhythmic stabilization, as below, which triggered his tremor  Performed addition of pot capsule stretch w/o difficulty or discomfort  DL, PT added L shoulder mobs  PROM to L shoulder cont to improve  Tolerated treatment well  Issued updated written HEP instructions, reviewed same w/pt  Patient would benefit from continued PT, progressing as per protocol  Plan: Continue per plan of care  Precautions: RTC Repair DOS 3/9/21      Manuals    L shoulder PROM 25° 25' 25' 20' 20' 20' 20' HS 15' MO 15'  MO   L shoulder AP          DL  Gr II                             Neuro Re-Ed             Scap Retractions                                                                                           Ther Ex             Pendulums  between exercises prn  between exercises prn  Elbow PROM             Wrist AROM             Towel Squeeze             UT Stretch             AAROM supine wand flexion 10x10" 10x10" 10x10" 10x10" 10x10" 10x10" 10x10" 10x10" 10x10" 10x10"   Standing pball abduction on table AAROM 10x10" 20x5" 10x10" 10x10" 10x10" 10x10" 10x10" 10x10" 10x10" 10x10"   Pulley 10x10" flex/abd ea    20x5" 20x5" 20x5" 20x5" 20x5" 20x5" 20x5" 20x5" 20x5"   AAROM supine wand ER  20x5" 20x 5" 20x 5" 20x5" 20x5" 20x5" 20x5" 20x5" 10x10"   Supine flex    Trial AROM NV    10x  10x  12x 15x 15x 20x   Finger ladder    Trial NV flex/abd    5x5"   ea  5x5"   ea 10x5"  ea 10x5" 10x5" 10x5"   ea   S/l ER          15x 15x   Prone ext         90* to hip 10x   10x   Rhythmic stab  balanced 30"  tremor   Post capsule stretch          3x20"   Ther Activity                                       Gait Training                                       Modalities             CP PRN

## 2021-05-25 ENCOUNTER — APPOINTMENT (OUTPATIENT)
Dept: PHYSICAL THERAPY | Facility: CLINIC | Age: 66
End: 2021-05-25
Payer: COMMERCIAL

## 2021-05-26 ENCOUNTER — OFFICE VISIT (OUTPATIENT)
Dept: PHYSICAL THERAPY | Facility: CLINIC | Age: 66
End: 2021-05-26
Payer: COMMERCIAL

## 2021-05-26 DIAGNOSIS — S46.012A TRAUMATIC INCOMPLETE TEAR OF LEFT ROTATOR CUFF, INITIAL ENCOUNTER: Primary | ICD-10-CM

## 2021-05-26 PROCEDURE — 97110 THERAPEUTIC EXERCISES: CPT

## 2021-05-26 PROCEDURE — 97140 MANUAL THERAPY 1/> REGIONS: CPT

## 2021-05-26 NOTE — PROGRESS NOTES
Daily Note     Today's date: 2021  Patient name: Ella Perry  : 1955  MRN: [id number]  Referring provider: Cookie Burden  Dx:   Encounter Diagnosis     ICD-10-CM    1  Traumatic incomplete tear of left rotator cuff, initial encounter  S46 012A                   Subjective: Pt reports no L shoulder pain  Objective: See treatment diary below      Assessment: Performed new exercises and ex progressions w/o issue  Comfortable throughout PROM, IR improves with repetition  Tolerated treatment well  Patient would benefit from continued PT      Plan: Continue per plan of care  Precautions: RTC Repair DOS 3/9/21      Manuals    L shoulder PROM 15'  MO  25' 20' 20' 20' 20' HS 15' MO 15'  MO   L shoulder AP          DL  Gr II                             Neuro Re-Ed             Scap Retractions                                                                                           Ther Ex             Pendulums   between exercises prn            AAROM supine wand flexion 10x10"  10x10" 10x10" 10x10" 10x10" 10x10" 10x10" 10x10" 10x10"   Standing pball abduction on table AAROM np  10x10" 10x10" 10x10" 10x10" 10x10" 10x10" 10x10" 10x10"   Pulley 20x5"  20x5" 20x5" 20x5" 20x5" 20x5" 20x5" 20x5" 20x5"   AAROM supine wand ER 10x10"  20x 5" 20x 5" 20x5" 20x5" 20x5" 20x5" 20x5" 10x10"   Supine flex 20x   Trial AROM NV    10x  10x  12x 15x 15x 20x   Finger ladder 10x5"  ea   Trial NV flex/abd    5x5"   ea  5x5"   ea 10x5"  ea 10x5" 10x5" 10x5"   ea   S/l ER 20x         15x 15x   Prone ext 10x        90* to hip 10x   10x   Rhythmic stab  balanced 2x30"          30"  tremor   Post capsule stretch 3x30"         3x20"   Isometrics flx,ext,abd,IR/ER submax  5"x10  Ea manual            3 way arm lifts x10ea         10x ea   Ther Activity                                       Gait Training                                       Modalities             CP PRN

## 2021-05-28 ENCOUNTER — OFFICE VISIT (OUTPATIENT)
Dept: PHYSICAL THERAPY | Facility: CLINIC | Age: 66
End: 2021-05-28
Payer: COMMERCIAL

## 2021-05-28 DIAGNOSIS — S46.012A TRAUMATIC INCOMPLETE TEAR OF LEFT ROTATOR CUFF, INITIAL ENCOUNTER: Primary | ICD-10-CM

## 2021-05-28 PROCEDURE — 97140 MANUAL THERAPY 1/> REGIONS: CPT

## 2021-05-28 PROCEDURE — 97110 THERAPEUTIC EXERCISES: CPT

## 2021-05-28 NOTE — PROGRESS NOTES
Daily Note     Today's date: 2021  Patient name: Cesar Zuniga  : 1955  MRN: [id number]  Referring provider: Lisa Corbett  Dx:   Encounter Diagnosis     ICD-10-CM    1  Traumatic incomplete tear of left rotator cuff, initial encounter  S46 012A                   Subjective: Pt reports that his shoulder is feeling very good  Pt reports slight discomfort after last PT session but improved with ice  Objective: See treatment diary below      Assessment: Pt with AC joint compression with posterior capsule stretch, therefore performed manually with scap blocking with reduces irritation to [facility] joint  Pt educated to hold on this at home and will stretch in PT  Pt doing well with ROM, initiated s/l abduction arom  In addition, educated per protocol that pt is able to start using L arm actively with 10# weight limit below shoulder height and no weight above shoulder height as tolerated  Plan: Continue per plan of care  Precautions: RTC Repair DOS 3/9/21      Manuals    L shoulder PROM 15'  MO 13' DL  20' 20' 20' 20' HS 15' MO 15'  MO   L shoulder AP, Inf Mob    DL Gr II-III        DL  Gr II   Post capsule stretch  DL (w/ scap block)                        Neuro Re-Ed             Scap Retractions                                                                                           Ther Ex             Pendulums             AAROM supine wand flexion 10x10"   10x10" 10x10" 10x10" 10x10" 10x10" 10x10" 10x10"   Standing pball abduction on table AAROM np   10x10" 10x10" 10x10" 10x10" 10x10" 10x10" 10x10"   Pulley 20x5" 20x5"  20x5" 20x5" 20x5" 20x5" 20x5" 20x5" 20x5"   AAROM supine wand ER 10x10"   20x 5" 20x5" 20x5" 20x5" 20x5" 20x5" 10x10"   Supine flex 20x 20x  Trial AROM NV    10x  10x  12x 15x 15x 20x   S/l abd AROM  10x           Finger ladder 10x5"  ea np  Trial NV flex/abd    5x5"   ea  5x5"   ea 10x5"  ea 10x5" 10x5" 10x5"   ea   S/l ER 20x 20x        15x 15x   Prone ext 10x 10x       90* to hip 10x   10x   Rhythmic stab  balanced 2x30" 2x30"         30"  tremor   Post capsule stretch 3x30" Manually; see manual        3x20"   Isometrics flx,ext,abd,IR/ER submax  5"x10  Ea manual tball Against wall 5"x10 each              3 way arm lifts x10ea x10 ea        10x ea                Ther Activity                                       Gait Training                                       Modalities             CP PRN

## 2021-06-01 ENCOUNTER — OFFICE VISIT (OUTPATIENT)
Dept: PHYSICAL THERAPY | Facility: CLINIC | Age: 66
End: 2021-06-01
Payer: COMMERCIAL

## 2021-06-01 DIAGNOSIS — S46.012A TRAUMATIC INCOMPLETE TEAR OF LEFT ROTATOR CUFF, INITIAL ENCOUNTER: Primary | ICD-10-CM

## 2021-06-01 PROCEDURE — 97110 THERAPEUTIC EXERCISES: CPT

## 2021-06-01 NOTE — PROGRESS NOTES
Daily Note     Today's date: 2021  Patient name: Cesar Zuniga  : 1955  MRN: [id number]  Referring provider: Lisa Corbett  Dx:   Encounter Diagnosis     ICD-10-CM    1  Traumatic incomplete tear of left rotator cuff, initial encounter  S46 012A        Start Time: 845  Stop Time: 925  Total time in clinic (min): 40 minutes    Subjective: Patient has no complaints of pain  F/U with ortho this Thursday  Objective: See treatment diary below    Assessment: Patient demonstrating good PROM within the current surgical protocol  No pain t/o entire session  Very mild UT compensation during standing 3 way arm lifts  Plan: Continue per plan of care  Precautions: RTC Repair DOS 3/9/21    Manuals    L shoulder PROM 15'  MO 13' DL 15'  JM   21' 20' HS 15' MO 13'  MO   L shoulder AP, Inf Mob  DL Gr II-III        DL  Gr II   Post capsule stretch  DL (w/ scap block)                        Neuro Re-Ed             Scap Retractions                                                                                           Ther Ex             Pendulums             AAROM supine wand flexion 10x10"     10x10" 10x10" 10x10" 10x10" 10x10"   Standing pball abduction on table AAROM np     10x10" 10x10" 10x10" 10x10" 10x10"   Pulley 20x5" 20x5" 20x5"   20x5" 20x5" 20x5" 20x5" 20x5"   AAROM supine wand ER 10x10"     20x5" 20x5" 20x5" 20x5" 10x10"   Supine flex 20x 20x 20x    10x  12x 15x 15x 20x   S/l abd AROM  10x 10x          Finger ladder 10x5"  ea np     5x5"   ea 10x5"  ea 10x5" 10x5" 10x5"   ea   S/l ER 20x 20x 20x       15x 15x   Prone ext 10x 10x 10x      90* to hip 10x   10x   Rhythmic stab  balanced 2x30" 2x30" 2x30"        30"  tremor   Post capsule stretch 3x30" Manually; see manual        3x20"   Isometrics flx,ext,abd,IR/ER submax  5"x10  Ea manual tball Against wall 5"x10 each    tball Against wall 5"x10 each          3 way arm lifts x10ea x10 ea x15 ea 10x ea                Ther Activity                                       Gait Training                                       Modalities             CP PRN

## 2021-06-03 ENCOUNTER — OFFICE VISIT (OUTPATIENT)
Dept: OBGYN CLINIC | Facility: MEDICAL CENTER | Age: 66
End: 2021-06-03

## 2021-06-03 VITALS
WEIGHT: 235 LBS | HEIGHT: 73 IN | DIASTOLIC BLOOD PRESSURE: 89 MMHG | BODY MASS INDEX: 31.14 KG/M2 | HEART RATE: 76 BPM | SYSTOLIC BLOOD PRESSURE: 136 MMHG

## 2021-06-03 DIAGNOSIS — Z98.890 S/P ARTHROSCOPY OF LEFT SHOULDER: ICD-10-CM

## 2021-06-03 DIAGNOSIS — Z98.890 S/P ROTATOR CUFF REPAIR: Primary | ICD-10-CM

## 2021-06-03 PROCEDURE — 99024 POSTOP FOLLOW-UP VISIT: CPT | Performed by: ORTHOPAEDIC SURGERY

## 2021-06-03 NOTE — PROGRESS NOTES
Shoulder Post Operative Visit     Assesment:     63 y o  male 7 weeks s/p Surgical Arthroscopy of the left shoulder with arthroscopic biceps tenodesis and open distal clavicle excision, DOS: 3/09/2021, with improvement at this time  Plan:    Post-Operative treatment:    Ice to shoulder 1-2 times daily, for 20 minutes at a time  PT for ROM and strengthening to shoulder, rotator cuff, scapular stabilizers  Imaging: All imaging from today was reviewed by myself and explained to the patient  Sling:  never, as they have completed this portion of the post op period  DVT Prophylaxis:  Ambulation    Follow up:   6 weeks    Patient was advised that if they have any fevers, chills, chest pain, shortness of breath, redness or drainage from the incision, please let our office know immediately  Chief Complaint   Patient presents with    Left Shoulder - Follow-up       History of Present Illness: The patient is a 63 y o  male who is being evaluated post operatively 7 weeks   status post surgical arthroscopy of the left shoulder with rotator cuff repair, arthroscopic biceps tenodesis and open distal clavicle excision performed on 3/09/2021  Patient stated that is pain has been much improved since the surgery  He stated that when he moves certain ways at night he experiences pain about the left shoulder  Since the prior visit, He reports significant improvement  Pain is well controlled  The patient is using ice to control swelling  They have started physical therapy  The patient is ambulating for DVT ppx  The patient is using their sling never, as they have completed this portion of the post op period  The patient denies any fevers, chills, calf pain, chest pain/shortness of breath, redness or drainage from the incision  I have reviewed the past medical, surgical, social and family history, medications and allergies as documented in the EMR      Review of systems: NUVIA aguilar negative other than that noted in the HPI  Constitutional: Negative for fatigue and fever  Physical Exam:    Blood pressure 136/89, pulse 76, height 6' 1" (1 854 m), weight 107 kg (235 lb)      General/Constitutional: NAD, well developed, well nourished  HENT: Normocephalic, atraumatic  CV: Intact distal pulses, regular rate  Resp: No respiratory distress or labored breathing  Lymphatic: No lymphadenopathy palpated  Neuro: Alert and Oriented x 3, no focal deficits  Psych: Normal mood, normal affect, normal judgement, normal behavior  Skin: Warm, dry, no rashes, no erythema    Shoulder focused exam:       RIGHT LEFT    Scapula Atrophy Negative Negative     Winging Negative Negative     Protraction Negative Negative    Rotator cuff SS 5/5 5/5     IS 5/5 5/5     SubS 5/5 5/5    ROM FF Full passive;150 active  170     ER0 60 60     ER90 90 90     IR90 T6 T6     IRb T6 T6    TTP:  None None    Stability:  stable stable      Incisions show no erythema, no drainage      UE NV Exam: +2 Radial pulses bilaterally  Sensation intact to light touch C5-T1 bilaterally, Radial/median/ulnar nerve motor intact      Scribe Attestation    I,:  Andressa Contreras am acting as a scribe while in the presence of the attending physician :       I,:  Eliot Ibarra, DO personally performed the services described in this documentation    as scribed in my presence :

## 2021-06-04 ENCOUNTER — OFFICE VISIT (OUTPATIENT)
Dept: PHYSICAL THERAPY | Facility: CLINIC | Age: 66
End: 2021-06-04
Payer: COMMERCIAL

## 2021-06-04 DIAGNOSIS — S46.012A TRAUMATIC INCOMPLETE TEAR OF LEFT ROTATOR CUFF, INITIAL ENCOUNTER: Primary | ICD-10-CM

## 2021-06-04 PROCEDURE — 97110 THERAPEUTIC EXERCISES: CPT

## 2021-06-04 NOTE — PROGRESS NOTES
Daily Note     Today's date: 2021  Patient name: Nolvia Tovar  : 1955  MRN: [id number]  Referring provider: Gricelda Laboy  Dx:   Encounter Diagnosis     ICD-10-CM    1  Traumatic incomplete tear of left rotator cuff, initial encounter  S46 012A        Start Time: 0900  Stop Time: 0940  Total time in clinic (min): 40 minutes    Subjective: Patient states, "Doctors visit went well  Happy with my progress"    Objective: See treatment diary below    Assessment: Patient had no complaints of pain t/o therapy  PROM is WNL  Still with a little UT compensation during 3 way arm lifts  Progress as per surgical protocol  Plan: Continue per plan of care  Precautions: RTC Repair DOS 3/9/21    Manuals    L shoulder PROM 15'  MO 13' DL 15'  JM 13'  JM  ' 21' HS 15' MO 13'  MO   L shoulder AP, Inf Mob  DL Gr II-III        DL  Gr II   Post capsule stretch  DL (w/ scap block)                        Neuro Re-Ed             Scap Retractions                                                                                           Ther Ex             Pendulums             AAROM supine wand flexion 10x10"     10x10" 10x10" 10x10" 10x10" 10x10"   Standing pball abduction on table AAROM np     10x10" 10x10" 10x10" 10x10" 10x10"   Pulley 20x5" 20x5" 20x5" 20x5"  20x5" 20x5" 20x5" 20x5" 20x5"   AAROM supine wand ER 10x10"     20x5" 20x5" 20x5" 20x5" 10x10"   Supine flex 20x 20x 20x 20x   10x  12x 15x 15x 20x   S/l abd AROM  10x 10x 15x         Finger ladder 10x5"  ea np     5x5"   ea 10x5"  ea 10x5" 10x5" 10x5"   ea   S/l ER 20x 20x 20x 25x      15x 15x   Prone row    15x         Prone ext 10x 10x 10x 15x     90* to hip 10x   10x   Rhythmic stab  balanced 2x30" 2x30" 2x30" 2x30"       30"  tremor   Post capsule stretch 3x30" Manually; see manual        3x20"   Isometrics flx,ext,abd,IR/ER submax  5"x10  Ea manual tball Against wall 5"x10 each    tball Against wall 5"x10 each tball Against wall 5"x10 each         3 way arm lifts x10ea x10 ea x15 ea x15 ea      10x ea                Ther Activity                                       Gait Training                                       Modalities             CP PRN

## 2021-06-08 ENCOUNTER — OFFICE VISIT (OUTPATIENT)
Dept: PHYSICAL THERAPY | Facility: CLINIC | Age: 66
End: 2021-06-08
Payer: COMMERCIAL

## 2021-06-08 DIAGNOSIS — S46.012A TRAUMATIC INCOMPLETE TEAR OF LEFT ROTATOR CUFF, INITIAL ENCOUNTER: Primary | ICD-10-CM

## 2021-06-08 PROCEDURE — 97110 THERAPEUTIC EXERCISES: CPT

## 2021-06-08 PROCEDURE — 97140 MANUAL THERAPY 1/> REGIONS: CPT

## 2021-06-08 NOTE — PROGRESS NOTES
Daily Note     Today's date: 2021  Patient name: Maggy Aguirre  : 1955  MRN: [id number]  Referring provider: Barak Santana  Dx:   Encounter Diagnosis     ICD-10-CM    1  Traumatic incomplete tear of left rotator cuff, initial encounter  S46 012A                   Subjective: Pt reports no pain throughout day, no complaints of issues with activities of daily living  Objective: See treatment diary below      Assessment: Pt continues to tolerate AROM and PROM  Pt able to perform AROM with relative full ROM  Per protocol, by next week if able to perform all AROM with normal biomechanics will initiate resisted strengthening  Pt will benefit from continued skilled PT  Plan: Continue per plan of care  Precautions: RTC Repair DOS 3/9/21    Manuals    L shoulder PROM 15'  MO 15' DL 15'  JM 13'  JM 13' DL  21' HS 15' MO 13'  MO   L shoulder AP, Inf Mob    DL Gr II-III        DL  Gr II   Post capsule stretch  DL (w/ scap block)                        Neuro Re-Ed             Scap Retractions                                                                                           Ther Ex             Pendulums             AAROM supine wand flexion 10x10"      10x10" 10x10" 10x10" 10x10"   Standing pball abduction on table AAROM np      10x10" 10x10" 10x10" 10x10"   Pulley 20x5" 20x5" 20x5" 20x5" 20x5"  20x5" 20x5" 20x5" 20x5"   AAROM supine wand ER 10x10"      20x5" 20x5" 20x5" 10x10"   Supine flex 20x 20x 20x 20x 20x   12x 15x 15x 20x   S/l abd AROM  10x 10x 15x 15x        Finger ladder 10x5"  ea np     10x5"  ea 10x5" 10x5" 10x5"   ea   S/l ER 20x 20x 20x 25x 25x     15x 15x   Prone row    15x 15x        Prone ext 10x 10x 10x 15x 15x    90* to hip 10x   10x   Rhythmic stab  balanced 2x30" 2x30" 2x30" 2x30" 2x30"      30"  tremor   Post capsule stretch 3x30" Manually; see manual        3x20"   Isometrics flx,ext,abd,IR/ER submax  5"x10  Ea manual tball Against wall 5"x10 each    tball Against wall 5"x10 each tball Against wall 5"x10 each tball Against wall 5"x10 each        3 way arm lifts x10ea x10 ea x15 ea x15 ea x15 ea     10x ea                Ther Activity                                       Gait Training                                       Modalities             CP PRN

## 2021-06-11 ENCOUNTER — EVALUATION (OUTPATIENT)
Dept: PHYSICAL THERAPY | Facility: CLINIC | Age: 66
End: 2021-06-11
Payer: COMMERCIAL

## 2021-06-11 DIAGNOSIS — S46.012A TRAUMATIC INCOMPLETE TEAR OF LEFT ROTATOR CUFF, INITIAL ENCOUNTER: Primary | ICD-10-CM

## 2021-06-11 PROCEDURE — 97110 THERAPEUTIC EXERCISES: CPT

## 2021-06-11 PROCEDURE — 97140 MANUAL THERAPY 1/> REGIONS: CPT

## 2021-06-11 PROCEDURE — 97164 PT RE-EVAL EST PLAN CARE: CPT

## 2021-06-11 NOTE — PROGRESS NOTES
Daily Note/Re-evaluation     Today's date: 2021  Patient name: Mary Crabtree  : 1955  MRN: [id number]  Referring provider: Lisa Ross  Dx:   Encounter Diagnosis     ICD-10-CM    1  Traumatic incomplete tear of left rotator cuff, initial encounter  S46 012A                   Subjective: Pt reports that he was crawling on the floor with his grandchildren yesterday therefore felt some soreness; subsequently spent some time icing shoulder  Today he feels no pain in the shoulder  Objective: See treatment diary below    Assessment  Assessment details: Mary Crabtree is a 63 y o  male s/p L RTC repair, 13 weeks since DOS  Pt with WNL elevation PROM, still with small deficit in IR PROM  In addition, AROM performed with small limitation of range relative to PROM but good biomechanics  Pt is progressing very well per protocol  As pt with relative symmetrical elevation AROM, pt will be appropriate for strengthening phase of rehab NV  Pt will benefit from continued skilled PT        Impairments: abnormal gait, abnormal or restricted ROM, activity intolerance, impaired balance, impaired physical strength, lacks appropriate home exercise program, weight-bearing intolerance and poor posture     Symptom irritability: lowUnderstanding of Dx/Px/POC: good   Prognosis: good    Goals  STG  Patient will decrease pain at worst to 3/10 (ongoing)  Patient will demonstrate full pain free L shoulder PROM (MET)  Patient will d/c sling (MET)  Patient will be independent with ADLs (MET)  Patient will be independent with basic HEP (MET)    LTG  Patient will decrease pain at worst to 1/10 (ongoing)  Patient will demonstrate L shoulder strength of 4+/5 in all planes (ongoing)  Patient will demonstrate full pain free L shoulder AROM (ongoing)  Patient will report ability to play with his grandchildren without limitations (ongoing)  Patient will be independent with comprehensive HEP (ongoing)    Plan  Patient would benefit from: skilled physical therapy  Planned modality interventions: cryotherapy and thermotherapy: hydrocollator packs  Planned therapy interventions: manual therapy, neuromuscular re-education, patient education, therapeutic activities, therapeutic exercise, therapeutic training and home exercise program  Frequency: 2x week  Duration in weeks: 6  Treatment plan discussed with: patient        Subjective Evaluation    History of Present Illness  Date of surgery: 3/9/2021  Pain  Current pain ratin  At best pain ratin  At worst pain ratin (w/ stretch)  Quality: dull ache and sharp  Relieving factors: ice and rest    Social Support  Lives with: spouse    Employment status: working ([employer] manager )  Patient Goals  Patient goals for therapy: decreased pain, independence with ADLs/IADLs, increased strength, return to sport/leisure activities and increased motion  Patient goal: playing with grandchildren          Objective    Observation: smooth,scars    Palpation: no tenderness noted upon examination    L Shoulder PROM:  - Flexion: 170°  - Abduction: 160°  - Internal Rotation @ 90 ABD: 50°  - External Rotation @ 90°: 75°    L shoulder AROM in standing  Flex 160°  ABD: 160°    Shoulder Strength: at least 3/5 per AROM all directions (no scapular compensations of scapular dyskinesia during elevation)      Plan: Continue per plan of care  Precautions: RTC Repair DOS 3/9/21    Manuals    L shoulder PROM 15'  MO 13' DL 15'  JM 13'  JM 13' DL 13' DL  HS 15' MO 15'  MO   L shoulder AP, Inf Mob    DL Gr II-III        DL  Gr II   Post capsule stretch  DL (w/ scap block)                        Neuro Re-Ed             Scap Retractions                                                                                           Ther Ex             Pulley 20x5" 20x5" 20x5" 20x5" 20x5" 20x5"  20x5" 20x5" 20x5"   Supine flex 20x 20x 20x 20x 20x 20x  15x 15x 20x   S/l abd AROM  10x 10x 15x 15x 15x       Finger ladder 10x5"  ea np      10x5" 10x5" 10x5"   ea   S/l ER 20x 20x 20x 25x 25x 25x    15x 15x   Prone row    15x 15x 20x       Prone ext 10x 10x 10x 15x 15x 20x   90* to hip 10x   10x   Rhythmic stab  balanced 2x30" 2x30" 2x30" 2x30" 2x30"      30"  tremor   Post capsule stretch 3x30" Manually; see manual        3x20"   Isometrics flx,ext,abd,IR/ER submax  5"x10  Ea manual tball Against wall 5"x10 each  tball Against wall 5"x10 each tball Against wall 5"x10 each tball Against wall 5"x10 each        3 way arm lifts x10ea x10 ea x15 ea x15 ea x15 ea NV    10x ea   Tests/measures      See obj          Ther Activity                                       Gait Training                                       Modalities             CP PRN

## 2021-06-14 DIAGNOSIS — E78.00 HYPERCHOLESTEREMIA: ICD-10-CM

## 2021-06-15 ENCOUNTER — OFFICE VISIT (OUTPATIENT)
Dept: PHYSICAL THERAPY | Facility: CLINIC | Age: 66
End: 2021-06-15
Payer: COMMERCIAL

## 2021-06-15 DIAGNOSIS — S46.012A TRAUMATIC INCOMPLETE TEAR OF LEFT ROTATOR CUFF, INITIAL ENCOUNTER: Primary | ICD-10-CM

## 2021-06-15 PROCEDURE — 97140 MANUAL THERAPY 1/> REGIONS: CPT

## 2021-06-15 PROCEDURE — 97110 THERAPEUTIC EXERCISES: CPT

## 2021-06-15 RX ORDER — SIMVASTATIN 10 MG
10 TABLET ORAL DAILY
Qty: 30 TABLET | Refills: 5 | Status: SHIPPED | OUTPATIENT
Start: 2021-06-15 | End: 2021-12-12

## 2021-06-15 NOTE — PROGRESS NOTES
Daily Note     Today's date: 6/15/2021  Patient name: Oriana Waller  : 1955  MRN: [id number]  Referring provider: Tres Pozo  Dx:   Encounter Diagnosis     ICD-10-CM    1  Traumatic incomplete tear of left rotator cuff, initial encounter  S46 012A                   Subjective: "It's been pretty good "      Objective: See treatment diary below      Assessment: Mild discomfort initially with the addition of GTB rows, performed remaining new exercises and ex progressions w/o complaint  PROM to L shoulder julio well  Will monitor  Patient would benefit from continued PT      Plan: Continue per plan of care  Progress per protocol  Precautions: RTC Repair DOS 3/9/21    Manuals 5/26 5/28 6/1 6/4 6/8 6/11 6/15  5/18 5/21   L shoulder PROM 15'  MO 13' DL 15'  JM 13'  JM 13' DL 13' DL 13' MO  13' MO 13'  MO   L shoulder AP, Inf Mob  DL Gr II-III        DL  Gr II   Post capsule stretch  DL (w/ scap block)                        Neuro Re-Ed             Scap Retractions                                                                                           Ther Ex             Pulley 20x5" 20x5" 20x5" 20x5" 20x5" 20x5" 20x5"  20x5" 20x5"   Supine flex 20x 20x 20x 20x 20x 20x  20x  15x 20x   S/l abd AROM  10x 10x 15x 15x 15x  20x      Finger ladder 10x5"  ea np       10x5" 10x5"   ea   S/l ER 20x 20x 20x 25x 25x 25x  1# x10   15x 15x   Prone row    15x 15x 20x  20x      Prone ext 10x 10x 10x 15x 15x 20x  20x  90* to hip 10x   10x   Rhythmic stab  balanced 2x30" 2x30" 2x30" 2x30" 2x30"      30"  tremor   Post capsule stretch 3x30" Manually; see manual        3x20"   Isometrics flx,ext,abd,IR/ER submax  5"x10  Ea manual tball Against wall 5"x10 each  tball Against wall 5"x10 each tball Against wall 5"x10 each tball Against wall 5"x10 each        3 way arm lifts x10ea x10 ea x15 ea x15 ea x15 ea NV  x20ea   10x ea   Tests/measures      See obj          TB extension       GTB   15x      TB rows       GTB 15x      TB ER       RTB   15x      TB IR       RTB   15x      Ther Activity             UBE       NV                   Gait Training                                       Modalities             CP PRN

## 2021-06-18 ENCOUNTER — OFFICE VISIT (OUTPATIENT)
Dept: PHYSICAL THERAPY | Facility: CLINIC | Age: 66
End: 2021-06-18
Payer: COMMERCIAL

## 2021-06-18 DIAGNOSIS — S46.012A TRAUMATIC INCOMPLETE TEAR OF LEFT ROTATOR CUFF, INITIAL ENCOUNTER: Primary | ICD-10-CM

## 2021-06-18 PROCEDURE — 97140 MANUAL THERAPY 1/> REGIONS: CPT

## 2021-06-18 PROCEDURE — 97110 THERAPEUTIC EXERCISES: CPT

## 2021-06-18 NOTE — PROGRESS NOTES
Daily Note     Today's date: 2021  Patient name: Moira Elizabeth  : 1955  MRN: [id number]  Referring provider: Danial St PA-C  Dx: No diagnosis found  Subjective: Pt cont to report his L shoulder is feeling good  Objective: See treatment diary below      Assessment: Added UBE and performed exercise progressions w/o difficulty or discomfort  PROM to L shoulder w/o complaint IR was tight today  Issued RTB, GTB and updated written HEP instructions, reviewed same w/pt  Tolerated treatment well  Patient would benefit from continued PT      Plan: Continue per plan of care  Pt is on vacation next week, is scheduled upon his return  Precautions: RTC Repair DOS 3/9/21    Manuals 5/26 5/28 6/1 6/4 6/8 6/11 6/15 6/18  5/21   L shoulder PROM 15'  MO 15' DL 15'  JM 13'  JM 13' DL 13' DL 13' MO 13'  MO  13'  MO   L shoulder AP, Inf Mob  DL Gr II-III        DL  Gr II   Post capsule stretch  DL (w/ scap block)                        Neuro Re-Ed             Scap Retractions                                                                                           Ther Ex             Pulley 20x5" 20x5" 20x5" 20x5" 20x5" 20x5" 20x5" 20x5"  20x5"   Supine flex 20x 20x 20x 20x 20x 20x  20x  20x  20x   S/l abd AROM  10x 10x 15x 15x 15x  20x  20x     Finger ladder 10x5"  ea np        10x5"   ea   S/l ER 20x 20x 20x 25x 25x 25x  1# x10 1# 15x  15x   Prone row    15x 15x 20x  20x 20x     Prone ext 10x 10x 10x 15x 15x 20x  20x 20x  10x   Rhythmic stab  balanced 2x30" 2x30" 2x30" 2x30" 2x30"      30"  tremor   Post capsule stretch 3x30" Manually; see manual        3x20"   Isometrics flx,ext,abd,IR/ER submax  5"x10  Ea manual tball Against wall 5"x10 each  tball Against wall 5"x10 each tball Against wall 5"x10 each tball Against wall 5"x10 each        3 way arm lifts x10ea x10 ea x15 ea x15 ea x15 ea NV  x20ea x20ea  10x ea   Tests/measures      See obj          TB extension       GTB   15x GTB   20x TB rows       GTB   15x GTB   20x     TB ER       RTB   15x RTB   20x     TB IR       RTB   15x RTB   20x     Ther Activity             UBE       NV 2'/2'                  Gait Training                                       Modalities             CP PRN

## 2021-06-29 ENCOUNTER — OFFICE VISIT (OUTPATIENT)
Dept: PHYSICAL THERAPY | Facility: CLINIC | Age: 66
End: 2021-06-29
Payer: COMMERCIAL

## 2021-06-29 DIAGNOSIS — S46.012A TRAUMATIC INCOMPLETE TEAR OF LEFT ROTATOR CUFF, INITIAL ENCOUNTER: Primary | ICD-10-CM

## 2021-06-29 PROCEDURE — 97110 THERAPEUTIC EXERCISES: CPT

## 2021-06-29 PROCEDURE — 97140 MANUAL THERAPY 1/> REGIONS: CPT

## 2021-06-29 NOTE — PROGRESS NOTES
Daily Note     Today's date: 2021  Patient name: María Cabral  : 1955  MRN: [id number]  Referring provider: Jerome Cooney  Dx:   Encounter Diagnosis     ICD-10-CM    1  Traumatic incomplete tear of left rotator cuff, initial encounter  S46 012A                   Subjective: Pt reports that his shoulder has been feeling good  Notes slight soreness at time when reaching overhead but nothing significant  Objective: See treatment diary below      Assessment: Pt continues to tolerate progressions in strengthening with no pain  Initiated exercises as below to further strengthen periscapular and RTC mm  Will monitor and initiate further overhead strengthening with gradual progressions toward reaching full function  Plan: Continue per plan of care  Precautions: RTC Repair DOS 3/9/21    Manuals 5/26 5/28 6/1 6/4 6/8 6/11 6/15 6/18 6/29    L shoulder PROM 15'  MO 13' DL 15'  JM 13'  JM 13' DL 13' DL 13' MO 13'  MO 13' DL    L shoulder AP, Inf Mob  DL Gr II-III           Post capsule stretch  DL (w/ scap block)           Rhythmic stabilization         Flex @ 90° 2x30"; IR/ER @ 45° abd    Neuro Re-Ed             Scap Retractions                                                                                           Ther Ex             Pulley 20x5" 20x5" 20x5" 20x5" 20x5" 20x5" 20x5" 20x5" D/c    Supine flex 20x 20x 20x 20x 20x 20x  20x  20x D/c    S/l abd AROM  10x 10x 15x 15x 15x  20x  20x D/c    Finger ladder 10x5"  ea np           S/l ER 20x 20x 20x 25x 25x 25x  1# x10 1# 15x 2# 2x10    Prone row    15x 15x 20x  20x 20x D/c    Prone ext 10x 10x 10x 15x 15x 20x  20x 20x 20x 1#    Prone T         15x    Rhythmic stab  balanced 2x30" 2x30" 2x30" 2x30" 2x30"        Post capsule stretch 3x30" Manually; see manual           Isometrics flx,ext,abd,IR/ER submax  5"x10  Ea manual tball Against wall 5"x10 each    tball Against wall 5"x10 each tball Against wall 5"x10 each tball Against wall 5"x10 each        3 way arm lifts x10ea x10 ea x15 ea x15 ea x15 ea NV  x20ea x20ea X20 1#     Tests/measures      See obj          TB extension       GTB   15x GTB   20x GTB 3x10    TB rows       GTB   15x GTB   20x GTB 3x10    TB ER       RTB   15x RTB   20x RTB 3x10 (increase resis NV)    TB IR       RTB   15x RTB   20x GTB 3x10    Bent over Row         8# 15x (NV increase reps)    Bicep Curl         8# 2x10    Ther Activity             UBE       NV 2'/2' 3'/3'                 Gait Training                                       Modalities             CP PRN

## 2021-07-01 DIAGNOSIS — I10 ESSENTIAL HYPERTENSION: ICD-10-CM

## 2021-07-01 RX ORDER — LOSARTAN POTASSIUM AND HYDROCHLOROTHIAZIDE 25; 100 MG/1; MG/1
1 TABLET ORAL DAILY
Qty: 90 TABLET | Refills: 3 | Status: SHIPPED | OUTPATIENT
Start: 2021-07-01 | End: 2022-04-20 | Stop reason: SDUPTHER

## 2021-07-02 ENCOUNTER — OFFICE VISIT (OUTPATIENT)
Dept: PHYSICAL THERAPY | Facility: CLINIC | Age: 66
End: 2021-07-02
Payer: COMMERCIAL

## 2021-07-02 DIAGNOSIS — S46.012A TRAUMATIC INCOMPLETE TEAR OF LEFT ROTATOR CUFF, INITIAL ENCOUNTER: Primary | ICD-10-CM

## 2021-07-02 PROCEDURE — 97110 THERAPEUTIC EXERCISES: CPT

## 2021-07-02 PROCEDURE — 97140 MANUAL THERAPY 1/> REGIONS: CPT

## 2021-07-02 NOTE — PROGRESS NOTES
Daily Note     Today's date: 2021  Patient name: Juan Carlos Santana  : 1955  MRN: [id number]  Referring provider: Unique Mccall  Dx:   Encounter Diagnosis     ICD-10-CM    1  Traumatic incomplete tear of left rotator cuff, initial encounter  S46 012A                   Subjective: Pt reports his L shoulder feels pretty good, occasional mild discomfort only  Objective: See treatment diary below      Assessment: Performed exercise progressions w/o difficulty or discomfort  PROM to L shoulder well ujlio, IR remains most limited  Tolerated treatment well  Will monitor  Patient would benefit from continued PT      Plan: Continue per plan of care  Precautions: RTC Repair DOS 3/9/21    Manuals 5/26 5/28 6/1 6/4 6/8 6/11 6/15 6/18 6/29 7/2   L shoulder PROM 15'  MO 13' DL 15'  JM 13'  JM 13' DL 13' DL 13' MO 13'  MO 13' DL 13' MO   L shoulder AP, Inf Mob  DL Gr II-III           Post capsule stretch  DL (w/ scap block)           Rhythmic stabilization         Flex @ 90° 2x30"; IR/ER @ 45° abd Nabooru@Catacel*;  IR/ER@45*abd  2x30" ea   Neuro Re-Ed             Scap Retractions                                                                                           Ther Ex             Pulley 20x5" 20x5" 20x5" 20x5" 20x5" 20x5" 20x5" 20x5" D/c ----   Supine flex 20x 20x 20x 20x 20x 20x  20x  20x D/c  ----   S/l abd AROM  10x 10x 15x 15x 15x  20x  20x D/c  ----   Finger ladder 10x5"  ea np           S/l ER 20x 20x 20x 25x 25x 25x  1# x10 1# 15x 2# 2x10 2#x20   Prone row    15x 15x 20x  20x 20x D/c ----   Prone ext 10x 10x 10x 15x 15x 20x  20x 20x 20x 1# 20x 1#   Prone T         15x  15x   Rhythmic stab  balanced 2x30" 2x30" 2x30" 2x30" 2x30"        Post capsule stretch 3x30" Manually; see manual           Isometrics flx,ext,abd,IR/ER submax  5"x10  Ea manual tball Against wall 5"x10 each    tball Against wall 5"x10 each tball Against wall 5"x10 each tball Against wall 5"x10 each        3 way arm lifts x10ea x10 ea x15 ea x15 ea x15 ea NV  x20ea x20ea X20 1#  x20 1#   Tests/measures      See obj          TB extension       GTB   15x GTB   20x GTB 3x10 GTB  30x   TB rows       GTB   15x GTB   20x GTB 3x10 GTB  30x   TB ER       RTB   15x RTB   20x RTB 3x10 (increase resis NV) GTB  2x10   TB IR       RTB   15x RTB   20x GTB 3x10 GTB  3x10   Bent over Row         8# 15x (NV increase reps) 8#x20   Bicep Curl         8# 2x10 8# 2x10   Ther Activity             UBE       NV 2'/2' 3'/3' 3'/3'                Gait Training                                       Modalities             CP PRN

## 2021-07-06 ENCOUNTER — OFFICE VISIT (OUTPATIENT)
Dept: PHYSICAL THERAPY | Facility: CLINIC | Age: 66
End: 2021-07-06
Payer: COMMERCIAL

## 2021-07-06 DIAGNOSIS — S46.012A TRAUMATIC INCOMPLETE TEAR OF LEFT ROTATOR CUFF, INITIAL ENCOUNTER: Primary | ICD-10-CM

## 2021-07-06 PROCEDURE — 97110 THERAPEUTIC EXERCISES: CPT

## 2021-07-06 PROCEDURE — 97140 MANUAL THERAPY 1/> REGIONS: CPT

## 2021-07-06 NOTE — PROGRESS NOTES
Daily Note     Today's date: 2021  Patient name: Cliff Bullock  : 1955  MRN: [id number]  Referring provider: Joseph Berumen  Dx:   Encounter Diagnosis     ICD-10-CM    1  Traumatic incomplete tear of left rotator cuff, initial encounter  S46 012A                   Subjective: "The only thing that bothers me at all is when I'm lifting to the side (ABD)"      Objective: See treatment diary below      Assessment: Performed exercise progressions w/o issue  Comfortable throughout PROM to L shoulder  Tolerated treatment  Patient would benefit from cont PT  Plan: Continue per plan of care  Precautions: RTC Repair DOS 3/9/21    Manuals  7/6  6/1 6/4 6/8 6/11 6/15 6/18 6/29 7/2   L shoulder PROM 15'  MO  13'  JM 13'  JM 13' DL 13' DL 13' MO 13'  MO 13' DL 13' MO   L shoulder AP, Inf Mob  Post capsule stretch             Rhythmic stabilization Flex @90*;  IR/ER  @45*  abd  2x30" ea        Flex @ 90° 2x30"; IR/ER @ 45° abd [email or web address]*;  IR/ER@45*abd  2x30" ea   Neuro Re-Ed             Scap Retractions                                                                                           Ther Ex             Pulley ----  20x5" 20x5" 20x5" 20x5" 20x5" 20x5" D/c ----   Supine flex ----  20x 20x 20x 20x  20x  20x D/c  ----   S/l abd AROM ----  10x 15x 15x 15x  20x  20x D/c  ----   Finger ladder             S/l ER   20x 25x 25x 25x  1# x10 1# 15x 2# 2x10 2#x20   Prone row ----   15x 15x 20x  20x 20x D/c ----   Prone ext 20x 1#  10x 15x 15x 20x  20x 20x 20x 1# 20x 1#   Prone T 20x        15x  15x   Rhythmic stab  balanced   2x30" 2x30" 2x30"        Post capsule stretch             Isometrics flx,ext,abd,IR/ER   tball Against wall 5"x10 each tball Against wall 5"x10 each tball Against wall 5"x10 each        3 way arm lifts x20 1#  x15 ea x15 ea x15 ea NV  x20ea x20ea X20 1#  x20 1#   Tests/measures      See obj          TB extension Blue  2x10      GTB   15x GTB   20x GTB 3x10 GTB  30x   TB rows Blue  2x10      GTB   15x GTB   20x GTB 3x10 GTB  30x   TB ER GTB  2x10      RTB   15x RTB   20x RTB 3x10 (increase resis NV) GTB  2x10   TB IR GTB  30x      RTB   15x RTB   20x GTB 3x10 GTB  3x10   Bent over Row 8# x20        8# 15x (NV increase reps) 8#x20   Bicep Curl 8# 2x10        8# 2x10 8# 2x10   Ther Activity             UBE 3'/3'      NV 2'/2' 3'/3' 3'/3'                Gait Training                                       Modalities             CP PRN

## 2021-07-09 ENCOUNTER — OFFICE VISIT (OUTPATIENT)
Dept: PHYSICAL THERAPY | Facility: CLINIC | Age: 66
End: 2021-07-09
Payer: COMMERCIAL

## 2021-07-09 DIAGNOSIS — S46.012A TRAUMATIC INCOMPLETE TEAR OF LEFT ROTATOR CUFF, INITIAL ENCOUNTER: Primary | ICD-10-CM

## 2021-07-09 PROCEDURE — 97140 MANUAL THERAPY 1/> REGIONS: CPT

## 2021-07-09 PROCEDURE — 97110 THERAPEUTIC EXERCISES: CPT

## 2021-07-09 PROCEDURE — 97530 THERAPEUTIC ACTIVITIES: CPT

## 2021-07-09 NOTE — PROGRESS NOTES
Daily Note     Today's date: 2021  Patient name: Iris Johnson  : 1955  MRN: [id number]  Referring provider: Renae Steinberg PA-C  Dx:   Encounter Diagnosis     ICD-10-CM    1  Traumatic incomplete tear of left rotator cuff, initial encounter  S46 012A                   Subjective: Pt reports that his shoulder feels good today  Notes "its getting there"  Objective: See treatment diary below      Assessment: Pt tolerates initiation of new exercises  Initiated push up with low resistance, pec stretch, horizontal abduction  Pt also tolerates progressions with minimal to no discomfort  Pt may benefit from further stabilization exercises NV with body blade  Pt will benefit from continued skilled PT  Plan: Continue per plan of care  Precautions: RTC Repair DOS 3/9/21    Manuals  7/6 7/9  6/4 6/8 6/11 6/15 6/18 6/29 7/2   L shoulder PROM 15'  MO 10' DL  15'  JM 13' DL 13' DL 13' MO 13'  MO 13' DL 13' MO   L shoulder AP, Inf Mob  Post capsule stretch             Rhythmic stabilization Flex @90*;  IR/ER  @45*  abd  2x30" ea Flex @90, @120; IR/ER @ 45° @90° ABD  2x30" each  Flex @ 90° 2x30"; IR/ER @ 45° abd [email or web address]*;  IR/ER@45*abd  2x30" ea   Neuro Re-Ed             Scap Retractions                                                                                           Ther Ex             S/l ER  2# x20  25x 25x 25x  1# x10 1# 15x 2# 2x10 2#x20   Prone row ----   15x 15x 20x  20x 20x D/c ----   Prone ext 20x 1# 20x 1#  15x 15x 20x  20x 20x 20x 1# 20x 1#   Prone T 20x 20x 1#       15x  15x   3 way arm lifts x20 1# 1# 20x each  x15 ea x15 ea NV  x20ea x20ea X20 1#  x20 1#   Tests/measures      See obj          TB extension Blue  2x10 Blue 3x10     GTB   15x GTB   20x GTB 3x10 GTB  30x   TB rows Blue  2x10 Black 3x10     GTB   15x GTB   20x GTB 3x10 GTB  30x   TB ER GTB  2x10 GTB 30x     RTB   15x RTB   20x RTB 3x10 (increase resis NV) GTB  2x10   Horizontal abduction  RTB 15x TB IR GTB  30x BTB 30x     RTB   15x RTB   20x GTB 3x10 GTB  3x10   Bent over Row 8# x20 NV       8# 15x (NV increase reps) 8#x20   Bicep Curl 8# 2x10 NV       8# 2x10 8# 2x10   Body Blade  IR/ER @ 0° ABD (trial NV)           Pec stretch  30"x2           Serratus punch  2# 20x           Ther Activity             UBE 3'/3' 3'/3'     NV 2'/2' 3'/3' 3'/3'   Pushup  Against wall 2x10           Gait Training                                       Modalities             CP PRN

## 2021-07-13 ENCOUNTER — OFFICE VISIT (OUTPATIENT)
Dept: PHYSICAL THERAPY | Facility: CLINIC | Age: 66
End: 2021-07-13
Payer: COMMERCIAL

## 2021-07-13 DIAGNOSIS — S46.012A TRAUMATIC INCOMPLETE TEAR OF LEFT ROTATOR CUFF, INITIAL ENCOUNTER: Primary | ICD-10-CM

## 2021-07-13 PROCEDURE — 97140 MANUAL THERAPY 1/> REGIONS: CPT

## 2021-07-13 PROCEDURE — 97110 THERAPEUTIC EXERCISES: CPT

## 2021-07-13 NOTE — PROGRESS NOTES
Chart reviewed    Attempt x 1    RN asking therapy to follow up later in AM due to low BP and waiting for MD visit. RN asking for therapy to return at 11:45 AM       PT will follow up later in day . Daily Note     Today's date: 2021  Patient name: Mohini Street  : 1955  MRN: [id number]  Referring provider: Annie Adams  Dx:   Encounter Diagnosis     ICD-10-CM    1  Traumatic incomplete tear of left rotator cuff, initial encounter  S46 012A                   Subjective: Pt states that with new exercises performed last visit, felt very good in the L shoulder  As such, pt states that he may have gotten over confident with use of L shoulder  States that he thinks he overdid it this weekend  He states that he did a lot of different activities using his L shoulder and played in the pool with the kids; he does report during activities did not feel like he was over using it  States that since then, for the past few days and today feels increased soreness in the shoulder  He does not think he did any damage to his RTC just feels soreness; rating it 6/10  Objective: See treatment diary below      Assessment: Pt likely with overuse/mm  Soreness affecting pt's symptoms today  Held on rest of shoulder strengthening exercises secondary to increased soreness  Focus placed on manual therapy with STM as well as ROM  Pt responds well to STM of deltoids and pec major, no pain with following that up with ROM  Pt educated to allow soreness to improve prior to re-initiating strengthening but did recommend light shoulder activity to help with mm  Soreness  PT will monitor and re-initiate exercise program as tolerable NV  Plan: Continue per plan of care  Precautions: RTC Repair DOS 3/9/21    Manuals    6/8 6/11 6/15 6/18 6/29 7   L shoulder PROM 15'  MO 10' DL 10' DL  13' DL 13' DL 13' MO 13'  MO 13' DL 13' MO   STM deltoid/pec major   DL 10'           L shoulder AP, Inf Mob  Post capsule stretch             Rhythmic stabilization Flex @90*;  IR/ER  @45*  abd  2x30" ea Flex @90, @120; IR/ER @ 45° @90° ABD  2x30" each          Flex @ 90° 2x30"; IR/ER @ 45° abd Aaliyah@[employer]*;  SO/DS@42*IXO  2x30" ea   Neuro Re-Ed             Scap Retractions                                                                                           Ther Ex             S/l ER  2# x20   25x 25x  1# x10 1# 15x 2# 2x10 2#x20   Prone row ----    15x 20x  20x 20x D/c ----   Prone ext 20x 1# 20x 1#   15x 20x  20x 20x 20x 1# 20x 1#   Prone T 20x 20x 1#       15x  15x   3 way arm lifts x20 1# 1# 20x each  x15 ea NV  x20ea x20ea X20 1#  x20 1#   Tests/measures      See obj          TB extension Blue  2x10 Blue 3x10     GTB   15x GTB   20x GTB 3x10 GTB  30x   TB rows Blue  2x10 Black 3x10 Black 3x10    GTB   15x GTB   20x GTB 3x10 GTB  30x   TB ER GTB  2x10 GTB 30x     RTB   15x RTB   20x RTB 3x10 (increase resis NV) GTB  2x10   Horizontal abduction  RTB 15x           TB IR GTB  30x BTB 30x     RTB   15x RTB   20x GTB 3x10 GTB  3x10   Bent over Row 8# x20 NV       8# 15x (NV increase reps) 8#x20   Bicep Curl 8# 2x10 NV       8# 2x10 8# 2x10   Body Blade  IR/ER @ 0° ABD (trial NV)           Pec stretch  30"x2 30"x3          Serratus punch  2# 20x           Ther Activity             UBE 3'/3' 3'/3' 3'/3'    NV 2'/2' 3'/3' 3'/3'   Pushup  Against wall 2x10           Gait Training                                       Modalities             CP PRN

## 2021-07-16 ENCOUNTER — OFFICE VISIT (OUTPATIENT)
Dept: PHYSICAL THERAPY | Facility: CLINIC | Age: 66
End: 2021-07-16
Payer: COMMERCIAL

## 2021-07-16 DIAGNOSIS — S46.012A TRAUMATIC INCOMPLETE TEAR OF LEFT ROTATOR CUFF, INITIAL ENCOUNTER: Primary | ICD-10-CM

## 2021-07-16 PROCEDURE — 97140 MANUAL THERAPY 1/> REGIONS: CPT

## 2021-07-16 PROCEDURE — 97110 THERAPEUTIC EXERCISES: CPT

## 2021-07-20 ENCOUNTER — OFFICE VISIT (OUTPATIENT)
Dept: PHYSICAL THERAPY | Facility: CLINIC | Age: 66
End: 2021-07-20
Payer: COMMERCIAL

## 2021-07-20 DIAGNOSIS — S46.012A TRAUMATIC INCOMPLETE TEAR OF LEFT ROTATOR CUFF, INITIAL ENCOUNTER: Primary | ICD-10-CM

## 2021-07-20 PROCEDURE — 97110 THERAPEUTIC EXERCISES: CPT

## 2021-07-20 PROCEDURE — 97140 MANUAL THERAPY 1/> REGIONS: CPT

## 2021-07-20 NOTE — PROGRESS NOTES
Daily Note     Today's date: 2021  Patient name: Merced Butler  : 1955  MRN: [id number]  Referring provider: Mena Sexton  Dx:   Encounter Diagnosis     ICD-10-CM    1  Traumatic incomplete tear of left rotator cuff, initial encounter  S46 012A                   Subjective: Pt reports he is feeling better from flare up  Notes some soreness L deltoid  Objective: See treatment diary below      Assessment: Able to resume additional exercises today w/o issue  PROM to L shoulder w/o complaint  Tolerated treatment well  Patient would benefit from continued PT      Plan: Continue per plan of care  Precautions: RTC Repair DOS 3/9/21    Manuals  7/6 7/9 7/13 7/16 7/20  6/15 6/18 6/29 7/2   L shoulder PROM 15'  MO 8' DL 10' DL 10'  MO 8'  MO  15' MO 15'  MO 15' DL 13' MO   STM deltoid/pec major   DL 10'  nv MO  5'        L shoulder AP, Inf Mob  Post capsule stretch             Rhythmic stabilization Flex @90*;  IR/ER  @45*  abd  2x30" ea Flex @90, @120; IR/ER @ 45° @90° ABD  2x30" each  Flex @ 90° 2x30"; IR/ER @ 45° abd Darbey@[employer]*;  IR/ER@45*abd  2x30" ea   Neuro Re-Ed             Scap Retractions                                                                                           Ther Ex             S/l ER  2# x20    20x 2#  2x10   1# x10 1# 15x 2# 2x10 2#x20   Prone row ----       20x 20x D/c ----   Prone ext 20x 1# 20x 1#   20x 20x 1#   20x 20x 20x 1# 20x 1#   Prone T 20x 20x 1#   20x 1#    15x  15x   3 way arm lifts x20 1# 1# 20x each      10x ea  20x ea   x20ea x20ea X20 1#  x20 1#   Tests/measures             TB extension Blue  2x10 Blue 3x10   Black   2x10 Black  2x10  GTB   15x GTB   20x GTB 3x10 GTB  30x   TB rows Blue  2x10 Black 3x10 Black 3x10 Black  3x10 Black  3x10  GTB   15x GTB   20x GTB 3x10 GTB  30x   TB ER GTB  2x10 GTB 30x  GTB  30x GTB  30x  RTB   15x RTB   20x RTB 3x10 (increase resis NV) GTB  2x10   Horizontal abduction  RTB 15x           TB IR GTB  30x BTB 30x   GTB  30x Blue  30x    RTB   15x RTB   20x GTB 3x10 GTB  3x10   Bent over Row 8# x20 NV   8#  2x10    8# 15x (NV increase reps) 8#x20   Bicep Curl 8# 2x10 NV   8#  2x10    8# 2x10 8# 2x10   Body Blade  IR/ER @ 0° ABD (trial NV)           Pec stretch  30"x2 30"x3 30"x3 30"x3        Serratus punch  2# 20x   20x 2# 20x        Ther Activity             UBE 3'/3' 3'/3' 3'/3' 3'/3' 3'/3'  NV 2'/2' 3'/3' 3'/3'   Pushup  Against wall 2x10  2x10 2x10        Gait Training                                       Modalities             CP PRN

## 2021-07-23 ENCOUNTER — OFFICE VISIT (OUTPATIENT)
Dept: PHYSICAL THERAPY | Facility: CLINIC | Age: 66
End: 2021-07-23
Payer: COMMERCIAL

## 2021-07-23 DIAGNOSIS — S46.012A TRAUMATIC INCOMPLETE TEAR OF LEFT ROTATOR CUFF, INITIAL ENCOUNTER: Primary | ICD-10-CM

## 2021-07-23 PROCEDURE — 97110 THERAPEUTIC EXERCISES: CPT

## 2021-07-23 PROCEDURE — 97140 MANUAL THERAPY 1/> REGIONS: CPT

## 2021-07-23 PROCEDURE — 97530 THERAPEUTIC ACTIVITIES: CPT

## 2021-07-23 NOTE — PROGRESS NOTES
Daily Note     Today's date: 2021  Patient name: Raj Dash  : 1955  MRN: [id number]  Referring provider: Cheyenne Bernal  Dx:   Encounter Diagnosis     ICD-10-CM    1  Traumatic incomplete tear of left rotator cuff, initial encounter  S46 012A                   Subjective: Pt reports that soreness has completely resolved since aggravating shoulder a couple weeks ago  Pt states that shoulder is feeling "fine" today  Objective: See treatment diary below      Assessment: To further work on endurance of RTC mm, increased repetitions for tband IR/ER with fatigue noted  Introduced PNF with D2 ext as well with no complaints  Pt only with minimal soreness noted with shoulder abduction  Initiated some latissimus STM as potential soft tissue restriction with shoulder abduction  Pt to be following up with ortho next week  Plan: Continue per plan of care  Precautions: RTC Repair DOS 3/9/21    Manuals   7   L shoulder PROM 15'  MO 8' DL 10' DL 10'  MO 10'  MO 10' DL  13'  MO 13' DL 13' MO   STM deltoid/pec major   DL 10'  nv MO  5' DL + lat       L shoulder AP, Inf Mob  Post capsule stretch             Rhythmic stabilization Flex @90*;  IR/ER  @45*  abd  2x30" ea Flex @90, @120; IR/ER @ 45° @90° ABD  2x30" each  Flex @ 90° 2x30"; IR/ER @ 45° abd Vero@[employer]*;  IR/ER@45*abd  2x30" ea   Neuro Re-Ed             Scap Retractions                                                                                           Ther Ex             S/l ER  2# x20    20x 2#  2x10 2# 30x  1# 15x 2# 2x10 2#x20   Prone row ----       20x D/c ----   Prone ext 20x 1# 20x 1#   20x 20x 1# 20x 1#  20x 20x 1# 20x 1#   Prone T 20x 20x 1#   20x 1# 20x 1#   15x  15x   D2 ext PNF      Blue 20x L       3 way arm lifts x20 1# 1# 20x each  10x ea  20x ea 2# 20x flex/scap     x20ea X20 1#  x20 1#   Tests/measures             TB extension Blue  2x10 Blue 3x10 Black   2x10 Black  2x10 Black 30x  GTB   20x GTB 3x10 GTB  30x   TB rows Blue  2x10 Black 3x10 Black 3x10 Black  3x10 Black  3x10 Apollo 40# 30x  GTB   20x GTB 3x10 GTB  30x   TB ER GTB  2x10 GTB 30x  GTB  30x GTB  30x GTB 40x  RTB   20x RTB 3x10 (increase resis NV) GTB  2x10   Horizontal abduction  RTB 15x           TB IR GTB  30x BTB 30x   GTB  30x Blue  30x   Blue 40x  RTB   20x GTB 3x10 GTB  3x10   Bent over Row 8# x20 NV   8#  2x10 8# 20x   8# 15x (NV increase reps) 8#x20   Bicep Curl 8# 2x10 NV   8#  2x10 8# 20x   8# 2x10 8# 2x10   Body Blade  IR/ER @ 0° ABD (trial NV)           Pec stretch  30"x2 30"x3 30"x3 30"x3        Serratus punch  2# 20x   20x 2# 20x 3# 30x       Ther Activity             UBE 3'/3' 3'/3' 3'/3' 3'/3' 3'/3' 3'/3'  2'/2' 3'/3' 3'/3'   Pushup  Against wall 2x10  2x10 2x10 2x10 wall       Gait Training                                       Modalities             CP PRN

## 2021-07-26 ENCOUNTER — OFFICE VISIT (OUTPATIENT)
Dept: OBGYN CLINIC | Facility: MEDICAL CENTER | Age: 66
End: 2021-07-26
Payer: COMMERCIAL

## 2021-07-26 VITALS
DIASTOLIC BLOOD PRESSURE: 78 MMHG | SYSTOLIC BLOOD PRESSURE: 118 MMHG | HEART RATE: 82 BPM | BODY MASS INDEX: 30.75 KG/M2 | WEIGHT: 232 LBS | HEIGHT: 73 IN

## 2021-07-26 DIAGNOSIS — Z98.890 S/P ROTATOR CUFF REPAIR: Primary | ICD-10-CM

## 2021-07-26 DIAGNOSIS — S46.012D TRAUMATIC TEAR OF LEFT ROTATOR CUFF, UNSPECIFIED TEAR EXTENT, SUBSEQUENT ENCOUNTER: ICD-10-CM

## 2021-07-26 PROCEDURE — 99213 OFFICE O/P EST LOW 20 MIN: CPT | Performed by: ORTHOPAEDIC SURGERY

## 2021-07-26 PROCEDURE — 1036F TOBACCO NON-USER: CPT | Performed by: ORTHOPAEDIC SURGERY

## 2021-07-26 PROCEDURE — 3008F BODY MASS INDEX DOCD: CPT | Performed by: ORTHOPAEDIC SURGERY

## 2021-07-26 NOTE — PROGRESS NOTES
Ortho Sports Medicine Shoulder Follow Up Visit     Assesment:   63 y o  male left shoulder follow for s/p of Surgical arthroscopy of the left shoulder with arthroscopic biceps tenodesis and open distal clavicle excision, DOS: 3/09/2021, with improvement at this time  Plan:    Conservative treatment:    Ice to shoulder 1-2 times daily, for 20 minutes at a time  PT for ROM and strengthening to shoulder, rotator cuff, scapular stabilizers  Let pain guide return to activities  Imaging:    No imaging was available for review today  Injection:    No Injection planned at this time  Surgery:     No surgery is recommended at this point, continue with conservative treatment plan as noted  Follow up:    Return in about 3 months (around 10/26/2021)  Chief Complaint   Patient presents with    Left Shoulder - Post-op, Follow-up         History of Present Illness: The patient is returns for follow up of left shoulder arthroscopy with arthroscopic biceps tendonesis and open distal clavicle excision  DOS 3/19/2021  Since the prior visit, He reports significant improvement  He does still have some pain at night still that does wake him up occasionally, however it has improved compared to before surgery  Pain is improved by rest and physical therapy  Pain is aggravated by overhead activity and lifting  Which is sharp but then subsides after a few seconds  The patient denies weakness  The patient has tried rest and physical therapy  Has discounted ice and NSAIDs  Shoulder Surgical History:  Surgical arthroscopy of the left shoulder with arthroscopic biceps tenodesis and open distal clavicle excision  DOS 3/19/2021      Past Medical, Social and Family History:  Past Medical History:   Diagnosis Date    Acute maxillary sinusitis     Asthmatic bronchitis     Benign prostatic hyperplasia with urinary incontinence without sensory awareness     Cervical disc disease     Cervicalgia  Chest pain     DVT (deep venous thrombosis) ([facility] [address] ) 2004    Erectile dysfunction     Fatigue     Hypercholesterolemia     Hyperlipidemia     Hypertension     IFG (impaired fasting glucose)     PE (pulmonary thromboembolism) (HCC)     Screening for colon cancer     Screening for prostate cancer      Past Surgical History:   Procedure Laterality Date    BACK SURGERY  2004    CHOLECYSTECTOMY  2009    UT ARTHROSCOPY SHOULDER SURGICAL BICEPS TENODESIS Left 3/9/2021    Procedure: ARTHROSCOPIC BICEPS TENODESIS;  Surgeon: Elisa Michele DO;  Location: [address] [place] [address] MAIN OR;  Service: Orthopedics    UT PARTIAL REMOVAL, CLAVICLE Left 3/9/2021    Procedure: DISTAL CLAVICLE EXCISION;  Surgeon: Elisa Michele DO;  Location: [address] [place] [address] MAIN OR;  Service: Orthopedics    UT [address] ARTHROSCOP,SURG,W/ROTAT CUFF REPR Left 3/9/2021    Procedure: REPAIR ROTATOR CUFF  ARTHROSCOPIC;  Surgeon: Elisa Michele DO;  Location: [address] [place] [address] OR;  Service: Orthopedics     Allergies   Allergen Reactions    Vancomycin Rash     Peeling of skin    Ace Inhibitors Cough     Current Outpatient Medications on File Prior to Visit   Medication Sig Dispense Refill    doxazosin (CARDURA) 2 mg tablet Take 1 tablet (2 mg total) by mouth daily at bedtime 90 tablet 1    fluticasone (FLONASE) 50 mcg/act nasal spray 2 sprays as needed    3    losartan-hydrochlorothiazide (HYZAAR) 100-25 MG per tablet Take 1 tablet by mouth daily 90 tablet 3    Multiple Vitamin (MULTIVITAMIN ADULT PO) Take by mouth      oxyCODONE (ROXICODONE) 5 mg immediate release tablet Take 1 tablet (5 mg total) by mouth every 4 (four) hours as needed for moderate pain for up to 15 dosesMax Daily Amount: 30 mg (Patient not taking: Reported on 3/15/2021) 15 tablet 0    Probiotic Product (PROBIOTIC PO) Take by mouth      rivaroxaban (Xarelto) 20 mg tablet Take 1 tablet (20 mg total) by mouth daily 90 tablet 2    sildenafil (REVATIO) 20 mg tablet Use 3 to 5 tablets as needed 90 tablet 3    simvastatin (ZOCOR) 10 mg tablet Take 1 tablet (10 mg total) by mouth daily 30 tablet 5    VITAMIN D PO Take by mouth      Zoster Vac Recomb Adjuvanted (Shingrix) 50 MCG/0 5ML SUSR 0 5mL IM for one dose, followed by 0 5mL IM 2-6 months after first dose (Patient not taking: Reported on 4/19/2021) 1 each 1     No current facility-administered medications on file prior to visit  Social History     Socioeconomic History    Marital status: /Civil Union     Spouse name: Not on file    Number of children: Not on file    Years of education: Not on file    Highest education level: Not on file   Occupational History    Not on file   Tobacco Use    Smoking status: Never Smoker    Smokeless tobacco: Never Used   Vaping Use    Vaping Use: Never used   Substance and Sexual Activity    Alcohol use: Yes     Comment: SOCIAL    Drug use: No    Sexual activity: Yes   Other Topics Concern    Not on file   Social History Narrative    Lives with wife  Feels safe at home  Has living will  Sees dentist reg  Good dental hygiene    Living situation: supportive and safe     Social Determinants of Health     Financial Resource Strain:     Difficulty of Paying Living Expenses:    Food Insecurity:     Worried About Running Out of Food in the Last Year:     [address] in the Last Year:    Transportation Needs: No Transportation Needs    Lack of Transportation (Medical): No    Lack of Transportation (Non-Medical): No   Physical Activity: Inactive    Days of Exercise per Week: 0 days    Minutes of Exercise per Session: 0 min   Stress: No Stress Concern Present    Feeling of Stress : Only a little   Social Connections:  Moderately Isolated    Frequency of Communication with Friends and Family: Twice a week    Frequency of Social Gatherings with Friends and Family: Twice a week    Attends Anabaptist Services: Never    Active Member of Clubs or Organizations: No    Attends Club or Organization Meetings: Never    Marital Status:    Intimate Partner Violence: Not At Risk    Fear of Current or Ex-Partner: No    Emotionally Abused: No    Physically Abused: No    Sexually Abused: No       I have reviewed the past medical, surgical, social and family history, medications and allergies as documented in the EMR  Review of systems: ROS is negative other than that noted in the HPI  Constitutional: Negative for fatigue and fever  Physical Exam:    Blood pressure 118/78, pulse 82, height 6' 1" (1 854 m), weight 105 kg (232 lb)      General/Constitutional: NAD, well developed, well nourished  HENT: Normocephalic, atraumatic  CV: Intact distal pulses, regular rate  Resp: No respiratory distress or labored breathing  Lymphatic: No lymphadenopathy palpated  Neuro: Alert and Oriented x 3, no focal deficits  Psych: Normal mood, normal affect, normal judgement, normal behavior  Skin: Warm, dry, no rashes, no erythema      Shoulder focused exam:       RIGHT LEFT    Scapula Atrophy Negative Negative     Winging Negative Negative     Protraction Negative Negative    Rotator cuff SS 5/5 5/5     IS 5/5 5/5     SubS 5/5 5/5    ROM     160     ER0 60 60 with some discomfort     ER90 90    90 with some discomfort     IR90 T6    T6     IRb T6    T6    TTP: AC Negative Negative     Biceps Negative Positive     Coracoid Negative Negative    Special Tests: O'Briens Negative Positive     Morris-shear Negative Negative     Cross body Adduction Negative Negative     Speeds  Negative Negative     Kirill's Negative Negative     Whipple Negative Negative       Neer Negative Negative     Parrish Negative Negative    Instability: Apprehension & relocation not tested not tested     Load & shift not tested not tested    Other: Crank Negative Negative               UE NV Exam: +2 Radial pulses bilaterally  Sensation intact to light touch C5-T1 bilaterally, Radial/median/ulnar nerve motor intact    Cervical ROM is full without pain, numbness or tingling      Shoulder Imaging    No imaging was performed today      Scribe Attestation    I,:  Brittaney Bliss am acting as a scribe while in the presence of the attending physician :       I,:  Nolan Sever, DO personally performed the services described in this documentation    as scribed in my presence :

## 2021-07-27 ENCOUNTER — OFFICE VISIT (OUTPATIENT)
Dept: PHYSICAL THERAPY | Facility: CLINIC | Age: 66
End: 2021-07-27
Payer: COMMERCIAL

## 2021-07-27 DIAGNOSIS — S46.012A TRAUMATIC INCOMPLETE TEAR OF LEFT ROTATOR CUFF, INITIAL ENCOUNTER: Primary | ICD-10-CM

## 2021-07-27 PROCEDURE — 97140 MANUAL THERAPY 1/> REGIONS: CPT

## 2021-07-27 PROCEDURE — 97530 THERAPEUTIC ACTIVITIES: CPT

## 2021-07-27 PROCEDURE — 97110 THERAPEUTIC EXERCISES: CPT

## 2021-07-27 NOTE — PROGRESS NOTES
Daily Note     Today's date: 2021  Patient name: Raj Dash  : 1955  MRN: [id number]  Referring provider: Cheyenne Bernal  Dx:   Encounter Diagnosis     ICD-10-CM    1  Traumatic incomplete tear of left rotator cuff, initial encounter  S46 012A                   Subjective: Pt reports he saw his Dr yesterday, pleased w/progress  RTD 10-28-21  Pt reports his L shoulder feels good  Objective: See treatment diary below      Assessment: Performed exercise progression and remaining ex program w/o difficulty or discomfort  PROM to L shoulder w/o complaint, IR is limited, improved w/repetition  Tolerated treatment well  Will monitor  Patient would benefit from continued PT      Plan: Continue per plan of care  Precautions: RTC Repair DOS 3/9/21    Manuals   7   L shoulder PROM 15'  MO 8' DL 10' DL 10'  MO 10'  MO 10' DL 10' MO  13' DL 13' MO   STM deltoid/pec major   DL 10'  nv MO  5' DL + lat + lat  MO      L shoulder AP, Inf Mob  Post capsule stretch             Rhythmic stabilization Flex @90*;  IR/ER  @45*  abd  2x30" ea Flex @90, @120; IR/ER @ 45° @90° ABD  2x30" each  Flex @ 90° 2x30"; IR/ER @ 45° abd V[employer]*;  IR/ER@45*abd  2x30" ea   Neuro Re-Ed             Scap Retractions                                                                                           Ther Ex             S/l ER  2# x20    20x 2#  2x10 2# 30x 2# 30x  2# 2x10 2#x20   Prone row ----        D/c ----   Prone ext 20x 1# 20x 1#   20x 20x 1# 20x 1# 20x 1#  20x 1# 20x 1#   Prone T 20x 20x 1#   20x 1# 20x 1# 20x 1#  15x  15x   D2 ext PNF      Blue 20x L Blue  20x L      3 way arm lifts x20 1# 1# 20x each      10x ea  20x ea 2# 20x flex/scap   2# 20x  Flex/scap  X20 1#  x20 1#   Tests/measures             TB extension Blue  2x10 Blue 3x10   Black   2x10 Black  2x10 Black 30x Black  30x  GTB 3x10 GTB  30x   TB rows Blue  2x10 Black 3x10 Black 3x10 Black  3x10 Black  3x10 Apollo 40# 30x Apollo  40# 30x  GTB 3x10 GTB  30x   TB ER GTB  2x10 GTB 30x  GTB  30x GTB  30x GTB 40x Blue  2x10  RTB 3x10 (increase resis NV) GTB  2x10   Horizontal abduction  RTB 15x           TB IR GTB  30x BTB 30x   GTB  30x Blue  30x   Blue 40x Black  2x10    GTB 3x10 GTB  3x10   Bent over Row 8# x20 NV   8#  2x10 8# 20x 8# 20x  8# 15x (NV increase reps) 8#x20   Bicep Curl 8# 2x10 NV   8#  2x10 8# 20x 8# 20x  8# 2x10 8# 2x10   Body Blade  IR/ER @ 0° ABD (trial NV)           Pec stretch  30"x2 30"x3 30"x3 30"x3        Serratus punch  2# 20x   20x 2# 20x 3# 30x 3# 30x      Ther Activity             UBE 3'/3' 3'/3' 3'/3' 3'/3' 3'/3' 3'/3' 3'/3'  3'/3' 3'/3'   Pushup  Against wall 2x10  2x10 2x10 2x10 wall 2x10  wall      Gait Training                                       Modalities             CP PRN

## 2021-07-30 ENCOUNTER — OFFICE VISIT (OUTPATIENT)
Dept: PHYSICAL THERAPY | Facility: CLINIC | Age: 66
End: 2021-07-30
Payer: COMMERCIAL

## 2021-07-30 DIAGNOSIS — S46.012A TRAUMATIC INCOMPLETE TEAR OF LEFT ROTATOR CUFF, INITIAL ENCOUNTER: Primary | ICD-10-CM

## 2021-07-30 PROCEDURE — 97140 MANUAL THERAPY 1/> REGIONS: CPT

## 2021-07-30 PROCEDURE — 97110 THERAPEUTIC EXERCISES: CPT

## 2021-07-30 PROCEDURE — 97530 THERAPEUTIC ACTIVITIES: CPT

## 2021-07-30 NOTE — PROGRESS NOTES
Daily Note     Today's date: 2021  Patient name: Juan Carlos Santana  : 1955  MRN: [id number]  Referring provider: Bettie Corona PA-C  Dx:   Encounter Diagnosis     ICD-10-CM    1  Traumatic incomplete tear of left rotator cuff, initial encounter  S46 012A                   Subjective: Pt cont to report his L shoulder is feeling good  Objective: See treatment diary below      Assessment: Performed exercise progressions w/o discomfort, just some fatigue  Demonstrates good knowledge of ex program  Filipe PROM to L shoulder well, IR improves w/repetitions  Will monitor  Patient would benefit from continued PT      Plan: Continue per plan of care  Progress NV as appropriate  Precautions: RTC Repair DOS 3/9/21    Manuals     L shoulder PROM 15'  MO 8' DL 10' DL 10'  MO 10'  MO 10' DL 10' MO 8' MO  13' MO   STM deltoid/pec major   DL 10'  nv MO  5' DL + lat + lat  MO + lat   MO     L shoulder AP, Inf Mob  Post capsule stretch             Rhythmic stabilization Flex @90*;  IR/ER  @45*  abd  2x30" ea Flex @90, @120; IR/ER @ 45° @90° ABD  2x30" each  [employer]*;  AQ/HB@98*EFX  2x30" ea   Neuro Re-Ed             Scap Retractions                                                                                           Ther Ex             S/l ER  2# x20    20x 2#  2x10 2# 30x 2# 30x 2# 30x 3# 2#x20   Prone row ----         ----   Prone ext 20x 1# 20x 1#   20x 20x 1# 20x 1# 20x 1# 30x 1# 2# 20x 1#   Prone T 20x 20x 1#   20x 1# 20x 1# 20x 1# 30x 1# 2#  15x   D2 ext PNF      Blue 20x L Blue  20x L Blue 20x   L     3 way arm lifts x20 1# 1# 20x each      10x ea  20x ea 2# 20x flex/scap   2# 20x  Flex/scap 2# 30x  Flex/scap  x20 1#   Tests/measures             TB extension Blue  2x10 Blue 3x10   Black   2x10 Black  2x10 Black 30x Black  30x Apollo  30# 20x  GTB  30x   TB rows Blue  2x10 Black 3x10 Black 3x10 Black  3x10 Black  3x10 Apollo 40# 30x Apollo  40# 30x Apollo  40# 30x  GTB  30x   TB ER GTB  2x10 GTB 30x  GTB  30x GTB  30x GTB 40x Blue  2x10 Blue 30x    GTB  2x10   Horizontal abduction  RTB 15x           TB IR GTB  30x BTB 30x   GTB  30x Blue  30x   Blue 40x Black  2x10   Black 30x  GTB  3x10   Bent over Row 8# x20 NV   8#  2x10 8# 20x 8# 20x 8# 20x  8#x20   Bicep Curl 8# 2x10 NV   8#  2x10 8# 20x 8# 20x 8# 20x  8# 2x10   Body Blade  IR/ER @ 0° ABD (trial NV)           Pec stretch  30"x2 30"x3 30"x3 30"x3        Serratus punch  2# 20x   20x 2# 20x 3# 30x 3# 30x 3# 30x     Ther Activity             UBE 3'/3' 3'/3' 3'/3' 3'/3' 3'/3' 3'/3' 3'/3' 3'/3'  3'/3'   Pushup  Against wall 2x10  2x10 2x10 2x10 wall 2x10  wall 2x10  wall     Gait Training                                       Modalities             CP PRN

## 2021-08-02 ENCOUNTER — OFFICE VISIT (OUTPATIENT)
Dept: PHYSICAL THERAPY | Facility: CLINIC | Age: 66
End: 2021-08-02
Payer: COMMERCIAL

## 2021-08-02 DIAGNOSIS — S46.012A TRAUMATIC INCOMPLETE TEAR OF LEFT ROTATOR CUFF, INITIAL ENCOUNTER: Primary | ICD-10-CM

## 2021-08-02 PROCEDURE — 97110 THERAPEUTIC EXERCISES: CPT

## 2021-08-02 PROCEDURE — 97140 MANUAL THERAPY 1/> REGIONS: CPT

## 2021-08-02 PROCEDURE — 97530 THERAPEUTIC ACTIVITIES: CPT

## 2021-08-06 ENCOUNTER — APPOINTMENT (OUTPATIENT)
Dept: PHYSICAL THERAPY | Facility: CLINIC | Age: 66
End: 2021-08-06
Payer: COMMERCIAL

## 2021-08-10 ENCOUNTER — OFFICE VISIT (OUTPATIENT)
Dept: PHYSICAL THERAPY | Facility: CLINIC | Age: 66
End: 2021-08-10
Payer: COMMERCIAL

## 2021-08-10 DIAGNOSIS — S46.012A TRAUMATIC INCOMPLETE TEAR OF LEFT ROTATOR CUFF, INITIAL ENCOUNTER: Primary | ICD-10-CM

## 2021-08-10 PROCEDURE — 97530 THERAPEUTIC ACTIVITIES: CPT

## 2021-08-10 PROCEDURE — 97110 THERAPEUTIC EXERCISES: CPT

## 2021-08-10 PROCEDURE — 97140 MANUAL THERAPY 1/> REGIONS: CPT

## 2021-08-10 NOTE — PROGRESS NOTES
Daily Note     Today's date: 8/10/2021  Patient name: Joshua Berry  : 1955  MRN: [id number]  Referring provider: Melinda Snyder,*  Dx:   Encounter Diagnosis     ICD-10-CM    1  Traumatic incomplete tear of left rotator cuff, initial encounter  S46 012A                   Subjective: "Really good," no pain  Objective: See treatment diary below      Assessment: Performed exercise progressions w/o issue  Comfortable throughout PROM to L shoulder  Tolerated treatment well  Patient would benefit from continued PT      Plan: Continue per plan of care  Precautions: RTC Repair DOS 3/9/21    Manuals  7/6 7/9 7/13 7/16 7/20 7/23 7/27 7/30 8/2 8/10   L shoulder PROM 15'  MO 10' DL 10' DL 10'  MO 10'  MO 8' DL 10' MO 10' MO 10' DL 10' MO   STM deltoid/pec major   DL 10'  nv MO  5' DL + lat + lat  MO + lat   MO DL+lat + lat  MO   L shoulder AP, Inf Mob  Post capsule stretch             Rhythmic stabilization Flex @90*;  IR/ER  @45*  abd  2x30" ea Flex @90, @120; IR/ER @ 45° @90° ABD  2x30" each  Neuro Re-Ed             Scap Retractions                                                                                           Ther Ex             S/l ER  2# x20    20x 2#  2x10 2# 30x 2# 30x 2# 30x np    Prone row ----            Prone ext 20x 1# 20x 1#   20x 20x 1# 20x 1# 20x 1# 30x 1# 2# 30x 2# 30x   Prone T 20x 20x 1#   20x 1# 20x 1# 20x 1# 30x 1# 2# 30x 2# 30x   D2 ext PNF      Blue 20x L Blue  20x L Blue 20x   L Blue 20x L Blue 30x L   3 way arm lifts x20 1# 1# 20x each  10x ea  20x ea 2# 20x flex/scap   2# 20x  Flex/scap 2# 30x  Flex/scap L only 2# 30x ea   3#  2x10 ea   Tests/measures             TB extension Blue  2x10 Blue 3x10   Black   2x10 Black  2x10 Black 30x Black  30x Apollo  30# 20x Apollo 30# 30x Apollo  30# 30x   TB rows Blue  2x10 Black 3x10 Black 3x10 Black  3x10 Black  3x10 Apollo 40# 30x Apollo  40# 30x Apollo  40# 30x Apollo 50# 3x15 Apollo  50# 3x15   TB ER GTB  2x10 GTB 30x  GTB  30x GTB  30x GTB 40x Blue  2x10 Blue 30x   Blue 30x Blue 30x   Tband W (robbery)         Grn 20x Grn 20x   Horizontal abduction  RTB 15x           TB IR GTB  30x BTB 30x   GTB  30x Blue  30x   Blue 40x Black  2x10   Black 30x Black 3x15 Black  3x15   Bent over Row 8# x20 NV   8#  2x10 8# 20x 8# 20x 8# 20x NV 10# 10# 2x10   Bicep Curl 8# 2x10 NV   8#  2x10 8# 20x 8# 20x 8# 20x np    Body Blade  IR/ER @ 0° ABD (trial NV)       3x30" IR/ER @ 0°ABD  3x30"  IR/ER @  0* ABD   Pec stretch  30"x2 30"x3 30"x3 30"x3        Serratus punch  2# 20x   20x 2# 20x 3# 30x 3# 30x 3# 30x np    Ther Activity             UBE 3'/3' 3'/3' 3'/3' 3'/3' 3'/3' 3'/3' 3'/3' 3'/3' 3'/3' 3'/3'   Pushup  Against wall 2x10  2x10 2x10 2x10 wall 2x10  wall 2x10  wall counter 20x Counter  20x   Gait Training                                       Modalities             CP PRN

## 2021-08-13 ENCOUNTER — APPOINTMENT (OUTPATIENT)
Dept: PHYSICAL THERAPY | Facility: CLINIC | Age: 66
End: 2021-08-13
Payer: COMMERCIAL

## 2021-08-17 ENCOUNTER — OFFICE VISIT (OUTPATIENT)
Dept: PHYSICAL THERAPY | Facility: CLINIC | Age: 66
End: 2021-08-17
Payer: COMMERCIAL

## 2021-08-17 DIAGNOSIS — S46.012A TRAUMATIC INCOMPLETE TEAR OF LEFT ROTATOR CUFF, INITIAL ENCOUNTER: Primary | ICD-10-CM

## 2021-08-17 PROCEDURE — 97110 THERAPEUTIC EXERCISES: CPT

## 2021-08-17 PROCEDURE — 97140 MANUAL THERAPY 1/> REGIONS: CPT

## 2021-08-17 NOTE — PROGRESS NOTES
Daily Note     Today's date: 2021  Patient name: Juan Carlos Santana  : 1955  MRN: [id number]  Referring provider: Jose Peña,*  Dx:   Encounter Diagnosis     ICD-10-CM    1  Traumatic incomplete tear of left rotator cuff, initial encounter  S46 012A                   Subjective: Pt states his L shoulder feels "pretty good "      Objective: See treatment diary below      Assessment: Performed exercise progressions w/o difficulty or discomfort  Demonstrates good knowledge of same  PROM to L shoulder w/o complaint  Tolerated treatment well  Will monitor  Patient would benefit from continued PT      Plan: Continue per plan of care  Precautions: RTC Repair DOS 3/9/21    Manuals 8/17    7/20 7/23 7/27 7/30 8/2 8/10   L shoulder PROM 10' MO    10'  MO 10' DL 10' MO 10' MO 10' DL 10' MO   STM deltoid/pec major     MO  5' DL + lat + lat  MO + lat   MO DL+lat + lat  MO   Neuro Re-Ed                                                                              Ther Ex             S/l ER 3# 20x    2#  2x10 2# 30x 2# 30x 2# 30x np    Prone row             Prone ext 2# 30x    20x 1# 20x 1# 20x 1# 30x 1# 2# 30x 2# 30x   Prone T 2# 30x    20x 1# 20x 1# 20x 1# 30x 1# 2# 30x 2# 30x   D2 ext PNF Blue 30x  L     Blue 20x L Blue  20x L Blue 20x   L Blue 20x L Blue 30x L   3 way arm lifts 3#  2x10 ea       20x ea 2# 20x flex/scap   2# 20x  Flex/scap 2# 30x  Flex/scap L only 2# 30x ea   3#  2x10 ea   TB extension Apollo  30# 30x    Black  2x10 Black 30x Black  30x Apollo  30# 20x Apollo 30# 30x Apollo  30# 30x   TB rows Apollo  50# 3x15    Black  3x10 Apollo 40# 30x Apollo  40# 30x Apollo  40# 30x Apollo 50# 3x15 Apollo  50# 3x15   TB ER Black  3x10    GTB  30x GTB 40x Blue  2x10 Blue 30x   Blue 30x Blue 30x   Tband W (abebabery) Grn  30x        Grn 20x Grn 20x   Horizontal abduction             TB IR Black  3x15 Apollo     Blue  30x   Blue 40x Black  2x10   Black 30x Black 3x15 Black  3x15   Bent over Row 10#  2x10 8#  2x10 8# 20x 8# 20x 8# 20x NV 10# 10# 2x10   Bicep Curl 10# 20x    8#  2x10 8# 20x 8# 20x 8# 20x np    Body Blade 3x30"  IR/ER @  0* ABD        3x30" IR/ER @ 0°ABD  3x30"  IR/ER @  0* ABD   Pec stretch     30"x3        Serratus punch 3# 30x    2# 20x 3# 30x 3# 30x 3# 30x np    Ther Activity             UBE 3'/3'    3'/3' 3'/3' 3'/3' 3'/3' 3'/3' 3'/3'   Pushup Counter  20x    2x10 2x10 wall 2x10  wall 2x10  wall counter 20x Counter  20x   Gait Training                                       Modalities             CP PRN

## 2021-08-20 ENCOUNTER — APPOINTMENT (OUTPATIENT)
Dept: PHYSICAL THERAPY | Facility: CLINIC | Age: 66
End: 2021-08-20
Payer: COMMERCIAL

## 2021-08-24 ENCOUNTER — OFFICE VISIT (OUTPATIENT)
Dept: PHYSICAL THERAPY | Facility: CLINIC | Age: 66
End: 2021-08-24
Payer: COMMERCIAL

## 2021-08-24 DIAGNOSIS — S46.012A TRAUMATIC INCOMPLETE TEAR OF LEFT ROTATOR CUFF, INITIAL ENCOUNTER: Primary | ICD-10-CM

## 2021-08-24 PROCEDURE — 97140 MANUAL THERAPY 1/> REGIONS: CPT

## 2021-08-24 PROCEDURE — 97110 THERAPEUTIC EXERCISES: CPT

## 2021-08-24 NOTE — PROGRESS NOTES
Daily Note     Today's date: 2021  Patient name: Nadeem Isabel  : 1955  MRN: [id number]  Referring provider: Ingris Ortiz,*  Dx:   Encounter Diagnosis     ICD-10-CM    1  Traumatic incomplete tear of left rotator cuff, initial encounter  S46 012A                   Subjective: "I went bike riding the other day for the first time, it was a little sore," adding "I tried not to lean on it too much "  Pt reports he has no L shoulder pain, "I feel some limitations" during shoulder flex with elbow flexed @ 90*  Objective: See treatment diary below      Assessment: Added golf swing, pt will add to HEP, when comfortable, instructed he may try hitting a small bucket of balls  Performed exercises progressions w/o discomfort, just fatigue at times  Comfortable throughout PROM to L shoulder  Tolerated treatment well  Will monitor  Patient would benefit from continued PT      Plan: Continue per plan of care  Precautions: RTC Repair DOS 3/9/21    Manuals 8/17 8/24   7/20 7/23 7/27 7/30 8/2 8/10   L shoulder PROM 10' MO 10' MO   10'  MO 10' DL 10' MO 10' MO 10' DL 8' MO   STM deltoid/pec major     MO  5' DL + lat + lat  MO + lat   MO DL+lat + lat  MO   Neuro Re-Ed                                                                              Ther Ex             S/l ER 3# 20x  3# 30x   2#  2x10 2# 30x 2# 30x 2# 30x np    Prone row             Prone ext 2# 30x  3# 20x   20x 1# 20x 1# 20x 1# 30x 1# 2# 30x 2# 30x   Prone T 2# 30x  3# 20x   20x 1# 20x 1# 20x 1# 30x 1# 2# 30x 2# 30x   D2 ext PNF Blue 30x  L  Blue 30x  L    Blue 20x L Blue  20x L Blue 20x   L Blue 20x L Blue 30x L   3 way arm lifts 3#  2x10 ea    4#   2x10 ea    20x ea 2# 20x flex/scap   2# 20x  Flex/scap 2# 30x  Flex/scap L only 2# 30x ea   3#  2x10 ea   TB extension Apollo  30# 30x  Apollo  40# 2x10   Black  2x10 Black 30x Black  30x Apollo  30# 20x Apollo 30# 30x Apollo  30# 30x   TB rows Apollo  50# 3x15  Apollo   60# 2x10     Black  3x10 Apollo 40# 30x Apollo  40# 30x Apollo  40# 30x Apollo 50# 3x15 Apollo  50# 3x15   TB ER Black  3x10  Apollo  20# 2x10   GTB  30x GTB 40x Blue  2x10 Blue 30x   Blue 30x Blue 30x   Tband W (robbery) Grn  30x  Blue 30x         Grn 20x Grn 20x   Horizontal abduction             TB IR Black  3x15 Apollo  30# 2x10     Blue  30x   Blue 40x Black  2x10   Black 30x Black 3x15 Black  3x15   Bent over Row 10#  2x10 10# 30x   8#  2x10 8# 20x 8# 20x 8# 20x NV 10# 10# 2x10   Bicep Curl 10# 20x  10# 20x   8#  2x10 8# 20x 8# 20x 8# 20x np    Body Blade 3x30"  IR/ER @  0* ABD 3x30"  IR/ER @  0* ABD       3x30" IR/ER @ 0°ABD  3x30"  IR/ER @  0* ABD   Pec stretch     30"x3        Serratus punch 3# 30x  s/l 3#   2x10   2# 20x 3# 30x 3# 30x 3# 30x np    Ther Activity             UBE 3'/3' 3'/3'   3'/3' 3'/3' 3'/3' 3'/3' 3'/3' 3'/3'   Pushup Counter  20x Counter,  Plinth  x10ea   2x10 2x10 wall 2x10  wall 2x10  wall counter 20x Counter  20x   Swinging golf club    6x           Gait Training                                       Modalities             CP PRN

## 2021-08-27 ENCOUNTER — APPOINTMENT (OUTPATIENT)
Dept: PHYSICAL THERAPY | Facility: CLINIC | Age: 66
End: 2021-08-27
Payer: COMMERCIAL

## 2021-08-30 DIAGNOSIS — I10 ESSENTIAL HYPERTENSION: ICD-10-CM

## 2021-08-30 RX ORDER — DOXAZOSIN 2 MG/1
2 TABLET ORAL
Qty: 90 TABLET | Refills: 1 | Status: SHIPPED | OUTPATIENT
Start: 2021-08-30 | End: 2022-02-25

## 2021-08-31 ENCOUNTER — OFFICE VISIT (OUTPATIENT)
Dept: PHYSICAL THERAPY | Facility: CLINIC | Age: 66
End: 2021-08-31
Payer: COMMERCIAL

## 2021-08-31 DIAGNOSIS — S46.012A TRAUMATIC INCOMPLETE TEAR OF LEFT ROTATOR CUFF, INITIAL ENCOUNTER: Primary | ICD-10-CM

## 2021-08-31 PROCEDURE — 97110 THERAPEUTIC EXERCISES: CPT

## 2021-08-31 PROCEDURE — 97140 MANUAL THERAPY 1/> REGIONS: CPT

## 2021-08-31 NOTE — PROGRESS NOTES
Daily Note     Today's date: 2021  Patient name: Merced Butler  : 1955  MRN: [id number]  Referring provider: Geraldine Cool,*  Dx:   Encounter Diagnosis     ICD-10-CM    1  Traumatic incomplete tear of left rotator cuff, initial encounter  S46 012A                   Subjective: "It's feeling really good, I think we're coming near the end here "  Pt reports using 2# and 4# weights at home  Objective: See treatment diary below      Assessment: Performed exercise progressions w/o discomfort, although was fatigued at times during same  PROM to L shoulder w/o complaint  Tolerated treatment well  Will monitor  Patient would benefit from continued PT      Plan: Continue per plan of care  Precautions: RTC Repair DOS 3/9/21    Manuals 8/17 8/24 8/31   7/23 7/27 7/30 8/2 8/10   L shoulder PROM 10' MO 10' MO 10'  MO   10' DL 10' MO 10' MO 10' DL 10' MO   STM deltoid/pec major      DL + lat + lat  MO + lat   MO DL+lat + lat  MO   Neuro Re-Ed                                                                              Ther Ex             S/l ER 3# 20x  3# 30x 4# 3x10   2# 30x 2# 30x 2# 30x np    Prone row   2# 2x10          Prone ext 2# 30x  3# 20x 3# 30x   20x 1# 20x 1# 30x 1# 2# 30x 2# 30x   Prone T 2# 30x  3# 20x 3# 30x   20x 1# 20x 1# 30x 1# 2# 30x 2# 30x   D2 ext PNF Blue 30x  L  Blue 30x  L Blue 30x L   Blue 20x L Blue  20x L Blue 20x   L Blue 20x L Blue 30x L   3 way arm lifts 3#  2x10 ea    4#   2x10 ea 5#  20x ea   2# 20x flex/scap   2# 20x  Flex/scap 2# 30x  Flex/scap L only 2# 30x ea   3#  2x10 ea   TB extension Apollo  30# 30x  Apollo  40# 2x10 Apollo  40# 3x10   Black 30x Black  30x Apollo  30# 20x Apollo 30# 30x Apollo  30# 30x   TB rows Apollo  50# 3x15  Apollo   60# 2x10   Apollo  60#  3x10   Apollo 40# 30x Apollo  40# 30x Apollo  40# 30x Apollo 50# 3x15 Apollo  50# 3x15   TB ER Black  3x10  Apollo  20# 2x10 Apollo  20#  3x10   GTB 40x Blue  2x10 Blue 30x   Blue 30x Blue 30x   Tband W (tk) Grn  30x  Blue 30x   Blue  30x      Grn 20x Grn 20x   Horizontal abduction             TB IR Black  3x15 Apollo  30# 2x10   Apollo  30#  2x10   Blue 40x Black  2x10   Black 30x Black 3x15 Black  3x15   Bent over Row 10#  2x10 10# 30x 12#  20x;  10x   8# 20x 8# 20x 8# 20x NV 10# 10# 2x10   Bicep Curl 10# 20x  10# 20x 12#  20x   8# 20x 8# 20x 8# 20x np    Body Blade 3x30"  IR/ER @  0* ABD 3x30"  IR/ER @  0* ABD 3x30"  IR/ER @ 0*  ABD      3x30" IR/ER @ 0°ABD  3x30"  IR/ER @  0* ABD   Pec stretch             Serratus punch 3# 30x  s/l 3#   2x10 S/l 3#  30x   3# 30x 3# 30x 3# 30x np    Ther Activity             UBE 3'/3' 3'/3' 3'/3'   3'/3' 3'/3' 3'/3' 3'/3' 3'/3'   Pushup Counter  20x Counter,  Plinth  x10ea Plinth  2x10   2x10 wall 2x10  wall 2x10  wall counter 20x Counter  20x   Swinging golf club    6x 15x          Gait Training                                       Modalities             CP PRN

## 2021-09-03 ENCOUNTER — APPOINTMENT (OUTPATIENT)
Dept: PHYSICAL THERAPY | Facility: CLINIC | Age: 66
End: 2021-09-03
Payer: COMMERCIAL

## 2021-09-07 ENCOUNTER — EVALUATION (OUTPATIENT)
Dept: PHYSICAL THERAPY | Facility: CLINIC | Age: 66
End: 2021-09-07
Payer: COMMERCIAL

## 2021-09-07 DIAGNOSIS — S46.012A TRAUMATIC INCOMPLETE TEAR OF LEFT ROTATOR CUFF, INITIAL ENCOUNTER: Primary | ICD-10-CM

## 2021-09-07 PROCEDURE — 97530 THERAPEUTIC ACTIVITIES: CPT

## 2021-09-07 PROCEDURE — 97140 MANUAL THERAPY 1/> REGIONS: CPT

## 2021-09-07 PROCEDURE — 97110 THERAPEUTIC EXERCISES: CPT

## 2021-09-07 PROCEDURE — 97164 PT RE-EVAL EST PLAN CARE: CPT

## 2021-09-07 NOTE — PROGRESS NOTES
Discharge/Re-evaluation     Today's date: 2021  Patient name: Alejandra Zelaya  : 1955  MRN: [id number]  Referring provider: Tara Raymundo,*  Dx:   Encounter Diagnosis     ICD-10-CM    1  Traumatic incomplete tear of left rotator cuff, initial encounter  S46 012A                   Subjective: Pt reports that functionally he has been doing well  He feels ready for discharge as pt is independent with home exercise program at this time  With all ADL's does not note any pain  With some activities with grand children will feel at most 2/10 discomfort  Objective: See treatment diary below    Assessment  Assessment details: Alejandra Zelaya is a 63 y o  male s/p L RTC repair  Pt has been compliant with all HEP, and is independent at this time  Pt has reached 5/5 strength in all shoulder measurements with WNL ROM  Pt has met most goals, is appropriate for discharge from skilled PT at this time  PT answered all questions/concerns       Impairments: abnormal gait, abnormal or restricted ROM, activity intolerance, impaired balance, impaired physical strength, lacks appropriate home exercise program, weight-bearing intolerance and poor posture     Symptom irritability: lowUnderstanding of Dx/Px/POC: good   Prognosis: good    Goals  STG  Patient will decrease pain at worst to 3/10 (MET)  Patient will demonstrate full pain free L shoulder PROM (MET)  Patient will d/c sling (MET)  Patient will be independent with ADLs (MET)  Patient will be independent with basic HEP (MET)    LTG  Patient will decrease pain at worst to 1/10 (MET)  Patient will demonstrate L shoulder strength of 4+/5 in all planes (MET)  Patient will demonstrate full pain free L shoulder AROM (partially met - slight discomfort end range ABD)  Patient will report ability to play with his grandchildren without limitations (MET)  Patient will be independent with comprehensive HEP (MET)    Plan  Patient would benefit from: skilled physical therapy  Planned modality interventions: cryotherapy and thermotherapy: hydrocollator packs  Planned therapy interventions: manual therapy, neuromuscular re-education, patient education, therapeutic activities, therapeutic exercise, therapeutic training and home exercise program  Frequency: 2x week  Duration in weeks: 6  Treatment plan discussed with: patient        Subjective Evaluation    History of Present Illness  Date of surgery: 3/9/2021  Pain  Current pain ratin  At best pain ratin  At worst pain ratin (w/ stretch)  Quality: dull ache and sharp  Relieving factors: ice and rest    Social Support  Lives with: spouse    Employment status: working ([employer] manager )  Patient Goals  Patient goals for therapy: decreased pain, independence with ADLs/IADLs, increased strength, return to sport/leisure activities and increased motion  Patient goal: playing with grandchildren          Objective    Observation: smooth,scars    Palpation: no tenderness noted upon examination    L Shoulder PROM:  - Flexion: 180°  - Abduction: 170°  - Internal Rotation @ 90 ABD: 70°  - External Rotation @ 90°: 85°    L shoulder AROM in standing  Flex 180°  ABD: 165°    Shoulder Strength: at least 3/5 per AROM all directions (no scapular compensations of scapular dyskinesia during elevation)  Flex:   Abd: 55  ER:   IR:     Plan: Discharge     Precautions: RTC Repair DOS 3/9/21    Manuals 8/17 8/24 8/31 9/7  7/23 7/27 7/30 8/2 8/10   L shoulder PROM 10' MO 10' MO 10'  MO 10' DL  10' DL 10' MO 10' MO 10' DL 10' MO   STM deltoid/pec major      DL + lat + lat  MO + lat   MO DL+lat + lat  MO   Neuro Re-Ed                                                                              Ther Ex             S/l ER 3# 20x  3# 30x 4# 3x10 np  2# 30x 2# 30x 2# 30x np    Prone row   2# 2x10 np         Prone ext 2# 30x  3# 20x 3# 30x np  20x 1# 20x 1# 30x 1# 2# 30x 2# 30x   Prone T 2# 30x  3# 20x 3# 30x 3# 30x  20x 1# 20x 1# 30x 1# 2# 30x 2# 30x   D2 ext PNF Blue 30x  L  Blue 30x  L Blue 30x L D1 Ext 30x L Blue  Blue 20x L Blue  20x L Blue 20x   L Blue 20x L Blue 30x L   3 way arm lifts 3#  2x10 ea    4#   2x10 ea 5#  20x ea 5# 20x ea  2# 20x flex/scap   2# 20x  Flex/scap 2# 30x  Flex/scap L only 2# 30x ea   3#  2x10 ea   TB extension Apollo  30# 30x  Apollo  40# 2x10 Apollo  40# 3x10 Apollo 40# 3x10  Black 30x Black  30x Apollo  30# 20x Apollo 30# 30x Apollo  30# 30x   TB rows Apollo  50# 3x15  Apollo   60# 2x10   Apollo  60#  3x10 Apollo 60# 3x10  Apollo 40# 30x Apollo  40# 30x Apollo  40# 30x Apollo 50# 3x15 Apollo  50# 3x15   TB ER Black  3x10  Apollo  20# 2x10 Apollo  20#  3x10 Apollo 20# 3x10  GTB 40x Blue  2x10 Blue 30x   Blue 30x Blue 30x   Tband W (robbery) Grn  30x  Blue 30x   Blue  30x Blue 30x     Grn 20x Grn 20x   Horizontal abduction             TB IR Black  3x15 Apollo  30# 2x10   Apollo  30#  2x10 Apollo 3x10  Blue 40x Black  2x10   Black 30x Black 3x15 Black  3x15   Bent over Row 10#  2x10 10# 30x 12#  20x;  10x 12# 30x  8# 20x 8# 20x 8# 20x NV 10# 10# 2x10   Bicep Curl 10# 20x  10# 20x 12#  20x 12# 20x  8# 20x 8# 20x 8# 20x np    Body Blade 3x30"  IR/ER @  0* ABD 3x30"  IR/ER @  0* ABD 3x30"  IR/ER @ 0*  ABD 3x30"  IR/ER @ 0*  ABD     3x30" IR/ER @ 0°ABD  3x30"  IR/ER @  0* ABD   Pec stretch             Serratus punch 3# 30x  s/l 3#   2x10 S/l 3#  30x S/l 3# 30x  3# 30x 3# 30x 3# 30x np    Ther Activity             UBE 3'/3' 3'/3' 3'/3' 3'/3'  3'/3' 3'/3' 3'/3' 3'/3' 3'/3'   Pushup Counter  20x Counter,  Plinth  x10ea Plinth  2x10 Plinth 3x10  2x10 wall 2x10  wall 2x10  wall counter 20x Counter  20x   Swinging golf club    6x 15x          Gait Training                                       Modalities             CP PRN

## 2021-09-14 ENCOUNTER — APPOINTMENT (OUTPATIENT)
Dept: PHYSICAL THERAPY | Facility: CLINIC | Age: 66
End: 2021-09-14
Payer: COMMERCIAL

## 2021-09-21 ENCOUNTER — APPOINTMENT (OUTPATIENT)
Dept: PHYSICAL THERAPY | Facility: CLINIC | Age: 66
End: 2021-09-21
Payer: COMMERCIAL

## 2021-09-28 ENCOUNTER — APPOINTMENT (OUTPATIENT)
Dept: PHYSICAL THERAPY | Facility: CLINIC | Age: 66
End: 2021-09-28
Payer: COMMERCIAL

## 2021-10-28 ENCOUNTER — OFFICE VISIT (OUTPATIENT)
Dept: OBGYN CLINIC | Facility: MEDICAL CENTER | Age: 66
End: 2021-10-28
Payer: COMMERCIAL

## 2021-10-28 VITALS
SYSTOLIC BLOOD PRESSURE: 123 MMHG | WEIGHT: 234.4 LBS | HEART RATE: 77 BPM | DIASTOLIC BLOOD PRESSURE: 86 MMHG | BODY MASS INDEX: 31.07 KG/M2 | HEIGHT: 73 IN

## 2021-10-28 DIAGNOSIS — S46.012D TRAUMATIC TEAR OF LEFT ROTATOR CUFF, UNSPECIFIED TEAR EXTENT, SUBSEQUENT ENCOUNTER: Primary | ICD-10-CM

## 2021-10-28 PROCEDURE — 99213 OFFICE O/P EST LOW 20 MIN: CPT | Performed by: ORTHOPAEDIC SURGERY

## 2021-10-28 PROCEDURE — 1036F TOBACCO NON-USER: CPT | Performed by: ORTHOPAEDIC SURGERY

## 2021-10-28 PROCEDURE — 3008F BODY MASS INDEX DOCD: CPT | Performed by: ORTHOPAEDIC SURGERY

## 2021-11-10 ENCOUNTER — OFFICE VISIT (OUTPATIENT)
Dept: FAMILY MEDICINE CLINIC | Facility: HOSPITAL | Age: 66
End: 2021-11-10
Payer: COMMERCIAL

## 2021-11-10 VITALS
BODY MASS INDEX: 31.14 KG/M2 | SYSTOLIC BLOOD PRESSURE: 130 MMHG | DIASTOLIC BLOOD PRESSURE: 68 MMHG | HEIGHT: 73 IN | RESPIRATION RATE: 16 BRPM | HEART RATE: 75 BPM | WEIGHT: 235 LBS | OXYGEN SATURATION: 97 %

## 2021-11-10 DIAGNOSIS — Z00.00 ANNUAL PHYSICAL EXAM: Primary | ICD-10-CM

## 2021-11-10 DIAGNOSIS — Z12.5 SCREENING FOR PROSTATE CANCER: ICD-10-CM

## 2021-11-10 DIAGNOSIS — Z13.6 SCREENING FOR CARDIOVASCULAR CONDITION: ICD-10-CM

## 2021-11-10 DIAGNOSIS — N52.8 OTHER MALE ERECTILE DYSFUNCTION: ICD-10-CM

## 2021-11-10 DIAGNOSIS — Z23 NEED FOR INFLUENZA VACCINATION: ICD-10-CM

## 2021-11-10 DIAGNOSIS — R40.0 DAYTIME SOMNOLENCE: ICD-10-CM

## 2021-11-10 DIAGNOSIS — Z13.1 SCREENING FOR DIABETES MELLITUS: ICD-10-CM

## 2021-11-10 PROCEDURE — 3725F SCREEN DEPRESSION PERFORMED: CPT | Performed by: INTERNAL MEDICINE

## 2021-11-10 PROCEDURE — 90471 IMMUNIZATION ADMIN: CPT

## 2021-11-10 PROCEDURE — 90682 RIV4 VACC RECOMBINANT DNA IM: CPT

## 2021-11-10 PROCEDURE — 1036F TOBACCO NON-USER: CPT | Performed by: INTERNAL MEDICINE

## 2021-11-10 PROCEDURE — 99396 PREV VISIT EST AGE 40-64: CPT | Performed by: INTERNAL MEDICINE

## 2021-11-10 RX ORDER — SILDENAFIL CITRATE 20 MG/1
TABLET ORAL
Qty: 90 TABLET | Refills: 5 | Status: SHIPPED | OUTPATIENT
Start: 2021-11-10 | End: 2021-11-30 | Stop reason: SDUPTHER

## 2021-11-16 ENCOUNTER — TELEPHONE (OUTPATIENT)
Dept: FAMILY MEDICINE CLINIC | Facility: HOSPITAL | Age: 66
End: 2021-11-16

## 2021-11-17 PROCEDURE — 0241U HB NFCT DS VIR RESP RNA 4 TRGT: CPT | Performed by: INTERNAL MEDICINE

## 2021-11-20 LAB
ALBUMIN SERPL-MCNC: 4.2 G/DL (ref 3.8–4.8)
ALBUMIN/GLOB SERPL: 2.1 {RATIO} (ref 1.2–2.2)
ALP SERPL-CCNC: 41 IU/L (ref 44–121)
ALT SERPL-CCNC: 23 IU/L (ref 0–44)
AST SERPL-CCNC: 19 IU/L (ref 0–40)
BILIRUB SERPL-MCNC: 0.6 MG/DL (ref 0–1.2)
BUN SERPL-MCNC: 12 MG/DL (ref 8–27)
BUN/CREAT SERPL: 15 (ref 10–24)
CALCIUM SERPL-MCNC: 10 MG/DL (ref 8.6–10.2)
CHLORIDE SERPL-SCNC: 103 MMOL/L (ref 96–106)
CHOLEST SERPL-MCNC: 161 MG/DL (ref 100–199)
CHOLEST/HDLC SERPL: 4.1 RATIO (ref 0–5)
CO2 SERPL-SCNC: 27 MMOL/L (ref 20–29)
CREAT SERPL-MCNC: 0.78 MG/DL (ref 0.76–1.27)
GLOBULIN SER-MCNC: 2 G/DL (ref 1.5–4.5)
GLUCOSE SERPL-MCNC: 110 MG/DL (ref 65–99)
HDLC SERPL-MCNC: 39 MG/DL
LDLC SERPL CALC-MCNC: 97 MG/DL (ref 0–99)
POTASSIUM SERPL-SCNC: 4.2 MMOL/L (ref 3.5–5.2)
PROT SERPL-MCNC: 6.2 G/DL (ref 6–8.5)
PSA FREE MFR SERPL: 19 %
PSA FREE SERPL-MCNC: 0.8 NG/ML
PSA SERPL-MCNC: 4.2 NG/ML (ref 0–4)
SL AMB EGFR AFRICAN AMERICAN: 110 ML/MIN/1.73
SL AMB EGFR NON AFRICAN AMERICAN: 95 ML/MIN/1.73
SL AMB VLDL CHOLESTEROL CALC: 25 MG/DL (ref 5–40)
SODIUM SERPL-SCNC: 141 MMOL/L (ref 134–144)
TRIGL SERPL-MCNC: 139 MG/DL (ref 0–149)

## 2021-11-22 ENCOUNTER — TELEPHONE (OUTPATIENT)
Dept: SLEEP CENTER | Facility: CLINIC | Age: 66
End: 2021-11-22

## 2021-11-27 ENCOUNTER — IMMUNIZATIONS (OUTPATIENT)
Dept: FAMILY MEDICINE CLINIC | Facility: HOSPITAL | Age: 66
End: 2021-11-27

## 2021-11-27 DIAGNOSIS — Z23 ENCOUNTER FOR IMMUNIZATION: Primary | ICD-10-CM

## 2021-11-27 PROCEDURE — 0001A COVID-19 PFIZER VACC 0.3 ML: CPT

## 2021-11-27 PROCEDURE — 91300 COVID-19 PFIZER VACC 0.3 ML: CPT

## 2021-11-30 ENCOUNTER — OFFICE VISIT (OUTPATIENT)
Dept: GASTROENTEROLOGY | Facility: CLINIC | Age: 66
End: 2021-11-30
Payer: COMMERCIAL

## 2021-11-30 ENCOUNTER — TELEPHONE (OUTPATIENT)
Dept: GASTROENTEROLOGY | Facility: CLINIC | Age: 66
End: 2021-11-30

## 2021-11-30 VITALS
SYSTOLIC BLOOD PRESSURE: 124 MMHG | DIASTOLIC BLOOD PRESSURE: 78 MMHG | BODY MASS INDEX: 31.54 KG/M2 | HEIGHT: 73 IN | WEIGHT: 238 LBS

## 2021-11-30 DIAGNOSIS — I26.99 PE (PULMONARY THROMBOEMBOLISM) (HCC): ICD-10-CM

## 2021-11-30 DIAGNOSIS — Z79.01 CURRENT USE OF LONG TERM ANTICOAGULATION: ICD-10-CM

## 2021-11-30 DIAGNOSIS — Z86.010 HISTORY OF COLON POLYPS: Primary | ICD-10-CM

## 2021-11-30 PROBLEM — Z86.0100 HISTORY OF COLON POLYPS: Status: ACTIVE | Noted: 2021-11-30

## 2021-11-30 PROCEDURE — 99214 OFFICE O/P EST MOD 30 MIN: CPT | Performed by: INTERNAL MEDICINE

## 2021-11-30 PROCEDURE — 3008F BODY MASS INDEX DOCD: CPT | Performed by: INTERNAL MEDICINE

## 2021-11-30 RX ORDER — SODIUM PICOSULFATE, MAGNESIUM OXIDE, AND ANHYDROUS CITRIC ACID 10; 3.5; 12 MG/160ML; G/160ML; G/160ML
LIQUID ORAL
Qty: 320 ML | Refills: 0 | Status: SHIPPED | OUTPATIENT
Start: 2021-11-30 | End: 2021-12-21 | Stop reason: HOSPADM

## 2021-12-06 ENCOUNTER — HOSPITAL ENCOUNTER (OUTPATIENT)
Dept: SLEEP CENTER | Facility: CLINIC | Age: 66
Discharge: HOME | End: 2021-12-06
Payer: COMMERCIAL

## 2021-12-06 DIAGNOSIS — R40.0 DAYTIME SOMNOLENCE: ICD-10-CM

## 2021-12-06 PROCEDURE — G0399 HOME SLEEP TEST/TYPE 3 PORTA: HCPCS

## 2021-12-09 PROCEDURE — 95806 SLEEP STUDY UNATT&RESP EFFT: CPT | Performed by: INTERNAL MEDICINE

## 2021-12-10 ENCOUNTER — TELEPHONE (OUTPATIENT)
Dept: SLEEP CENTER | Facility: CLINIC | Age: 66
End: 2021-12-10

## 2021-12-12 DIAGNOSIS — Z86.711 HISTORY OF PULMONARY EMBOLISM: ICD-10-CM

## 2021-12-12 DIAGNOSIS — E78.00 HYPERCHOLESTEREMIA: ICD-10-CM

## 2021-12-12 RX ORDER — RIVAROXABAN 20 MG/1
TABLET, FILM COATED ORAL
Qty: 90 TABLET | Refills: 2 | Status: SHIPPED | OUTPATIENT
Start: 2021-12-12

## 2021-12-12 RX ORDER — SIMVASTATIN 10 MG
TABLET ORAL
Qty: 90 TABLET | Refills: 1 | Status: SHIPPED | OUTPATIENT
Start: 2021-12-12 | End: 2022-06-16

## 2021-12-21 ENCOUNTER — HOSPITAL ENCOUNTER (OUTPATIENT)
Dept: GASTROENTEROLOGY | Facility: AMBULATORY SURGERY CENTER | Age: 66
Discharge: HOME | End: 2021-12-21
Payer: COMMERCIAL

## 2021-12-21 ENCOUNTER — ANESTHESIA EVENT (OUTPATIENT)
Dept: GASTROENTEROLOGY | Facility: AMBULATORY SURGERY CENTER | Age: 66
End: 2021-12-21

## 2021-12-21 ENCOUNTER — ANESTHESIA (OUTPATIENT)
Dept: GASTROENTEROLOGY | Facility: AMBULATORY SURGERY CENTER | Age: 66
End: 2021-12-21

## 2021-12-21 VITALS
TEMPERATURE: 98 F | RESPIRATION RATE: 19 BRPM | DIASTOLIC BLOOD PRESSURE: 69 MMHG | OXYGEN SATURATION: 95 % | HEART RATE: 74 BPM | SYSTOLIC BLOOD PRESSURE: 102 MMHG

## 2021-12-21 DIAGNOSIS — Z86.010 HISTORY OF COLON POLYPS: ICD-10-CM

## 2021-12-21 PROCEDURE — 88305 TISSUE EXAM BY PATHOLOGIST: CPT | Performed by: SPECIALIST

## 2021-12-21 PROCEDURE — 45385 COLONOSCOPY W/LESION REMOVAL: CPT | Performed by: INTERNAL MEDICINE

## 2021-12-21 RX ORDER — LIDOCAINE HYDROCHLORIDE 10 MG/ML
0.5 INJECTION, SOLUTION EPIDURAL; INFILTRATION; INTRACAUDAL; PERINEURAL ONCE AS NEEDED
Status: DISCONTINUED | OUTPATIENT
Start: 2021-12-21 | End: 2021-12-25 | Stop reason: HOSPADM

## 2021-12-21 RX ORDER — PROPOFOL 10 MG/ML
INJECTION, EMULSION INTRAVENOUS AS NEEDED
Status: DISCONTINUED | OUTPATIENT
Start: 2021-12-21 | End: 2021-12-21

## 2021-12-21 RX ORDER — SODIUM CHLORIDE, SODIUM LACTATE, POTASSIUM CHLORIDE, CALCIUM CHLORIDE 600; 310; 30; 20 MG/100ML; MG/100ML; MG/100ML; MG/100ML
125 INJECTION, SOLUTION INTRAVENOUS CONTINUOUS
Status: DISCONTINUED | OUTPATIENT
Start: 2021-12-21 | End: 2021-12-25 | Stop reason: HOSPADM

## 2021-12-21 RX ORDER — SODIUM CHLORIDE, SODIUM LACTATE, POTASSIUM CHLORIDE, CALCIUM CHLORIDE 600; 310; 30; 20 MG/100ML; MG/100ML; MG/100ML; MG/100ML
INJECTION, SOLUTION INTRAVENOUS CONTINUOUS PRN
Status: DISCONTINUED | OUTPATIENT
Start: 2021-12-21 | End: 2021-12-21

## 2021-12-21 RX ADMIN — PROPOFOL 50 MG: 10 INJECTION, EMULSION INTRAVENOUS at 13:08

## 2021-12-21 RX ADMIN — PROPOFOL 50 MG: 10 INJECTION, EMULSION INTRAVENOUS at 13:17

## 2021-12-21 RX ADMIN — SODIUM CHLORIDE, SODIUM LACTATE, POTASSIUM CHLORIDE, CALCIUM CHLORIDE: 600; 310; 30; 20 INJECTION, SOLUTION INTRAVENOUS at 12:54

## 2021-12-21 RX ADMIN — PROPOFOL 50 MG: 10 INJECTION, EMULSION INTRAVENOUS at 13:06

## 2021-12-21 RX ADMIN — PROPOFOL 50 MG: 10 INJECTION, EMULSION INTRAVENOUS at 13:05

## 2021-12-21 RX ADMIN — PROPOFOL 50 MG: 10 INJECTION, EMULSION INTRAVENOUS at 13:14

## 2021-12-21 RX ADMIN — SODIUM CHLORIDE, SODIUM LACTATE, POTASSIUM CHLORIDE, CALCIUM CHLORIDE 125 ML/HR: 600; 310; 30; 20 INJECTION, SOLUTION INTRAVENOUS at 12:56

## 2021-12-21 RX ADMIN — PROPOFOL 100 MG: 10 INJECTION, EMULSION INTRAVENOUS at 13:04

## 2021-12-21 RX ADMIN — PROPOFOL 50 MG: 10 INJECTION, EMULSION INTRAVENOUS at 13:11

## 2022-01-26 ENCOUNTER — OFFICE VISIT (OUTPATIENT)
Dept: PULMONOLOGY | Facility: HOSPITAL | Age: 67
End: 2022-01-26
Payer: COMMERCIAL

## 2022-01-26 VITALS
SYSTOLIC BLOOD PRESSURE: 122 MMHG | DIASTOLIC BLOOD PRESSURE: 78 MMHG | HEIGHT: 73 IN | BODY MASS INDEX: 31.17 KG/M2 | HEART RATE: 78 BPM | OXYGEN SATURATION: 97 % | WEIGHT: 235.2 LBS

## 2022-01-26 DIAGNOSIS — G47.33 OBSTRUCTIVE SLEEP APNEA: Primary | ICD-10-CM

## 2022-01-26 DIAGNOSIS — G47.34 NOCTURNAL HYPOXEMIA: ICD-10-CM

## 2022-01-26 DIAGNOSIS — R06.83 SNORING: ICD-10-CM

## 2022-01-26 DIAGNOSIS — Z86.711 HISTORY OF PULMONARY EMBOLISM: ICD-10-CM

## 2022-01-26 DIAGNOSIS — R40.0 DAYTIME SOMNOLENCE: ICD-10-CM

## 2022-01-26 DIAGNOSIS — I10 ESSENTIAL HYPERTENSION: ICD-10-CM

## 2022-01-26 PROCEDURE — 99204 OFFICE O/P NEW MOD 45 MIN: CPT | Performed by: INTERNAL MEDICINE

## 2022-01-26 PROCEDURE — 3074F SYST BP LT 130 MM HG: CPT | Performed by: INTERNAL MEDICINE

## 2022-01-26 PROCEDURE — 3008F BODY MASS INDEX DOCD: CPT | Performed by: INTERNAL MEDICINE

## 2022-01-26 PROCEDURE — 1036F TOBACCO NON-USER: CPT | Performed by: INTERNAL MEDICINE

## 2022-01-26 PROCEDURE — 3078F DIAST BP <80 MM HG: CPT | Performed by: INTERNAL MEDICINE

## 2022-01-26 NOTE — PATIENT INSTRUCTIONS
Sleep Apnea   AMBULATORY CARE:   Sleep apnea  is a condition that causes you to stop breathing often during sleep  Types of sleep apnea:   · Obstructive sleep apnea (SOL)  is the most common kind  The muscles and tissues around your throat relax and block air from passing through  Obesity, use of alcohol or cigarettes, or a family history are common causes  SOL may increase your risk for complications after surgery  · Central sleep apnea (CSA)  means your brain does not send signals to the muscles that control breathing  You do not take a breath even though your airway is open  Common causes include medical conditions such as heart failure, being older than 40, or use of opioids  · Complex (or mixed) sleep apnea  means you have both obstructive and central sleep apnea  Common signs and symptoms:   · Loud snoring or long pauses in breathing    · Feeling sleepy, slow, and tired during the day    · Snorting, gasping, or choking while you sleep, and waking up suddenly because of these    · Feeling irritable during the day    · Dry mouth or a headache in the mornings    · Heavy night sweating    · A hard time thinking, remembering things, or focusing on your tasks the following day    Call your local emergency number (911 in the [address] [facility]) if:   · You have chest pain or trouble breathing  Call your doctor if:   · You have new or worsening signs or symptoms  · You have questions or concerns about your condition or care  Treatment  depends on the kind of apnea you have  · A mouth device  may be needed if you have mild sleep apnea  These are designed to keep your throat open  Ask your dentist or healthcare provider about the best mouth device for you  · A machine  may be used to help you get more air during sleep  A mask may be placed over your nose and mouth, or just your nose  The mask is hooked to the machine  You will get air through the mask      ? A continuous positive airway pressure (CPAP) machine is used to keep your airway open during sleep  The machine blows a gentle stream of air into the mask when you breathe  This helps keep your airway open so you can breathe more regularly  Extra oxygen may be given through the machine  ? A bilevel positive airway pressure (BiPAP) machine  gives air but lowers the pressure when you breathe out  ? An adaptive servo-ventilator (ASV)  is a machine that learns your usual breathing pattern  Then, it uses pressure to give you air and prevent stops in your breathing  · Surgery  to expand your airway or remove extra tissues may be needed  Surgery is usually only considered if other treatments do not work  Manage or prevent sleep apnea:   · Reach and maintain a healthy weight  Ask your healthcare provider what a healthy weight is for you  Ask him or her to help you create a safe weight loss plan if you are overweight  Even a small goal of a 10% weight loss can improve your symptoms  · Do not smoke  Nicotine and other chemicals in cigarettes and cigars can cause lung damage  Ask your healthcare provider for information if you currently smoke and need help to quit  E-cigarettes or smokeless tobacco still contain nicotine  Talk to your healthcare provider before you use these products  · Do not drink alcohol or take sedative medicine before you go to sleep  Alcohol and sedatives can relax the muscles and tissues around your throat  This can block the airflow to your lungs  · Sleep on your side or use pillows designed to prevent sleep apnea  This prevents your tongue or other tissues from blocking your throat  You can also raise the head of your bed  Follow up with your doctor or specialist as directed: You may need to have blood tests during your follow-up visits  Work with your provider to find the right breathing support equipment and settings for you  Write down your questions so you remember to ask them during your visits    © Copyright [employer] 2021 Information is for [employer] use only and may not be sold, redistributed or otherwise used for commercial purposes  All illustrations and images included in CareNotes® are the copyrighted property of A D A M , Inc  or Art Rodriguez  The above information is an  only  It is not intended as medical advice for individual conditions or treatments  Talk to your doctor, nurse or pharmacist before following any medical regimen to see if it is safe and effective for you

## 2022-01-26 NOTE — PROGRESS NOTES
Sleep Consultation   Adela Coles 61 y o  male MRN: [id number]      Reason for consultation: SOL     Requesting physician: Dr Garcia Score     Assessment/Plan  1  Obstructive sleep apnea  Assessment & Plan:  · Moderate SOL recent diagnosed on a home sleep study test with an average AHI of 16 8 events per hour, associated with significant nocturnal hypoxemia with 19% of total sleep time with oxygen desaturation below 88% that seems to be a little bit disproportionate to the severity of the sleep apnea, perhaps status secondary to the underlying chronic pulmonary embolism/possible pulmonary hypertension  I would like to obtain an in-lab CPAP titration study  Counseled the patient extensively for the importance of treatment of obstructive sleep apnea the consequences of untreated obstructive sleep apnea cardiovascular and cerebrovascular morbidity    Orders:  -     CPAP Study; Future    2  History of pulmonary embolism  Assessment & Plan:  History of DVT and bilateral pulmonary embolism, on chronic anticoagulation was Xarelto    Orders:  -     CPAP Study; Future    3  Nocturnal hypoxemia  Assessment & Plan:  Multifactorial moderate obstructive sleep apnea and history of chronic pulmonary embolism cannot rule out hypoventilation  I would like to obtain an in-lab titration study to assure that sleep apnea is sufficiently treated as well as nocturnal hypoxemia with using the CPAP  Orders:  -     CPAP Study; Future    4  Essential hypertension  Assessment & Plan:  Treatment of obstructive sleep apnea helps control blood pressure      5  Daytime somnolence  Assessment & Plan:  Secondary to underlying moderate obstructive sleep apnea as detailed above      6  Snoring  Assessment & Plan:  Secondary to underlying moderate SOL as detailed above            History of Present Illness   HPI:  Amarilis Martinez is a 61 y o  male with PMHx as below who comes in for evaluation of moderate SOL and nocturnal hypoxemia    The patient works as a  for [email or web address] he is currently working from home and has excessive daytime sleepiness and daytime somnolence with hard time keeping awake during the day although he denies taking naps but he feels struggling during the day hours  His sleep routine is going to bed around 11:00 p m  Falls asleep in half an hour wakes up at 6:30 a m  Takes him about half an hour to get out of bed he has at least 2 awakenings throughout the night for using the bathroom and he feels sleepy during the day although he does not take naps  He reports witnessed snoring loudly and snorts while he is asleep  He has once a week experiences daytime headache and chronic pain  He drinks 1 cup of coffee denies smoking, alcohol or drug abuse  His Durham scale is 6/24          Review of Systems      Genitourinary difficulty with erection   Cardiology none   Gastrointestinal none   Neurology awaken with headache and difficulty with memory   Constitutional fatigue   Integumentary none   Psychiatry anxiety   Musculoskeletal joint pain, back pain and sciatica   Pulmonary shortness of breath with activity, frequent cough, snoring and difficulty breathing when lying flat    ENT throat clearing   Endocrine none   Hematological none           Historical Information   Past Medical History:   Diagnosis Date    Acute maxillary sinusitis     Asthmatic bronchitis     Benign prostatic hyperplasia with urinary incontinence without sensory awareness     Cervical disc disease     Cervicalgia     Chest pain     [facility]VT [facility]deep venous thrombosis) ([facility] [address] ) 2004    Erectile dysfunction     Fatigue     Hypercholesterolemia     Hyperlipidemia     Hypertension     IFG (impaired fasting glucose)     [facility] 2004    on xarelto    Screening for colon cancer     Screening for prostate cancer     Sleep apnea     12/21/21--states he is being worked up for sleep apnea     Past Surgical History:   Procedure Laterality Date    BACK SURGERY  2004    CHOLECYSTECTOMY  2009    COLONOSCOPY      [place] ARTHROSCOPY SHOULDER SURGICAL BICEPS TENODESIS Left 3/9/2021    Procedure: ARTHROSCOPIC BICEPS TENODESIS;  Surgeon: Dk Roldan DO;  Location: [address] [place] [address] MAIN OR;  Service: Orthopedics    [place] PARTIAL REMOVAL, CLAVICLE Left 3/9/2021    Procedure: DISTAL CLAVICLE EXCISION;  Surgeon: Dk Roldan DO;  Location: [address] [place] [address] MAIN OR;  Service: Orthopedics    W[place] SHLDR ARTHROSCOP,SURG,W/ROTAT CUFF REPR Left 3/9/2021    Procedure: REPAIR ROTATOR CUFF  ARTHROSCOPIC;  Surgeon: Dk Roldan DO;  Location: [address] [place] [address] MAIN OR;  Service: Orthopedics     Family History   Problem Relation Age of Onset    Coronary artery disease Mother     Hypertrophic cardiomyopathy Mother     Thyroid disease Mother     Coronary artery disease Sister     Irritable bowel syndrome Sister     Crohn's disease Sister     Substance Abuse Sister     Mental illness Sister     Substance Abuse Brother     Mental illness Brother     Heart disease Family         cardiovascular disease, ischemic heart disease    Cancer Family      Social History     Socioeconomic History    Marital status: /Civil Union     Spouse name: Not on file    Number of children: Not on file    Years of education: Not on file    Highest education level: Not on file   Occupational History    Not on file   Tobacco Use    Smoking status: Never Smoker    Smokeless tobacco: Never Used   Vaping Use    Vaping Use: Never used   Substance and Sexual Activity    Alcohol use: Yes     Comment: SOCIAL    Drug use: No    Sexual activity: Yes   Other Topics Concern    Not on file   Social History Narrative    Lives with wife  Feels safe at home  Has living will  Sees dentist reg       Good dental hygiene    Living situation: supportive and safe     Social Determinants of Health     Financial Resource Strain: Not on file   Food Insecurity: Not on file   Transportation Needs: No Transportation Needs  Lack of Transportation (Medical): No    Lack of Transportation (Non-Medical): No   Physical Activity: Inactive    Days of Exercise per Week: 0 days    Minutes of Exercise per Session: 0 min   Stress: No Stress Concern Present    Feeling of Stress : Only a little   Social Connections: Moderately Isolated    Frequency of Communication with Friends and Family: Twice a week    Frequency of Social Gatherings with Friends and Family: Twice a week    Attends [employer] Services: Never    Active Member of Clubs or Organizations: No    Attends Club or Organization Meetings: Never    Marital Status:    Intimate Partner Violence: Not At Risk    Fear of Current or Ex-Partner: No    Emotionally Abused: No    Physically Abused: No    Sexually Abused: No   Housing Stability: Not on file       Occupational History:      Meds/Allergies   Allergies   Allergen Reactions    Vancomycin Rash     Peeling of skin    Ace Inhibitors Cough       Home medications:  Prior to Admission medications    Medication Sig Start Date End Date Taking?  Authorizing Provider   doxazosin (CARDURA) 2 mg tablet Take 1 tablet (2 mg total) by mouth daily at bedtime 8/30/21  Yes Orquidea Tolbert MD   fluticasone Tania [place]) 50 mcg/act nasal spray 2 sprays as needed   11/3/17  Yes Historical Provider, MD   losartan-hydrochlorothiazide (HYZAAR) 100-25 MG per tablet Take 1 tablet by mouth daily 7/1/21  Yes Federico Ferrer PA-C   Multiple Vitamin (MULTIVITAMIN ADULT PO) Take by mouth   Yes Historical Provider, MD   Probiotic Product (PROBIOTIC PO) Take by mouth   Yes Historical Provider, MD   sildenafil (REVATIO) 20 mg tablet 3 pills by mouth before sexual activity 11/30/21  Yes Orquidea Tolbert MD   simvastatin (ZOCOR) 10 mg tablet TAKE 1 TABLET BY MOUTH EVERY DAY 12/12/21  Yes Orquidea Tolbert MD   Xarelto 20 MG tablet TAKE 1 TABLET BY MOUTH EVERY DAY 12/12/21  Yes Orquidea Tolbert MD       Vitals:   Blood pressure 122/78, pulse 78, height 6' 1" (1 854 m), weight 107 kg (235 lb 3 2 oz), SpO2 97 %  ,  Body mass index is 31 03 kg/m²  Physical Exam  General:  Awake alert and oriented x 3, conversant without conversational dyspnea, NAD, normal affect  HEENT:   Sclera noninjected, nonicteric OU, Nares patent,  no craniofacial abnormalities, Mucous membranes, moist, no oral lesions, normal dentition, Mallampati class 4  NECK:  Trachea midline, no accessory muscle use, no stridor,  JVP not elevated  CARDIAC: Reg, single s1/S2, no m/r/g  PULM: CTA bilaterally no wheezing, rhonchi or rales  ABD: Soft nontender, nondistended, no rebound, no rigidity, no guarding  EXT: No cyanosis, no clubbing, no edema, normal capillary refill  NEURO: no focal neurologic deficits, AAOx3, moving all extremities appropriately    Labs: I have personally reviewed pertinent lab results  , ABG: No results found for: PHART, [id number], PO2ART, SUX5TGH, [id number], BEART, SOURCE, BNP: No results found for: BNP, CBC: No results found for: WBC, HGB, HCT, MCV, PLT, ADJUSTEDWBC, MCH, MCHC, RDW, MPV, NRBC, CMP: No results found for: SODIUM, K, CL, CO2, ANIONGAP, BUN, CREATININE, GLUCOSE, CALCIUM, AST, ALT, ALKPHOS, PROT, BILITOT, EGFR, PT/INR: No results found for: PT, INR, Troponin: No results found for: TROPONINI  Lab Results   Component Value Date    WBC 8 08 02/11/2021    HGB 15 1 02/11/2021    HCT 44 1 02/11/2021    MCV 94 02/11/2021     02/11/2021      Lab Results   Component Value Date    GLUCOSE 93 06/21/2014    CALCIUM 8 7 03/12/2018     06/21/2014    K 4 2 11/19/2021    CO2 27 11/19/2021     11/19/2021    BUN 12 11/19/2021    CREATININE 0 78 11/19/2021     No results found for: IRON, TIBC, FERRITIN  No results found for: [id number]  No results found for: FOLATE      Sleep studies:  HST 12/2021:  AHI 16 8 events per hour with 19% of total sleep time with oxygen desaturation below 88%    Tay Franco MD  [address] [facility] Portions of the record may have been created with voice recognition software  Occasional wrong word or "sound a like" substitutions may have occurred due to the inherent limitations of voice recognition software  Read the chart carefully and recognize, using context, where substitutions have occurred

## 2022-01-26 NOTE — ASSESSMENT & PLAN NOTE
Multifactorial moderate obstructive sleep apnea and history of chronic pulmonary embolism cannot rule out hypoventilation  I would like to obtain an in-lab titration study to assure that sleep apnea is sufficiently treated as well as nocturnal hypoxemia with using the CPAP

## 2022-01-26 NOTE — ASSESSMENT & PLAN NOTE
· Moderate SOL recent diagnosed on a home sleep study test with an average AHI of 16 8 events per hour, associated with significant nocturnal hypoxemia with 19% of total sleep time with oxygen desaturation below 88% that seems to be a little bit disproportionate to the severity of the sleep apnea, perhaps status secondary to the underlying chronic pulmonary embolism/possible pulmonary hypertension  I would like to obtain an in-lab CPAP titration study    Counseled the patient extensively for the importance of treatment of obstructive sleep apnea the consequences of untreated obstructive sleep apnea cardiovascular and cerebrovascular morbidity

## 2022-01-26 NOTE — PROGRESS NOTES
Review of Systems      Genitourinary difficulty with erection   Cardiology none   Gastrointestinal none   Neurology awaken with headache and difficulty with memory   Constitutional fatigue   Integumentary none   Psychiatry anxiety   Musculoskeletal joint pain, back pain and sciatica   Pulmonary shortness of breath with activity, frequent cough, snoring and difficulty breathing when lying flat    ENT throat clearing   Endocrine none   Hematological none

## 2022-02-03 ENCOUNTER — TELEPHONE (OUTPATIENT)
Dept: SLEEP CENTER | Facility: CLINIC | Age: 67
End: 2022-02-03

## 2022-02-03 DIAGNOSIS — G47.33 OBSTRUCTIVE SLEEP APNEA: Primary | ICD-10-CM

## 2022-02-03 NOTE — TELEPHONE ENCOUNTER
Patient's insurance denied titration sleep study  Insurance recommended APAP  Peer to peer can be done by calling [phone]  How would you like to proceed?

## 2022-02-25 DIAGNOSIS — I10 ESSENTIAL HYPERTENSION: ICD-10-CM

## 2022-02-25 RX ORDER — DOXAZOSIN 2 MG/1
2 TABLET ORAL
Qty: 90 TABLET | Refills: 1 | Status: SHIPPED | OUTPATIENT
Start: 2022-02-25

## 2022-04-16 ENCOUNTER — IMMUNIZATIONS (OUTPATIENT)
Dept: FAMILY MEDICINE CLINIC | Facility: HOSPITAL | Age: 67
End: 2022-04-16

## 2022-04-16 PROCEDURE — 91305 COVID-19 PFIZER VACC TRIS-SUCROSE GRAY CAP 0.3 ML: CPT

## 2022-04-16 PROCEDURE — 0054A COVID-19 PFIZER VACC TRIS-SUCROSE GRAY CAP 0.3 ML: CPT

## 2022-04-20 DIAGNOSIS — I10 ESSENTIAL HYPERTENSION: ICD-10-CM

## 2022-04-20 RX ORDER — LOSARTAN POTASSIUM AND HYDROCHLOROTHIAZIDE 25; 100 MG/1; MG/1
1 TABLET ORAL DAILY
Qty: 90 TABLET | Refills: 3 | Status: SHIPPED | OUTPATIENT
Start: 2022-04-20 | End: 2022-07-24 | Stop reason: SDUPTHER

## 2022-06-16 DIAGNOSIS — E78.00 HYPERCHOLESTEREMIA: ICD-10-CM

## 2022-06-16 RX ORDER — SIMVASTATIN 10 MG
TABLET ORAL
Qty: 90 TABLET | Refills: 1 | Status: SHIPPED | OUTPATIENT
Start: 2022-06-16

## 2022-06-24 ENCOUNTER — OFFICE VISIT (OUTPATIENT)
Dept: PULMONOLOGY | Facility: HOSPITAL | Age: 67
End: 2022-06-24
Payer: COMMERCIAL

## 2022-06-24 VITALS
HEART RATE: 66 BPM | HEIGHT: 73 IN | SYSTOLIC BLOOD PRESSURE: 120 MMHG | DIASTOLIC BLOOD PRESSURE: 74 MMHG | OXYGEN SATURATION: 96 % | WEIGHT: 235 LBS | BODY MASS INDEX: 31.14 KG/M2 | RESPIRATION RATE: 16 BRPM

## 2022-06-24 DIAGNOSIS — Z86.711 HISTORY OF PULMONARY EMBOLISM: ICD-10-CM

## 2022-06-24 DIAGNOSIS — G47.33 OBSTRUCTIVE SLEEP APNEA: Primary | ICD-10-CM

## 2022-06-24 DIAGNOSIS — R05.3 CHRONIC COUGH: ICD-10-CM

## 2022-06-24 DIAGNOSIS — E66.9 OBESITY (BMI 30-39.9): ICD-10-CM

## 2022-06-24 DIAGNOSIS — Z79.01 CURRENT USE OF LONG TERM ANTICOAGULATION: ICD-10-CM

## 2022-06-24 PROCEDURE — 1036F TOBACCO NON-USER: CPT | Performed by: PHYSICIAN ASSISTANT

## 2022-06-24 PROCEDURE — 99214 OFFICE O/P EST MOD 30 MIN: CPT | Performed by: PHYSICIAN ASSISTANT

## 2022-06-24 PROCEDURE — 3008F BODY MASS INDEX DOCD: CPT | Performed by: PHYSICIAN ASSISTANT

## 2022-06-24 NOTE — PROGRESS NOTES
Answers for HPI/ROS submitted by the patient on 6/23/2022  Do you experience frequent throat clearing?: Yes  Chronicity: chronic  When did you first notice your symptoms?: more than 1 year ago  How often do your symptoms occur?: daily  Since you first noticed this problem, how has it changed?: unchanged  Do you have shortness of breath that occurs with effort or exertion?: No  Do you have ear congestion?: No  Do you have heartburn?: No  Do you have fatigue?: No  Do you have nasal congestion?: No  Do you have shortness of breath when lying flat?: No  Do you have shortness of breath when you wake up?: No  Do you have sweats?: No  Have you experienced weight loss?: No  Which of the following makes your symptoms worse?: nothing  Which of the following makes your symptoms better?: nothing    Assessment & Plan:      1  Obstructive sleep apnea     2  Chronic cough  XR chest pa & lateral   3  Obesity (BMI 30-39 9)     4  History of pulmonary embolism     5  Current use of long term anticoagulation         · Patient presenting for follow-up  · They are using the CPAP and benefitting from it  Better quality of sleep at night and more energy throughout the day  · Reviewed compliance report with the patient demonstrating residual AHI is acceptable at 0 7 (previously > 16) and they are compliant with use  · Will continue CPAP at current pressure settings  · Discussed regular cleaning and changing of the supplies  · Patient is aware of consequences of untreated sleep apnea including increased risk for cardiac disease and stroke and therefore the need for compliance  · Regarding chronic cough, seems likely related to PND  Advised to use Flonase consistently and monitor to see if this helps  Obtain CXR given chronicity of symptoms  · Healthy eating and exercise discussed    Subjective:     Patient ID: Robin Monsalve is a 61 y o  male      Chief Complaint:  Robin Monsalve is a very pleasant 61 y o  male with PMHx of SOL, PE, DVT, hypertension presenting for follow-up regarding CPAP management  Patient reports this is helping quite a bit with his quality of sleep and energy level during the day  No longer snoring loudly  He is using the CPAP consistently  He denies having shortness of breath during the day  He does have a chronic dry cough which seems to be worse when he lays back in his recliner  It is associated with postnasal drip  He does not have any chest tightness, wheezing, GERD symptoms, or pitting lower extremity edema  He is a lifelong nonsmoker  He did have some secondhand exposure to cigarette smoke in his childhood home  primary symptoms  Associated symptoms include coughing  Pertinent negatives include no chest pain, fever, headaches, myalgias or sore throat  The following portions of the patient's history were reviewed in this encounter and updated as appropriate: Past medical, social, surgical, family, allergies    Review of Systems   Constitutional: Negative for appetite change and fever  HENT: Positive for postnasal drip  Negative for ear pain, rhinorrhea, sneezing, sore throat and trouble swallowing  Respiratory: Positive for cough  Cardiovascular: Negative for chest pain  Musculoskeletal: Negative for myalgias  Neurological: Negative for headaches  All other systems reviewed and are negative  Objective:  Vitals:    06/24/22 0807 06/24/22 0808   BP: 120/74    BP Location: Left arm    Patient Position: Sitting    Cuff Size: Standard    Pulse: 66    Resp: 16    SpO2:  96%   Weight: 107 kg (235 lb)    Height: 6' 1" (1 854 m)        Physical Exam  Vitals and nursing note reviewed  Constitutional:       General: He is not in acute distress  Appearance: He is well-developed  He is not diaphoretic  HENT:      Head: Normocephalic and atraumatic  Right Ear: External ear normal       Left Ear: External ear normal    Eyes:      General: No scleral icterus          Right eye: No discharge  Left eye: No discharge  Neck:      Trachea: No tracheal deviation  Cardiovascular:      Rate and Rhythm: Normal rate and regular rhythm  Heart sounds: Normal heart sounds  No murmur heard  No friction rub  No gallop  Pulmonary:      Effort: Pulmonary effort is normal  No respiratory distress  Breath sounds: Normal breath sounds  No stridor  No wheezing  Musculoskeletal:         General: No deformity  Skin:     General: Skin is warm and dry  Coloration: Skin is not pale  Findings: No erythema  Neurological:      Mental Status: He is alert and oriented to person, place, and time  Cranial Nerves: No cranial nerve deficit  Motor: No abnormal muscle tone  Psychiatric:         Behavior: Behavior normal          Thought Content: Thought content normal          Judgment: Judgment normal          Lab Review:   No visits with results within 2 Month(s) from this visit  Latest known visit with results is:   Hospital Outpatient Visit on 12/21/2021   Component Date Value    Case Report 12/21/2021                      Value:Surgical Pathology Report                         Case: [id number]                                   Authorizing Provider:  Papa Sidhu MD           Collected:           12/21/2021 1316              Ordering Location:     [address] [place] [address] Received:            12/21/2021 2015              Pathologist:           Gilberto Almonte MD                                                     Specimen:    Large Intestine, Right/Ascending Colon, ascending colon polyp cold snare                   Final Diagnosis 12/21/2021                      Value: This result contains rich text formatting which cannot be displayed here   Additional Information 12/21/2021                      Value: This result contains rich text formatting which cannot be displayed here      Synoptic Checklist 12/21/2021                      Value: COLON/RECTUM POLYP FORM - GI - A                                                                                     :    Adenoma(s)      Gross Description 12/21/2021                      Value: This result contains rich text formatting which cannot be displayed here

## 2022-07-05 ENCOUNTER — APPOINTMENT (OUTPATIENT)
Dept: RADIOLOGY | Facility: CLINIC | Age: 67
End: 2022-07-05
Payer: COMMERCIAL

## 2022-07-05 DIAGNOSIS — R05.3 CHRONIC COUGH: ICD-10-CM

## 2022-07-05 PROCEDURE — 71046 X-RAY EXAM CHEST 2 VIEWS: CPT

## 2022-08-11 ENCOUNTER — APPOINTMENT (OUTPATIENT)
Dept: RADIOLOGY | Facility: CLINIC | Age: 67
End: 2022-08-11
Payer: COMMERCIAL

## 2022-08-11 ENCOUNTER — OFFICE VISIT (OUTPATIENT)
Dept: OBGYN CLINIC | Facility: CLINIC | Age: 67
End: 2022-08-11
Payer: COMMERCIAL

## 2022-08-11 VITALS
HEIGHT: 73 IN | BODY MASS INDEX: 30.48 KG/M2 | DIASTOLIC BLOOD PRESSURE: 76 MMHG | WEIGHT: 230 LBS | SYSTOLIC BLOOD PRESSURE: 122 MMHG

## 2022-08-11 DIAGNOSIS — M25.551 RIGHT HIP PAIN: ICD-10-CM

## 2022-08-11 DIAGNOSIS — S76.019A RUPTURE OF HIP ABDUCTOR TENDON: ICD-10-CM

## 2022-08-11 DIAGNOSIS — M25.551 RIGHT HIP PAIN: Primary | ICD-10-CM

## 2022-08-11 PROCEDURE — 73502 X-RAY EXAM HIP UNI 2-3 VIEWS: CPT

## 2022-08-11 PROCEDURE — 99203 OFFICE O/P NEW LOW 30 MIN: CPT | Performed by: PHYSICIAN ASSISTANT

## 2022-08-11 NOTE — PATIENT INSTRUCTIONS
Crutch Instructions   WHAT YOU NEED TO KNOW:   Crutches are tools that provide support and balance when you walk  You may need 1 or 2 crutches to help support your body weight  You may need crutches if you had surgery or an injury that affects your ability to walk  DISCHARGE INSTRUCTIONS:   Return to the emergency department if:   You have sudden numbness in a hand or arm  Your arm is swollen, red, and warm to the touch  Your fingers feel cold or have cramping pain  Call your doctor if:   Your crutches do not fit  One crutch is longer than the other  Your crutches break or get lost     The rubber tips of your crutches are split or loose  You get blisters or painful calluses on your hands or armpits  Your armpit is red, sore, or has bumps or pimples  Your arm muscles get weaker the longer you use the crutches  You have questions or concerns about your condition or care  How to use crutches safely:   Support your weight with your arms and hands  Do not support your weight with your armpits  This could hurt the nerves and blood vessels that are in your armpits  Keep your elbow bent when the crutch is in place under your arm  Walk slowly and carefully with crutches  Go up and down stairs and ramps slowly  Stop to rest when you feel tired  Get up slowly to a sitting or standing position  This will help prevent dizziness and fainting  Use your crutches only on firm ground  Use caution when you walk on ice or snow  Wet or waxed floors and smooth cement floors can be slippery  Watch out for small rugs or cords  Create clear pathways between rooms in your home  You may need to move your furniture to do this  Keep your needed items within reach  Carry items in a bag or backpack to keep your hands free  How to walk with crutches:   Place both crutches under your arms  Place your hands on the hand  of the crutches  Place your crutches slightly in front of you      Position the crutches  The top of the crutches should be about 2 fingers [id number] (about 1½ inches) below your armpits  Place your weight on your hands  The top of the crutches should not press into your armpits  If you have one leg that is injured,  keep it off the floor by bending your knee  Lift the crutches and move them a step ahead of you  Put the rubber ends of the crutches firmly on the ground  Move your injured leg between the crutches and slowly bring your body forward  Shift your weight onto the crutches using your arms  Take a normal step with your uninjured leg  If you are using your crutches for balance,  move your right foot and left crutch forward  Then move your left foot and right crutch forward  Keep walking this way  How to go up stairs with crutches:   Face the stairs  Put the crutches close to the first step  Push onto the crutches and put your uninjured leg on the first step  Put your weight on your uninjured leg that is on the first step  Bring both crutches and the injured leg onto the step at the same time  Make sure the rubber ends of the crutches are completely on the step  When you hold onto a railing with one arm, put both crutches under the other arm  Use the railing to help you go up the stairs  How to go down stairs with crutches:   Stand with the toes of your uninjured leg close to the edge of the step  Bend the knee of your uninjured leg  Slowly lower both crutches along with the injured leg onto the next step  Lean on your crutches  Slowly lower your uninjured leg onto the same step  Place both crutches under one arm while you hold onto the railing with the other arm  How to sit in a chair with crutches:   Make sure the chair is sturdy and will not move  Turn and back up to the chair until you feel the edge of it against the backs of your legs  Keep your injured leg forward  Hold both crutches with one hand   Use your other hand to reach back and hold on to the chair  Slowly lower your body to the chair  How to get up from a chair with crutches:   Sit on the edge of your chair  Push up with your hands using the crutches or arms of the chair  Put your weight on your uninjured foot as you get up  Keep your injured leg bent at the knee and off the floor  © Copyright [employer] 2022 Information is for End User's use only and may not be sold, redistributed or otherwise used for commercial purposes  All illustrations and images included in CareNotes® are the copyrighted property of [employer]  or [facility]   The above information is an  only  It is not intended as medical advice for individual conditions or treatments  Talk to your doctor, nurse or pharmacist before following any medical regimen to see if it is safe and effective for you

## 2022-08-11 NOTE — PROGRESS NOTES
Orthopaedic Surgery - Office Note  Abelardo Mayer (75 y o  male)   : 1955   MRN: [id number]  Encounter Date: 2022    Chief Complaint   Patient presents with    Right Hip - Pain         Assessment/Plan  Diagnoses and all orders for this visit:    Right hip pain  -     XR hip/pelv 2-3 vws right if performed; Future  -     Ambulatory Referral to Physical Therapy; Future  -     Durable Medical Equipment    Right hip abductor tendon s/s  -     Ambulatory Referral to Physical Therapy; Future  -     Durable Medical Equipment    I discussed with the patient he has a soft tissue muscle injury in his lateral hip most likely an abductor injury  He will ice this region 20 minutes on 1 hour off 3 times a day  He was educated on the likelihood of getting an ecchymotic area especially with his Xarelto use  I would not recommend any oral NSAIDs due to the due Xarelto use  He will use Tylenol as needed for pain  I recommend starting formal physical therapy and using crutches to avoid limping  He will follow-up upon return from his vacation for repeat evaluation with Dr Hal Valencia  Return 7-10 days with Dr Hal Valencia  History of Present Illness  This is a new patient with right hip pain  Patient has a history of pulmonary emboli and takes Xarelto chronically  Patient reports approximately 1 week ago while swinging a wedge on the golf courts he felt a pain in his lateral right hip  He reports that it felt like a twinge but eventually went away  Yesterday while doing a similar swing he felt what he describes as Velcro ripping in the lateral hip and has had some degree of pain in that region since that time  The pain is increased with trying to walk  He denies any groin pain  He has not had problems like this in the past   He denies any paresthesias  He believes he notices some swelling in that region  He reports he is leaving on Saturday to go to the [facility] for a week      Review of Systems  Pertinent items are noted in HPI  All other systems were reviewed and are negative  Physical Exam  /76   Ht 6' 1" (1 854 m)   Wt 104 kg (230 lb)   BMI 30 34 kg/m²   Cons: Appears well  No apparent distress  Psych: Alert  Oriented x3  Mood and affect normal   Eyes: PERRLA, EOMI  Resp: Normal effort  No audible wheezing or stridor  CV: Palpable pulse  No discernable arrhythmia  Lymph:  No palpable cervical, axillary, or inguinal lymphadenopathy  Skin: Warm  No palpable masses  No visible lesions  Neuro: Normal muscle tone  Normal and symmetric DTR's  Patient's right hip has a relatively well-maintained range of motion both actively and passively  He has no reproducible groin pain to forced internal rotation or log rolling  He has a negative drawer sign for fracture  Hip flexor strength is 5/5 without any pain  Adduction strength is 5/5 without pain  Hip abduction strength is decreased to 4/5 with pain to the lateral hip  Patient is tender to palpation over the greater trochanteric bursal region  There is trace soft tissue edema in this region  There is no appreciable ecchymosis at this time  He is nontender throughout the rest of the palpation of the right hip  There are no thigh masses  He has a negative straight leg raise  Gait is antalgic favoring the right side  His knee exam is within normal limits      Studies Reviewed  X-rays performed in the office today three views of the right hip show no acute fractures or dislocations  Mild degenerative changes are noted  I cannot appreciate any avulsion fractures or greater troch fractures  This was read from an orthopedic standpoint will await official radiologist interpretation    Procedures  No procedures today  Medical, Surgical, Family, and Social History  The patient's medical history, family history, and social history, were reviewed and updated as appropriate      Past Medical History:   Diagnosis Date    Acute maxillary sinusitis     Asthmatic bronchitis     Benign prostatic hyperplasia     Benign prostatic hyperplasia with urinary incontinence without sensory awareness     Cervical disc disease     Cervicalgia     Chest pain     DVT (deep venous thrombosis) ([facility] [address] ) 2004    Erectile dysfunction     Fatigue     Hypercholesterolemia     Hyperlipidemia     Hypertension     IFG (impaired fasting glucose)     [facility] 2004    on xarelto    Pneumonia 1971    Screening for colon cancer     Screening for prostate cancer     Sleep apnea     12/21/21--states he is being worked up for sleep apnea       Past Surgical History:   Procedure Laterality Date    BACK SURGERY  2004    CHOLECYSTECTOMY  2009    COLON SURGERY  2004    Polyps removed    COLONOSCOPY      EYE SURGERY  2016?    tprn retina    MS ARTHROSCOPY SHOULDER SURGICAL BICEPS TENODESIS Left 03/09/2021    Procedure: ARTHROSCOPIC BICEPS TENODESIS;  Surgeon: Анна Ibanez DO;  Location: [address] [place] [address] MAIN OR;  Service: Orthopedics    MS PARTIAL REMOVAL, CLAVICLE Left 03/09/2021    Procedure: DISTAL CLAVICLE EXCISION;  Surgeon: Анна Ibanez DO;  Location: [address] [place] [address] MAIN OR;  Service: Orthopedics    MS SHLDR ARTHROSCOP,SURG,W/ROTAT CUFF REPR Left 03/09/2021    Procedure: REPAIR ROTATOR CUFF  ARTHROSCOPIC;  Surgeon: Анна Ibanez DO;  Location: [address] [place] [address] MAIN OR;  Service: [address]  2004    Tumor removed lower spine       Family History   Problem Relation Age of Onset    Coronary artery disease Mother     Hypertrophic cardiomyopathy Mother     Thyroid disease Mother     Coronary artery disease Sister     Irritable bowel syndrome Sister     Crohn's disease Sister     Substance Abuse Sister     Mental illness Sister     Substance Abuse Brother     Mental illness Brother     Heart disease Family         cardiovascular disease, ischemic heart disease    Cancer Family     Cancer Father         jaw bone    Hypertension Father Social History     Occupational History    Not on file   Tobacco Use    Smoking status: Never Smoker    Smokeless tobacco: Never Used   Vaping Use    Vaping Use: Never used   Substance and Sexual Activity    Alcohol use: Yes     Comment: Inconsistent    Drug use: No    Sexual activity: Yes     Partners: Female       Allergies   Allergen Reactions    Vancomycin Rash     Peeling of skin    Ace Inhibitors Cough         Current Outpatient Medications:     doxazosin (CARDURA) 2 mg tablet, TAKE 1 TABLET (2 MG TOTAL) BY MOUTH DAILY AT BEDTIME, Disp: 90 tablet, Rfl: 1    fluticasone (FLONASE) 50 mcg/act nasal spray, 2 sprays as needed   (Patient not taking: Reported on 6/24/2022), Disp: , Rfl: 3    losartan-hydrochlorothiazide (HYZAAR) 100-25 MG per tablet, Take 1 tablet by mouth daily, Disp: 90 tablet, Rfl: 1    Multiple Vitamin (MULTIVITAMIN ADULT PO), Take by mouth, Disp: , Rfl:     Probiotic Product (PROBIOTIC PO), Take by mouth, Disp: , Rfl:     sildenafil (REVATIO) 20 mg tablet, 3 pills by mouth before sexual activity, Disp: 90 tablet, Rfl: 0    simvastatin (ZOCOR) 10 mg tablet, TAKE 1 TABLET BY MOUTH EVERY DAY, Disp: 90 tablet, Rfl: 1    Xarelto 20 MG tablet, TAKE 1 TABLET BY MOUTH EVERY DAY, Disp: 90 tablet, Rfl: 2      Johny Krishna PA-C

## 2022-08-22 ENCOUNTER — EVALUATION (OUTPATIENT)
Dept: PHYSICAL THERAPY | Facility: CLINIC | Age: 67
End: 2022-08-22
Payer: COMMERCIAL

## 2022-08-22 DIAGNOSIS — S76.019A RUPTURE OF HIP ABDUCTOR TENDON: ICD-10-CM

## 2022-08-22 DIAGNOSIS — M25.551 RIGHT HIP PAIN: Primary | ICD-10-CM

## 2022-08-22 PROCEDURE — 97110 THERAPEUTIC EXERCISES: CPT | Performed by: PHYSICAL THERAPIST

## 2022-08-22 PROCEDURE — 97161 PT EVAL LOW COMPLEX 20 MIN: CPT | Performed by: PHYSICAL THERAPIST

## 2022-08-22 NOTE — PROGRESS NOTES
PT Evaluation     Today's date: 2022  Patient name: Mateo Hutchinson  : 1955  MRN: [id number]  Referring provider: AMBROSIO Jeter*  Dx:   Encounter Diagnosis     ICD-10-CM    1  Right hip pain  M25 551 Ambulatory Referral to Physical Therapy   2  Right hip abductor tendon s/s  S76 019A Ambulatory Referral to Physical Therapy                  Assessment  Assessment details: Mateo Hutchinson is a 61 y o  male who presents with acute R hip pain  Pt has tenderness t/o R ITB and TFL  Pt has pain with end range ER and Delmar test  Pt currently has difficulty with prolonged ambulation, difficulty negotiating stairs, pain with car transfers, unable to golf  Pt would benefit from skilled PT to address the above impairments and to return to pain free function  Impairments: abnormal or restricted ROM, impaired physical strength, lacks appropriate home exercise program and pain with function  Functional limitations: difficulty with prolonged ambulation, difficulty negotiating stairs, pain with car transfers, unable to golf  Symptom irritability: moderateUnderstanding of Dx/Px/POC: good   Prognosis: good    Goals  1  Pt will be independent with HEP upon DC  2  Pt's R hip ROM will be pain free to allow for car transfers with ease  3  Pt will report minimal tenderness t/o R hip to allow for prolonged walking  4  Pt's pain will be no more than 2/10 to allow for return to golf        Plan  Patient would benefit from: skilled physical therapy  Planned modality interventions: low level laser therapy  Planned therapy interventions: joint mobilization, manual therapy, neuromuscular re-education, patient education, therapeutic activities, therapeutic exercise, strengthening and home exercise program  Frequency: 2x week  Duration in visits: 12  Duration in weeks: 6  Treatment plan discussed with: patient        Subjective Evaluation    History of Present Illness  Date of onset: 2022  Mechanism of injury: Pt reports that he was golfing and took a shot when he felt a "twinge/rip" is his R hip  Pt reports that his hip immediately was swollen and he experienced some burning down his lateral leg  Pt reports he walked with crutches for the next few days due to being unable to weight bear  X-rays were unremarkable  Pt scheduled for MRI this Friday  Pt presents to OP PT  Not a recurrent problem   Quality of life: good    Pain  Current pain ratin  At best pain ratin  At worst pain ratin  Location: r hip  Quality: dull ache  Relieving factors: rest  Aggravating factors: standing and walking  Progression: improved      Diagnostic Tests  X-ray: normal  Treatments  No previous or current treatments  Patient Goals  Patient goals for therapy: decreased pain and return to sport/leisure activities          Objective     Palpation     Right   Tenderness of the gluteus medius and TFL  Active Range of Motion     Lumbar   Normal active range of motion    Passive Range of Motion     Right Hip   Flexion: [facility]  External rotation (90/90): 35 degrees with pain  Internal rotation (90/90): 20 degrees     Strength/Myotome Testing     Lumbar   Left   Normal strength    Right   Normal strength    Tests     Right Hip   Positive TOMMIE and Delmar                Precautions: none      Manuals             Inf/lat hip mobs                                                    Neuro Re-Ed                                                                                                        Ther Ex             Upright bike             ITB stretch with strap 3x30"            Supine hip flexor stretch 3x30"            Figure 4 stretch 3x30"            Bridging with ball sq             Supine SLR with ER             R SL clamshells             Prone hip ext             Prone donkey kick             Prone ER ball sq                                                                 Ther Activity             Step ups (lat/cross)

## 2022-08-24 ENCOUNTER — OFFICE VISIT (OUTPATIENT)
Dept: PHYSICAL THERAPY | Facility: CLINIC | Age: 67
End: 2022-08-24
Payer: COMMERCIAL

## 2022-08-24 ENCOUNTER — OFFICE VISIT (OUTPATIENT)
Dept: FAMILY MEDICINE CLINIC | Facility: HOSPITAL | Age: 67
End: 2022-08-24
Payer: COMMERCIAL

## 2022-08-24 VITALS
SYSTOLIC BLOOD PRESSURE: 132 MMHG | WEIGHT: 235 LBS | BODY MASS INDEX: 31.14 KG/M2 | OXYGEN SATURATION: 95 % | RESPIRATION RATE: 16 BRPM | HEIGHT: 73 IN | DIASTOLIC BLOOD PRESSURE: 76 MMHG | HEART RATE: 68 BPM

## 2022-08-24 DIAGNOSIS — M25.551 RIGHT HIP PAIN: Primary | ICD-10-CM

## 2022-08-24 DIAGNOSIS — R97.20 ELEVATED PSA: ICD-10-CM

## 2022-08-24 DIAGNOSIS — Z13.1 SCREENING FOR DIABETES MELLITUS: ICD-10-CM

## 2022-08-24 DIAGNOSIS — Z13.6 SCREENING FOR CARDIOVASCULAR CONDITION: ICD-10-CM

## 2022-08-24 DIAGNOSIS — S76.019A RUPTURE OF HIP ABDUCTOR TENDON: ICD-10-CM

## 2022-08-24 DIAGNOSIS — R73.01 IMPAIRED FASTING GLUCOSE: ICD-10-CM

## 2022-08-24 DIAGNOSIS — Z00.00 ANNUAL PHYSICAL EXAM: Primary | ICD-10-CM

## 2022-08-24 PROCEDURE — 3725F SCREEN DEPRESSION PERFORMED: CPT | Performed by: INTERNAL MEDICINE

## 2022-08-24 PROCEDURE — 99396 PREV VISIT EST AGE 40-64: CPT | Performed by: INTERNAL MEDICINE

## 2022-08-24 PROCEDURE — 97530 THERAPEUTIC ACTIVITIES: CPT

## 2022-08-24 PROCEDURE — 97140 MANUAL THERAPY 1/> REGIONS: CPT

## 2022-08-24 PROCEDURE — 97110 THERAPEUTIC EXERCISES: CPT

## 2022-08-24 NOTE — PATIENT INSTRUCTIONS

## 2022-08-24 NOTE — ASSESSMENT & PLAN NOTE
Patient has BPH and urination was when forgot to take his doxazosin for vacation  He denies hematuria    Since PSA was more than 4 in November last year I will recheck PSA tomorrow

## 2022-08-24 NOTE — PROGRESS NOTES
[facility] [address]    NAME: Jeny Coles  AGE: 61 y o  SEX: male  : 1955     DATE: 2022     Assessment and Plan:     Problem List Items Addressed This Visit        Endocrine    Impaired fasting glucose     Patient has impaired fasting glucose, I will check his A1c         Relevant Orders    Hemoglobin A1C With EAG       Other    Elevated PSA     Patient has BPH and urination was when forgot to take his doxazosin for vacation  He denies hematuria  Since PSA was more than 4 in November last year I will recheck PSA tomorrow         Relevant Orders    PSA, total and free      Other Visit Diagnoses     Annual physical exam    -  Primary    Screening for diabetes mellitus        Relevant Orders    Comprehensive metabolic panel    Screening for cardiovascular condition        Relevant Orders    Lipid panel          Immunizations and preventive care screenings were discussed with patient today  Appropriate education was printed on patient's after visit summary  Counseling:  · Exercise: the importance of regular exercise/physical activity was discussed  Recommend exercise 3-5 times per week for at least 30 minutes  Depression Screening and Follow-up Plan: Patient was screened for depression during today's encounter  They screened negative with a PHQ-2 score of 0  Return in about 1 year (around 2023) for Annual physical      Chief Complaint:     Chief Complaint   Patient presents with    Annual Exam      History of Present Illness:     Adult Annual Physical   Patient here for a comprehensive physical exam  The patient reports no problems  Diet and Physical Activity  · Diet/Nutrition: well balanced diet  · Exercise: walking        Depression Screening  PHQ-2/9 Depression Screening    Little interest or pleasure in doing things: 0 - not at all  Feeling down, depressed, or hopeless: 0 - not at all  PHQ-2 Score: 0  PHQ-2 Interpretation: Negative depression screen       General Health  · Sleep: sleeps well  · Hearing: normal - bilateral   · Vision: wears glasses  · Dental: regular dental visits   Health  · Symptoms include: weak urinary stream     Review of Systems:     Review of Systems   Constitutional: Negative for fever and unexpected weight change  HENT: Negative for hearing loss  Eyes: Negative for visual disturbance  Respiratory: Negative for cough and shortness of breath  Cardiovascular: Negative for chest pain  Gastrointestinal: Negative for abdominal pain, blood in stool and constipation  Genitourinary: Positive for difficulty urinating ( stable on doxazosin)  Negative for hematuria  Musculoskeletal:        Patient had right pain weeks ago and which is getting better, he is able to walk better   Skin: Negative for rash  Neurological: Negative for headaches  Psychiatric/Behavioral: Negative for dysphoric mood        Past Medical History:     Past Medical History:   Diagnosis Date    Acute maxillary sinusitis     Asthmatic bronchitis     Benign prostatic hyperplasia     Benign prostatic hyperplasia with urinary incontinence without sensory awareness     Cervical disc disease     Cervicalgia     Chest pain     [facility]) ([facility] [address] ) 2004    Erectile dysfunction     Fatigue     Hypercholesterolemia     Hyperlipidemia     Hypertension     IFG (impaired fasting glucose)     PE (pulmonary thromboembolism) ([facility] [address] ) 2004    on xarelto    Pneumonia 1971    Screening for colon cancer     Screening for prostate cancer     Sleep apnea     12/21/21--states he is being worked up for sleep apnea      Past Surgical History:     Past Surgical History:   Procedure Laterality Date    BACK SURGERY  2004    CHOLECYSTECTOMY  2009    COLON SURGERY  2004    Polyps removed    COLONOSCOPY      EYE SURGERY  2016?    tprn retina    [place] ARTHROSCOPY SHOULDER SURGICAL BICEPS TENODESIS Left 03/09/2021    Procedure: ARTHROSCOPIC BICEPS TENODESIS;  Surgeon: Phyllis Veliz DO;  Location: [address] [place] [address] MAIN OR;  Service: Orthopedics    SD PARTIAL REMOVAL, CLAVICLE Left 03/09/2021    Procedure: DISTAL CLAVICLE EXCISION;  Surgeon: Phyllis Veliz DO;  Location: [address] [place] [address] MAIN OR;  Service: Orthopedics    SD SHLDR ARTHROSCOP,SURG,W/ROTAT CUFF REPR Left 03/09/2021    Procedure: REPAIR ROTATOR CUFF  ARTHROSCOPIC;  Surgeon: Phyllis Veliz DO;  Location: [address] [place] [address] MAIN OR;  Service: [facility] SURGERY  2004    Tumor removed lower spine      Family History:     Family History   Problem Relation Age of Onset    Coronary artery disease Mother     Hypertrophic cardiomyopathy Mother     Thyroid disease Mother     Coronary artery disease Sister     Irritable bowel syndrome Sister     Crohn's disease Sister     Substance Abuse Sister     Mental illness Sister     Substance Abuse Brother     Mental illness Brother     Heart disease Family         cardiovascular disease, ischemic heart disease    Cancer Family     Cancer Father         jaw bone    Hypertension Father       Social History:     Social History     Socioeconomic History    Marital status: /Civil Union     Spouse name: None    Number of children: None    Years of education: None    Highest education level: None   Occupational History    None   Tobacco Use    Smoking status: Never Smoker    Smokeless tobacco: Never Used   Vaping Use    Vaping Use: Never used   Substance and Sexual Activity    Alcohol use: Yes     Comment: Inconsistent    Drug use: No    Sexual activity: Yes     Partners: Female   Other Topics Concern    None   Social History Narrative    Lives with wife  Feels safe at home  Has living will  Sees dentist reg       Good dental hygiene    Living situation: supportive and safe     Social Determinants of Health     Financial Resource Strain: Not on file   Food Insecurity: Not on file Transportation Needs: Not on file   Physical Activity: Not on file   Stress: Not on file   Social Connections: Not on file   Intimate Partner Violence: Not on file   Housing Stability: Not on file      Current Medications:     Current Outpatient Medications   Medication Sig Dispense Refill    doxazosin (CARDURA) 2 mg tablet TAKE 1 TABLET BY MOUTH DAILY AT BEDTIME 90 tablet 1    losartan-hydrochlorothiazide (HYZAAR) 100-25 MG per tablet Take 1 tablet by mouth daily 90 tablet 1    Multiple Vitamin (MULTIVITAMIN ADULT PO) Take by mouth      Probiotic Product (PROBIOTIC PO) Take by mouth      sildenafil (REVATIO) 20 mg tablet 3 pills by mouth before sexual activity 90 tablet 0    simvastatin (ZOCOR) 10 mg tablet TAKE 1 TABLET BY MOUTH EVERY DAY 90 tablet 1    Xarelto 20 MG tablet TAKE 1 TABLET BY MOUTH EVERY DAY 90 tablet 2    fluticasone (FLONASE) 50 mcg/act nasal spray 2 sprays as needed   (Patient not taking: No sig reported)  3     No current facility-administered medications for this visit  Allergies: Allergies   Allergen Reactions    Vancomycin Rash     Peeling of skin    Ace Inhibitors Cough      Physical Exam:     /76   Pulse 68   Resp 16   Ht 6' 1" (1 854 m)   Wt 107 kg (235 lb)   SpO2 95%   BMI 31 00 kg/m²     Physical Exam  Constitutional:       Appearance: He is well-developed  HENT:      Head: Normocephalic  Right Ear: Tympanic membrane normal  There is no impacted cerumen  Left Ear: Tympanic membrane normal  There is no impacted cerumen  Eyes:      Conjunctiva/sclera: Conjunctivae normal    Cardiovascular:      Rate and Rhythm: Normal rate and regular rhythm  Heart sounds: No murmur heard  Pulmonary:      Effort: No respiratory distress  Breath sounds: No wheezing or rales  Abdominal:      General: Bowel sounds are normal  There is no distension  Palpations: Abdomen is soft  Tenderness: There is no abdominal tenderness  Musculoskeletal:         General: No tenderness  Cervical back: Neck supple  Skin:     General: Skin is warm and dry  Findings: No erythema  Neurological:      Mental Status: He is alert and oriented to person, place, and time  Cranial Nerves: No cranial nerve deficit  Motor: No weakness  Gait: Gait normal    Psychiatric:         Mood and Affect: Mood normal          Thought Content:  Thought content normal           Rosy Encinas MD  [address]

## 2022-08-24 NOTE — PROGRESS NOTES
Daily Note     Today's date: 2022  Patient name: Preet Rodriguez  : 1955  MRN: [id number]  Referring provider: AMBROSIO Yoon*  Dx:   Encounter Diagnosis     ICD-10-CM    1  Right hip pain  M25 551    2  Right hip abductor tendon s/s  S76 019A        Start Time: 0802  Stop Time: 0845  Total time in clinic (min): 43 minutes       Subjective: No significant changes to report since the initial evaluation  Objective: See treatment diary below  Assessment: Intermittent crepitus in the right hip when performing cross over step up  Patient is comfortable throughout manual therapy  Plan: Continue treatment as per PT plan of care         Precautions: none      Manuals            Inf/lat hip mobs  inf/lat glides with belt  JLW                                                  Neuro Re-Ed                                                                                                        Ther Ex             ITB stretch with strap 3x30" 3x30"           Supine hip flexor stretch 3x30" 3x30"           Figure 4 stretch 3x30" 3x30"           Bridging with ball sq  5"x20           Supine SLR with ER  20           R SL clamshells  5"x20           Prone hip ext  20           Prone donkey kick  20           Prone ER ball sq  5"x20                                                               Ther Activity             upright bike  8'           step ups (lat/cross)  8"  20 ea

## 2022-08-25 ENCOUNTER — APPOINTMENT (OUTPATIENT)
Dept: LAB | Facility: CLINIC | Age: 67
End: 2022-08-25
Payer: COMMERCIAL

## 2022-08-25 DIAGNOSIS — R73.01 IMPAIRED FASTING GLUCOSE: ICD-10-CM

## 2022-08-25 DIAGNOSIS — Z13.6 SCREENING FOR ISCHEMIC HEART DISEASE: ICD-10-CM

## 2022-08-25 DIAGNOSIS — Z13.1 SCREENING FOR DIABETES MELLITUS: ICD-10-CM

## 2022-08-25 DIAGNOSIS — R97.20 ELEVATED PROSTATE SPECIFIC ANTIGEN (PSA): ICD-10-CM

## 2022-08-25 LAB
ALBUMIN SERPL BCP-MCNC: 3.8 G/DL (ref 3.5–5)
ALP SERPL-CCNC: 38 U/L (ref 46–116)
ALT SERPL W P-5'-P-CCNC: 31 U/L (ref 12–78)
ANION GAP SERPL CALCULATED.3IONS-SCNC: 3 MMOL/L (ref 4–13)
AST SERPL W P-5'-P-CCNC: 20 U/L (ref 5–45)
BILIRUB SERPL-MCNC: 0.67 MG/DL (ref 0.2–1)
BUN SERPL-MCNC: 14 MG/DL (ref 5–25)
CALCIUM SERPL-MCNC: 10 MG/DL (ref 8.3–10.1)
CHLORIDE SERPL-SCNC: 106 MMOL/L (ref 96–108)
CHOLEST SERPL-MCNC: 144 MG/DL
CO2 SERPL-SCNC: 29 MMOL/L (ref 21–32)
CREAT SERPL-MCNC: 0.79 MG/DL (ref 0.6–1.3)
EST. AVERAGE GLUCOSE BLD GHB EST-MCNC: 126 MG/DL
GFR SERPL CREATININE-BSD FRML MDRD: 94 ML/MIN/1.73SQ M
GLUCOSE P FAST SERPL-MCNC: 109 MG/DL (ref 65–99)
HBA1C MFR BLD: 6 %
HDLC SERPL-MCNC: 42 MG/DL
LDLC SERPL CALC-MCNC: 82 MG/DL (ref 0–100)
NONHDLC SERPL-MCNC: 102 MG/DL
POTASSIUM SERPL-SCNC: 3.8 MMOL/L (ref 3.5–5.3)
PROT SERPL-MCNC: 6.7 G/DL (ref 6.4–8.4)
SODIUM SERPL-SCNC: 138 MMOL/L (ref 135–147)
TRIGL SERPL-MCNC: 99 MG/DL

## 2022-08-25 PROCEDURE — 84154 ASSAY OF PSA FREE: CPT

## 2022-08-25 PROCEDURE — 84153 ASSAY OF PSA TOTAL: CPT

## 2022-08-25 PROCEDURE — 36415 COLL VENOUS BLD VENIPUNCTURE: CPT

## 2022-08-25 PROCEDURE — 80061 LIPID PANEL: CPT

## 2022-08-25 PROCEDURE — 80053 COMPREHEN METABOLIC PANEL: CPT

## 2022-08-25 PROCEDURE — 83036 HEMOGLOBIN GLYCOSYLATED A1C: CPT

## 2022-08-26 ENCOUNTER — HOSPITAL ENCOUNTER (OUTPATIENT)
Dept: MRI IMAGING | Facility: HOSPITAL | Age: 67
End: 2022-08-26
Payer: COMMERCIAL

## 2022-08-26 DIAGNOSIS — M25.551 RIGHT HIP PAIN: ICD-10-CM

## 2022-08-26 LAB
PSA FREE MFR SERPL: 18.1 %
PSA FREE SERPL-MCNC: 0.78 NG/ML
PSA SERPL-MCNC: 4.3 NG/ML (ref 0–4)

## 2022-08-26 PROCEDURE — G1004 CDSM NDSC: HCPCS

## 2022-08-26 PROCEDURE — 73721 MRI JNT OF LWR EXTRE W/O DYE: CPT

## 2022-08-30 ENCOUNTER — OFFICE VISIT (OUTPATIENT)
Dept: PHYSICAL THERAPY | Facility: CLINIC | Age: 67
End: 2022-08-30
Payer: COMMERCIAL

## 2022-08-30 DIAGNOSIS — M25.551 RIGHT HIP PAIN: Primary | ICD-10-CM

## 2022-08-30 DIAGNOSIS — S76.019A RUPTURE OF HIP ABDUCTOR TENDON: ICD-10-CM

## 2022-08-30 PROCEDURE — 97140 MANUAL THERAPY 1/> REGIONS: CPT

## 2022-08-30 PROCEDURE — 97110 THERAPEUTIC EXERCISES: CPT

## 2022-08-30 PROCEDURE — 97530 THERAPEUTIC ACTIVITIES: CPT

## 2022-08-30 NOTE — PROGRESS NOTES
Daily Note     Today's date: 2022  Patient name: Maggy Aguirre  : 1955  MRN: [id number]  Referring provider: AMBROSIO Gastelum*  Dx:   Encounter Diagnosis     ICD-10-CM    1  Right hip pain  M25 551    2  Right hip abductor tendon s/s  S76 019A        Start Time: 54  Stop Time: 09  Total time in clinic (min): 42 minutes       Subjective: Patient reports he experienced muscle soreness following the last PT session  Patient states, "The MRI did come back showing there is a tear "      Objective: See treatment diary below  Assessment: Therapeutic exercise program and manual therapy tolerated well  Provided patient with verbal cues to focus on glute activation during bridge activity to avoid cramping in B/L hamstrings  Plan: Continue treatment as per PT plan of care         Precautions: none      Manuals           Inf/lat hip mobs  inf/lat glides with belt  JLW inf/lat glides with belt  JLW          gentle HS stretch   JLW                                    Neuro Re-Ed                                                                                                        Ther Ex             ITB stretch with strap 3x30" 3x30" 3x30"          Supine hip flexor stretch 3x30" 3x30" 3x30"          Figure 4 stretch 3x30" 3x30" 3x30"          Bridging with ball sq  5"x20 5"x20          Supine SLR with ER  20 20          R SL clamshells  5"x20 5"x20          Prone hip ext  20 20          Prone donkey kick  20 20          Prone ER ball sq  5"x20 5"x20                                                              Ther Activity             upright bike  8' recum  8'          step ups (lat/cross)  8"  20 ea 8"  20 ea

## 2022-08-31 ENCOUNTER — OFFICE VISIT (OUTPATIENT)
Dept: OBGYN CLINIC | Facility: CLINIC | Age: 67
End: 2022-08-31
Payer: COMMERCIAL

## 2022-08-31 VITALS
DIASTOLIC BLOOD PRESSURE: 82 MMHG | BODY MASS INDEX: 31.01 KG/M2 | WEIGHT: 234 LBS | SYSTOLIC BLOOD PRESSURE: 128 MMHG | HEIGHT: 73 IN

## 2022-08-31 DIAGNOSIS — S76.011A TEAR OF RIGHT GLUTEUS MEDIUS TENDON, INITIAL ENCOUNTER: Primary | ICD-10-CM

## 2022-08-31 PROCEDURE — 99214 OFFICE O/P EST MOD 30 MIN: CPT | Performed by: ORTHOPAEDIC SURGERY

## 2022-08-31 NOTE — ASSESSMENT & PLAN NOTE
Findings today are consistent with a partial tear of right gluteus medius   Imaging and prognosis was reviewed with the patient today  Discussed with patient that this injury could take 6-8 weeks to heal  If patient continues to have issues a surgical intervention can be further discussed to repair the tendon  Patient should continue physical therapy to work on bilateral hip strengthen and stretching  Patient should avoid activities that aggravate his symptoms- high impact activities  Patient can part take in low impact activities such as stationary biking, flat surface walking and swimming  Patient should follow up in 6 weeks  All patient's questions were answered to his satisfaction  This note is created using dictation transcription  It may contain typographical errors, grammatical errors, improperly dictated words, background noise and other errors

## 2022-08-31 NOTE — PROGRESS NOTES
Assessment:     1  Tear of right gluteus medius tendon, initial encounter        Plan:     Problem List Items Addressed This Visit        Musculoskeletal and Integument    Tear of right gluteus medius tendon - Primary     Findings today are consistent with a partial tear of right gluteus medius   Imaging and prognosis was reviewed with the patient today  Discussed with patient that this injury could take 6-8 weeks to heal  If patient continues to have issues a surgical intervention can be further discussed to repair the tendon  Patient should continue physical therapy to work on bilateral hip strengthen and stretching  Patient should avoid activities that aggravate his symptoms- high impact activities  Patient can part take in low impact activities such as stationary biking, flat surface walking and swimming  Patient should follow up in 6 weeks  All patient's questions were answered to his satisfaction  This note is created using dictation transcription  It may contain typographical errors, grammatical errors, improperly dictated words, background noise and other errors  Subjective:     Patient ID: Norm Alfonso is a 61 y o  male  Chief Complaint:  Patient presents today for right hip pain  Patient was referred today by Josué Noe PA-C (seen 8/11/22)  Patient notes at the beginning of August he was swinging a golf club and felt pain through his right lateral hip- patient describes the incident as a velcrow ripping over lateal hip  Patient notes swelling and minimal bruising over lateral  Patient has been attending physical therapy- patient finds it helpful, gained strength is right leg  Patient denies any previous injury to right hip  Patient is not taking anything for pain  Patient does note having history of low back pain  Information on patient's intake form was reviewed       Allergy:  Allergies   Allergen Reactions    Vancomycin Rash     Peeling of skin    Ace Inhibitors Cough Medications:  all current active meds have been reviewed  Past Medical History:  Past Medical History:   Diagnosis Date    Acute maxillary sinusitis     Asthmatic bronchitis     Benign prostatic hyperplasia     Benign prostatic hyperplasia with urinary incontinence without sensory awareness     Cervical disc disease     Cervicalgia     Chest pain     D[facility] venous thrombosis) ([facility] [address] ) 2004    Erectile dysfunction     Fatigue     Hypercholesterolemia     Hyperlipidemia     Hypertension     IFG (impaired fasting glucose)     PE [facility]olism[facility] ([facility] ) 2004    on xarelto    Pneumonia 1971    Screening for colon cancer     Screening for prostate cancer     Sleep apnea     12/21/21--states he is being worked up for sleep apnea     Past Surgical History:  Past Surgical History:   Procedure Laterality Date    BACK SURGERY  2004    CHOLECYSTECTOMY  2009    COLON SURGERY  2004    Polyps removed    COLONOSCOPY      EYE SURGERY  2016?    tprn retina    [place] ARTHROSCOPY SHOULDER SURGICAL BICEPS TENODESIS Left 03/09/2021    Procedure: ARTHROSCOPIC BICEPS TENODESIS;  Surgeon: Tamika Valentin DO;  Location: [address] [place] [address] OR;  Service: Orthopedics    [place] PARTIAL REMOVAL, CLAVICLE Left 03/09/2021    Procedure: DISTAL CLAVICLE EXCISION;  Surgeon: Tamika Valentin DO;  Location: [address] [place] [address] OR;  Service: Orthopedics    NV SHLDR ARTHROSCOP,SURG,W/ROTAT CUFF REPR Left 03/09/2021    Procedure: REPAIR ROTATOR CUFF  ARTHROSCOPIC;  Surgeon: Tamika Valentin DO;  Location: [address] [place] [address] OR;  Service: Nighat Pryor  2004    Tumor removed lower spine     Family History:  Family History   Problem Relation Age of Onset    Coronary artery disease Mother     Hypertrophic cardiomyopathy Mother     Thyroid disease Mother     Coronary artery disease Sister     Irritable bowel syndrome Sister     Crohn's disease Sister     Substance Abuse Sister     Mental illness Sister     Substance Abuse Brother     Mental illness Brother     Heart disease Family         cardiovascular disease, ischemic heart disease    Cancer Family     Cancer Father         jaw bone    Hypertension Father      Social History:  Social History     Substance and Sexual Activity   Alcohol Use Yes    Comment: Inconsistent     Social History     Substance and Sexual Activity   Drug Use No     Social History     Tobacco Use   Smoking Status Never Smoker   Smokeless Tobacco Never Used     Review of Systems   Constitutional: Negative  HENT: Negative  Eyes: Negative  Respiratory: Negative  Cardiovascular: Negative  Gastrointestinal: Negative  Endocrine: Negative  Genitourinary: Negative  Musculoskeletal: Positive for arthralgias (Right hip)  Negative for gait problem and joint swelling  Skin: Negative  Neurological: Negative  Hematological: Negative  Psychiatric/Behavioral: Negative  Objective:  BP Readings from Last 1 Encounters:   08/31/22 128/82      Wt Readings from Last 1 Encounters:   08/31/22 106 kg (234 lb)      BMI:   Estimated body mass index is 30 87 kg/m² as calculated from the following:    Height as of this encounter: 6' 1" (1 854 m)  Weight as of this encounter: 106 kg (234 lb)  BSA:   Estimated body surface area is 2 3 meters squared as calculated from the following:    Height as of this encounter: 6' 1" (1 854 m)  Weight as of this encounter: 106 kg (234 lb)  Physical Exam  Vitals and nursing note reviewed  Constitutional:       Appearance: Normal appearance  He is well-developed  HENT:      Head: Normocephalic and atraumatic  Right Ear: External ear normal       Left Ear: External ear normal    Eyes:      Extraocular Movements: Extraocular movements intact  Conjunctiva/sclera: Conjunctivae normal    Pulmonary:      Effort: Pulmonary effort is normal    Musculoskeletal:      Cervical back: Neck supple  Skin:     General: Skin is warm     Neurological:      Mental Status: He is alert and oriented to person, place, and time  Psychiatric:         Mood and Affect: Mood normal          Behavior: Behavior normal        Right Hip Exam     Tenderness   The patient is experiencing tenderness in the lateral     Range of Motion   Flexion: normal   External rotation: normal   Internal rotation: normal     Muscle Strength   Abduction: 5/5   Adduction: 5/5   Flexion: 5/5     Tests   TOMMIE: negative  Delmar: negative    Other   Erythema: absent  Scars: absent  Sensation: normal  Pulse: present            I have personally reviewed pertinent films in PACS and my interpretation is MRI of right hip demonstrates partial tearing of right gluteus medius at myotendinous junction       Scribe Attestation    I,:  Mini Lopez am acting as a scribe while in the presence of the attending physician :       I,:  Gillian Rain MD personally performed the services described in this documentation    as scribed in my presence :

## 2022-09-07 ENCOUNTER — OFFICE VISIT (OUTPATIENT)
Dept: PHYSICAL THERAPY | Facility: CLINIC | Age: 67
End: 2022-09-07
Payer: MEDICARE

## 2022-09-07 DIAGNOSIS — M25.551 RIGHT HIP PAIN: Primary | ICD-10-CM

## 2022-09-07 DIAGNOSIS — S76.019A RUPTURE OF HIP ABDUCTOR TENDON: ICD-10-CM

## 2022-09-07 PROCEDURE — 97110 THERAPEUTIC EXERCISES: CPT

## 2022-09-07 PROCEDURE — 97530 THERAPEUTIC ACTIVITIES: CPT

## 2022-09-07 PROCEDURE — 97140 MANUAL THERAPY 1/> REGIONS: CPT

## 2022-09-09 ENCOUNTER — OFFICE VISIT (OUTPATIENT)
Dept: PHYSICAL THERAPY | Facility: CLINIC | Age: 67
End: 2022-09-09
Payer: MEDICARE

## 2022-09-09 DIAGNOSIS — M25.551 RIGHT HIP PAIN: Primary | ICD-10-CM

## 2022-09-09 PROCEDURE — 97110 THERAPEUTIC EXERCISES: CPT | Performed by: PHYSICAL THERAPIST

## 2022-09-09 PROCEDURE — 97530 THERAPEUTIC ACTIVITIES: CPT | Performed by: PHYSICAL THERAPIST

## 2022-09-09 NOTE — PROGRESS NOTES
Daily Note     Today's date: 2022  Patient name: Ari Monson  : 1955  MRN: [id number]  Referring provider: AMBROSIO Mackay*  Dx:   Encounter Diagnosis     ICD-10-CM    1  Right hip pain  M25 551                   Subjective: Pt reports that he has been pain free and has returned to PLOF  Objective: See treatment diary below      Assessment: Progressed program with good tolerance and updated HEP  Pt to transition to independent HEP at this time  Plan: No further skilled PT required        Precautions: none      Manuals         Inf/lat hip mobs  inf/lat glides with belt  JLW inf/lat glides with belt  JLW Inf/lat  Glides  W/belt  KT         gentle HS stretch   JLW MO                                   Neuro Re-Ed                                                                                                        Ther Ex             ITB stretch with strap 3x30" 3x30" 3x30" 3x30" HEP        Supine hip flexor stretch 3x30" 3x30" 3x30" 3x30" HEP        Figure 4 stretch 3x30" 3x30" 3x30" 3x30" HEP        Bridging with ball sq  5"x20 5"x20 5"x20 5"x20        Supine SLR with ER  20 20 20 20        R SL clamshells  5"x20 5"x20 5"x20 GTB 5"x20        Reverse clamshells     GTB 5"x20        Prone hip ext  20 20 20 20        Prone donkey kick  20 20 20 20        Prone ER ball sq  5"x20 5"x20 5"x20 5"x20                                                            Ther Activity             upright bike  8' recum  8' recum  8' recum 8'        step ups (lat/cross)  8"  20 ea 8"  20 ea 8"  20 ea 8"x20 ea        Eccentric leg lowering     6"x15        Physio squats     x15        Standing fire hydrants     5"x15        SLS     2x30"

## 2022-09-11 DIAGNOSIS — Z86.711 HISTORY OF PULMONARY EMBOLISM: ICD-10-CM

## 2022-09-11 RX ORDER — RIVAROXABAN 20 MG/1
TABLET, FILM COATED ORAL
Qty: 90 TABLET | Refills: 2 | Status: SHIPPED | OUTPATIENT
Start: 2022-09-11

## 2022-09-13 ENCOUNTER — APPOINTMENT (OUTPATIENT)
Dept: PHYSICAL THERAPY | Facility: CLINIC | Age: 67
End: 2022-09-13
Payer: MEDICARE

## 2022-09-16 ENCOUNTER — APPOINTMENT (OUTPATIENT)
Dept: PHYSICAL THERAPY | Facility: CLINIC | Age: 67
End: 2022-09-16
Payer: MEDICARE

## 2022-11-16 NOTE — PROGRESS NOTES
UROLOGY NEW CONSULT NOTE     CHIEF COMPLAINT   Brenda Ordonez is a 74 y o  male with a complaint of   Chief Complaint   Patient presents with   • Elevated PSA       History of Present Illness:   Brenda Ordonez is a 74 y o  male here for evaluation of rising PSA  Patient reports he is not had rectal exam in some time  No family history of prostate cancer  Patient denies significant urinary concerns  On Cardura  Does use some occasional sildenafil for erection issues  Patient is on long-term blood thinners for history of a DVT      Lab Results   Component Value Date    PSA 4 3 (H) 08/25/2022    PSA 4 2 (H) 11/19/2021    PSA 3 8 09/11/2020     Urinary Score(s)     AUA SYMPTOM SCORE    Flowsheet Row Most Recent Value   AUA SYMPTOM SCORE    How often have you had a sensation of not emptying your bladder completely after you finished urinating? 1 (P)     How often have you had to urinate again less than two hours after you finished urinating? 2 (P)     How often have you found you stopped and started again several times when you urinate? 0 (P)     How often have you found it difficult to postpone urination? 1 (P)     How often have you had a weak urinary stream? 4 (P)     How often have you had to push or strain to begin urination? 3 (P)     How many times did you most typically get up to urinate from the time you went to bed at night until the time you got up in the morning? 1 (P)     Quality of Life: If you were to spend the rest of your life with your urinary condition just the way it is now, how would you feel about that? 2 (P)     AUA SYMPTOM SCORE 12 (P)            Past Medical History:     Past Medical History:   Diagnosis Date   • Acute maxillary sinusitis    • Asthmatic bronchitis    • Benign prostatic hyperplasia    • Benign prostatic hyperplasia with urinary incontinence without sensory awareness    • Cervical disc disease    • Cervicalgia    • Chest pain    • DVT (deep venous thrombosis) ([facility] [address] ) 2004   • Erectile dysfunction    • Fatigue    • Hypercholesterolemia    • Hyperlipidemia    • Hypertension    • IFG (impaired fasting glucose)    • PE (pulmonary thromboembolism) ([facility] [address] ) 2004    on xarelto   • Pneumonia 1971   • Screening for colon cancer    • Screening for prostate cancer    • Sleep apnea     12/21/21--states he is being worked up for sleep apnea       PAST SURGICAL HISTORY:     Past Surgical History:   Procedure Laterality Date   • BACK SURGERY  2004   • CHOLECYSTECTOMY  2009   • COLON SURGERY  2004    Polyps removed   • COLONOSCOPY     • EYE SURGERY  2016?    tprn retina   • PA ARTHROSCOPY SHOULDER SURGICAL BICEPS TENODESIS Left 03/09/2021    Procedure: ARTHROSCOPIC BICEPS TENODESIS;  Surgeon: Morgan Shaw DO;  Location: [address] [place] [address] OR;  Service: Orthopedics   • PA PARTIAL REMOVAL, CLAVICLE Left 03/09/2021    Procedure: DISTAL CLAVICLE EXCISION;  Surgeon: Morgan Shaw DO;  Location: [address] [place] [address] OR;  Service: Orthopedics   • PA SHLDR ARTHROSCOP,SURG,W/ROTAT CUFF REPR Left 03/09/2021    Procedure: REPAIR ROTATOR CUFF  ARTHROSCOPIC;  Surgeon: Morgan Shaw DO;  Location: [address] [place] [address] OR;  Service: Orthopedics   • [address] 2004    Tumor removed lower spine       CURRENT MEDICATIONS:     Current Outpatient Medications   Medication Sig Dispense Refill   • doxazosin (CARDURA) 2 mg tablet TAKE 1 TABLET BY MOUTH DAILY AT BEDTIME 90 tablet 1   • losartan-hydrochlorothiazide (HYZAAR) 100-25 MG per tablet Take 1 tablet by mouth daily 90 tablet 1   • Multiple Vitamin (MULTIVITAMIN ADULT PO) Take by mouth     • Probiotic Product (PROBIOTIC PO) Take by mouth     • sildenafil (REVATIO) 20 mg tablet 3 pills by mouth before sexual activity 90 tablet 0   • simvastatin (ZOCOR) 10 mg tablet TAKE 1 TABLET BY MOUTH EVERY DAY 90 tablet 1   • Xarelto 20 MG tablet TAKE 1 TABLET BY MOUTH EVERY DAY 90 tablet 2   • fluticasone (FLONASE) 50 mcg/act nasal spray 2 sprays as needed   (Patient not taking: Reported on 6/24/2022)  3     No current facility-administered medications for this visit  ALLERGIES:     Allergies   Allergen Reactions   • Vancomycin Rash     Peeling of skin   • Ace Inhibitors Cough       SOCIAL HISTORY:     Social History     Socioeconomic History   • Marital status: /Civil Union     Spouse name: None   • Number of children: None   • Years of education: None   • Highest education level: None   Occupational History   • None   Tobacco Use   • Smoking status: Never   • Smokeless tobacco: Never   Vaping Use   • Vaping Use: Never used   Substance and Sexual Activity   • Alcohol use: Yes     Comment: Inconsistent   • Drug use: No   • Sexual activity: Yes     Partners: Female   Other Topics Concern   • None   Social History Narrative    Lives with wife  Feels safe at home  Has living will  Sees dentist reg  Good dental hygiene    Living situation: supportive and safe     Social Determinants of Health     Financial Resource Strain: Not on file   Food Insecurity: Not on file   Transportation Needs: Not on file   Physical Activity: Not on file   Stress: Not on file   Social Connections: Not on file   Intimate Partner Violence: Not on file   Housing Stability: Not on file       SOCIAL HISTORY:     Family History   Problem Relation Age of Onset   • Coronary artery disease Mother    • Hypertrophic cardiomyopathy Mother    • Thyroid disease Mother    • Coronary artery disease Sister    • Irritable bowel syndrome Sister    • Crohn's disease Sister    • Substance Abuse Sister    • Mental illness Sister    • Substance Abuse Brother    • Mental illness Brother    • Heart disease Family         cardiovascular disease, ischemic heart disease   • Cancer Family    • Cancer Father         jaw bone   • Hypertension Father        REVIEW OF SYSTEMS:     Review of Systems   Constitutional: Negative  Respiratory: Negative  Cardiovascular: Negative  Gastrointestinal: Negative  Genitourinary: Negative  Musculoskeletal: Negative  Skin: Negative  Psychiatric/Behavioral: Negative  PHYSICAL EXAM:     /84   Pulse 57   Ht 6' 1" (1 854 m)   Wt 105 kg (231 lb)   SpO2 96%   BMI 30 48 kg/m²     Physical Exam  Vitals reviewed  HENT:      Head: Normocephalic  Nose: Nose normal       Mouth/Throat:      Mouth: Mucous membranes are moist    Eyes:      Pupils: Pupils are equal, round, and reactive to light  Cardiovascular:      Rate and Rhythm: Normal rate  Pulses: Normal pulses  Pulmonary:      Effort: Pulmonary effort is normal    Abdominal:      General: Abdomen is flat  Comments: Cholecystectomy scars healed   Genitourinary:     Comments: Circumcised phallus, testicles descended, angio keratoma the scrotal skin, prostate demonstrates some asymmetry and induration in the right side  Musculoskeletal:         General: Normal range of motion  Cervical back: Normal range of motion  Skin:     General: Skin is warm  Neurological:      General: No focal deficit present  Mental Status: He is alert and oriented to person, place, and time  Psychiatric:         Mood and Affect: Mood normal          LABS:     CBC:   Lab Results   Component Value Date    WBC 8 08 02/11/2021    HGB 15 1 02/11/2021    HCT 44 1 02/11/2021    MCV 94 02/11/2021     02/11/2021       BMP:   Lab Results   Component Value Date    GLUCOSE 93 06/21/2014    CALCIUM 10 0 08/25/2022     06/21/2014    K 3 8 08/25/2022    CO2 29 08/25/2022     08/25/2022    BUN 14 08/25/2022    CREATININE 0 79 08/25/2022     Lab Results   Component Value Date    PSA 4 3 (H) 08/25/2022    PSA 4 2 (H) 11/19/2021    PSA 3 8 09/11/2020       ASSESSMENT:     74 y o  male  with mild but stable PSA elevation    PLAN:     I am concerned regarding the slow rise of the patient's PSA mostly as it relates to the hemihypertrophy and induration I feel on the patient's right side of the prostate    At this point time, I recommended a multiparametric MRI  Depending on the results, this will determine need for fusion based biopsy versus continued surveillance  I have discussed this with the patient so he has some expectation of MRI results  I will call him when the results have returned to discuss next step  Should the patient proceed to biopsy, systemic anticoagulation would need to be held

## 2022-11-18 ENCOUNTER — OFFICE VISIT (OUTPATIENT)
Dept: UROLOGY | Facility: MEDICAL CENTER | Age: 67
End: 2022-11-18

## 2022-11-18 VITALS
BODY MASS INDEX: 30.62 KG/M2 | SYSTOLIC BLOOD PRESSURE: 132 MMHG | HEIGHT: 73 IN | OXYGEN SATURATION: 96 % | DIASTOLIC BLOOD PRESSURE: 84 MMHG | WEIGHT: 231 LBS | HEART RATE: 57 BPM

## 2022-11-18 DIAGNOSIS — R97.20 ELEVATED PSA: ICD-10-CM

## 2022-11-18 DIAGNOSIS — R68.89 ABNORMAL DIGITAL RECTAL EXAM: Primary | ICD-10-CM

## 2022-12-01 ENCOUNTER — TELEPHONE (OUTPATIENT)
Dept: UROLOGY | Facility: AMBULATORY SURGERY CENTER | Age: 67
End: 2022-12-01

## 2022-12-01 DIAGNOSIS — R97.20 ELEVATED PSA: Primary | ICD-10-CM

## 2022-12-01 NOTE — TELEPHONE ENCOUNTER
Dulcy Severe and made him aware order was placed for BMP for him to have completed prior to scheduled MRI  He verbalized understanding

## 2022-12-01 NOTE — TELEPHONE ENCOUNTER
Pt under care of Dr Handy Antonio    Pt called and left Vm stating he has an appt for MRI 1/5/23 and CS told pt he needs blood work prior to MRI please review and contact pt to let him know what blood work is needed     PT call back-[phone]

## 2022-12-03 ENCOUNTER — APPOINTMENT (OUTPATIENT)
Dept: LAB | Facility: HOSPITAL | Age: 67
End: 2022-12-03
Attending: UROLOGY

## 2022-12-03 DIAGNOSIS — Z00.00 ROUTINE GENERAL MEDICAL EXAMINATION AT A HEALTH CARE FACILITY: ICD-10-CM

## 2022-12-03 LAB
ANION GAP SERPL CALCULATED.3IONS-SCNC: 8 MMOL/L (ref 4–13)
BUN SERPL-MCNC: 14 MG/DL (ref 5–25)
CALCIUM SERPL-MCNC: 9.9 MG/DL (ref 8.3–10.1)
CHLORIDE SERPL-SCNC: 102 MMOL/L (ref 96–108)
CO2 SERPL-SCNC: 29 MMOL/L (ref 21–32)
CREAT SERPL-MCNC: 0.76 MG/DL (ref 0.6–1.3)
GFR SERPL CREATININE-BSD FRML MDRD: 95 ML/MIN/1.73SQ M
GLUCOSE P FAST SERPL-MCNC: 102 MG/DL (ref 65–99)
POTASSIUM SERPL-SCNC: 3.8 MMOL/L (ref 3.5–5.3)
SODIUM SERPL-SCNC: 139 MMOL/L (ref 135–147)

## 2022-12-06 DIAGNOSIS — E78.00 HYPERCHOLESTEREMIA: ICD-10-CM

## 2022-12-06 DIAGNOSIS — N52.8 OTHER MALE ERECTILE DYSFUNCTION: ICD-10-CM

## 2022-12-06 RX ORDER — SILDENAFIL CITRATE 20 MG/1
TABLET ORAL
Qty: 90 TABLET | Refills: 0 | Status: SHIPPED | OUTPATIENT
Start: 2022-12-06

## 2022-12-06 RX ORDER — SIMVASTATIN 10 MG
10 TABLET ORAL DAILY
Qty: 90 TABLET | Refills: 0 | Status: SHIPPED | OUTPATIENT
Start: 2022-12-06

## 2023-01-01 DIAGNOSIS — I10 ESSENTIAL HYPERTENSION: ICD-10-CM

## 2023-01-01 RX ORDER — LOSARTAN POTASSIUM AND HYDROCHLOROTHIAZIDE 25; 100 MG/1; MG/1
TABLET ORAL
Qty: 90 TABLET | Refills: 1 | Status: SHIPPED | OUTPATIENT
Start: 2023-01-01

## 2023-01-05 ENCOUNTER — HOSPITAL ENCOUNTER (OUTPATIENT)
Dept: RADIOLOGY | Age: 68
Discharge: HOME | End: 2023-01-05

## 2023-01-05 DIAGNOSIS — R68.89 ABNORMAL DIGITAL RECTAL EXAM: ICD-10-CM

## 2023-01-05 DIAGNOSIS — R97.20 ELEVATED PSA: ICD-10-CM

## 2023-01-05 RX ADMIN — GADOBUTROL 10 ML: 604.72 INJECTION INTRAVENOUS at 09:41

## 2023-01-13 ENCOUNTER — TELEPHONE (OUTPATIENT)
Dept: UROLOGY | Facility: CLINIC | Age: 68
End: 2023-01-13

## 2023-01-13 DIAGNOSIS — R97.20 ELEVATED PSA: Primary | ICD-10-CM

## 2023-01-13 NOTE — TELEPHONE ENCOUNTER
Called and spoke with the patient  Kamila Marteley aware of his MRI results  Informed patient he needs a 6 month follow up with PSA prior to visit  Patient scheduled 7/18/2023 at 0930 with Mahogany Ramos at the [facility] office  PSA script mailed to patient

## 2023-01-13 NOTE — TELEPHONE ENCOUNTER
----- Message from Antonio Villaseñor MD sent at 1/12/2023  4:10 PM EST -----  Reviewed results of mpMRI with patient by telephone  Prostate size 72g, PIRADs-2, low concern  Recommend against biopsy at this time, instead PSA recheck in 6 mos with APC  Can we help arrange appt?

## 2023-02-15 DIAGNOSIS — I10 ESSENTIAL HYPERTENSION: ICD-10-CM

## 2023-02-15 RX ORDER — DOXAZOSIN 2 MG/1
TABLET ORAL
Qty: 90 TABLET | Refills: 1 | Status: SHIPPED | OUTPATIENT
Start: 2023-02-15

## 2023-03-09 ENCOUNTER — OFFICE VISIT (OUTPATIENT)
Dept: DERMATOLOGY | Facility: CLINIC | Age: 68
End: 2023-03-09

## 2023-03-09 VITALS — WEIGHT: 234.3 LBS | TEMPERATURE: 97.5 F | BODY MASS INDEX: 31.05 KG/M2 | HEIGHT: 73 IN

## 2023-03-09 DIAGNOSIS — L57.8 ACTINIC SKIN DAMAGE: ICD-10-CM

## 2023-03-09 DIAGNOSIS — L57.0 ACTINIC KERATOSES: ICD-10-CM

## 2023-03-09 DIAGNOSIS — Z98.890 HISTORY OF MOHS MICROGRAPHIC SURGERY FOR SKIN CANCER: ICD-10-CM

## 2023-03-09 DIAGNOSIS — Z85.828 HISTORY OF NONMELANOMA SKIN CANCER: Primary | ICD-10-CM

## 2023-03-09 DIAGNOSIS — Z85.828 HISTORY OF MOHS MICROGRAPHIC SURGERY FOR SKIN CANCER: ICD-10-CM

## 2023-03-09 DIAGNOSIS — Z12.83 SCREENING FOR MALIGNANT NEOPLASM OF SKIN: ICD-10-CM

## 2023-03-09 DIAGNOSIS — E78.00 HYPERCHOLESTEREMIA: ICD-10-CM

## 2023-03-09 RX ORDER — SIMVASTATIN 10 MG
TABLET ORAL
Qty: 90 TABLET | Refills: 0 | Status: SHIPPED | OUTPATIENT
Start: 2023-03-09

## 2023-03-09 NOTE — PATIENT INSTRUCTIONS
Physical Exam and Assessment/Plan by Diagnosis:    ACTINIC KERATOSIS    Assessment and Plan:  Based on a thorough discussion of this condition and the management approach to it (including a comprehensive discussion of the known risks, side effects and potential benefits of treatment), the patient (family) agrees to implement the following specific plan:    Discussed the possibility of starting topical creams in the future  Please let us know if the spots do not go away within 2-3 weeks  Recommend skin exams every 6 months  Cryotherapy treatment performed in office with signed consent    Actinic keratoses are very common on sites repeatedly exposed to the sun, especially the backs of the hands and the face  They are considered precancers and have a low risk of turning into squamous cell carcinoma  It is rare for a solitary actinic keratosis to evolve into a squamous cell carcinoma (SCC), but the risk is 10-15% when more than 10 actinic keratoses are present  A tender, thickened, ulcerated or enlarging actinic keratosis is suspicious of SCC  Actinic keratoses may be prevented by strict sun protection  If already present, keratoses may improve with a very high sun protection factor (50+) broad-spectrum sunscreen applied at least daily to affected areas, year-round  We recommend that sun protective clothing and hats and sunglasses be worn whenever possible    Note that you can make you own UPF 30 rate clothing using just your own washing machine with a product called sun guard    There are several different options for treating actinic keratoses    Topical “medications such as 5-fluorouracil or Aldara  - good for field treatment ie treats what's seen and not seen    Cryotherapy - good for single spots but treats “only what we see” versus a field treatment    Photodynamic therapy - involves application of a light sensitizing medicine and then exposure to a special light, also a good field treatment        Today we opted to treat your actinic keratoses with Cryotherapy  PROCEDURE:  DESTRUCTION OF PRE-MALIGNANT LESIONS  After a thorough discussion of treatment options and risk/benefits/alternatives (including but not limited to local pain, scarring, dyspigmentation, blistering, and possible superinfection), verbal and written consent were obtained and the aforementioned lesions were treated on with cryotherapy using liquid nitrogen x 1 cycle for 5-10 seconds  TOTAL NUMBER of 7 pre-malignant lesions were treated today on the ANATOMIC LOCATION:   The patient tolerated the procedure well, and after-care instructions were provided  Liquid nitrogen was applied for 10-12 seconds to the skin lesion and the expected blistering or scabbing reaction explained  Do not pick at the area  Patient reminded to expect hypopigmented scars from the procedure  Return if lesion fails to fully resolve

## 2023-03-09 NOTE — PROGRESS NOTES
[facility] Note     Patient Name: Barry Cantu  Encounter Date: 03/09/2023     Have you been cared for by a [place] [address] Dermatologist in the last 3 years and, if so, which description applies to you? NO  I am considered a "new" patient and must complete all patient intake questions  I am MALE/not capable of bearing children  REVIEW OF SYSTEMS:  Have you recently had or currently have any of the following? · Recent fever or chills? No  · Any non-healing wound? No   PAST MEDICAL HISTORY:  Have you personally ever had or currently have any of the following? If "YES," then please provide more detail  · Skin cancer (such as Melanoma, Basal Cell Carcinoma, Squamous Cell Carcinoma? YES, SCC, AK's, MOHS Surgery  · Tuberculosis, HIV/AIDS, Hepatitis B or C: No  · Systemic Immunosuppression such as Diabetes, Biologic or Immunotherapy, Chemotherapy, Organ Transplantation, Bone Marrow Transplantation No  · Radiation Treatment No   FAMILY HISTORY:  Any "first degree relatives" (parent, brother, sister, or child) with the following? • Skin Cancer, Pancreatic or Other Cancer? YES, Father had throat and bone cancer of the face, grandfather passed of cancer, type unknown  PATIENT EXPERIENCE:    • Do you want the Dermatologist to perform a COMPLETE skin exam today including a clinical examination under the "bra and underwear" areas? Yes  • If necessary, do we have your permission to call and leave a detailed message on your Preferred Phone number that includes your specific medical information?   Yes      Allergies   Allergen Reactions   • Vancomycin Rash     Peeling of skin   • Ace Inhibitors Cough      Current Outpatient Medications:   •  doxazosin (CARDURA) 2 mg tablet, TAKE 1 TABLET BY MOUTH EVERYDAY AT BEDTIME, Disp: 90 tablet, Rfl: 1  •  fluticasone (FLONASE) 50 mcg/act nasal spray, 2 sprays as needed, Disp: , Rfl: 3  •  losartan-hydrochlorothiazide (HYZAAR) 100-25 MG per tablet, TAKE 1 TABLET DAILY, Disp: 90 tablet, Rfl: 1  •  Multiple Vitamin (MULTIVITAMIN ADULT PO), Take by mouth, Disp: , Rfl:   •  Probiotic Product (PROBIOTIC PO), Take by mouth, Disp: , Rfl:   •  sildenafil (REVATIO) 20 mg tablet, 3 pills by mouth before sexual activity, Disp: 90 tablet, Rfl: 1  •  simvastatin (ZOCOR) 10 mg tablet, TAKE 1 TABLET BY MOUTH EVERY DAY, Disp: 90 tablet, Rfl: 0  •  Xarelto 20 MG tablet, TAKE 1 TABLET BY MOUTH EVERY DAY, Disp: 90 tablet, Rfl: 2          • Whom besides the patient is providing clinical information about today's encounter?   o NO ADDITIONAL HISTORIAN (patient alone provided history)    Physical Exam and Assessment/Plan by Diagnosis:    ACTINIC KERATOSIS    Physical Exam:  • Anatomic Location Affected:  Back neck, Right arm, x 5 on Left hand  • Morphological Description:      Additional History of Present Condition:  Patient is here today to establish care and have a full body skin exam done  Patient has a history of SCC, AK's and has had 4 MOHS surgeries in the past  Patient used to see a Dermatologist over by the [facility]  Patient says he would get skin exams done every 6 months due to his history  Assessment and Plan:  Based on a thorough discussion of this condition and the management approach to it (including a comprehensive discussion of the known risks, side effects and potential benefits of treatment), the patient (family) agrees to implement the following specific plan:    • Discussed the possibility of starting topical creams in the future  • Please let us know if the spots do not go away within 2-3 weeks  • Recommend skin exams every 6 months  • Cryotherapy treatment performed in office with signed consent    Actinic keratoses are very common on sites repeatedly exposed to the sun, especially the backs of the hands and the face  They are considered precancers and have a low risk of turning into squamous cell carcinoma   It is rare for a solitary actinic keratosis to evolve into a squamous cell carcinoma (SCC), but the risk is 10-15% when more than 10 actinic keratoses are present  A tender, thickened, ulcerated or enlarging actinic keratosis is suspicious of SCC  Actinic keratoses may be prevented by strict sun protection  If already present, keratoses may improve with a very high sun protection factor (50+) broad-spectrum sunscreen applied at least daily to affected areas, year-round  We recommend that sun protective clothing and hats and sunglasses be worn whenever possible  Note that you can make you own UPF 30 rate clothing using just your own washing machine with a product called sun guard    There are several different options for treating actinic keratoses    • Topical “medications such as 5-fluorouracil or Aldara  - good for field treatment ie treats what's seen and not seen    • Cryotherapy - good for single spots but treats Lita what we see” versus a field treatment    • Photodynamic therapy - involves application of a light sensitizing medicine and then exposure to a special light, also a good field treatment        Today we opted to treat your actinic keratoses with Cryotherapy  PROCEDURE:  DESTRUCTION OF PRE-MALIGNANT LESIONS  After a thorough discussion of treatment options and risk/benefits/alternatives (including but not limited to local pain, scarring, dyspigmentation, blistering, and possible superinfection), verbal and written consent were obtained and the aforementioned lesions were treated on with cryotherapy using liquid nitrogen x 1 cycle for 5-10 seconds  • TOTAL NUMBER of 7 pre-malignant lesions were treated today on the ANATOMIC LOCATION:   The patient tolerated the procedure well, and after-care instructions were provided      ROUTINE SKIN EXAM  Physical Exam:  • Anatomic Location Affected:  Trunk and other extremities  • Morphological Description:  Spots of concern found upon evaluation  • Pertinent Positives:  • Pertinent Negatives:     Additional History of Present Condition:  Patient presents as a new patient for a full body skin exam     Assessment and Plan:  Based on a thorough discussion of this condition and the management approach to it (including a comprehensive discussion of the known risks, side effects and potential benefits of treatment), the patient (family) agrees to implement the following specific plan:  • Skin exam recommended every 6 months or sooner if needed for new lesions of the skin        Scribe Attestation    I,:  Kimberly Diaz am acting as a scribe while in the presence of the attending physician :       I,:  Annmarie Mcdaniels MD personally performed the services described in this documentation    as scribed in my presence :

## 2023-06-06 DIAGNOSIS — Z86.711 HISTORY OF PULMONARY EMBOLISM: ICD-10-CM

## 2023-06-07 DIAGNOSIS — E78.00 HYPERCHOLESTEREMIA: ICD-10-CM

## 2023-06-07 RX ORDER — SIMVASTATIN 10 MG
TABLET ORAL
Qty: 90 TABLET | Refills: 0 | Status: SHIPPED | OUTPATIENT
Start: 2023-06-07

## 2023-06-07 RX ORDER — RIVAROXABAN 20 MG/1
TABLET, FILM COATED ORAL
Qty: 90 TABLET | Refills: 2 | Status: SHIPPED | OUTPATIENT
Start: 2023-06-07

## 2023-06-26 ENCOUNTER — OFFICE VISIT (OUTPATIENT)
Dept: PULMONOLOGY | Facility: HOSPITAL | Age: 68
End: 2023-06-26
Payer: MEDICARE

## 2023-06-26 VITALS
HEIGHT: 73 IN | TEMPERATURE: 97.8 F | OXYGEN SATURATION: 94 % | SYSTOLIC BLOOD PRESSURE: 114 MMHG | HEART RATE: 74 BPM | WEIGHT: 228 LBS | DIASTOLIC BLOOD PRESSURE: 64 MMHG | BODY MASS INDEX: 30.22 KG/M2

## 2023-06-26 DIAGNOSIS — G47.33 OBSTRUCTIVE SLEEP APNEA: Primary | ICD-10-CM

## 2023-06-26 DIAGNOSIS — Z79.01 CURRENT USE OF LONG TERM ANTICOAGULATION: ICD-10-CM

## 2023-06-26 DIAGNOSIS — Z86.711 HISTORY OF PULMONARY EMBOLISM: ICD-10-CM

## 2023-06-26 DIAGNOSIS — E66.9 OBESITY (BMI 30-39.9): ICD-10-CM

## 2023-06-26 PROCEDURE — 99214 OFFICE O/P EST MOD 30 MIN: CPT | Performed by: PHYSICIAN ASSISTANT

## 2023-06-26 NOTE — PROGRESS NOTES
Answers for HPI/ROS submitted by the patient on 6/25/2023  Do you have shortness of breath that occurs with effort or exertion?: No  Do you have ear congestion?: No  Do you have heartburn?: No  Do you have fatigue?: No  Do you have nasal congestion?: No  Do you have shortness of breath when lying flat?: No  Do you have shortness of breath when you wake up?: No  Do you have sweats?: No  Have you experienced weight loss?: No    Assessment:    1  Obstructive sleep apnea        2  History of pulmonary embolism        3  Current use of long term anticoagulation        4  Obesity (BMI 30-39  9)              Plan:   • Patient presenting for follow-up  • They are using the CPAP and benefitting from it  Better quality of sleep at night and more energy throughout the day  • Reviewed compliance report with the patient demonstrating residual AHI is acceptable at 0 3 (previously > 16) and they are compliant with use  • Will continue CPAP at current pressure settings  • Discussed regular cleaning and changing of the supplies  • Patient is aware of consequences of untreated sleep apnea including increased risk for cardiac disease and stroke and therefore the need for compliance  • Remains on lifelong anticoagulation  • Advised importance of regular exercise and healthy eating      Subjective:     Patient ID: Sparkle Du is a 74 y o  male  Chief Complaint:  Sparkle Du is a very pleasant 74 y o  male with PMHx of SOL, PE, DVT, hypertension presenting for follow-up regarding CPAP management  Patient reports this is helping quite a bit with his quality of sleep and energy level during the day  No longer snoring loudly  He is using the CPAP consistently  He denies having shortness of breath during the day  He is a lifelong nonsmoker  He did have some secondhand exposure to cigarette smoke in his childhood home  Feels overall he is doing well  Goes golfing twice a week  Averages 8500 steps a day        Associated symptoms include fatigue (occasional, after exercise)  Pertinent negatives include no chest pain, fever, headaches, myalgias or sore throat  The following portions of the patient's history were reviewed in this encounter and updated as appropriate: Past medical, social, surgical, family, allergies    Review of Systems   Constitutional: Positive for fatigue (occasional, after exercise)  Negative for fever  HENT: Negative for sore throat  Cardiovascular: Negative for chest pain  Musculoskeletal: Negative for myalgias  Neurological: Negative for headaches  All other systems reviewed and are negative  Objective:    Physical Exam  Vitals reviewed  Constitutional:       General: He is not in acute distress  Appearance: He is not toxic-appearing  HENT:      Head: Normocephalic and atraumatic  Eyes:      General: No scleral icterus  Cardiovascular:      Rate and Rhythm: Normal rate and regular rhythm  Pulmonary:      Effort: Pulmonary effort is normal       Breath sounds: Normal breath sounds  Musculoskeletal:         General: No signs of injury  Skin:     General: Skin is warm and dry  Neurological:      General: No focal deficit present  Mental Status: He is alert  Mental status is at baseline  Psychiatric:         Mood and Affect: Mood normal          Behavior: Behavior normal          Lab Review:   No visits with results within 2 Month(s) from this visit     Latest known visit with results is:   Appointment on 04/13/2023   Component Date Value   • PSA, Diagnostic 04/13/2023 3 9

## 2023-07-31 ENCOUNTER — OFFICE VISIT (OUTPATIENT)
Dept: URGENT CARE | Facility: CLINIC | Age: 68
End: 2023-07-31
Payer: MEDICARE

## 2023-07-31 VITALS
DIASTOLIC BLOOD PRESSURE: 82 MMHG | SYSTOLIC BLOOD PRESSURE: 141 MMHG | TEMPERATURE: 97.2 F | OXYGEN SATURATION: 97 % | RESPIRATION RATE: 16 BRPM | HEART RATE: 78 BPM

## 2023-07-31 DIAGNOSIS — J06.9 ACUTE URI: Primary | ICD-10-CM

## 2023-07-31 PROCEDURE — G0463 HOSPITAL OUTPT CLINIC VISIT: HCPCS

## 2023-07-31 PROCEDURE — 99213 OFFICE O/P EST LOW 20 MIN: CPT

## 2023-07-31 RX ORDER — LEVALBUTEROL TARTRATE 45 UG/1
1-2 AEROSOL, METERED ORAL EVERY 6 HOURS PRN
Qty: 15 G | Refills: 0 | Status: SHIPPED | OUTPATIENT
Start: 2023-07-31

## 2023-07-31 RX ORDER — ALBUTEROL SULFATE 90 UG/1
2 AEROSOL, METERED RESPIRATORY (INHALATION) EVERY 6 HOURS PRN
Qty: 8.5 G | Refills: 0 | Status: SHIPPED | OUTPATIENT
Start: 2023-07-31 | End: 2023-07-31

## 2023-07-31 NOTE — PROGRESS NOTES
[facility]        NAME: Tete Ba is a 74 y.o. male  : 1955    MRN: [id number]  DATE: 2023  TIME: 11:57 AM    Assessment and Plan   Acute URI [J06.9]  1. Acute URI          - Rapid COVID negative    Patient Instructions       Follow up with PCP in 3-5 days. Proceed to  ER if symptoms worsen. Chief Complaint     Chief Complaint   Patient presents with   • Cough     Pt reports chest congestion, coughing when lying down, headache, and fatigue for two days. History of Present Illness       75 y/o M presents for cold like symptoms x 2 days. Admits to runny nose, PND, L ear pain, chest congestion, cough in the AM. Family member sick last week. Tylenol PRN. Last yesterday. Review of Systems   Review of Systems   Constitutional: Positive for fatigue. Negative for chills and fever. HENT: Positive for ear pain and rhinorrhea. Negative for congestion and sore throat. Respiratory: Positive for cough and chest tightness.           Current Medications       Current Outpatient Medications:   •  doxazosin (CARDURA) 2 mg tablet, TAKE 1 TABLET BY MOUTH EVERYDAY AT BEDTIME, Disp: 90 tablet, Rfl: 1  •  fluticasone (FLONASE) 50 mcg/act nasal spray, 2 sprays as needed, Disp: , Rfl: 3  •  losartan-hydrochlorothiazide (HYZAAR) 100-25 MG per tablet, TAKE 1 TABLET DAILY, Disp: 90 tablet, Rfl: 0  •  Multiple Vitamin (MULTIVITAMIN ADULT PO), Take by mouth, Disp: , Rfl:   •  Probiotic Product (PROBIOTIC PO), Take by mouth, Disp: , Rfl:   •  sildenafil (REVATIO) 20 mg tablet, 3 pills by mouth before sexual activity, Disp: 90 tablet, Rfl: 1  •  simvastatin (ZOCOR) 10 mg tablet, TAKE 1 TABLET BY MOUTH EVERY DAY, Disp: 90 tablet, Rfl: 0  •  Xarelto 20 MG tablet, TAKE 1 TABLET BY MOUTH EVERY DAY, Disp: 90 tablet, Rfl: 2    Current Allergies     Allergies as of 2023 - Reviewed 2023   Allergen Reaction Noted   • Vancomycin Rash 2015   • Ace inhibitors Cough 05/15/2017 The following portions of the patient's history were reviewed and updated as appropriate: allergies, current medications, past family history, past medical history, past social history, past surgical history and problem list.     Past Medical History:   Diagnosis Date   • Actinic keratosis 2015   • Acute maxillary sinusitis    • Arthritis 2000   • Asthmatic bronchitis    • Benign prostatic hyperplasia    • Benign prostatic hyperplasia with urinary incontinence without sensory awareness    • Cancer ([address]) 2015   • Cervical disc disease    • Cervicalgia    • Chest pain    • DVT (deep venous thrombosis) ([address]) 2004   • Erectile dysfunction    • Fatigue    • Hypercholesterolemia    • Hyperlipidemia    • Hypertension    • IFG (impaired fasting glucose)    • PE (pulmonary thromboembolism) ([address]) 2004    on xarelto   • Pneumonia 1971   • Screening for colon cancer    • Screening for prostate cancer    • Skin tag    • Sleep apnea     12/21/21--states he is being worked up for sleep apnea   • Squamous cell skin cancer 2015 2017 2022       Past Surgical History:   Procedure Laterality Date   • BACK SURGERY  2004   • CHOLECYSTECTOMY  2009   • COLON SURGERY  2004    Polyps removed   • COLONOSCOPY     • EYE SURGERY  2016?    tprn retina   • MOHS SURGERY  2015 2017 2022   • LA CLAVICULECTOMY PARTIAL Left 03/09/2021    Procedure: DISTAL CLAVICLE EXCISION;  Surgeon: Jhonny Stewart DO;  Location: [address] [place] [address] OR;  Service: Orthopedics   • LA SURGICAL ARTHROSCOPY SHOULDER BICEPS TENODESIS Left 03/09/2021    Procedure: ARTHROSCOPIC BICEPS TENODESIS;  Surgeon: Jhonny Stewart DO;  Location: [address] [place] [address] OR;  Service: Orthopedics   • LA SURGICAL ARTHROSCOPY SHOULDER W/ROTATOR CUFF RPR Left 03/09/2021    Procedure: REPAIR ROTATOR CUFF  ARTHROSCOPIC;  Surgeon: Jhonny Stewart DO;  Location: [address] [place] [address] OR;  Service: Orthopedics   • SKIN BIOPSY  2015 2016 2017 2022   • SPINE SURGERY  2004    Tumor removed lower spine       Family History   Problem Relation Age of Onset   • Coronary artery disease Mother    • Hypertrophic cardiomyopathy Mother    • Thyroid disease Mother    • Coronary artery disease Sister    • Irritable bowel syndrome Sister    • Crohn's disease Sister    • Substance Abuse Sister    • Mental illness Sister    • Substance Abuse Brother    • Mental illness Brother    • Heart disease Family         cardiovascular disease, ischemic heart disease   • Cancer Family    • Cancer Father         jaw bone   • Hypertension Father    • Diabetes Maternal Aunt          Medications have been verified. Objective   /82   Pulse 78   Temp (!) 97.2 °F (36.2 °C)   Resp 16   SpO2 97%   No LMP for male patient. Physical Exam     Physical Exam  Vitals and nursing note reviewed. Constitutional:       General: He is not in acute distress. Appearance: He is not toxic-appearing. HENT:      Head: Normocephalic and atraumatic. Right Ear: Tympanic membrane, ear canal and external ear normal.      Left Ear: Tympanic membrane, ear canal and external ear normal.      Nose: Rhinorrhea present. Mouth/Throat:      Mouth: Mucous membranes are moist.      Pharynx: No oropharyngeal exudate or posterior oropharyngeal erythema. Eyes:      Conjunctiva/sclera: Conjunctivae normal.   Pulmonary:      Effort: Pulmonary effort is normal.      Breath sounds: Normal breath sounds. Musculoskeletal:      Cervical back: No tenderness. Lymphadenopathy:      Cervical: No cervical adenopathy. Neurological:      Mental Status: He is alert.    Psychiatric:         Mood and Affect: Mood normal.         Behavior: Behavior normal.

## 2023-08-09 DIAGNOSIS — I10 ESSENTIAL HYPERTENSION: ICD-10-CM

## 2023-08-09 RX ORDER — DOXAZOSIN 2 MG/1
TABLET ORAL
Qty: 90 TABLET | Refills: 1 | Status: SHIPPED | OUTPATIENT
Start: 2023-08-09

## 2023-08-10 ENCOUNTER — TELEPHONE (OUTPATIENT)
Dept: DERMATOLOGY | Facility: CLINIC | Age: 68
End: 2023-08-10

## 2023-08-10 NOTE — TELEPHONE ENCOUNTER
Called pt to advise his upcoming appt on 9/11 with Dr. Wesly Marcus needs to be r/s, due to the provider no longer being with the practice. Left a message for patient to call the office to reschedule.

## 2023-08-23 ENCOUNTER — TELEPHONE (OUTPATIENT)
Dept: PULMONOLOGY | Facility: CLINIC | Age: 68
End: 2023-08-23

## 2023-08-23 NOTE — TELEPHONE ENCOUNTER
[facility] calling requesting office notes and CPAP order be faxed to [phone] in order to requalify the pt for his CPAP.

## 2023-08-25 ENCOUNTER — OFFICE VISIT (OUTPATIENT)
Dept: UROLOGY | Facility: CLINIC | Age: 68
End: 2023-08-25
Payer: MEDICARE

## 2023-08-25 VITALS
HEART RATE: 69 BPM | SYSTOLIC BLOOD PRESSURE: 130 MMHG | WEIGHT: 227 LBS | BODY MASS INDEX: 30.09 KG/M2 | HEIGHT: 73 IN | DIASTOLIC BLOOD PRESSURE: 80 MMHG | OXYGEN SATURATION: 97 %

## 2023-08-25 DIAGNOSIS — R97.20 ELEVATED PSA: Primary | ICD-10-CM

## 2023-08-25 PROCEDURE — 99213 OFFICE O/P EST LOW 20 MIN: CPT

## 2023-08-25 NOTE — PROGRESS NOTES
Office Visit- Urology  Hyacinth Velazco 1955 MRN: [id number]      Assessment/Discussion/Plan    74 y.o. male managed by     1. Elevated PSA  -Patient was originally seen for history of elevated PSA with PSA reading of 4.2 in November 2021 and 4.3 in August 2022  -He was seen in consultation with Dr. Maximilian Jay who performed a prostate exam which demonstrated right-sided induration and asymmetry  -Multiparametric MRI returned at PI-RADS 2 with measurement of the prostate of 71.9 cc with no dominant lesions  -PSA 3.9 recheck.    -Plan for PSA and ISABEL in 6 months           Chief Complaint:   Romain Johnson is a 74 y.o. male presenting to the office for follow-up for elevated PSA        Subjective  -68-year-old male  -Was last seen in the office in November 2022 for evaluation of rising PSA  -His PSA was recorded at 4.2 in November 2021 and 4.3 in August 2022  -At that time Dr. Nico Mahoney performed a rectal exam which demonstrated some asymmetry and induration on the right side.  -Patient was advised to obtain an MRI of the prostate for further evaluation which returned at PI-RADS 2 with no dominant lesions concerning for prostate cancer. Prostate size 72 g  -He was instructed to follow-up in 6 months with a PSA  -Presents to the office today for follow-up  -PSA recheck returned to 3.9  -He denies any changes to his urinary pattern.   Denies any bothersome urinary symptoms to warrant pharmacotherapy  -Denies dysuria, flank pain, gross hematuria      AUA SYMPTOM SCORE    Flowsheet Row Most Recent Value   AUA SYMPTOM SCORE    How often have you had a sensation of not emptying your bladder completely after you finished urinating? 0 (P)     How often have you had to urinate again less than two hours after you finished urinating? 1 (P)     How often have you found you stopped and started again several times when you urinate? 1 (P)     How often have you found it difficult to postpone urination? 0 (P)     How often have you had a weak urinary stream? 3 (P)     How often have you had to push or strain to begin urination? 1 (P)     How many times did you most typically get up to urinate from the time you went to bed at night until the time you got up in the morning? 1 (P)     Quality of Life: If you were to spend the rest of your life with your urinary condition just the way it is now, how would you feel about that? 1 (P)     AUA SYMPTOM SCORE 7 (P)           ROS:   Review of Systems   Constitutional: Negative. Negative for chills, fatigue and fever. HENT: Negative. Respiratory: Negative for shortness of breath. Cardiovascular: Negative for chest pain. Gastrointestinal: Negative. Negative for abdominal pain. Endocrine: Negative. Musculoskeletal: Negative. Skin: Negative. Neurological: Negative. Negative for dizziness and light-headedness. Hematological: Negative. Psychiatric/Behavioral: Negative.           Past Medical History  Past Medical History:   Diagnosis Date   • Actinic keratosis 2015   • Acute maxillary sinusitis    • Arthritis 2000   • Asthmatic bronchitis    • Benign prostatic hyperplasia    • Benign prostatic hyperplasia with urinary incontinence without sensory awareness    • Cancer ([address]) 2015   • Cervical disc disease    • Cervicalgia    • Chest pain    • DVT (deep venous thrombosis) ([address]) 2004   • Erectile dysfunction    • Fatigue    • Hypercholesterolemia    • Hyperlipidemia    • Hypertension    • IFG (impaired fasting glucose)    • PE (pulmonary thromboembolism) ([address]) 2004    on xarelto   • Pneumonia 1971   • Screening for colon cancer    • Screening for prostate cancer    • Skin tag    • Sleep apnea     12/21/21--states he is being worked up for sleep apnea   • Squamous cell skin cancer 2015 2017 2022       Past Surgical History  Past Surgical History:   Procedure Laterality Date   • BACK SURGERY  2004   • CHOLECYSTECTOMY  2009   • COLON SURGERY  2004    Polyps removed   • COLONOSCOPY     • EYE SURGERY 2016?    tprn retina   • [address]  2015 2017 2022   • MT CLAVICULECTOMY PARTIAL Left 03/09/2021    Procedure: DISTAL CLAVICLE EXCISION;  Surgeon: Herber Mckeon DO;  Location:  MAIN OR;  Service: Orthopedics   • MT SURGICAL ARTHROSCOPY SHOULDER BICEPS TENODESIS Left 03/09/2021    Procedure: ARTHROSCOPIC BICEPS TENODESIS;  Surgeon: Herber Mckeon DO;  Location: [address] [place] [address] MAIN OR;  Service: Orthopedics   • MT SURGICAL ARTHROSCOPY SHOULDER W/ROTATOR CUFF RPR Left 03/09/2021    Procedure: REPAIR ROTATOR CUFF  ARTHROSCOPIC;  Surgeon: Herber Mckeon DO;  Location: [address] [place] [address] MAIN OR;  Service: Orthopedics   • SKIN BIOPSY  2015 2016 2017 2022   • [address]  2004    Tumor removed lower spine       Past Family History  Family History   Problem Relation Age of Onset   • Coronary artery disease Mother    • Hypertrophic cardiomyopathy Mother    • Thyroid disease Mother    • Coronary artery disease Sister    • Irritable bowel syndrome Sister    • Crohn's disease Sister    • Substance Abuse Sister    • Mental illness Sister    • Substance Abuse Brother    • Mental illness Brother    • Heart disease Family         cardiovascular disease, ischemic heart disease   • Cancer Family    • Cancer Father         jaw bone   • Hypertension Father    • Diabetes Maternal Aunt        Past Social history  Social History     Socioeconomic History   • Marital status: /Civil Union     Spouse name: Not on file   • Number of children: Not on file   • Years of education: Not on file   • Highest education level: Not on file   Occupational History   • Not on file   Tobacco Use   • Smoking status: Never   • Smokeless tobacco: Never   • Tobacco comments:     Second hand only   Vaping Use   • Vaping Use: Never used   Substance and Sexual Activity   • Alcohol use: Yes     Comment: Inconsistent, infrequent   • Drug use: No   • Sexual activity: Yes     Partners: Female   Other Topics Concern   • Not on file   Social History Narrative    Lives with wife. Feels safe at home. Has living will. Sees dentist reg. Good dental hygiene    Living situation: supportive and safe     Social Determinants of Health     Financial Resource Strain: Low Risk  (8/21/2023)    Overall Financial Resource Strain (CARDIA)    • Difficulty of Paying Living Expenses: Not hard at all   Food Insecurity: Not on file   Transportation Needs: No Transportation Needs (8/21/2023)    PRAPARE - Transportation    • Lack of Transportation (Medical): No    • Lack of Transportation (Non-Medical): No   Physical Activity: Inactive (3/5/2021)    Exercise Vital Sign    • Days of Exercise per Week: 0 days    • Minutes of Exercise per Session: 0 min   Stress: No Stress Concern Present (3/5/2021)    [facility]    • Feeling of Stress : Only a little   Social Connections:  Moderately Isolated (3/5/2021)    Social Connection and Isolation Panel [NHANES]    • Frequency of Communication with Friends and Family: Twice a week    • Frequency of Social Gatherings with Friends and Family: Twice a week    • Attends Latter day Services: Never    • Active Member of Clubs or Organizations: No    • Attends Club or Organization Meetings: Never    • Marital Status:    Intimate Partner Violence: Not At Risk (3/5/2021)    Humiliation, Afraid, Rape, and Kick questionnaire    • Fear of Current or Ex-Partner: No    • Emotionally Abused: No    • Physically Abused: No    • Sexually Abused: No   Housing Stability: Not on file       Current Medications  Current Outpatient Medications   Medication Sig Dispense Refill   • doxazosin (CARDURA) 2 mg tablet TAKE 1 TABLET BY MOUTH EVERYDAY AT BEDTIME 90 tablet 1   • fluticasone (FLONASE) 50 mcg/act nasal spray 2 sprays as needed  3   • levalbuterol (XOPENEX HFA) 45 mcg/act inhaler Inhale 1-2 puffs every 6 (six) hours as needed for wheezing 15 g 0   • losartan-hydrochlorothiazide (HYZAAR) 100-25 MG per tablet TAKE 1 TABLET DAILY 90 tablet 0   • Multiple Vitamin (MULTIVITAMIN ADULT PO) Take by mouth     • Probiotic Product (PROBIOTIC PO) Take by mouth     • sildenafil (REVATIO) 20 mg tablet 3 pills by mouth before sexual activity 90 tablet 1   • simvastatin (ZOCOR) 10 mg tablet TAKE 1 TABLET BY MOUTH EVERY DAY 90 tablet 0   • Xarelto 20 MG tablet TAKE 1 TABLET BY MOUTH EVERY DAY 90 tablet 2     No current facility-administered medications for this visit. Allergies  Allergies   Allergen Reactions   • Vancomycin Rash and Other (See Comments)     Peeling of skin   • Ace Inhibitors Cough       OBJECTIVE    Vitals   Vitals:    08/25/23 0827   BP: 130/80   BP Location: Left arm   Patient Position: Sitting   Cuff Size: Adult   Pulse: 69   SpO2: 97%   Weight: 103 kg (227 lb)   Height: 6' 1" (1.854 m)       PVR:    Physical Exam  Constitutional:       General: He is not in acute distress. Appearance: Normal appearance. He is normal weight. He is not ill-appearing or toxic-appearing. HENT:      Head: Normocephalic and atraumatic. Eyes:      Conjunctiva/sclera: Conjunctivae normal.   Cardiovascular:      Rate and Rhythm: Normal rate. Pulses: Normal pulses. Pulmonary:      Effort: Pulmonary effort is normal. No respiratory distress. Abdominal:      Tenderness: There is no right CVA tenderness or left CVA tenderness. Musculoskeletal:         General: Normal range of motion. Cervical back: Normal range of motion and neck supple. Skin:     General: Skin is warm and dry. Neurological:      General: No focal deficit present. Mental Status: He is alert and oriented to person, place, and time. Cranial Nerves: No cranial nerve deficit. Psychiatric:         Mood and Affect: Mood normal.         Behavior: Behavior normal.         Thought Content:  Thought content normal.          Labs:     Lab Results   Component Value Date    PSA 3.9 04/13/2023    PSA 4.3 (H) 08/25/2022    PSA 4.2 (H) 11/19/2021    PSA 3.8 09/11/2020     Lab Results   Component Value Date    CREATININE 0.76 12/03/2022      Lab Results   Component Value Date    HGBA1C 6.0 (H) 08/25/2022     Lab Results   Component Value Date    GLUCOSE 93 06/21/2014    CALCIUM 9.9 12/03/2022     06/21/2014    K 3.8 12/03/2022    CO2 29 12/03/2022     12/03/2022    BUN 14 12/03/2022    CREATININE 0.76 12/03/2022       I have personally reviewed all pertinent lab results and reviewed with patient    Imaging     MULTIPARAMETRIC MRI OF THE PROSTATE WITH AND WITHOUT CONTRAST-WITH 3-D POSTPROCESSING.     INDICATION:   R68.89: Other general symptoms and signs  R97.20: Elevated prostate specific antigen (PSA).     COMPARISON: None     PSA LEVEL: 4.3 ng/mL on August 25, 2022. PRIOR BIOPSY DATE: No prior biopsy. BIOPSY RESULTS: Not applicable.     TECHNIQUE: The following pulse sequences were obtained:  Small field-of-view axial T1-weighted and multiplanar T2-weighted images; DWI axial and ADC map; large field of view axial T2 weighted images; T1w in-phase and opposed-phase axials of entire pelvis   and dynamic 3D T1w axial before and during IV contrast injection.        CONTRAST:  Gadobutrol (Gadavist) 10 mL of Gadobutrol injection (SINGLE-DOSE)     TECHNICAL LIMITATIONS: None.     FINDINGS:     PROSTATE:     Size: 5.1 x 4.6 x 6.1 cm = 71.9 cc. Post-biopsy hemorrhage:  None. Central gland enlargement (BPH): Moderate.     Focal lesions - No dominant lesion. Findings of BPH with a mostly encapsulated OR a homogenous circumscribed nodule without encapsulation OR a homogenous hypointense area between nodules. PI-RADSv2.1 Category 2 - Low (clinically significant cancer   unlikely).         SEMINAL VESICLES: Unremarkable     Note: Clinically significant cancer is defined on pathology/histology as Karen score greater than or equal to 7, and/or volume of greater than or equal to 0.5 mL, and/or extraprostatic extension.     URINARY BLADDER: Unremarkable.     LYMPH NODES: No pelvic lymphadenopathy.     BONES: No suspicious osseous lesion.           IMPRESSION:     1. PI-RADSv2.1 Category 2 - Low (clinically significant cancer is unlikely to be present).     2. Moderate BPH with calculated prostate volume of 72 cc.     Prostate gland boundaries were segmented using 3D advanced post-processing on an independent [employer] system workstation with active physician participation.       Marin Renner PA-C  Date: 8/25/2023 Time: 6:43 PM  [facility]    This note was written using fluency dictation software. Please excuse any resulting minor grammatical errors.

## 2023-08-28 ENCOUNTER — OFFICE VISIT (OUTPATIENT)
Dept: FAMILY MEDICINE CLINIC | Facility: HOSPITAL | Age: 68
End: 2023-08-28
Payer: COMMERCIAL

## 2023-08-28 VITALS
DIASTOLIC BLOOD PRESSURE: 70 MMHG | WEIGHT: 226 LBS | BODY MASS INDEX: 29.95 KG/M2 | RESPIRATION RATE: 16 BRPM | HEART RATE: 66 BPM | OXYGEN SATURATION: 94 % | SYSTOLIC BLOOD PRESSURE: 120 MMHG | HEIGHT: 73 IN

## 2023-08-28 DIAGNOSIS — Z00.00 WELCOME TO MEDICARE PREVENTIVE VISIT: ICD-10-CM

## 2023-08-28 DIAGNOSIS — Z13.6 SCREENING FOR CARDIOVASCULAR CONDITION: ICD-10-CM

## 2023-08-28 DIAGNOSIS — R73.01 IMPAIRED FASTING GLUCOSE: ICD-10-CM

## 2023-08-28 DIAGNOSIS — M51.37 DDD (DEGENERATIVE DISC DISEASE), LUMBOSACRAL: ICD-10-CM

## 2023-08-28 DIAGNOSIS — G47.33 OBSTRUCTIVE SLEEP APNEA: ICD-10-CM

## 2023-08-28 DIAGNOSIS — Z13.1 SCREENING FOR DIABETES MELLITUS: ICD-10-CM

## 2023-08-28 DIAGNOSIS — R05.3 CHRONIC COUGH: Primary | ICD-10-CM

## 2023-08-28 DIAGNOSIS — I10 ESSENTIAL HYPERTENSION: ICD-10-CM

## 2023-08-28 PROBLEM — M51.379 DDD (DEGENERATIVE DISC DISEASE), LUMBOSACRAL: Status: ACTIVE | Noted: 2023-08-28

## 2023-08-28 PROCEDURE — G0402 INITIAL PREVENTIVE EXAM: HCPCS | Performed by: INTERNAL MEDICINE

## 2023-08-28 PROCEDURE — 99214 OFFICE O/P EST MOD 30 MIN: CPT | Performed by: INTERNAL MEDICINE

## 2023-08-28 NOTE — ASSESSMENT & PLAN NOTE
Patient has had a dry chronic cough for several months. His wife notices this more than he does. He was exposed to his smoking as a child. Chest x-ray in July last year was normal.    He denies symptoms of acid reflux, has rare wheezing in his lungs but denies shortness of breath. Has some postnasal dripping in the morning. Examination of the nose, throat, lungs are normal today    Order chest x-ray to evaluate for any infiltrates. We will try Flonase for possible upper airway drip. He has no signs of congestive heart failure. None of the medications could cause his chronic cough.

## 2023-08-28 NOTE — ASSESSMENT & PLAN NOTE
Patient also complains of intermittent fatigue requiring him to sit down and rest.  He also has intermittent loose bowel meds and constipation. His colonoscopy in 2021 showed 1 polyp and small internal hemorrhoids. Denies any signs of GI bleeding, unintentional weight loss, abdominal pain. Denies hematuria    Blood pressure is well controlled today. Check electrolytes, fasting blood sugar, renal function, liver function test, blood counts    Continue losartan/HCTZ and Cardura. Blood pressure is well controlled.

## 2023-08-28 NOTE — ASSESSMENT & PLAN NOTE
Patient has degenerative disc disease of the lumbosacral spine as seen on MRI of the lumbosacral spine in 2013. He is status post L4-L5 laminectomy. MRI also showed spondylosis. His upper lumbar back pain in the morning could be due to spondylosis. It resolves with stretching and walking. There are no symptoms of radiculopathy.   We decided to watch this for now without further imaging or referrals

## 2023-08-28 NOTE — PROGRESS NOTES
Assessment and Plan:     Problem List Items Addressed This Visit        Endocrine    Impaired fasting glucose    Relevant Orders    HEMOGLOBIN A1C W/ EAG ESTIMATION       Respiratory    Obstructive sleep apnea     He has sleep apnea and uses CPAP every night. Tiredness could be due to sleep apnea but he has been compliant with using CPAP. Cardiovascular and Mediastinum    Essential hypertension     Patient also complains of intermittent fatigue requiring him to sit down and rest.  He also has intermittent loose bowel meds and constipation. His colonoscopy in 2021 showed 1 polyp and small internal hemorrhoids. Denies any signs of GI bleeding, unintentional weight loss, abdominal pain. Denies hematuria    Blood pressure is well controlled today. Check electrolytes, fasting blood sugar, renal function, liver function test, blood counts    Continue losartan/HCTZ and Cardura. Blood pressure is well controlled. Relevant Orders    CBC    TSH, 3rd generation with Free T4 reflex    UA w Reflex to Microscopic w Reflex to Culture       Musculoskeletal and Integument    DDD (degenerative disc disease), lumbosacral     Patient has degenerative disc disease of the lumbosacral spine as seen on MRI of the lumbosacral spine in 2013. He is status post L4-L5 laminectomy. MRI also showed spondylosis. His upper lumbar back pain in the morning could be due to spondylosis. It resolves with stretching and walking. There are no symptoms of radiculopathy. We decided to watch this for now without further imaging or referrals                Other    Chronic cough - Primary     Patient has had a dry chronic cough for several months. His wife notices this more than he does. He was exposed to his smoking as a child. Chest x-ray in July last year was normal.    He denies symptoms of acid reflux, has rare wheezing in his lungs but denies shortness of breath.   Has some postnasal dripping in the morning. Examination of the nose, throat, lungs are normal today    Order chest x-ray to evaluate for any infiltrates. We will try Flonase for possible upper airway drip. He has no signs of congestive heart failure. None of the medications could cause his chronic cough. Relevant Orders    XR chest pa & lateral   Other Visit Diagnoses     Welcome to Medicare preventive visit        Screening for diabetes mellitus        Relevant Orders    Comprehensive metabolic panel    Screening for cardiovascular condition        Relevant Orders    Lipid panel          Depression Screening and Follow-up Plan: Patient was screened for depression during today's encounter. They screened negative with a PHQ-2 score of 0. Preventive health issues were discussed with patient, and age appropriate screening tests were ordered as noted in patient's After Visit Summary. Personalized health advice and appropriate referrals for health education or preventive services given if needed, as noted in patient's After Visit Summary. History of Present Illness:     Patient presents for a Medicare Wellness Visit    HPI   Patient Care Team:  Aminata Castro MD as PCP - Dave Chiang MD     Review of Systems:     Review of Systems   Constitutional: Positive for fatigue. Negative for fever. HENT: Negative for hearing loss. Eyes: Negative for visual disturbance. Respiratory: Positive for cough. Negative for shortness of breath. Cardiovascular: Negative for chest pain and palpitations. Gastrointestinal: Negative for abdominal pain, blood in stool, constipation and diarrhea. Endocrine: Negative for polydipsia and polyphagia. Genitourinary: Negative for dysuria and hematuria. Musculoskeletal: Positive for arthralgias (Has intermittent left groin pain when he externally rotates his left hip.   Results with stretching, has been going on for couple months) and back pain (Has intermittent upper lumbar back pain mainly in the mornings that is resolved with walking and stretching exercises. It is nonradiating). Negative for myalgias. Skin: Negative for rash. Neurological: Negative for headaches. Psychiatric/Behavioral: Negative for dysphoric mood. All other systems reviewed and are negative.        Problem List:     Patient Active Problem List   Diagnosis   • Essential hypertension   • Other hyperlipidemia   • Impaired fasting glucose   • History of pulmonary embolism   • Chronic rhinitis   • Other male erectile dysfunction   • Benign prostatic hyperplasia with urinary hesitancy   • Elevated PSA   • Current use of long term anticoagulation   • History of DVT in adulthood   • Nontraumatic complete tear of left rotator cuff   • History of colon polyps   • Obstructive sleep apnea   • Daytime somnolence   • Snoring   • Nocturnal hypoxemia   • Tear of right gluteus medius tendon   • Chronic cough   • DDD (degenerative disc disease), lumbosacral      Past Medical and Surgical History:     Past Medical History:   Diagnosis Date   • Actinic keratosis 2015   • Acute maxillary sinusitis    • Arthritis 2000   • Asthmatic bronchitis    • Benign prostatic hyperplasia    • Benign prostatic hyperplasia with urinary incontinence without sensory awareness    • Cancer ([address]) 2015   • Cervical disc disease    • Cervicalgia    • Chest pain    • DVT (deep venous thrombosis) ([address]) 2004   • Erectile dysfunction    • Fatigue    • Hypercholesterolemia    • Hyperlipidemia    • Hypertension    • IFG (impaired fasting glucose)    • PE (pulmonary thromboembolism) ([address]) 2004    on xarelto   • Pneumonia 1971   • Screening for colon cancer    • Screening for prostate cancer    • Skin tag    • Sleep apnea     12/21/21--states he is being worked up for sleep apnea   • Squamous cell skin cancer 2015 2017 2022     Past Surgical History:   Procedure Laterality Date   • BACK SURGERY  2004   • CHOLECYSTECTOMY  2009   • COLON SURGERY  2004    Polyps removed • COLONOSCOPY     • EYE SURGERY  2016?    tprn retina   • MOHS SURGERY  2015 2017 2022   • NC CLAVICULECTOMY PARTIAL Left 03/09/2021    Procedure: DISTAL CLAVICLE EXCISION;  Surgeon: Nakia Page DO;  Location: [facility] MAIN OR;  Service: Orthopedics   • NC SURGICAL ARTHROSCOPY SHOULDER BICEPS TENODESIS Left 03/09/2021    Procedure: ARTHROSCOPIC BICEPS TENODESIS;  Surgeon: Nakia Page DO;  Location: [facility] OR;  Service: Orthopedics   • NC SURGICAL ARTHROSCOPY SHOULDER W/ROTATOR CUFF RPR Left 03/09/2021    Procedure: REPAIR ROTATOR CUFF  ARTHROSCOPIC;  Surgeon: Nakia Page DO;  Location: [facility] MAIN OR;  Service: Orthopedics   • SKIN BIOPSY  2015 2016 2017 2022   • [address]  2004    Tumor removed lower spine      Family History:     Family History   Problem Relation Age of Onset   • Coronary artery disease Mother    • Hypertrophic cardiomyopathy Mother    • Thyroid disease Mother    • Coronary artery disease Sister    • Irritable bowel syndrome Sister    • Crohn's disease Sister    • Substance Abuse Sister    • Mental illness Sister    • Substance Abuse Brother    • Mental illness Brother    • Heart disease Family         cardiovascular disease, ischemic heart disease   • Cancer Family    • Cancer Father         jaw bone   • Hypertension Father    • Diabetes Maternal Aunt       Social History:     Social History     Socioeconomic History   • Marital status: /Civil Union     Spouse name: None   • Number of children: None   • Years of education: None   • Highest education level: None   Occupational History   • None   Tobacco Use   • Smoking status: Never   • Smokeless tobacco: Never   • Tobacco comments:     Second hand only   Vaping Use   • Vaping Use: Never used   Substance and Sexual Activity   • Alcohol use: Yes     Comment: Inconsistent, infrequent   • Drug use: No   • Sexual activity: Yes     Partners: Female   Other Topics Concern   • None   Social History Narrative    Lives with wife.     Feels safe at home. Has living will. Sees dentist reg. Good dental hygiene    Living situation: supportive and safe     Social Determinants of Health     Financial Resource Strain: Low Risk  (8/21/2023)    Overall Financial Resource Strain (CARDIA)    • Difficulty of Paying Living Expenses: Not hard at all   Food Insecurity: Not on file   Transportation Needs: No Transportation Needs (8/21/2023)    PRAPARE - Transportation    • Lack of Transportation (Medical): No    • Lack of Transportation (Non-Medical): No   Physical Activity: Inactive (3/5/2021)    Exercise Vital Sign    • Days of Exercise per Week: 0 days    • Minutes of Exercise per Session: 0 min   Stress: No Stress Concern Present (3/5/2021)    [facility]    • Feeling of Stress : Only a little   Social Connections:  Moderately Isolated (3/5/2021)    Social Connection and Isolation Panel [NHANES]    • Frequency of Communication with Friends and Family: Twice a week    • Frequency of Social Gatherings with Friends and Family: Twice a week    • Attends Faith Services: Never    • Active Member of Clubs or Organizations: No    • Attends Club or Organization Meetings: Never    • Marital Status:    Intimate Partner Violence: Not At Risk (3/5/2021)    Humiliation, Afraid, Rape, and Kick questionnaire    • Fear of Current or Ex-Partner: No    • Emotionally Abused: No    • Physically Abused: No    • Sexually Abused: No   Housing Stability: Not on file      Medications and Allergies:     Current Outpatient Medications   Medication Sig Dispense Refill   • doxazosin (CARDURA) 2 mg tablet TAKE 1 TABLET BY MOUTH EVERYDAY AT BEDTIME 90 tablet 1   • fluticasone (FLONASE) 50 mcg/act nasal spray 2 sprays as needed  3   • levalbuterol (XOPENEX HFA) 45 mcg/act inhaler Inhale 1-2 puffs every 6 (six) hours as needed for wheezing 15 g 0   • losartan-hydrochlorothiazide (HYZAAR) 100-25 MG per tablet TAKE 1 TABLET DAILY 90 tablet 0   • Multiple Vitamin (MULTIVITAMIN ADULT PO) Take by mouth     • Probiotic Product (PROBIOTIC PO) Take by mouth     • sildenafil (REVATIO) 20 mg tablet 3 pills by mouth before sexual activity 90 tablet 1   • simvastatin (ZOCOR) 10 mg tablet TAKE 1 TABLET BY MOUTH EVERY DAY 90 tablet 0   • Xarelto 20 MG tablet TAKE 1 TABLET BY MOUTH EVERY DAY 90 tablet 2     No current facility-administered medications for this visit. Allergies   Allergen Reactions   • Vancomycin Rash and Other (See Comments)     Peeling of skin   • Ace Inhibitors Cough      Immunizations:     Immunization History   Administered Date(s) Administered   • COVID-19 PFIZER VACCINE 0.3 ML IM 03/25/2021, 03/25/2021, 04/16/2021, 04/16/2021, 11/27/2021, 11/27/2021   • COVID-19 Pfizer vac (Emery-sucrose, gray cap) 12 yr+ IM 04/16/2022   • INFLUENZA 10/16/2014, 10/11/2015, 09/27/2016   • Influenza Quadrivalent Preservative Free 3 years and older IM 09/27/2016, 09/27/2016, 09/12/2020   • Influenza, high dose seasonal 0.7 mL 10/30/2022   • Influenza, injectable, quadrivalent, preservative free 0.5 mL 09/12/2020   • Influenza, recombinant, quadrivalent,injectable, preservative free 12/31/2018, 11/21/2019, 11/10/2021   • Influenza, seasonal, injectable 11/21/2012, 11/21/2012, 01/04/2014, 01/04/2014, 10/16/2014, 10/16/2014, 10/11/2015, 10/11/2015   • Tdap 11/21/2012, 11/21/2012   • Zoster Vaccine Recombinant 08/24/2020, 11/20/2020      Health Maintenance:         Topic Date Due   • Hepatitis C Screening  Never done   • HIV Screening  Never done   • Colorectal Cancer Screening  12/20/2026         Topic Date Due   • Pneumococcal Vaccine: 65+ Years (1 - PCV) Never done   • COVID-19 Vaccine (7 - Pfizer series) 09/02/2022   • Influenza Vaccine (1) 09/01/2023      Medicare Screening Tests and Risk Assessments:     Magnolia Zamudoi is here for his Welcome to Medicare visit. Health Risk Assessment:   Patient rates overall health as very good.  Patient feels that their physical health rating is slightly better. Patient is very satisfied with their life. Eyesight was rated as same. Hearing was rated as same. Patient feels that their emotional and mental health rating is same. Patients states they are never, rarely angry. Patient states they are sometimes unusually tired/fatigued. Pain experienced in the last 7 days has been some. Patient's pain rating has been 2/10. Patient states that he has experienced no weight loss or gain in last 6 months. Depression Screening:   PHQ-2 Score: 0      Fall Risk Screening: In the past year, patient has experienced: no history of falling in past year      Home Safety:  Patient does not have trouble with stairs inside or outside of their home. Patient has working smoke alarms and has working carbon monoxide detector. Home safety hazards include: none. Nutrition:   Current diet is Regular and Limited junk food. Medications:   Patient is currently taking over-the-counter supplements. OTC medications include: Probiotic, multi vitamin, vitamin D. Patient is able to manage medications. Activities of Daily Living (ADLs)/Instrumental Activities of Daily Living (IADLs):   Walk and transfer into and out of bed and chair?: Yes  Dress and groom yourself?: Yes    Bathe or shower yourself?: Yes    Feed yourself? Yes  Do your laundry/housekeeping?: Yes  Manage your money, pay your bills and track your expenses?: Yes  Make your own meals?: Yes    Do your own shopping?: Yes    [facility] for CPAP    Previous Hospitalizations:   Any hospitalizations or ED visits within the last 12 months?: No      Advance Care Planning:   Living will: Yes    Durable POA for healthcare:  Yes    Advanced directive: Yes    Advanced directive counseling given: Yes      PREVENTIVE SCREENINGS      Cardiovascular Screening:    General: Screening Not Indicated, History Lipid Disorder and Risks and Benefits Discussed    Due for: Lipid Panel      Diabetes Screening:     General: Screening Current and Risks and Benefits Discussed    Due for: Blood Glucose      Colorectal Cancer Screening:     General: Screening Current      Prostate Cancer Screening:    General: Screening Current and Risks and Benefits Discussed      Abdominal Aortic Aneurysm (AAA) Screening:    Risk factors include: age between 70-77 yo        Lung Cancer Screening:     General: Screening Not Indicated    Screening, Brief Intervention, and Referral to Treatment (SBIRT)    Screening  Typical number of drinks in a day: 0  Typical number of drinks in a week: 2  Interpretation: Low risk drinking behavior. AUDIT-C Screenin) How often did you have a drink containing alcohol in the past year? 2 to 4 times a month  2) How many drinks did you have on a typical day when you were drinking in the past year? 1 to 2  3) How often did you have 6 or more drinks on one occasion in the past year? never    AUDIT-C Score: 2  Interpretation: Score 0-3 (male): Negative screen for alcohol misuse    Single Item Drug Screening:  How often have you used an illegal drug (including marijuana) or a prescription medication for non-medical reasons in the past year? never    Single Item Drug Screen Score: 0  Interpretation: Negative screen for possible drug use disorder    Brief Intervention  Alcohol & drug use screenings were reviewed. No concerns regarding substance use disorder identified. Other Counseling Topics:   Regular weightbearing exercise. Vision Screening    Right eye Left eye Both eyes   Without correction      With correction 20/20 20/40 20/25        Physical Exam:     /70   Pulse 66   Resp 16   Ht 6' 1" (1.854 m)   Wt 103 kg (226 lb)   SpO2 94%   BMI 29.82 kg/m²     Physical Exam  Constitutional:       General: He is not in acute distress. Appearance: He is well-developed. He is not toxic-appearing. HENT:      Head: Normocephalic. Right Ear: Tympanic membrane normal. There is no impacted cerumen. Left Ear: Tympanic membrane normal. There is no impacted cerumen. Nose: No congestion or rhinorrhea. Mouth/Throat:      Mouth: Mucous membranes are moist.   Eyes:      Conjunctiva/sclera: Conjunctivae normal.   Cardiovascular:      Rate and Rhythm: Normal rate and regular rhythm. Heart sounds: No murmur heard. Pulmonary:      Effort: No respiratory distress. Breath sounds: No wheezing or rales. Abdominal:      General: Bowel sounds are normal.      Palpations: Abdomen is soft. Tenderness: There is no abdominal tenderness. Musculoskeletal:         General: No tenderness. Cervical back: Neck supple. Lymphadenopathy:      Cervical: No cervical adenopathy. Skin:     General: Skin is warm and dry. Neurological:      Mental Status: He is alert and oriented to person, place, and time. Cranial Nerves: No cranial nerve deficit. Motor: No weakness. Gait: Gait normal.   Psychiatric:         Mood and Affect: Mood normal.         Thought Content:  Thought content normal.          Fredrick Rivas MD

## 2023-08-28 NOTE — PATIENT INSTRUCTIONS
Check with your pharmacist to see if you have had the prevnar 13, pneumovax 23 or prevnar 20 vaccinations. Your flonase nasal spray for a month one spray twice a day each nostril for your cough. Medicare Preventive Visit Patient Instructions  Thank you for completing your Welcome to Medicare Visit or Medicare Annual Wellness Visit today. Your next wellness visit will be due in one year (8/28/2024). The screening/preventive services that you may require over the next 5-10 years are detailed below. Some tests may not apply to you based off risk factors and/or age. Screening tests ordered at today's visit but not completed yet may show as past due. Also, please note that scanned in results may not display below. Preventive Screenings:  Service Recommendations Previous Testing/Comments   Colorectal Cancer Screening  Colonoscopy    Fecal Occult Blood Test (FOBT)/Fecal Immunochemical Test (FIT)  Fecal DNA/Cologuard Test  Flexible Sigmoidoscopy Age: 43-73 years old   Colonoscopy: every 10 years (May be performed more frequently if at higher risk)  OR  FOBT/FIT: every 1 year  OR  Cologuard: every 3 years  OR  Sigmoidoscopy: every 5 years  Screening may be recommended earlier than age 39 if at higher risk for colorectal cancer. Also, an individualized decision between you and your healthcare provider will decide whether screening between the ages of 77-80 would be appropriate.  Colonoscopy: 12/21/2021  FOBT/FIT: Not on file  Cologuard: Not on file  Sigmoidoscopy: Not on file    Screening Current     Prostate Cancer Screening Individualized decision between patient and health care provider in men between ages of 53-66   Medicare will cover every 12 months beginning on the day after your 50th birthday PSA: 3.9 ng/mL     Screening Current     Hepatitis C Screening Once for adults born between [address] [place] [address]  More frequently in patients at high risk for Hepatitis C Hep C Antibody: Not on file        Diabetes Screening 1-2 times per year if you're at risk for diabetes or have pre-diabetes Fasting glucose: 102 mg/dL (12/3/2022)  A1C: 6.0 % (8/25/2022)  Screening Current   Cholesterol Screening Once every 5 years if you don't have a lipid disorder. May order more often based on risk factors. Lipid panel: 08/25/2022  Screening Not Indicated  History Lipid Disorder      Other Preventive Screenings Covered by Medicare:  Abdominal Aortic Aneurysm (AAA) Screening: covered once if your at risk. You're considered to be at risk if you have a family history of AAA or a male between the age of 70-76 who smoking at least 100 cigarettes in your lifetime. Lung Cancer Screening: covers low dose CT scan once per year if you meet all of the following conditions: (1) Age 48-67; (2) No signs or symptoms of lung cancer; (3) Current smoker or have quit smoking within the last 15 years; (4) You have a tobacco smoking history of at least 20 pack years (packs per day x number of years you smoked); (5) You get a written order from a healthcare provider. Glaucoma Screening: covered annually if you're considered high risk: (1) You have diabetes OR (2) Family history of glaucoma OR (3)  aged 48 and older OR (3)  American aged 72 and older  Osteoporosis Screening: covered every 2 years if you meet one of the following conditions: (1) Have a vertebral abnormality; (2) On glucocorticoid therapy for more than 3 months; (3) Have primary hyperparathyroidism; (4) On osteoporosis medications and need to assess response to drug therapy. HIV Screening: covered annually if you're between the age of 14-79. Also covered annually if you are younger than 13 and older than 72 with risk factors for HIV infection. For pregnant patients, it is covered up to 3 times per pregnancy.     Immunizations:  Immunization Recommendations   Influenza Vaccine Annual influenza vaccination during flu season is recommended for all persons aged >= 6 months who do not have contraindications   Pneumococcal Vaccine   * Pneumococcal conjugate vaccine = PCV13 (Prevnar 13), PCV15 (Vaxneuvance), PCV20 (Prevnar 20)  * Pneumococcal polysaccharide vaccine = PPSV23 (Pneumovax) Adults 2364 years old: 1-3 doses may be recommended based on certain risk factors  Adults 72 years old: 1-2 doses may be recommended based off what pneumonia vaccine you previously received   Hepatitis B Vaccine 3 dose series if at intermediate or high risk (ex: diabetes, end stage renal disease, liver disease)   Tetanus (Td) Vaccine - COST NOT COVERED BY MEDICARE PART B Following completion of primary series, a booster dose should be given every 10 years to maintain immunity against tetanus. Td may also be given as tetanus wound prophylaxis. Tdap Vaccine - COST NOT COVERED BY MEDICARE PART B Recommended at least once for all adults. For pregnant patients, recommended with each pregnancy. Shingles Vaccine (Shingrix) - COST NOT COVERED BY MEDICARE PART B  2 shot series recommended in those aged 48 and above     Health Maintenance Due:      Topic Date Due    Hepatitis C Screening  Never done    HIV Screening  Never done    Colorectal Cancer Screening  12/20/2026     Immunizations Due:      Topic Date Due    Pneumococcal Vaccine: 65+ Years (1 - PCV) Never done    COVID-19 Vaccine (7 - Pfizer series) 09/02/2022    Influenza Vaccine (1) 09/01/2023     Advance Directives   What are advance directives? Advance directives are legal documents that state your wishes and plans for medical care. These plans are made ahead of time in case you lose your ability to make decisions for yourself. Advance directives can apply to any medical decision, such as the treatments you want, and if you want to donate organs. What are the types of advance directives? There are many types of advance directives, and each state has rules about how to use them. You may choose a combination of any of the following:  Living will:   This is a written record of the treatment you want. You can also choose which treatments you do not want, which to limit, and which to stop at a certain time. This includes surgery, medicine, IV fluid, and tube feedings. Durable power of  for [facility]): This is a written record that states who you want to make healthcare choices for you when you are unable to make them for yourself. This person, called a proxy, is usually a family member or a friend. You may choose more than 1 proxy. Do not resuscitate (DNR) order:  A DNR order is used in case your heart stops beating or you stop breathing. It is a request not to have certain forms of treatment, such as CPR. A DNR order may be included in other types of advance directives. Medical directive: This covers the care that you want if you are in a coma, near death, or unable to make decisions for yourself. You can list the treatments you want for each condition. Treatment may include pain medicine, surgery, blood transfusions, dialysis, IV or tube feedings, and a ventilator (breathing machine). Values history: This document has questions about your views, beliefs, and how you feel and think about life. This information can help others choose the care that you would choose. Why are advance directives important? An advance directive helps you control your care. Although spoken wishes may be used, it is better to have your wishes written down. Spoken wishes can be misunderstood, or not followed. Treatments may be given even if you do not want them. An advance directive may make it easier for your family to make difficult choices about your care. Weight Management   Why it is important to manage your weight:  Being overweight increases your risk of health conditions such as heart disease, high blood pressure, type 2 diabetes, and certain types of cancer. It can also increase your risk for osteoarthritis, sleep apnea, and other respiratory problems.  Aim for a slow, steady weight loss. Even a small amount of weight loss can lower your risk of health problems. How to lose weight safely:  A safe and healthy way to lose weight is to eat fewer calories and get regular exercise. You can lose up about 1 pound a week by decreasing the number of calories you eat by 500 calories each day. Healthy meal plan for weight management:  A healthy meal plan includes a variety of foods, contains fewer calories, and helps you stay healthy. A healthy meal plan includes the following:  Eat whole-grain foods more often. A healthy meal plan should contain fiber. Fiber is the part of grains, fruits, and vegetables that is not broken down by your body. Whole-grain foods are healthy and provide extra fiber in your diet. Some examples of whole-grain foods are whole-wheat breads and pastas, oatmeal, brown rice, and bulgur. Eat a variety of vegetables every day. Include dark, leafy greens such as spinach, kale, marely greens, and mustard greens. Eat yellow and orange vegetables such as carrots, sweet potatoes, and winter squash. Eat a variety of fruits every day. Choose fresh or canned fruit (canned in its own juice or light syrup) instead of juice. Fruit juice has very little or no fiber. Eat low-fat dairy foods. Drink fat-free (skim) milk or 1% milk. Eat fat-free yogurt and low-fat cottage cheese. Try low-fat cheeses such as mozzarella and other reduced-fat cheeses. Choose meat and other protein foods that are low in fat. Choose beans or other legumes such as split peas or lentils. Choose fish, skinless poultry (chicken or turkey), or lean cuts of red meat (beef or pork). Before you cook meat or poultry, cut off any visible fat. Use less fat and oil. Try baking foods instead of frying them. Add less fat, such as margarine, sour cream, regular salad dressing and mayonnaise to foods. Eat fewer high-fat foods.  Some examples of high-fat foods include french fries, doughnuts, ice cream, and cakes. Eat fewer sweets. Limit foods and drinks that are high in sugar. This includes candy, cookies, regular soda, and sweetened drinks. Exercise:  Exercise at least 30 minutes per day on most days of the week. Some examples of exercise include walking, biking, dancing, and swimming. You can also fit in more physical activity by taking the stairs instead of the elevator or parking farther away from stores. Ask your healthcare provider about the best exercise plan for you. © Copyright [employer] 2018 Information is for End User's use only and may not be sold, redistributed or otherwise used for commercial purposes. All illustrations and images included in CareNotes® are the copyrighted property of [employer] or [facility] Preventive Visit Patient Instructions  Thank you for completing your Welcome to Medicare Visit or Medicare Annual Wellness Visit today. Your next wellness visit will be due in one year (8/28/2024). The screening/preventive services that you may require over the next 5-10 years are detailed below. Some tests may not apply to you based off risk factors and/or age. Screening tests ordered at today's visit but not completed yet may show as past due. Also, please note that scanned in results may not display below. Preventive Screenings:  Service Recommendations Previous Testing/Comments   Colorectal Cancer Screening  Colonoscopy    Fecal Occult Blood Test (FOBT)/Fecal Immunochemical Test (FIT)  Fecal DNA/Cologuard Test  Flexible Sigmoidoscopy Age: 43-73 years old   Colonoscopy: every 10 years (May be performed more frequently if at higher risk)  OR  FOBT/FIT: every 1 year  OR  Cologuard: every 3 years  OR  Sigmoidoscopy: every 5 years  Screening may be recommended earlier than age 39 if at higher risk for colorectal cancer.  Also, an individualized decision between you and your healthcare provider will decide whether screening between the ages of 77-80 would be appropriate. Colonoscopy: 12/21/2021  FOBT/FIT: Not on file  Cologuard: Not on file  Sigmoidoscopy: Not on file    Screening Current     Prostate Cancer Screening Individualized decision between patient and health care provider in men between ages of 53-66   Medicare will cover every 12 months beginning on the day after your 50th birthday PSA: 3.9 ng/mL     Screening Current     Hepatitis C Screening Once for adults born between 1945 and 1965  More frequently in patients at high risk for Hepatitis C Hep C Antibody: Not on file        Diabetes Screening 1-2 times per year if you're at risk for diabetes or have pre-diabetes Fasting glucose: 102 mg/dL (12/3/2022)  A1C: 6.0 % (8/25/2022)  Screening Current   Cholesterol Screening Once every 5 years if you don't have a lipid disorder. May order more often based on risk factors. Lipid panel: 08/25/2022  Screening Not Indicated  History Lipid Disorder      Other Preventive Screenings Covered by Medicare:  Abdominal Aortic Aneurysm (AAA) Screening: covered once if your at risk. You're considered to be at risk if you have a family history of AAA or a male between the age of 70-76 who smoking at least 100 cigarettes in your lifetime. Lung Cancer Screening: covers low dose CT scan once per year if you meet all of the following conditions: (1) Age 48-67; (2) No signs or symptoms of lung cancer; (3) Current smoker or have quit smoking within the last 15 years; (4) You have a tobacco smoking history of at least 20 pack years (packs per day x number of years you smoked); (5) You get a written order from a healthcare provider.   Glaucoma Screening: covered annually if you're considered high risk: (1) You have diabetes OR (2) Family history of glaucoma OR (3)  aged 48 and older OR (3)  American aged 72 and older  Osteoporosis Screening: covered every 2 years if you meet one of the following conditions: (1) Have a vertebral abnormality; (2) On glucocorticoid therapy for more than 3 months; (3) Have primary hyperparathyroidism; (4) On osteoporosis medications and need to assess response to drug therapy. HIV Screening: covered annually if you're between the age of 14-79. Also covered annually if you are younger than 13 and older than 72 with risk factors for HIV infection. For pregnant patients, it is covered up to 3 times per pregnancy. Immunizations:  Immunization Recommendations   Influenza Vaccine Annual influenza vaccination during flu season is recommended for all persons aged >= 6 months who do not have contraindications   Pneumococcal Vaccine   * Pneumococcal conjugate vaccine = PCV13 (Prevnar 13), PCV15 (Vaxneuvance), PCV20 (Prevnar 20)  * Pneumococcal polysaccharide vaccine = PPSV23 (Pneumovax) Adults 20-63 years old: 1-3 doses may be recommended based on certain risk factors  Adults 72 years old: 1-2 doses may be recommended based off what pneumonia vaccine you previously received   Hepatitis B Vaccine 3 dose series if at intermediate or high risk (ex: diabetes, end stage renal disease, liver disease)   Tetanus (Td) Vaccine - COST NOT COVERED BY MEDICARE PART B Following completion of primary series, a booster dose should be given every 10 years to maintain immunity against tetanus. Td may also be given as tetanus wound prophylaxis. Tdap Vaccine - COST NOT COVERED BY MEDICARE PART B Recommended at least once for all adults. For pregnant patients, recommended with each pregnancy.    Shingles Vaccine (Shingrix) - COST NOT COVERED BY MEDICARE PART B  2 shot series recommended in those aged 48 and above     Health Maintenance Due:      Topic Date Due    Hepatitis C Screening  Never done    HIV Screening  Never done    Colorectal Cancer Screening  12/20/2026     Immunizations Due:      Topic Date Due    Pneumococcal Vaccine: 65+ Years (1 - PCV) Never done    COVID-19 Vaccine (7 - Pfizer series) 09/02/2022    Influenza Vaccine (1) 09/01/2023 Advance Directives   What are advance directives? Advance directives are legal documents that state your wishes and plans for medical care. These plans are made ahead of time in case you lose your ability to make decisions for yourself. Advance directives can apply to any medical decision, such as the treatments you want, and if you want to donate organs. What are the types of advance directives? There are many types of advance directives, and each state has rules about how to use them. You may choose a combination of any of the following:  Living will: This is a written record of the treatment you want. You can also choose which treatments you do not want, which to limit, and which to stop at a certain time. This includes surgery, medicine, IV fluid, and tube feedings. Durable power of  for [facility]): This is a written record that states who you want to make healthcare choices for you when you are unable to make them for yourself. This person, called a proxy, is usually a family member or a friend. You may choose more than 1 proxy. Do not resuscitate (DNR) order:  A DNR order is used in case your heart stops beating or you stop breathing. It is a request not to have certain forms of treatment, such as CPR. A DNR order may be included in other types of advance directives. Medical directive: This covers the care that you want if you are in a coma, near death, or unable to make decisions for yourself. You can list the treatments you want for each condition. Treatment may include pain medicine, surgery, blood transfusions, dialysis, IV or tube feedings, and a ventilator (breathing machine). Values history: This document has questions about your views, beliefs, and how you feel and think about life. This information can help others choose the care that you would choose. Why are advance directives important? An advance directive helps you control your care.  Although spoken wishes may be used, it is better to have your wishes written down. Spoken wishes can be misunderstood, or not followed. Treatments may be given even if you do not want them. An advance directive may make it easier for your family to make difficult choices about your care. Weight Management   Why it is important to manage your weight:  Being overweight increases your risk of health conditions such as heart disease, high blood pressure, type 2 diabetes, and certain types of cancer. It can also increase your risk for osteoarthritis, sleep apnea, and other respiratory problems. Aim for a slow, steady weight loss. Even a small amount of weight loss can lower your risk of health problems. How to lose weight safely:  A safe and healthy way to lose weight is to eat fewer calories and get regular exercise. You can lose up about 1 pound a week by decreasing the number of calories you eat by 500 calories each day. Healthy meal plan for weight management:  A healthy meal plan includes a variety of foods, contains fewer calories, and helps you stay healthy. A healthy meal plan includes the following:  Eat whole-grain foods more often. A healthy meal plan should contain fiber. Fiber is the part of grains, fruits, and vegetables that is not broken down by your body. Whole-grain foods are healthy and provide extra fiber in your diet. Some examples of whole-grain foods are whole-wheat breads and pastas, oatmeal, brown rice, and bulgur. Eat a variety of vegetables every day. Include dark, leafy greens such as spinach, kale, marely greens, and mustard greens. Eat yellow and orange vegetables such as carrots, sweet potatoes, and winter squash. Eat a variety of fruits every day. Choose fresh or canned fruit (canned in its own juice or light syrup) instead of juice. Fruit juice has very little or no fiber. Eat low-fat dairy foods. Drink fat-free (skim) milk or 1% milk. Eat fat-free yogurt and low-fat cottage cheese.  Try low-fat cheeses such as mozzarella and other reduced-fat cheeses. Choose meat and other protein foods that are low in fat. Choose beans or other legumes such as split peas or lentils. Choose fish, skinless poultry (chicken or turkey), or lean cuts of red meat (beef or pork). Before you cook meat or poultry, cut off any visible fat. Use less fat and oil. Try baking foods instead of frying them. Add less fat, such as margarine, sour cream, regular salad dressing and mayonnaise to foods. Eat fewer high-fat foods. Some examples of high-fat foods include french fries, doughnuts, ice cream, and cakes. Eat fewer sweets. Limit foods and drinks that are high in sugar. This includes candy, cookies, regular soda, and sweetened drinks. Exercise:  Exercise at least 30 minutes per day on most days of the week. Some examples of exercise include walking, biking, dancing, and swimming. You can also fit in more physical activity by taking the stairs instead of the elevator or parking farther away from stores. Ask your healthcare provider about the best exercise plan for you. © Copyright [employer] 2018 Information is for End User's use only and may not be sold, redistributed or otherwise used for commercial purposes.  All illustrations and images included in CareNotes® are the copyrighted property of [employer] or [address] [place] [address]

## 2023-08-28 NOTE — ASSESSMENT & PLAN NOTE
He has sleep apnea and uses CPAP every night. Tiredness could be due to sleep apnea but he has been compliant with using CPAP.

## 2023-08-30 ENCOUNTER — LAB (OUTPATIENT)
Dept: LAB | Facility: CLINIC | Age: 68
End: 2023-08-30
Payer: MEDICARE

## 2023-08-30 DIAGNOSIS — R73.01 IMPAIRED FASTING GLUCOSE: ICD-10-CM

## 2023-08-30 DIAGNOSIS — I10 ESSENTIAL HYPERTENSION: ICD-10-CM

## 2023-08-30 LAB
BILIRUB UR QL STRIP: NEGATIVE
CLARITY UR: CLEAR
COLOR UR: NORMAL
EST. AVERAGE GLUCOSE BLD GHB EST-MCNC: 131 MG/DL
GLUCOSE UR STRIP-MCNC: NEGATIVE MG/DL
HBA1C MFR BLD: 6.2 %
HGB UR QL STRIP.AUTO: NEGATIVE
KETONES UR STRIP-MCNC: NEGATIVE MG/DL
LEUKOCYTE ESTERASE UR QL STRIP: NEGATIVE
NITRITE UR QL STRIP: NEGATIVE
PH UR STRIP.AUTO: 6 [PH]
PROT UR STRIP-MCNC: NEGATIVE MG/DL
SP GR UR STRIP.AUTO: 1.01 (ref 1–1.03)
TSH SERPL DL<=0.05 MIU/L-ACNC: 2.27 UIU/ML (ref 0.45–4.5)
UROBILINOGEN UR STRIP-ACNC: <2 MG/DL

## 2023-08-30 PROCEDURE — 84443 ASSAY THYROID STIM HORMONE: CPT

## 2023-08-30 PROCEDURE — 81003 URINALYSIS AUTO W/O SCOPE: CPT | Performed by: INTERNAL MEDICINE

## 2023-08-30 PROCEDURE — 36415 COLL VENOUS BLD VENIPUNCTURE: CPT

## 2023-08-30 PROCEDURE — 83036 HEMOGLOBIN GLYCOSYLATED A1C: CPT

## 2023-08-31 NOTE — RESULT ENCOUNTER NOTE
Call patient: Labs Show that a1c was 6.2, increased a bit. I'm asking him to decrease carb and sweets intake and increase aerobic exercise to lower blood sugars, he doesn't have diabetes yet.

## 2023-09-07 DIAGNOSIS — E78.00 HYPERCHOLESTEREMIA: ICD-10-CM

## 2023-09-07 RX ORDER — SIMVASTATIN 10 MG
TABLET ORAL
Qty: 90 TABLET | Refills: 0 | Status: SHIPPED | OUTPATIENT
Start: 2023-09-07

## 2023-09-11 ENCOUNTER — TELEPHONE (OUTPATIENT)
Age: 68
End: 2023-09-11

## 2023-09-11 NOTE — TELEPHONE ENCOUNTER
Patient rec'd a letter cxling his apt with Dr. Andrei Leone and advising that someone would call him to r/s the apt. Advised him that there are no apts at this time. He was not happy.  I did advise him he was on the cancellation list

## 2023-10-05 DIAGNOSIS — I10 ESSENTIAL HYPERTENSION: ICD-10-CM

## 2023-10-05 RX ORDER — LOSARTAN POTASSIUM AND HYDROCHLOROTHIAZIDE 25; 100 MG/1; MG/1
TABLET ORAL
Qty: 90 TABLET | Refills: 0 | Status: SHIPPED | OUTPATIENT
Start: 2023-10-05

## 2023-10-26 ENCOUNTER — OFFICE VISIT (OUTPATIENT)
Dept: DERMATOLOGY | Facility: CLINIC | Age: 68
End: 2023-10-26
Payer: MEDICARE

## 2023-10-26 VITALS — HEIGHT: 73 IN | WEIGHT: 227 LBS | BODY MASS INDEX: 30.09 KG/M2 | TEMPERATURE: 97.8 F

## 2023-10-26 DIAGNOSIS — L81.4 SOLAR LENTIGO: ICD-10-CM

## 2023-10-26 DIAGNOSIS — L57.0 ACTINIC KERATOSIS: Primary | ICD-10-CM

## 2023-10-26 PROCEDURE — 17003 DESTRUCT PREMALG LES 2-14: CPT | Performed by: DERMATOLOGY

## 2023-10-26 PROCEDURE — 99214 OFFICE O/P EST MOD 30 MIN: CPT | Performed by: DERMATOLOGY

## 2023-10-26 PROCEDURE — 17000 DESTRUCT PREMALG LESION: CPT | Performed by: DERMATOLOGY

## 2023-10-26 NOTE — PROGRESS NOTES
[facility] Note     Patient Name: Robbie Velazco  Encounter Date: 10/26/2023     Have you been cared for by a [facility] Dermatologist in the last 3 years and, if so, which description applies to you? Yes. I have been here within the last 3 years, and my medical history has NOT changed since that time. I am MALE/not capable of bearing children. REVIEW OF SYSTEMS:  Have you recently had or currently have any of the following? No changes in my recent health. PAST MEDICAL HISTORY:  Have you personally ever had or currently have any of the following? If "YES," then please provide more detail. No changes in my medical history. HISTORY OF IMMUNOSUPPRESSION: Do you have a history of any of the following:  Systemic Immunosuppression such as Diabetes, Biologic or Immunotherapy, Chemotherapy, Organ Transplantation, Bone Marrow Transplantation? No     Answering "YES" requires the addition of the dotphrase "IMMUNOSUPPRESSED" as the first diagnosis of the patient's visit. FAMILY HISTORY:  Any "first degree relatives" (parent, brother, sister, or child) with the following? No changes in my family's known health. PATIENT EXPERIENCE:    Do you want the Dermatologist to perform a COMPLETE skin exam today including a clinical examination under the "bra and underwear" areas? NO  If necessary, do we have your permission to call and leave a detailed message on your Preferred Phone number that includes your specific medical information?   Yes      Allergies   Allergen Reactions    Vancomycin Rash and Other (See Comments)     Peeling of skin    Ace Inhibitors Cough      Current Outpatient Medications:     doxazosin (CARDURA) 2 mg tablet, TAKE 1 TABLET BY MOUTH EVERYDAY AT BEDTIME, Disp: 90 tablet, Rfl: 1    fluticasone (FLONASE) 50 mcg/act nasal spray, 2 sprays as needed, Disp: , Rfl: 3    levalbuterol (XOPENEX HFA) 45 mcg/act inhaler, Inhale 1-2 puffs every 6 (six) hours as needed for wheezing, Disp: 15 g, Rfl: 0    losartan-hydrochlorothiazide (HYZAAR) 100-25 MG per tablet, TAKE 1 TABLET DAILY, Disp: 90 tablet, Rfl: 0    Multiple Vitamin (MULTIVITAMIN ADULT PO), Take by mouth, Disp: , Rfl:     Probiotic Product (PROBIOTIC PO), Take by mouth, Disp: , Rfl:     sildenafil (REVATIO) 20 mg tablet, 3 pills by mouth before sexual activity, Disp: 90 tablet, Rfl: 1    simvastatin (ZOCOR) 10 mg tablet, TAKE 1 TABLET BY MOUTH EVERY DAY, Disp: 90 tablet, Rfl: 0    Xarelto 20 MG tablet, TAKE 1 TABLET BY MOUTH EVERY DAY, Disp: 90 tablet, Rfl: 2          Whom besides the patient is providing clinical information about today's encounter? NO ADDITIONAL HISTORIAN (patient alone provided history)    Physical Exam and Assessment/Plan by Diagnosis:    ACTINIC KERATOSIS    Physical Exam:  Anatomic Location Affected:  Left eyebrow, left temple, Bilateral cheeks, left neck, and right forehead. Morphological Description: Thin pink papule(s) with gritty scale       Assessment and Plan:  Based on a thorough discussion of this condition and the management approach to it (including a comprehensive discussion of the known risks, side effects and potential benefits of treatment), the patient (family) agrees to implement the following specific plan:  Treated with cryotherapy today; written and verbal consent obtained   Follow up 3 months     PROCEDURE:  DESTRUCTION OF PRE-MALIGNANT LESIONS  After a thorough discussion of treatment options and risk/benefits/alternatives (including but not limited to local pain, scarring, dyspigmentation, blistering, and possible superinfection), verbal and written consent were obtained and the aforementioned lesions were treated on with cryotherapy using liquid nitrogen x 2 cycles for 5-10 seconds. The patient tolerated the procedure well, and after-care instructions were provided.      TOTAL NUMBER of 8 pre-malignant lesions were treated today on the ANATOMIC LOCATION: left eyebrow x1, left temple x1, left cheek x1, left neck x3, right forehead 1, right cheek x1. Patient instructions: Your pre-cancerous lesions (called actinic keratosis) were treated with liquid nitrogen today. The treated areas will get more red, crusted over the next few days. There might be some blistering. Apply vaseline to the treated area for the next week to help it heal fully. Do not pick at the area. Return in 3-4 weeks for another round of liquid nitrogen treatment if lesion(s)  fails to fully resolve. SOLAR LENTIGINES      Physical Exam:  Anatomic Location Affected:  face  Morphological Description:  Multiple scattered brown to tan evenly pigmented macules   Denies pain, itch, bleeding. No treatments tried. Present for months - years. Reports getting newer lesions with sun exposure. Assessment and Plan:  Based on a thorough discussion of this condition and the management approach to it (including a comprehensive discussion of the known risks, side effects and potential benefits of treatment), the patient (family) agrees to implement the following specific plan:  Reassure benign  Use sun protection. Apply SPF 30 or higher at least three times a day. Wear sun protecting clothing and hats. Worrisome signs of skin malignancy discussed, questions answered. Regular self-skin check discussed. Advised to call or return to office if patient notices any spots of concern, rapidly growing/changing lesions, bleeding lesions, non-healing lesions. Advised regular SPF use. Scribe Attestation      I,:  Ta Shah am acting as a scribe while in the presence of the attending physician.:       I,:  Mikaela Muniz MD personally performed the services described in this documentation    as scribed in my presence. :             .

## 2023-10-26 NOTE — PATIENT INSTRUCTIONS
Patient instructions: Your pre-cancerous lesions (called actinic keratosis) were treated with liquid nitrogen today. The treated areas will get more red, crusted over the next few days. There might be some blistering. Apply vaseline to the treated area for the next week to help it heal fully. Do not pick at the area. Return in 3-4 weeks for another round of liquid nitrogen treatment if lesion(s)  fails to fully resolve.

## 2023-10-27 ENCOUNTER — HOSPITAL ENCOUNTER (EMERGENCY)
Facility: HOSPITAL | Age: 68
Discharge: HOME | End: 2023-10-27
Attending: EMERGENCY MEDICINE
Payer: MEDICARE

## 2023-10-27 ENCOUNTER — TELEPHONE (OUTPATIENT)
Dept: FAMILY MEDICINE CLINIC | Facility: HOSPITAL | Age: 68
End: 2023-10-27

## 2023-10-27 ENCOUNTER — OFFICE VISIT (OUTPATIENT)
Dept: URGENT CARE | Facility: CLINIC | Age: 68
End: 2023-10-27
Payer: MEDICARE

## 2023-10-27 ENCOUNTER — APPOINTMENT (EMERGENCY)
Dept: RADIOLOGY | Facility: HOSPITAL | Age: 68
End: 2023-10-27
Payer: MEDICARE

## 2023-10-27 VITALS
SYSTOLIC BLOOD PRESSURE: 140 MMHG | TEMPERATURE: 96.8 F | BODY MASS INDEX: 29.95 KG/M2 | WEIGHT: 226 LBS | HEART RATE: 72 BPM | RESPIRATION RATE: 18 BRPM | OXYGEN SATURATION: 98 % | DIASTOLIC BLOOD PRESSURE: 82 MMHG | HEIGHT: 73 IN

## 2023-10-27 VITALS
OXYGEN SATURATION: 96 % | HEART RATE: 73 BPM | SYSTOLIC BLOOD PRESSURE: 136 MMHG | RESPIRATION RATE: 16 BRPM | DIASTOLIC BLOOD PRESSURE: 92 MMHG | TEMPERATURE: 98.4 F

## 2023-10-27 DIAGNOSIS — M79.662 PAIN OF LEFT CALF: Primary | ICD-10-CM

## 2023-10-27 DIAGNOSIS — M79.89 SWELLING OF LEFT LOWER EXTREMITY: Primary | ICD-10-CM

## 2023-10-27 DIAGNOSIS — S80.12XA CONTUSION OF LEFT LOWER EXTREMITY, INITIAL ENCOUNTER: ICD-10-CM

## 2023-10-27 LAB
ANION GAP SERPL CALCULATED.3IONS-SCNC: 6 MMOL/L
APTT PPP: 28 SECONDS (ref 23–37)
BASOPHILS # BLD AUTO: 0.05 THOUSANDS/ÂΜL (ref 0–0.1)
BASOPHILS NFR BLD AUTO: 1 % (ref 0–1)
BUN SERPL-MCNC: 14 MG/DL (ref 5–25)
CALCIUM SERPL-MCNC: 9.7 MG/DL (ref 8.4–10.2)
CHLORIDE SERPL-SCNC: 104 MMOL/L (ref 96–108)
CO2 SERPL-SCNC: 28 MMOL/L (ref 21–32)
CREAT SERPL-MCNC: 0.67 MG/DL (ref 0.6–1.3)
EOSINOPHIL # BLD AUTO: 0.15 THOUSAND/ÂΜL (ref 0–0.61)
EOSINOPHIL NFR BLD AUTO: 2 % (ref 0–6)
ERYTHROCYTE [DISTWIDTH] IN BLOOD BY AUTOMATED COUNT: 12.3 % (ref 11.6–15.1)
GFR SERPL CREATININE-BSD FRML MDRD: 100 ML/MIN/1.73SQ M
GLUCOSE SERPL-MCNC: 89 MG/DL (ref 65–140)
HCT VFR BLD AUTO: 42.9 % (ref 36.5–49.3)
HGB BLD-MCNC: 14.9 G/DL (ref 12–17)
IMM GRANULOCYTES # BLD AUTO: 0.02 THOUSAND/UL (ref 0–0.2)
IMM GRANULOCYTES NFR BLD AUTO: 0 % (ref 0–2)
INR PPP: 1.31 (ref 0.84–1.19)
LYMPHOCYTES # BLD AUTO: 2.56 THOUSANDS/ÂΜL (ref 0.6–4.47)
LYMPHOCYTES NFR BLD AUTO: 28 % (ref 14–44)
MCH RBC QN AUTO: 32 PG (ref 26.8–34.3)
MCHC RBC AUTO-ENTMCNC: 34.7 G/DL (ref 31.4–37.4)
MCV RBC AUTO: 92 FL (ref 82–98)
MONOCYTES # BLD AUTO: 0.87 THOUSAND/ÂΜL (ref 0.17–1.22)
MONOCYTES NFR BLD AUTO: 9 % (ref 4–12)
NEUTROPHILS # BLD AUTO: 5.6 THOUSANDS/ÂΜL (ref 1.85–7.62)
NEUTS SEG NFR BLD AUTO: 60 % (ref 43–75)
NRBC BLD AUTO-RTO: 0 /100 WBCS
PLATELET # BLD AUTO: 196 THOUSANDS/UL (ref 149–390)
PMV BLD AUTO: 11.9 FL (ref 8.9–12.7)
POTASSIUM SERPL-SCNC: 3.8 MMOL/L (ref 3.5–5.3)
PROTHROMBIN TIME: 16.2 SECONDS (ref 11.6–14.5)
RBC # BLD AUTO: 4.65 MILLION/UL (ref 3.88–5.62)
SODIUM SERPL-SCNC: 138 MMOL/L (ref 135–147)
WBC # BLD AUTO: 9.25 THOUSAND/UL (ref 4.31–10.16)

## 2023-10-27 PROCEDURE — 99284 EMERGENCY DEPT VISIT MOD MDM: CPT

## 2023-10-27 PROCEDURE — G1004 CDSM NDSC: HCPCS

## 2023-10-27 PROCEDURE — G0463 HOSPITAL OUTPT CLINIC VISIT: HCPCS

## 2023-10-27 PROCEDURE — 85730 THROMBOPLASTIN TIME PARTIAL: CPT

## 2023-10-27 PROCEDURE — 85025 COMPLETE CBC W/AUTO DIFF WBC: CPT

## 2023-10-27 PROCEDURE — 99213 OFFICE O/P EST LOW 20 MIN: CPT

## 2023-10-27 PROCEDURE — 36415 COLL VENOUS BLD VENIPUNCTURE: CPT

## 2023-10-27 PROCEDURE — 80048 BASIC METABOLIC PNL TOTAL CA: CPT

## 2023-10-27 PROCEDURE — 73706 CT ANGIO LWR EXTR W/O&W/DYE: CPT

## 2023-10-27 PROCEDURE — 85610 PROTHROMBIN TIME: CPT

## 2023-10-27 PROCEDURE — 99285 EMERGENCY DEPT VISIT HI MDM: CPT | Performed by: EMERGENCY MEDICINE

## 2023-10-27 PROCEDURE — 76882 US LMTD JT/FCL EVL NVASC XTR: CPT | Performed by: EMERGENCY MEDICINE

## 2023-10-27 RX ADMIN — IOHEXOL 120 ML: 350 INJECTION, SOLUTION INTRAVENOUS at 21:57
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
## 2023-10-27 NOTE — ED ATTENDING ATTESTATION
10/27/2023  I, Viri Dexter MD, saw and evaluated the patient. I have discussed the patient with the resident/non-physician practitioner and agree with the resident's/non-physician practitioner's findings, Plan of Care, and MDM as documented in the resident's/non-physician practitioner's note, except where noted. All available labs and Radiology studies were reviewed. I was present for key portions of any procedure(s) performed by the resident/non-physician practitioner and I was immediately available to provide assistance. At this point I agree with the current assessment done in the Emergency Department. I have conducted an independent evaluation of this patient a history and physical is as follows:    ED Course         Critical Care Time  Procedures    78 yo male with hx of dvt on xarelto, playing soccer with grandkids last week and got kicked in calf. Pt able to walk, having no numbness, tingling, weakness. Pt noted bruising in area and calf pain and swelling worsening over the last few days. Vss, afebrile, lungs cta, rrr, left calf tenderness, hematoma noted lateral calf.   Labs, cta with runoff,

## 2023-10-27 NOTE — ED PROVIDER NOTES
History  Chief Complaint   Patient presents with    Foot Injury     Pt was playing soccer with grand kids last weekend and got kicked in the left calf. However today presents with left ankle swelling, black and blue discoloration to ankle, and left calf tenderness. +pulses, denies N/T. Pt is on xarelto     71-year-old male with past medical history of DVTs and pulmonary embolisms, on Xarelto, presents emergency department after being struck in his left calf by his grandson while at a soccer game. States he has noticed some pain and swelling in the left chest that is progressively gotten worse. He chronically wears compression stockings to help with the lower extremity edema. States that it feels like there is a band around his left calf that is progressively getting worse as the week is gone on. Bruising at the base of his left calcaneus. As well as increased edema in his left lower extremity. He denies pain out of proportion, paresthesias, pallor, paralysis, poikilothermia, or pulselessness in the left lower extremity. Prior to Admission Medications   Prescriptions Last Dose Informant Patient Reported? Taking?    Multiple Vitamin (MULTIVITAMIN ADULT PO)  Self Yes No   Sig: Take by mouth   Probiotic Product (PROBIOTIC PO)  Self Yes No   Sig: Take by mouth   Xarelto 20 MG tablet  Self No No   Sig: TAKE 1 TABLET BY MOUTH EVERY DAY   doxazosin (CARDURA) 2 mg tablet  Self No No   Sig: TAKE 1 TABLET BY MOUTH EVERYDAY AT BEDTIME   fluticasone (FLONASE) 50 mcg/act nasal spray  Self Yes No   Si sprays as needed   levalbuterol (XOPENEX HFA) 45 mcg/act inhaler  Self No No   Sig: Inhale 1-2 puffs every 6 (six) hours as needed for wheezing   Patient not taking: Reported on 10/27/2023   losartan-hydrochlorothiazide (HYZAAR) 100-25 MG per tablet   No No   Sig: TAKE 1 TABLET DAILY   sildenafil (REVATIO) 20 mg tablet  Self No No   Sig: 3 pills by mouth before sexual activity   simvastatin (ZOCOR) 10 mg tablet   No No   Sig: TAKE 1 TABLET BY MOUTH EVERY DAY      Facility-Administered Medications: None       Past Medical History:   Diagnosis Date    Actinic keratosis 2015    Acute maxillary sinusitis     Arthritis 2000    Asthmatic bronchitis     Benign prostatic hyperplasia     Benign prostatic hyperplasia with urinary incontinence without sensory awareness     Cancer ([address]) 2015    Cervical disc disease     Cervicalgia     Chest pain     DVT (deep venous thrombosis) ([address]) 2004    Erectile dysfunction     Fatigue     Hypercholesterolemia     Hyperlipidemia     Hypertension     IFG (impaired fasting glucose)     PE (pulmonary thromboembolism) ([address]) 2004    on xarelto    Pneumonia 1971    Screening for colon cancer     Screening for prostate cancer     Skin tag     Sleep apnea     12/21/21--states he is being worked up for sleep apnea    Squamous cell skin cancer 2015 2017 2022       Past Surgical History:   Procedure Laterality Date    BACK SURGERY  2004    CHOLECYSTECTOMY  2009    COLON SURGERY  2004    Polyps removed    COLONOSCOPY      EYE SURGERY  2016?    tprn retina    MOHS SURGERY  2015 2017 2022    SD CLAVICULECTOMY PARTIAL Left 03/09/2021    Procedure: DISTAL CLAVICLE EXCISION;  Surgeon: Derick Leon DO;  Location: [address] [place] [address] OR;  Service: Orthopedics    SD SURGICAL ARTHROSCOPY SHOULDER BICEPS TENODESIS Left 03/09/2021    Procedure: ARTHROSCOPIC BICEPS TENODESIS;  Surgeon: Derick Leon DO;  Location: [address] [place] [address] OR;  Service: Orthopedics    SD SURGICAL ARTHROSCOPY SHOULDER W/ROTATOR CUFF RPR Left 03/09/2021    Procedure: REPAIR ROTATOR CUFF  ARTHROSCOPIC;  Surgeon: Derick Leon DO;  Location: [address] [place] [address] OR;  Service: Orthopedics    SKIN BIOPSY  2015 2016 2017 2022    SPINE SURGERY  2004    Tumor removed lower spine       Family History   Problem Relation Age of Onset    Coronary artery disease Mother     Hypertrophic cardiomyopathy Mother     Thyroid disease Mother     Coronary artery disease Sister     Irritable bowel syndrome Sister     Crohn's disease Sister     Substance Abuse Sister     Mental illness Sister     Substance Abuse Brother     Mental illness Brother     Heart disease Family         cardiovascular disease, ischemic heart disease    Cancer Family     Cancer Father         jaw bone    Hypertension Father     Diabetes Maternal Aunt      I have reviewed and agree with the history as documented. E-Cigarette/Vaping    E-Cigarette Use Never User      E-Cigarette/Vaping Substances    Nicotine No     THC No     CBD No     Flavoring No     Other No     Unknown No      Social History     Tobacco Use    Smoking status: Never    Smokeless tobacco: Never    Tobacco comments:     Second hand only   Vaping Use    Vaping Use: Never used   Substance Use Topics    Alcohol use: Yes     Comment: Inconsistent, infrequent    Drug use: No        Review of Systems   Musculoskeletal:         Left calf pain       Physical Exam  ED Triage Vitals [10/27/23 1725]   Temperature Pulse Respirations Blood Pressure SpO2   98.4 °F (36.9 °C) 73 16 136/92 96 %      Temp Source Heart Rate Source Patient Position - Orthostatic VS BP Location FiO2 (%)   Temporal Monitor -- Left arm --      Pain Score       No Pain             Orthostatic Vital Signs  Vitals:    10/27/23 1725   BP: 136/92   Pulse: 73       Physical Exam  Vitals and nursing note reviewed. Constitutional:       General: He is not in acute distress. Appearance: He is well-developed. HENT:      Head: Normocephalic and atraumatic. Eyes:      Conjunctiva/sclera: Conjunctivae normal.   Cardiovascular:      Rate and Rhythm: Normal rate and regular rhythm. Heart sounds: No murmur heard. Pulmonary:      Effort: Pulmonary effort is normal. No respiratory distress. Breath sounds: Normal breath sounds. Abdominal:      Palpations: Abdomen is soft. Tenderness: There is no abdominal tenderness. Musculoskeletal:         General: No swelling.       Cervical back: Neck supple. Right lower leg: No edema. Left lower leg: Edema present. Legs:       Comments: Tenderness over the left lateral gastrocnemius near the area of soleus. Patient endorses increasing bruising and swelling. Complains of mild but worsening bandlike tenderness. Muscle strength out of 5 in upper and lower extremities. Sensation intact to light touch L2-S1.  +2 DP and PTs bilaterally   Skin:     General: Skin is warm and dry. Capillary Refill: Capillary refill takes less than 2 seconds. Neurological:      General: No focal deficit present. Mental Status: He is alert and oriented to person, place, and time. Mental status is at baseline. Cranial Nerves: No cranial nerve deficit. Sensory: No sensory deficit. Motor: No weakness.    Psychiatric:         Mood and Affect: Mood normal.         ED Medications  Medications   iohexol (OMNIPAQUE) 350 MG/ML injection (MULTI-DOSE) 120 mL (120 mL Intravenous Given 10/27/23 2157)       Diagnostic Studies  Results Reviewed       Procedure Component Value Units Date/Time    CBC and differential [[id number]] Collected: 10/27/23 2005    Lab Status: Final result Specimen: Blood from Arm, Left Updated: 10/27/23 2052     WBC 9.25 Thousand/uL      RBC 4.65 Million/uL      Hemoglobin 14.9 g/dL      Hematocrit 42.9 %      MCV 92 fL      MCH 32.0 pg      MCHC 34.7 g/dL      RDW 12.3 %      MPV 11.9 fL      Platelets 024 Thousands/uL      nRBC 0 /100 WBCs      Neutrophils Relative 60 %      Immat GRANS % 0 %      Lymphocytes Relative 28 %      Monocytes Relative 9 %      Eosinophils Relative 2 %      Basophils Relative 1 %      Neutrophils Absolute 5.60 Thousands/µL      Immature Grans Absolute 0.02 Thousand/uL      Lymphocytes Absolute 2.56 Thousands/µL      Monocytes Absolute 0.87 Thousand/µL      Eosinophils Absolute 0.15 Thousand/µL      Basophils Absolute 0.05 Thousands/µL     Basic metabolic panel [[id number]] Collected: 10/27/23 2005    Lab Status: Final result Specimen: Blood from Arm, Left Updated: 10/27/23 2037     Sodium 138 mmol/L      Potassium 3.8 mmol/L      Chloride 104 mmol/L      CO2 28 mmol/L      ANION GAP 6 mmol/L      BUN 14 mg/dL      Creatinine 0.67 mg/dL      Glucose 89 mg/dL      Calcium 9.7 mg/dL      eGFR 100 ml/min/1.73sq m     Narrative:      [facility] guidelines for Chronic Kidney Disease (CKD):     Stage 1 with normal or high GFR (GFR > 90 mL/min/1.73 square meters)    Stage 2 Mild CKD (GFR = 60-89 mL/min/1.73 square meters)    Stage 3A Moderate CKD (GFR = 45-59 mL/min/1.73 square meters)    Stage 3B Moderate CKD (GFR = 30-44 mL/min/1.73 square meters)    Stage 4 Severe CKD (GFR = 15-29 mL/min/1.73 square meters)    Stage 5 End Stage CKD (GFR <15 mL/min/1.73 square meters)  Note: GFR calculation is accurate only with a steady state creatinine    Protime-INR [[id number]]  (Abnormal) Collected: 10/27/23 2005    Lab Status: Final result Specimen: Blood from Arm, Left Updated: 10/27/23 2034     Protime 16.2 seconds      INR 1.31    APTT [[id number]]  (Normal) Collected: 10/27/23 2005    Lab Status: Final result Specimen: Blood from Arm, Left Updated: 10/27/23 2034     PTT 28 seconds                    CTA lower extremity left w wo contrast   Final Result by Aamir Early DO (10/27 2313)      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 This is present on CT of 5/24/2009 and appears unchanged. Normal left lower extremity runoff. No left lower extremity hematoma identified. If there is further concern for muscle injury, recommend outpatient MRI.             Workstation performed: [id number]               Procedures  POC MSK/Soft Tissue US    Date/Time: 10/27/2023 8:34 PM    Performed by: Fanta Feliciano DO  Authorized by: Fanta Feliciano DO    Patient location:  ED  Performed by:  Resident  Other Assisting Provider: No Procedure:     Performed: musculoskeletal ultrasound    MSK ultrasound:     MSK indications: swelling      MSK assessment of:  Muscle    MSK extremities evaluated: left lower extremity      Fluid Collection: Present (comment on size)      Joint Effusion: Absent      Tendon Injury: Absent      Fractured bone: Absent      Muscle Injury: Absent    Interpretation:     MSK impressions: abnormal ultrasound (see above)    Comments:      Patient has a collection of fluid in the left gastrocnemius. With color flow on, no pulsatile or color waves. No obvious signs of arterial injury however large amount of anechoic fluid collection. ED Course  ED Course as of 10/28/23 0906   Fri Oct 27, 2023   2104 Patient has been explained of all test results up until this point time. Patient is still pending CTA of lower extremity. Case has been discussed with Dr. Krystal Coto who will be receiving signout. If CT scan is negative, patient stable for discharge home. If CT scan is concerning for extravasation, consult vascular surgery. Identification of Seniors at [address] [place] [address] Drive Most Recent Value   (ISAR) Identification of Seniors at Risk    Before the illness or injury that brought you to the Emergency, did you need someone to help you on a regular basis? 0 Filed at: 10/27/2023 1726   In the last 24 hours, have you needed more help than usual? 0 Filed at: 10/27/2023 1726   Have you been hospitalized for one or more nights during the past 6 months? 0 Filed at: 10/27/2023 1726   In general, do you see well? 0 Filed at: 10/27/2023 1726   In general, do you have serious problems with your memory? 0 Filed at: 10/27/2023 1726   Do you take more than three different medications every day?  1 Filed at: 10/27/2023 1726   ISAR Score 1 Filed at: 10/27/2023 1726                                Medical Decision Making  60-year-old male presents emergency department complaining of left calf pain slow increase in swelling    DDx: Arterial bleed, venous bleed, doubt compartment syndrome    Plan: Basic labs, CTA with runoff, point-of-care ultrasound      Amount and/or Complexity of Data Reviewed  Labs: ordered. Radiology: ordered. Risk  Prescription drug management. Disposition  Final diagnoses:   Pain of left calf     Time reflects when diagnosis was documented in both MDM as applicable and the Disposition within this note       Time User Action Codes Description Comment    10/27/2023  9:05 PM Alice aWlden Add [N54.771] Pain of left calf           ED Disposition       ED Disposition   Discharge    Condition   Stable    Date/Time   Fri Oct 27, 2023 2323    [address] discharge to home/self care. Follow-up Information       Follow up With Specialties Details Why [id number]  1826, MD Internal Medicine   [address] [address] [address]  [phone]              Discharge Medication List as of 10/27/2023 11:24 PM        CONTINUE these medications which have NOT CHANGED    Details   doxazosin (CARDURA) 2 mg tablet TAKE 1 TABLET BY MOUTH EVERYDAY AT BEDTIME, Normal      fluticasone (FLONASE) 50 mcg/act nasal spray 2 sprays as needed, Starting Fri 11/3/2017, Historical Med      levalbuterol (XOPENEX HFA) 45 mcg/act inhaler Inhale 1-2 puffs every 6 (six) hours as needed for wheezing, Starting Mon 7/31/2023, Normal      losartan-hydrochlorothiazide (HYZAAR) 100-25 MG per tablet TAKE 1 TABLET DAILY, Normal      Multiple Vitamin (MULTIVITAMIN ADULT PO) Take by mouth, Historical Med      Probiotic Product (PROBIOTIC PO) Take by mouth, Historical Med      sildenafil (REVATIO) 20 mg tablet 3 pills by mouth before sexual activity, Normal      simvastatin (ZOCOR) 10 mg tablet TAKE 1 TABLET BY MOUTH EVERY DAY, Normal      Xarelto 20 MG tablet TAKE 1 TABLET BY MOUTH EVERY DAY, Normal           No discharge procedures on file.     PDMP Review         Value Time User PDMP Reviewed  Yes 11/10/2021  4:10 PM Ruth Vanegas MD             ED Provider  Attending physically available and evaluated Kleber Jacome. I managed the patient along with the ED Attending.     Electronically Signed by           Ivonne Peralta DO  10/28/23 3514

## 2023-10-28 NOTE — DISCHARGE INSTRUCTIONS
You were seen in ER for L calf pain. CT (-) no hematoma or vascular injury. Please follow up with PCP within 48 hours.  If symptoms worsen or persist please return to the ER for further evaluation and management

## 2023-11-21 ENCOUNTER — TELEMEDICINE (OUTPATIENT)
Dept: FAMILY MEDICINE CLINIC | Facility: HOSPITAL | Age: 68
End: 2023-11-21
Payer: MEDICARE

## 2023-11-21 VITALS — WEIGHT: 226 LBS | HEART RATE: 65 BPM | HEIGHT: 73 IN | BODY MASS INDEX: 29.95 KG/M2 | OXYGEN SATURATION: 92 %

## 2023-11-21 DIAGNOSIS — G47.33 OBSTRUCTIVE SLEEP APNEA: ICD-10-CM

## 2023-11-21 DIAGNOSIS — Z86.711 HISTORY OF PULMONARY EMBOLISM: ICD-10-CM

## 2023-11-21 DIAGNOSIS — R73.01 IMPAIRED FASTING GLUCOSE: ICD-10-CM

## 2023-11-21 DIAGNOSIS — I10 ESSENTIAL HYPERTENSION: ICD-10-CM

## 2023-11-21 DIAGNOSIS — U07.1 COVID: Primary | ICD-10-CM

## 2023-11-21 PROCEDURE — 99213 OFFICE O/P EST LOW 20 MIN: CPT | Performed by: INTERNAL MEDICINE

## 2023-11-21 RX ORDER — MOLNUPIRAVIR 200 MG/1
800 CAPSULE ORAL EVERY 12 HOURS
Qty: 40 CAPSULE | Refills: 0 | Status: SHIPPED | OUTPATIENT
Start: 2023-11-21 | End: 2023-11-26

## 2023-11-21 RX ORDER — PREDNISONE 10 MG/1
TABLET ORAL
Qty: 18 TABLET | Refills: 0 | Status: SHIPPED | OUTPATIENT
Start: 2023-11-21

## 2023-11-21 NOTE — PROGRESS NOTES
COVID-19 Outpatient Progress Note    Assessment/Plan:    Problem List Items Addressed This Visit          Endocrine    Impaired fasting glucose       Respiratory    Obstructive sleep apnea       Cardiovascular and Mediastinum    Essential hypertension       Other    History of pulmonary embolism     Other Visit Diagnoses       COVID    -  Primary           Disposition:     Discussed symptom directed medication options with patient. To use xopenex 2 puffs twice daily for 1 week- may use up to 4x day as needed  Call id oxygen sats drop below 92%   Take paxlovid as n directed   Take steroid prednisone taper    Patient meets criteria for Paxlovid and they have been counseled appropriately regarding risks, benefits, side effects, and alternative treatment options. After discussion, patient agrees to treatment. Possible side effects of Paxlovid? Possible side effects of Paxlovid are:  - Liver Problems. Notify us right away if you start to experience loss of appetite, yellowing of your skin and the whites of eyes (jaundice), dark-colored urine, pale colored stools and itchy skin, stomach area (abdominal) pain. - Resistance to HIV Medicines. If you have untreated HIV infection, Paxlovid may lead to some HIV medicines not working as well in the future. - Other possible side effects include: altered sense of taste, diarrhea, high blood pressure, or muscle aches. I have spent a total time of 17 minutes on the day of the encounter for this patient including discussing diagnostic results, risks and benefits of treatment options, instructions for management, reviewing/ordering tests, medicine, procedures and obtaining or reviewing history. Encounter provider: Laura Wang DO     Provider located at: [address] [place] [phone]     Recent Visits  No visits were found meeting these conditions.   Showing recent visits within past 7 days and meeting all other requirements  Today's Visits  Date Type Provider Dept   11/21/23 Telemedicine David Alcantara DO [facility]   Showing today's visits and meeting all other requirements  Future Appointments  No visits were found meeting these conditions. Showing future appointments within next 150 days and meeting all other requirements     This virtual check-in was done via [employer] and patient was informed that this is a secure, HIPAA-compliant platform. He agrees to proceed. Patient agrees to participate in a virtual check in via telephone or video visit instead of presenting to the office to address urgent/immediate medical needs. Patient is aware this is a billable service. He acknowledged consent and understanding of privacy and security of the video platform. The patient has agreed to participate and understands they can discontinue the visit at any time. After connecting through [facility], the patient was identified by name and date of birth. Jane Brown was informed that this was a telemedicine visit and that the exam was being conducted confidentially over secure lines. My office door was closed. No one else was in the room. Jane Brown acknowledged consent and understanding of privacy and security of the telemedicine visit. I informed the patient that I have reviewed his record in Epic and presented the opportunity for him to ask any questions regarding the visit today. The patient agreed to participate. Verification of patient location:  Patient is located in the following state in which I hold an active license: [place]    Subjective:   Jane Brown is a 79 y.o. male who is concerned about COVID-19. Patient's symptoms include sore throat, cough, shortness of breath and myalgias.  Patient denies fever.     - Date of symptom onset: 11/20/2023  - Date of exposure: 11/19/2023      COVID-19 vaccination status: Fully vaccinated with booster    Wife and grandkids and duaghters have tested positive   It is the first time he has ever had covid   Is fully vaccinated and had recent booster as well as rsv and covid   Home sats are ususall 95-96%    Ws down to 92% this am-     Lab Results   Component Value Date    SARSCOV2 Negative 11/17/2021       Review of Systems   Constitutional:  Negative for fever. HENT:  Positive for sore throat. Respiratory:  Positive for cough and shortness of breath. Musculoskeletal:  Positive for myalgias. Current Outpatient Medications on File Prior to Visit   Medication Sig    doxazosin (CARDURA) 2 mg tablet TAKE 1 TABLET BY MOUTH EVERYDAY AT BEDTIME    fluticasone (FLONASE) 50 mcg/act nasal spray 2 sprays as needed    losartan-hydrochlorothiazide (HYZAAR) 100-25 MG per tablet TAKE 1 TABLET DAILY    Multiple Vitamin (MULTIVITAMIN ADULT PO) Take by mouth    Probiotic Product (PROBIOTIC PO) Take by mouth    sildenafil (REVATIO) 20 mg tablet 3 pills by mouth before sexual activity    simvastatin (ZOCOR) 10 mg tablet TAKE 1 TABLET BY MOUTH EVERY DAY    Xarelto 20 MG tablet TAKE 1 TABLET BY MOUTH EVERY DAY    levalbuterol (XOPENEX HFA) 45 mcg/act inhaler Inhale 1-2 puffs every 6 (six) hours as needed for wheezing (Patient not taking: Reported on 10/27/2023)       Objective:    Pulse 65   Ht 6' 1" (1.854 m)   Wt 103 kg (226 lb)   SpO2 92%   BMI 29.82 kg/m²        Physical Exam  Vitals and nursing note reviewed. Constitutional:       Appearance: He is ill-appearing. He is not toxic-appearing. HENT:      Head: Normocephalic. Nose: Congestion present. Eyes:      General:         Right eye: No discharge. Left eye: No discharge. Conjunctiva/sclera: Conjunctivae normal.   Pulmonary:      Effort: No respiratory distress. Comments: Intermittent cough during visit  Skin:     Findings: No rash. Neurological:      Mental Status: He is alert and oriented to person, place, and time.    Psychiatric:         Mood and Affect: Mood normal.         Thought Content:  Thought content normal.     I have discussed with pt - will have rx for molipiri- is on xarelto so paxlovid is contraindicated   Also will have him hold his simvastatin for 7 days  [employer], DO

## 2023-12-02 ENCOUNTER — OFFICE VISIT (OUTPATIENT)
Dept: URGENT CARE | Facility: CLINIC | Age: 68
End: 2023-12-02
Payer: MEDICARE

## 2023-12-02 VITALS
BODY MASS INDEX: 30.09 KG/M2 | OXYGEN SATURATION: 95 % | DIASTOLIC BLOOD PRESSURE: 72 MMHG | SYSTOLIC BLOOD PRESSURE: 126 MMHG | RESPIRATION RATE: 18 BRPM | WEIGHT: 227 LBS | HEIGHT: 73 IN | TEMPERATURE: 96.7 F | HEART RATE: 73 BPM

## 2023-12-02 DIAGNOSIS — J01.10 ACUTE NON-RECURRENT FRONTAL SINUSITIS: Primary | ICD-10-CM

## 2023-12-02 PROCEDURE — 99213 OFFICE O/P EST LOW 20 MIN: CPT | Performed by: NURSE PRACTITIONER

## 2023-12-02 PROCEDURE — G0463 HOSPITAL OUTPT CLINIC VISIT: HCPCS | Performed by: NURSE PRACTITIONER

## 2023-12-02 RX ORDER — AMOXICILLIN AND CLAVULANATE POTASSIUM 875; 125 MG/1; MG/1
1 TABLET, FILM COATED ORAL EVERY 12 HOURS SCHEDULED
Qty: 14 TABLET | Refills: 0 | Status: SHIPPED | OUTPATIENT
Start: 2023-12-02 | End: 2023-12-09

## 2023-12-02 NOTE — PROGRESS NOTES
[facility]        NAME: Erin Abad is a 79 y.o. male  : 1955    MRN: [id number]  DATE: 2023  TIME: 9:33 AM    Assessment and Plan   Acute non-recurrent frontal sinusitis [J01.10]  1. Acute non-recurrent frontal sinusitis  amoxicillin-clavulanate (AUGMENTIN) 875-125 mg per tablet        Acute symptomatic not responding conservative treatment persisting following COVID infection prior to . Will recommend start Augmentin twice daily x7 days educated on side effects proper use of medication follow-up with primary care with worsening symptoms no improvement    Patient Instructions       Follow up with PCP in 3-5 days. Proceed to  ER if symptoms worsen. Chief Complaint     Chief Complaint   Patient presents with   • Sinusitis     Pt presents with frontal and occiptal sinus pressure, post nasal drip, and mild cough since recovering from covid x 1.5 weeks. History of Present Illness       Patient is a 65-year-old male arrives with complaints of sinus pressure pain headache postnasal drainage sore throat nasal congestion. Patient reports he was sick with COVID before  did take Paxlovid and also a steroid which alleviated all the other symptoms however the sinus pressure pain has not gone away the entire time and is persistent and worsening. Patient is also leaving for [place] tomorrow concerns could get worse while away. Review of Systems   Review of Systems   Constitutional:  Negative for activity change, appetite change, chills, fatigue and fever. HENT:  Positive for congestion, postnasal drip, sinus pressure, sinus pain and sore throat. Negative for rhinorrhea. Respiratory:  Negative for cough, chest tightness and shortness of breath. Gastrointestinal:  Negative for constipation, diarrhea, nausea and vomiting. Musculoskeletal:  Negative for myalgias. Skin:  Negative for color change, pallor and rash.    Neurological:  Negative for dizziness, syncope, weakness, light-headedness and headaches. Hematological:  Negative for adenopathy. Psychiatric/Behavioral:  Negative for agitation and confusion.           Current Medications       Current Outpatient Medications:   •  amoxicillin-clavulanate (AUGMENTIN) 875-125 mg per tablet, Take 1 tablet by mouth every 12 (twelve) hours for 7 days, Disp: 14 tablet, Rfl: 0  •  doxazosin (CARDURA) 2 mg tablet, TAKE 1 TABLET BY MOUTH EVERYDAY AT BEDTIME, Disp: 90 tablet, Rfl: 1  •  fluticasone (FLONASE) 50 mcg/act nasal spray, 2 sprays as needed, Disp: , Rfl: 3  •  losartan-hydrochlorothiazide (HYZAAR) 100-25 MG per tablet, TAKE 1 TABLET DAILY, Disp: 90 tablet, Rfl: 0  •  Multiple Vitamin (MULTIVITAMIN ADULT PO), Take by mouth, Disp: , Rfl:   •  Probiotic Product (PROBIOTIC PO), Take by mouth, Disp: , Rfl:   •  sildenafil (REVATIO) 20 mg tablet, 3 pills by mouth before sexual activity, Disp: 90 tablet, Rfl: 1  •  simvastatin (ZOCOR) 10 mg tablet, TAKE 1 TABLET BY MOUTH EVERY DAY, Disp: 90 tablet, Rfl: 0  •  Xarelto 20 MG tablet, TAKE 1 TABLET BY MOUTH EVERY DAY, Disp: 90 tablet, Rfl: 2  •  levalbuterol (XOPENEX HFA) 45 mcg/act inhaler, Inhale 1-2 puffs every 6 (six) hours as needed for wheezing (Patient not taking: Reported on 10/27/2023), Disp: 15 g, Rfl: 0  •  predniSONE 10 mg tablet, 30 mg by mouth daily for 3 days, then 20 mg by mouth daily for 3 days, then 10 mg by mouth daily for 3 days, then stop (Patient not taking: Reported on 12/2/2023), Disp: 18 tablet, Rfl: 0    Current Allergies     Allergies as of 12/02/2023 - Reviewed 12/02/2023   Allergen Reaction Noted   • Vancomycin Rash and Other (See Comments) 01/21/2015   • Ace inhibitors Cough 05/15/2017            The following portions of the patient's history were reviewed and updated as appropriate: allergies, current medications, past family history, past medical history, past social history, past surgical history and problem list.     Past Medical History:   Diagnosis Date   • Actinic keratosis 2015   • Acute maxillary sinusitis    • Arthritis 2000   • Asthmatic bronchitis    • Benign prostatic hyperplasia    • Benign prostatic hyperplasia with urinary incontinence without sensory awareness    • Cancer ([address]) 2015   • Cervical disc disease    • Cervicalgia    • Chest pain    • DVT (deep venous thrombosis) ([address]) 2004   • Erectile dysfunction    • Fatigue    • Hypercholesterolemia    • Hyperlipidemia    • Hypertension    • IFG (impaired fasting glucose)    • PE (pulmonary thromboembolism) ([address]) 2004    on xarelto   • Pneumonia 1971   • Screening for colon cancer    • Screening for prostate cancer    • Skin tag    • Sleep apnea     12/21/21--states he is being worked up for sleep apnea   • Squamous cell skin cancer 2015 2017 2022       Past Surgical History:   Procedure Laterality Date   • BACK SURGERY  2004   • CHOLECYSTECTOMY  2009   • COLON SURGERY  2004    Polyps removed   • COLONOSCOPY     • EYE SURGERY  2016?    tprn retina   • MOHS SURGERY  2015 2017 2022   • ID CLAVICULECTOMY PARTIAL Left 03/09/2021    Procedure: DISTAL CLAVICLE EXCISION;  Surgeon: Alma Rosa Aguilar DO;  Location: [facility] MAIN OR;  Service: Orthopedics   • ID SURGICAL ARTHROSCOPY SHOULDER BICEPS TENODESIS Left 03/09/2021    Procedure: ARTHROSCOPIC BICEPS TENODESIS;  Surgeon: Alma Rosa Aguilar DO;  Location: [facility] MAIN OR;  Service: Orthopedics   • ID SURGICAL ARTHROSCOPY SHOULDER W/ROTATOR CUFF RPR Left 03/09/2021    Procedure: REPAIR ROTATOR CUFF  ARTHROSCOPIC;  Surgeon: Alma Rosa Aguilar DO;  Location: [facility] MAIN OR;  Service: Orthopedics   • SKIN BIOPSY  2015 2016 2017 2022   • SPINE SURGERY  2004    Tumor removed lower spine       Family History   Problem Relation Age of Onset   • Coronary artery disease Mother    • Hypertrophic cardiomyopathy Mother    • Thyroid disease Mother    • Coronary artery disease Sister    • Irritable bowel syndrome Sister    • Crohn's disease Sister    • Substance Abuse Sister    • Mental illness Sister    • Substance Abuse Brother    • Mental illness Brother    • Heart disease Family         cardiovascular disease, ischemic heart disease   • Cancer Family    • Cancer Father         jaw bone   • Hypertension Father    • Diabetes Maternal Aunt          Medications have been verified. Objective   /72   Pulse 73   Temp (!) 96.7 °F (35.9 °C)   Resp 18   Ht 6' 1" (1.854 m)   Wt 103 kg (227 lb)   SpO2 95%   BMI 29.95 kg/m²   No LMP for male patient. Physical Exam     Physical Exam  Vitals and nursing note reviewed. Constitutional:       General: He is not in acute distress. Appearance: Normal appearance. He is ill-appearing. He is not toxic-appearing or diaphoretic. HENT:      Head: Normocephalic and atraumatic. Right Ear: Tympanic membrane, ear canal and external ear normal. There is no impacted cerumen. Left Ear: Tympanic membrane, ear canal and external ear normal. There is no impacted cerumen. Nose: Congestion present. No rhinorrhea. Mouth/Throat:      Mouth: Mucous membranes are moist.      Pharynx: Posterior oropharyngeal erythema present. Eyes:      General: No scleral icterus. Right eye: No discharge. Left eye: No discharge. Conjunctiva/sclera: Conjunctivae normal.   Cardiovascular:      Rate and Rhythm: Normal rate and regular rhythm. Pulmonary:      Effort: Pulmonary effort is normal. No respiratory distress. Breath sounds: No stridor. No wheezing or rales. Musculoskeletal:         General: Normal range of motion. Cervical back: Normal range of motion. Lymphadenopathy:      Cervical: Cervical adenopathy present. Skin:     General: Skin is dry. Neurological:      Mental Status: He is alert and oriented to person, place, and time. Psychiatric:         Mood and Affect: Mood normal.         Behavior: Behavior normal.         Thought Content:  Thought content normal.         Judgment: Judgment normal.

## 2023-12-06 DIAGNOSIS — E78.00 HYPERCHOLESTEREMIA: ICD-10-CM

## 2023-12-06 RX ORDER — SIMVASTATIN 10 MG
TABLET ORAL
Qty: 90 TABLET | Refills: 0 | Status: SHIPPED | OUTPATIENT
Start: 2023-12-06

## 2023-12-21 ENCOUNTER — OFFICE VISIT (OUTPATIENT)
Dept: URGENT CARE | Facility: CLINIC | Age: 68
End: 2023-12-21
Payer: MEDICARE

## 2023-12-21 ENCOUNTER — APPOINTMENT (OUTPATIENT)
Dept: RADIOLOGY | Facility: CLINIC | Age: 68
End: 2023-12-21
Payer: MEDICARE

## 2023-12-21 VITALS
HEIGHT: 73 IN | BODY MASS INDEX: 30.09 KG/M2 | DIASTOLIC BLOOD PRESSURE: 84 MMHG | SYSTOLIC BLOOD PRESSURE: 136 MMHG | OXYGEN SATURATION: 97 % | TEMPERATURE: 97.1 F | HEART RATE: 73 BPM | RESPIRATION RATE: 16 BRPM | WEIGHT: 227 LBS

## 2023-12-21 DIAGNOSIS — M54.50 ACUTE LEFT-SIDED LOW BACK PAIN, UNSPECIFIED WHETHER SCIATICA PRESENT: Primary | ICD-10-CM

## 2023-12-21 DIAGNOSIS — M54.50 ACUTE LEFT-SIDED LOW BACK PAIN, UNSPECIFIED WHETHER SCIATICA PRESENT: ICD-10-CM

## 2023-12-21 PROCEDURE — G0463 HOSPITAL OUTPT CLINIC VISIT: HCPCS | Performed by: PHYSICIAN ASSISTANT

## 2023-12-21 PROCEDURE — 72100 X-RAY EXAM L-S SPINE 2/3 VWS: CPT

## 2023-12-21 PROCEDURE — 99213 OFFICE O/P EST LOW 20 MIN: CPT | Performed by: PHYSICIAN ASSISTANT

## 2023-12-21 NOTE — PROGRESS NOTES
[facility]        NAME: Norman Coles is a 68 y.o. male  : 1955    MRN: [id number]  DATE: 2023  TIME: 4:15 PM    Assessment and Plan   Acute left-sided low back pain, unspecified whether sciatica present [M54.50]  1. Acute left-sided low back pain, unspecified whether sciatica present  XR spine lumbar 2 or 3 views injury        Discussed muscle relaxer's but patient refused and would prefer conservative management at this time.     Patient Instructions       Follow up with PCP in 3-5 days.  Proceed to  ER if symptoms worsen.    Chief Complaint     Chief Complaint   Patient presents with    Back Injury     Pt reports left lower lumbar back pain with radiation into groin that occurred over two week ago resulting from tripping and falling over his grandson while outdoors at [employer]. States pain has since been progressing. Managing with ice/heat application and stretches. Hx L4-L5 surgery.         History of Present Illness       Patient presents after a fall over 2 weeks ago at [employer].  Tripped over his grandson and landed on left side.  His back pain was flaring up before this but this made it a lot worse.  Can walk just fine. Pain is in the left lower back and is now started to wrap around towards the front.   Denies numbness/tingling, incontinence, testicular swelling, saddle anestsias, fevers.   Has a history of problems in the lower back.   In  had back surgery.     Back Pain  This is a new problem. The current episode started 1 to 4 weeks ago. The problem occurs constantly. The problem has been gradually worsening since onset. The pain is present in the sacro-iliac. The quality of the pain is described as aching, burning and shooting. The pain is at a severity of 7/10. The pain is Worse during the day. The symptoms are aggravated by position, sitting, standing and twisting. Stiffness is present All day. Associated symptoms include leg pain, pelvic pain and weakness. Pertinent  negatives include no abdominal pain, bladder incontinence, bowel incontinence, chest pain, dysuria, fever, headaches, numbness, paresis, paresthesias, perianal numbness, tingling or weight loss. Risk factors include obesity and recent trauma.       Review of Systems   Review of Systems   Constitutional:  Negative for fever and weight loss.   Cardiovascular:  Negative for chest pain.   Gastrointestinal:  Negative for abdominal pain and bowel incontinence.   Genitourinary:  Positive for pelvic pain. Negative for bladder incontinence, difficulty urinating and dysuria.   Musculoskeletal:  Positive for back pain.   Neurological:  Positive for weakness. Negative for tingling, numbness, headaches and paresthesias.         Current Medications       Current Outpatient Medications:     doxazosin (CARDURA) 2 mg tablet, TAKE 1 TABLET BY MOUTH EVERYDAY AT BEDTIME, Disp: 90 tablet, Rfl: 1    fluticasone (FLONASE) 50 mcg/act nasal spray, 2 sprays as needed, Disp: , Rfl: 3    losartan-hydrochlorothiazide (HYZAAR) 100-25 MG per tablet, TAKE 1 TABLET DAILY, Disp: 90 tablet, Rfl: 0    Multiple Vitamin (MULTIVITAMIN ADULT PO), Take by mouth, Disp: , Rfl:     Probiotic Product (PROBIOTIC PO), Take by mouth, Disp: , Rfl:     sildenafil (REVATIO) 20 mg tablet, 3 pills by mouth before sexual activity, Disp: 90 tablet, Rfl: 1    simvastatin (ZOCOR) 10 mg tablet, TAKE 1 TABLET BY MOUTH EVERY DAY, Disp: 90 tablet, Rfl: 0    Xarelto 20 MG tablet, TAKE 1 TABLET BY MOUTH EVERY DAY, Disp: 90 tablet, Rfl: 2    levalbuterol (XOPENEX HFA) 45 mcg/act inhaler, Inhale 1-2 puffs every 6 (six) hours as needed for wheezing (Patient not taking: Reported on 10/27/2023), Disp: 15 g, Rfl: 0    predniSONE 10 mg tablet, 30 mg by mouth daily for 3 days, then 20 mg by mouth daily for 3 days, then 10 mg by mouth daily for 3 days, then stop (Patient not taking: Reported on 12/2/2023), Disp: 18 tablet, Rfl: 0    Current Allergies     Allergies as of 12/21/2023 -  Reviewed 12/21/2023   Allergen Reaction Noted    Vancomycin Rash and Other (See Comments) 01/21/2015    Ace inhibitors Cough 05/15/2017            The following portions of the patient's history were reviewed and updated as appropriate: allergies, current medications, past family history, past medical history, past social history, past surgical history and problem list.     Past Medical History:   Diagnosis Date    Actinic keratosis 2015    Acute maxillary sinusitis     Arthritis 2000    Asthmatic bronchitis     Benign prostatic hyperplasia     Benign prostatic hyperplasia with urinary incontinence without sensory awareness     Cancer ([facility]) 2015    Cervical disc disease     Cervicalgia     Chest pain     DVT (deep venous thrombosis) ([facility]) 2004    Erectile dysfunction     Fatigue     Hypercholesterolemia     Hyperlipidemia     Hypertension     IFG (impaired fasting glucose)     PE (pulmonary thromboembolism) ([facility]) 2004    on xarelto    Pneumonia 1971    Screening for colon cancer     Screening for prostate cancer     Skin tag     Sleep apnea     12/21/21--states he is being worked up for sleep apnea    Squamous cell skin cancer 2015 2017 2022       Past Surgical History:   Procedure Laterality Date    BACK SURGERY  2004    CHOLECYSTECTOMY  2009    COLON SURGERY  2004    Polyps removed    COLONOSCOPY      EYE SURGERY  2016?    tprn retina    MOHS SURGERY  2015 2017 2022    MN CLAVICULECTOMY PARTIAL Left 03/09/2021    Procedure: DISTAL CLAVICLE EXCISION;  Surgeon: Eliot Ibarra DO;  Location:  MAIN OR;  Service: Orthopedics    MN SURGICAL ARTHROSCOPY SHOULDER BICEPS TENODESIS Left 03/09/2021    Procedure: ARTHROSCOPIC BICEPS TENODESIS;  Surgeon: Eliot Ibarra DO;  Location:  MAIN OR;  Service: Orthopedics    MN SURGICAL ARTHROSCOPY SHOULDER W/ROTATOR CUFF RPR Left 03/09/2021    Procedure: REPAIR ROTATOR CUFF  ARTHROSCOPIC;  Surgeon: Eliot Ibarra DO;  Location:  MAIN OR;  Service: Orthopedics    SKIN BIOPSY  " 2015 2016 2017 2022    SPINE SURGERY  2004    Tumor removed lower spine       Family History   Problem Relation Age of Onset    Coronary artery disease Mother     Hypertrophic cardiomyopathy Mother     Thyroid disease Mother     Coronary artery disease Sister     Irritable bowel syndrome Sister     Crohn's disease Sister     Substance Abuse Sister     Mental illness Sister     Substance Abuse Brother     Mental illness Brother     Heart disease Family         cardiovascular disease, ischemic heart disease    Cancer Family     Cancer Father         jaw bone    Hypertension Father     Diabetes Maternal Aunt          Medications have been verified.        Objective   /84 (BP Location: Left arm, Patient Position: Sitting)   Pulse 73   Temp (!) 97.1 °F (36.2 °C)   Resp 16   Ht 6' 1\" (1.854 m)   Wt 103 kg (227 lb)   SpO2 97%   BMI 29.95 kg/m²   No LMP for male patient.       Physical Exam     Physical Exam  Constitutional:       Appearance: Normal appearance.   Abdominal:      General: Abdomen is flat. Bowel sounds are normal.      Palpations: Abdomen is soft.   Musculoskeletal:         General: Tenderness present. No swelling or deformity. Normal range of motion.      Comments: SLR negative bilaterally.  TTP and palpable muscle spasm over the left lower back/upper gluteal area. Full AROM with 5/5 muscle strength of the lower extremities.  Full AROM of the lower back.    Skin:     General: Skin is warm.      Capillary Refill: Capillary refill takes less than 2 seconds.      Findings: No bruising.   Neurological:      Mental Status: He is alert.   Psychiatric:         Mood and Affect: Mood normal.         Behavior: Behavior normal.                   "

## 2023-12-21 NOTE — PATIENT INSTRUCTIONS
Follow-up with your primary care provider in the next 3-5 days.  Any new or worsening symptoms develop get re-evaluated sooner or proceed to the ER.

## 2023-12-27 ENCOUNTER — OFFICE VISIT (OUTPATIENT)
Dept: FAMILY MEDICINE CLINIC | Facility: HOSPITAL | Age: 68
End: 2023-12-27
Payer: MEDICARE

## 2023-12-27 VITALS
OXYGEN SATURATION: 95 % | HEART RATE: 72 BPM | BODY MASS INDEX: 30.19 KG/M2 | HEIGHT: 73 IN | SYSTOLIC BLOOD PRESSURE: 122 MMHG | WEIGHT: 227.8 LBS | RESPIRATION RATE: 16 BRPM | DIASTOLIC BLOOD PRESSURE: 68 MMHG

## 2023-12-27 DIAGNOSIS — M51.37 DDD (DEGENERATIVE DISC DISEASE), LUMBOSACRAL: Primary | ICD-10-CM

## 2023-12-27 DIAGNOSIS — I10 ESSENTIAL HYPERTENSION: ICD-10-CM

## 2023-12-27 PROCEDURE — 99214 OFFICE O/P EST MOD 30 MIN: CPT | Performed by: INTERNAL MEDICINE

## 2023-12-27 RX ORDER — PREDNISONE 10 MG/1
TABLET ORAL
Qty: 18 TABLET | Refills: 0 | Status: SHIPPED | OUTPATIENT
Start: 2023-12-27

## 2023-12-27 RX ORDER — LOSARTAN POTASSIUM AND HYDROCHLOROTHIAZIDE 25; 100 MG/1; MG/1
TABLET ORAL
Qty: 90 TABLET | Refills: 0 | Status: SHIPPED | OUTPATIENT
Start: 2023-12-27

## 2023-12-27 NOTE — PROGRESS NOTES
Assessment/Plan:    DDD (degenerative disc disease), lumbosacral  Patient presents for chief complaint of left-sided low back pain that occurred the first week of December after he accidentally fell over his grandson when turning in line and wanted to walk.    Pain is located to the left from the spine and it shoots like an arrow to the left groin area.  It is worse when he gets out of the car, gets out of bed sometimes with sitting.    It is alleviated by ice packs, doing stretching exercises at home, applying IcyHot to the low back area.    It is not associated extremity weakness, numbness, bowel or bladder incontinence.    He status post L4 S1 laminectomy for dural tumor    Lumbar spine x-ray last week showed advanced degenerative disease of the L4 to-S1 no fractures were seen    Has no tenderness today on examination of the spine.  He has full range of motion of the spine  Straight leg raising is negative.  Lower extremity muscle bulk, muscle tone and strength are normal and equal.  I could not elicit knee or ankle jerks today.  Gait was antalgic.    Patient's low back pain may be due to the fall of the trauma but he also has spondylosis and degenerative disc disease.  He has no features of sciatica.    I referred patient to spinal physical therapy.  I found no indication for MRI today  He will continue applying ice packs, IcyHot to the low back.  Will try prednisone for anti-inflammatory purposes.    Keep our appointment at the end of February for follow-up     Diagnoses and all orders for this visit:    DDD (degenerative disc disease), lumbosacral  -     Ambulatory Referral to [facility]; Future  -     predniSONE 10 mg tablet; 30 mg by mouth daily for 3 days, then 20 mg by mouth daily for 3 days, then 10 mg by mouth daily for 3 days, then stop          Subjective:      Patient ID: Norman Coles is a 68 y.o. male.      Back Pain  This is a new problem. The current episode started 1 to 4 weeks ago.  "The problem occurs daily. The problem has been gradually worsening since onset. The pain is present in the sacro-iliac. The quality of the pain is described as aching and burning. The pain is at a severity of 7/10. The pain is Worse during the day. The symptoms are aggravated by coughing, sitting, standing and twisting. Stiffness is present All day. Associated symptoms include pelvic pain. Risk factors include obesity and recent trauma.       Patient presents for follow-up of chronic conditions as detailed in the assessment and plan.      The following portions of the patient's history were reviewed and updated as appropriate: current medications, past family history, past medical history, past social history, past surgical history and problem list.    Review of Systems   Genitourinary:  Positive for pelvic pain.   Musculoskeletal:  Positive for back pain.         Objective:    /68   Pulse 72   Resp 16   Ht 6' 1\" (1.854 m)   Wt 103 kg (227 lb 12.8 oz)   SpO2 95%   BMI 30.05 kg/m²      Physical Exam  Constitutional:       General: He is not in acute distress.     Appearance: He is not toxic-appearing.   Musculoskeletal:         General: No tenderness.   Skin:     General: Skin is warm and dry.      Findings: No bruising, erythema or rash.   Neurological:      Mental Status: He is alert.      Motor: No weakness.      Gait: Gait abnormal.      Deep Tendon Reflexes: Reflexes abnormal.             Hal Solorio MD  "

## 2023-12-27 NOTE — ASSESSMENT & PLAN NOTE
Patient presents for chief complaint of left-sided low back pain that occurred the first week of December after he accidentally fell over his grandson when turning in line and wanted to walk.    Pain is located to the left from the spine and it shoots like an arrow to the left groin area.  It is worse when he gets out of the car, gets out of bed sometimes with sitting.    It is alleviated by ice packs, doing stretching exercises at home, applying IcyHot to the low back area.    It is not associated extremity weakness, numbness, bowel or bladder incontinence.    He status post L4 S1 laminectomy for dural tumor    Lumbar spine x-ray last week showed advanced degenerative disease of the L4 to-S1 no fractures were seen    Has no tenderness today on examination of the spine.  He has full range of motion of the spine  Straight leg raising is negative.  Lower extremity muscle bulk, muscle tone and strength are normal and equal.  I could not elicit knee or ankle jerks today.  Gait was antalgic.    Patient's low back pain may be due to the fall of the trauma but he also has spondylosis and degenerative disc disease.  He has no features of sciatica.    I referred patient to spinal physical therapy.  I found no indication for MRI today  He will continue applying ice packs, IcyHot to the low back.  Will try prednisone for anti-inflammatory purposes.    Keep our appointment at the end of February for follow-up

## 2024-01-04 ENCOUNTER — EVALUATION (OUTPATIENT)
Dept: PHYSICAL THERAPY | Facility: CLINIC | Age: 69
End: 2024-01-04
Payer: MEDICARE

## 2024-01-04 DIAGNOSIS — M51.37 DDD (DEGENERATIVE DISC DISEASE), LUMBOSACRAL: ICD-10-CM

## 2024-01-04 PROCEDURE — 97530 THERAPEUTIC ACTIVITIES: CPT | Performed by: PHYSICAL THERAPIST

## 2024-01-04 PROCEDURE — 97161 PT EVAL LOW COMPLEX 20 MIN: CPT | Performed by: PHYSICAL THERAPIST

## 2024-01-04 PROCEDURE — 97110 THERAPEUTIC EXERCISES: CPT | Performed by: PHYSICAL THERAPIST

## 2024-01-04 NOTE — PROGRESS NOTES
PT Evaluation     Today's date: 2024  Patient name: Norman Coles  : 1955  MRN: [id number]  Referring provider: Hal Solorio MD  Dx:   Encounter Diagnosis     ICD-10-CM    1. DDD (degenerative disc disease), lumbosacral  M51.37 Ambulatory Referral to Comprehensive Spine PT                     Assessment  Assessment details: Norman Coles is a 68 y.o. male presenting to outpatient physical therapy at [facility] with complaints of L LBP.  He presents with decreased lumbopelvic range of motion, decreased strength, limited flexibility, poor postural awareness, poor body mechanics, altered gait pattern, poor balance, decreased tolerance to activity and decreased functional mobility due to DDD (degenerative disc disease), lumbosacral.  He would benefit from skilled PT services in order to address these deficits and reach maximum level of function.  Thank you for the referral!    Impairments: activity intolerance, impaired physical strength and pain with function    Symptom irritability: lowUnderstanding of Dx/Px/POC: excellent  Goals  STG  1. Independent with HEP in 2 weeks  2. Decrease pain at worst by 50% in 2 weeks    LTG  1. Increase lumbopelvic mobility in all planes in 4 weeks  2. Return to full, unrestricted household chores in 4 weeks      Plan  Patient would benefit from: PT eval and skilled physical therapy  Planned modality interventions: thermotherapy: hydrocollator packs  Planned therapy interventions: manual therapy, neuromuscular re-education, therapeutic activities and therapeutic exercise  Frequency: 1x week  Duration in weeks: 4  Treatment plan discussed with: patient        Subjective Evaluation    History of Present Illness  Mechanism of injury: PMH includes decompressive laminectomy L4-S1 ~ 20 years. Pt reports chronic history of LBP, recently while standing in line, pt twisted and tripped over his grandson and fell onto L hip. Pt was on steroid pack and his pain has scientifically  improved. Pt wishes to get a input today in regards to an exercise program to avoid surgery and not limit his ability to enjoy life. Pt reports inconsistent pain with coughing and sneezing. Pt reports he does not have a current exercise program.   Patient Goals  Patient goals for therapy: increased strength, return to sport/leisure activities, increased motion and decreased pain  Patient goal: learn comprehensive HEP  Pain  Current pain ratin  At best pain ratin  At worst pain ratin  Location: L PSIS  Quality: dull ache and pressure  Relieving factors: medications  Aggravating factors: standing  Progression: improved      Diagnostic Tests  X-ray: abnormal        Objective     Palpation   Left   No palpable tenderness to the erector spinae, iliopsoas and transverse abdominus.     Active Range of Motion     Lumbar   Flexion:  Restriction level: minimal  Extension:  Restriction level: moderate  Left lateral flexion:  Restriction level: moderate  Right lateral flexion:  Restriction level: moderate  Left rotation:  Restriction level: minimal  Right rotation:  Restriction level: minimal    Joint Play     Hypomobile: L2, L3, L4 and L5     Pain: L4 and L5   Mechanical Assessment    Cervical      Thoracic      Lumbar    Standing flexion: repeated movements   Pain intensity: better  Pain level: decreased  Standing extension: repeated movements  Pain location: no change    Strength/Myotome Testing     Lumbar   Left   Heel walk: normal  Toe walk: normal    Right   Heel walk: normal  Toe walk: normal    Left Hip   Planes of Motion   Flexion: 5  Extension: 4+  Abduction: 5  Adduction: 5    Right Hip   Planes of Motion   Flexion: 5  Extension: 4+  Abduction: 5  Adduction: 5    Left Knee   Flexion: 5  Extension: 5    Right Knee   Flexion: 5  Extension: 5    Left Ankle/Foot   Dorsiflexion: 5  Plantar flexion: 5    Right Ankle/Foot   Dorsiflexion: 5  Plantar flexion: 5    Additional Strength Details  Bridge iso test:  19  "sec      Muscle Activation   Patient unable to activate left transverse abdominals and right transverse abdominals.     Tests     Lumbar   Negative prone instability , Valsalva and SIJ compression.     Left   Positive passive SLR and slump test.   Negative crossed SLR.     Right   Negative crossed SLR, passive SLR and slump test.     Left Hip   Negative TOMMIE.     Right Hip   Negative TOMMIE.              EPOC: 2/4/24      Manuals 1/4                                                                Neuro Re-Ed             Bridge iso             Prone hip ext iso             Squat no UE use                                                                 Ther Ex             Crossed trunk rotation 10x10\" L over RLE            SKTC stretch             Active hamstring stretch             Lumbar flexion stretch             SB 3 way roll out                                                    Ther Activity             TM walking BWD             Pt education: pathoanatomy, nature of sxs, POC, HEP NS 15'            Gait Training                                       Modalities                                            "

## 2024-01-12 ENCOUNTER — OFFICE VISIT (OUTPATIENT)
Dept: PHYSICAL THERAPY | Facility: CLINIC | Age: 69
End: 2024-01-12
Payer: MEDICARE

## 2024-01-12 DIAGNOSIS — M51.37 DDD (DEGENERATIVE DISC DISEASE), LUMBOSACRAL: Primary | ICD-10-CM

## 2024-01-12 PROCEDURE — 97112 NEUROMUSCULAR REEDUCATION: CPT | Performed by: PHYSICAL THERAPIST

## 2024-01-12 PROCEDURE — 97140 MANUAL THERAPY 1/> REGIONS: CPT | Performed by: PHYSICAL THERAPIST

## 2024-01-12 PROCEDURE — 97110 THERAPEUTIC EXERCISES: CPT | Performed by: PHYSICAL THERAPIST

## 2024-01-12 NOTE — PROGRESS NOTES
"Daily Note     Today's date: 2024  Patient name: Norman Coles  : 1955  MRN: [id number]  Referring provider: Hal Solorio MD  Dx:   Encounter Diagnosis     ICD-10-CM    1. DDD (degenerative disc disease), lumbosacral  M51.37         Subjective: Compliant with HEP, no questions regarding POC, motivated to continue PT, feeling better with HEP       Objective: See treatment diary below. Added SKTC stretch to HEP, see handout.       Assessment: Tolerated treatment well with initiation of full treatment program as noted below requiring verbal and tactile cues from PT for safe execution of therapeutic exercise. Patient demonstrated fatigue post treatment, exhibited good technique with therapeutic exercises, and would benefit from continued PT      Plan: Progress treatment as tolerated.  Add sciatic nerve glide to HEP NV     EPOC: 24      Manuals             Lumbar PA glides G3-4 4'            Sacral rocking mob G3-4 4'                                      Neuro Re-Ed             Bridge iso 3x10 3\"            Prone hip ext iso 20x 3\"            Squat no UE use 2x10                                                                Ther Ex             Crossed trunk rotation 10x10\" L over RLE            SKTC stretch 10x10\" ea            Active hamstring stretch 20x 3\" ea            Lumbar flexion stretch standing 10x10\"            SB 3 way roll out 10x10\" ea            Prone lying with heat 6'                                      Ther Activity             TM walking BWD 5'             Pt education: pathoanatomy, nature of sxs, POC, HEP NS 3'            Gait Training                                       Modalities                                            "

## 2024-01-18 ENCOUNTER — OFFICE VISIT (OUTPATIENT)
Dept: URGENT CARE | Facility: CLINIC | Age: 69
End: 2024-01-18
Payer: MEDICARE

## 2024-01-18 VITALS
RESPIRATION RATE: 16 BRPM | SYSTOLIC BLOOD PRESSURE: 130 MMHG | TEMPERATURE: 98.4 F | HEIGHT: 73 IN | HEART RATE: 78 BPM | OXYGEN SATURATION: 95 % | WEIGHT: 227 LBS | DIASTOLIC BLOOD PRESSURE: 80 MMHG | BODY MASS INDEX: 30.09 KG/M2

## 2024-01-18 DIAGNOSIS — J01.90 ACUTE NON-RECURRENT SINUSITIS, UNSPECIFIED LOCATION: Primary | ICD-10-CM

## 2024-01-18 PROCEDURE — G0463 HOSPITAL OUTPT CLINIC VISIT: HCPCS | Performed by: PHYSICIAN ASSISTANT

## 2024-01-18 PROCEDURE — 99213 OFFICE O/P EST LOW 20 MIN: CPT | Performed by: PHYSICIAN ASSISTANT

## 2024-01-18 RX ORDER — AMOXICILLIN AND CLAVULANATE POTASSIUM 875; 125 MG/1; MG/1
1 TABLET, FILM COATED ORAL EVERY 12 HOURS SCHEDULED
Qty: 14 TABLET | Refills: 0 | Status: SHIPPED | OUTPATIENT
Start: 2024-01-18 | End: 2024-01-25

## 2024-01-18 NOTE — PROGRESS NOTES
[facility]        NAME: Norman Coles is a 68 y.o. male  : 1955    MRN: [id number]  DATE: 2024  TIME: 12:13 PM    Assessment and Plan   Acute non-recurrent sinusitis, unspecified location [J01.90]  1. Acute non-recurrent sinusitis, unspecified location  amoxicillin-clavulanate (AUGMENTIN) 875-125 mg per tablet            Patient Instructions       Follow up with PCP in 3-5 days.  Proceed to  ER if symptoms worsen.    Chief Complaint     Chief Complaint   Patient presents with    Cold Like Symptoms     Pt reports sinus congestion, productive cough with thick, yellow mucus. States onset of symptoms one week ago. Managing with coricidin with some relief. Negative at home Covid test yesterday.          History of Present Illness       Patient presents with over 1 week of sinus pressure, congestion, cough, headache. Mild bloody mucous from right nostril this morning. Symptoms were improving then got worse again recently.    Coricidin with some relief.   Denies chest pains, SOB, dyspnea, fevers.  Some sick contacts with similar symptoms.         Review of Systems   Review of Systems   Constitutional:  Negative for chills, fatigue and fever.   HENT:  Positive for congestion, postnasal drip, rhinorrhea, sinus pressure and sinus pain. Negative for ear discharge, ear pain and sore throat.    Respiratory:  Positive for cough. Negative for chest tightness, shortness of breath and wheezing.    Cardiovascular:  Negative for chest pain and palpitations.   Musculoskeletal:  Negative for arthralgias and myalgias.   Neurological:  Negative for weakness.   Psychiatric/Behavioral:  Negative for confusion.          Current Medications       Current Outpatient Medications:     amoxicillin-clavulanate (AUGMENTIN) 875-125 mg per tablet, Take 1 tablet by mouth every 12 (twelve) hours for 7 days, Disp: 14 tablet, Rfl: 0    doxazosin (CARDURA) 2 mg tablet, TAKE 1 TABLET BY MOUTH EVERYDAY AT BEDTIME, Disp: 90  tablet, Rfl: 1    fluticasone (FLONASE) 50 mcg/act nasal spray, 2 sprays as needed, Disp: , Rfl: 3    losartan-hydrochlorothiazide (HYZAAR) 100-25 MG per tablet, TAKE 1 TABLET DAILY, Disp: 90 tablet, Rfl: 0    Multiple Vitamin (MULTIVITAMIN ADULT PO), Take by mouth, Disp: , Rfl:     Probiotic Product (PROBIOTIC PO), Take by mouth, Disp: , Rfl:     simvastatin (ZOCOR) 10 mg tablet, TAKE 1 TABLET BY MOUTH EVERY DAY, Disp: 90 tablet, Rfl: 0    Xarelto 20 MG tablet, TAKE 1 TABLET BY MOUTH EVERY DAY, Disp: 90 tablet, Rfl: 2    levalbuterol (XOPENEX HFA) 45 mcg/act inhaler, Inhale 1-2 puffs every 6 (six) hours as needed for wheezing (Patient not taking: Reported on 10/27/2023), Disp: 15 g, Rfl: 0    predniSONE 10 mg tablet, 30 mg by mouth daily for 3 days, then 20 mg by mouth daily for 3 days, then 10 mg by mouth daily for 3 days, then stop, Disp: 18 tablet, Rfl: 0    sildenafil (REVATIO) 20 mg tablet, 3 pills by mouth before sexual activity, Disp: 90 tablet, Rfl: 1    Current Allergies     Allergies as of 01/18/2024 - Reviewed 01/18/2024   Allergen Reaction Noted    Vancomycin Rash and Other (See Comments) 01/21/2015    Ace inhibitors Cough 05/15/2017            The following portions of the patient's history were reviewed and updated as appropriate: allergies, current medications, past family history, past medical history, past social history, past surgical history and problem list.     Past Medical History:   Diagnosis Date    Actinic keratosis 2015    Acute maxillary sinusitis     Arthritis 2000    Asthmatic bronchitis     Benign prostatic hyperplasia     Benign prostatic hyperplasia with urinary incontinence without sensory awareness     Cancer ([facility]) 2015    Cervical disc disease     Cervicalgia     Chest pain     DVT (deep venous thrombosis) ([facility]) 2004    Erectile dysfunction     Fatigue     Hypercholesterolemia     Hyperlipidemia     Hypertension     IFG (impaired fasting glucose)     Otitis media     PE (pulmonary  "thromboembolism) (HCC) 2004    on xarelto    Pneumonia 1971    Screening for colon cancer     Screening for prostate cancer     Skin tag     Sleep apnea     12/21/21--states he is being worked up for sleep apnea    Squamous cell skin cancer 2015 2017 2022    Visual impairment        Past Surgical History:   Procedure Laterality Date    BACK SURGERY  2004    CHOLECYSTECTOMY  2009    COLON SURGERY  2004    Polyps removed    COLONOSCOPY      EYE SURGERY  2016?    tprn retina    MOHS SURGERY  2015 2017 2022    ND CLAVICULECTOMY PARTIAL Left 03/09/2021    Procedure: DISTAL CLAVICLE EXCISION;  Surgeon: Eliot Ibarra DO;  Location:  MAIN OR;  Service: Orthopedics    ND SURGICAL ARTHROSCOPY SHOULDER BICEPS TENODESIS Left 03/09/2021    Procedure: ARTHROSCOPIC BICEPS TENODESIS;  Surgeon: Eliot Ibarra DO;  Location:  MAIN OR;  Service: Orthopedics    ND SURGICAL ARTHROSCOPY SHOULDER W/ROTATOR CUFF RPR Left 03/09/2021    Procedure: REPAIR ROTATOR CUFF  ARTHROSCOPIC;  Surgeon: Eliot Ibarra DO;  Location:  MAIN OR;  Service: Orthopedics    SKIN BIOPSY  2015 2016 2017 2022    SPINE SURGERY  2004    Tumor removed lower spine       Family History   Problem Relation Age of Onset    Coronary artery disease Mother     Hypertrophic cardiomyopathy Mother     Thyroid disease Mother     Heart disease Mother     Arthritis Mother         RA    Coronary artery disease Sister     Irritable bowel syndrome Sister     Crohn's disease Sister     Substance Abuse Sister     Mental illness Sister     Substance Abuse Brother     Mental illness Brother     Heart disease Family         cardiovascular disease, ischemic heart disease    Cancer Family     Cancer Father         jaw bone    Hypertension Father     Diabetes Maternal Aunt          Medications have been verified.        Objective   /80 (BP Location: Right arm, Patient Position: Sitting)   Pulse 78   Temp 98.4 °F (36.9 °C)   Resp 16   Ht 6' 1\" (1.854 m)   Wt 103 kg (227 " lb)   SpO2 95%   BMI 29.95 kg/m²   No LMP for male patient.       Physical Exam     Physical Exam  Constitutional:       General: He is not in acute distress.     Appearance: Normal appearance. He is not ill-appearing or diaphoretic.   HENT:      Right Ear: Tympanic membrane, ear canal and external ear normal.      Left Ear: Tympanic membrane, ear canal and external ear normal.      Nose:      Right Sinus: Maxillary sinus tenderness and frontal sinus tenderness present.      Left Sinus: Maxillary sinus tenderness and frontal sinus tenderness present.      Mouth/Throat:      Mouth: Mucous membranes are moist.      Pharynx: Oropharynx is clear.   Eyes:      Conjunctiva/sclera: Conjunctivae normal.   Cardiovascular:      Rate and Rhythm: Normal rate and regular rhythm.      Heart sounds: Normal heart sounds.   Pulmonary:      Effort: Pulmonary effort is normal.      Breath sounds: Normal breath sounds.   Lymphadenopathy:      Cervical: No cervical adenopathy.   Skin:     General: Skin is warm and dry.   Neurological:      Mental Status: He is alert.   Psychiatric:         Mood and Affect: Mood normal.         Behavior: Behavior normal.

## 2024-01-19 ENCOUNTER — OFFICE VISIT (OUTPATIENT)
Dept: PHYSICAL THERAPY | Facility: CLINIC | Age: 69
End: 2024-01-19
Payer: MEDICARE

## 2024-01-19 DIAGNOSIS — M51.37 DDD (DEGENERATIVE DISC DISEASE), LUMBOSACRAL: Primary | ICD-10-CM

## 2024-01-19 PROCEDURE — 97110 THERAPEUTIC EXERCISES: CPT | Performed by: PHYSICAL THERAPIST

## 2024-01-19 PROCEDURE — 97014 ELECTRIC STIMULATION THERAPY: CPT | Performed by: PHYSICAL THERAPIST

## 2024-01-19 PROCEDURE — 97140 MANUAL THERAPY 1/> REGIONS: CPT | Performed by: PHYSICAL THERAPIST

## 2024-01-19 NOTE — PROGRESS NOTES
"Daily Note     Today's date: 2024  Patient name: Norman Coles  : 1955  MRN: [id number]  Referring provider: Hal Solorio MD  Dx:   Encounter Diagnosis     ICD-10-CM    1. DDD (degenerative disc disease), lumbosacral  M51.37                      Subjective: Feeling much better overall, happy with response to HEP.       Objective: See treatment diary below      Assessment: No sharp pain with performance of flexion or RDL performance.  Able to perform prone hip extension on L LE with increased L sided QL pain- improved with cues to avoid lumbar twist / overcompensation.  With multifidae activation on L side with R hip extension had similar pain.  No discomfort with CPA or UPAs.  Sxs are likely muscular.     Plan: Continue per plan of care.  Continue with extensor/multifidae strengthening.       EPOC: 24      Manuals            Lumbar PA glides G3-4 4' PF           Sacral rocking mob G3-4 4' PF 15 min                                      Neuro Re-Ed             Bridge iso 3x10 3\" 3x10           Prone hip ext iso 20x 3\" 10x ea 3\"           Squat no UE use 2x10 2x10                                                               Ther Ex             Crossed trunk rotation 10x10\" L over RLE 10x5\" L            SKTC stretch 10x10\" ea            Active hamstring stretch 20x 3\" ea 5x15\" ea           Lumbar flexion stretch standing 10x10\" 10x10           SB 3 way roll out 10x10\" ea 5x10\"           Prone lying with heat 6'            RDL  2x10           Birddog  20x           Ther Activity             TM walking BWD 5'             Pt education: pathoanatomy, nature of sxs, POC, HEP NS 3'            Gait Training                                       Modalities             ES  to conclude   10 min                                "

## 2024-01-23 ENCOUNTER — TELEPHONE (OUTPATIENT)
Dept: DERMATOLOGY | Facility: CLINIC | Age: 69
End: 2024-01-23

## 2024-01-23 NOTE — TELEPHONE ENCOUNTER
Left patient a message to try and move him to 2/21 at 11:40 am in the urgent slot. Patient is currently overbooked into Dr. Mccarthy's clinic on 2/14.     TEAM: if patient returns call please move him to 2/21 at 11:40 if the slot is still open if it is not leave him where he is currently on 2/14.

## 2024-01-26 ENCOUNTER — OFFICE VISIT (OUTPATIENT)
Dept: PHYSICAL THERAPY | Facility: CLINIC | Age: 69
End: 2024-01-26
Payer: MEDICARE

## 2024-01-26 DIAGNOSIS — M51.37 DDD (DEGENERATIVE DISC DISEASE), LUMBOSACRAL: Primary | ICD-10-CM

## 2024-01-26 PROCEDURE — 97112 NEUROMUSCULAR REEDUCATION: CPT | Performed by: PHYSICAL THERAPIST

## 2024-01-26 PROCEDURE — 97140 MANUAL THERAPY 1/> REGIONS: CPT | Performed by: PHYSICAL THERAPIST

## 2024-01-26 PROCEDURE — 97110 THERAPEUTIC EXERCISES: CPT | Performed by: PHYSICAL THERAPIST

## 2024-01-26 NOTE — PROGRESS NOTES
"Daily Note     Today's date: 2024  Patient name: Norman Coles  : 1955  MRN: [id number]  Referring provider: Hal Solorio MD  Dx:   Encounter Diagnosis     ICD-10-CM    1. DDD (degenerative disc disease), lumbosacral  M51.37                      Subjective: Notes feeling continued L sided low back stiffness.        Objective: See treatment diary below      Assessment: Continues to have slight lumbar instability with performance of neutral spine marching and SLR with noted L hip flexor weakness contributing to lumbar compensation and tilting.  With performance of BP cuff feedback was able to stabilize with improved ability.  With closing down of L sided lumbar facets had slight discomfort but without referral.  + quadrant side on L with closing.  Improved slightly without referral post mobilization .  Noted R glute medius weakness as well contributing to lumbar discomfort.      Plan: Continue per plan of care.      EPOC: 24      Manuals           Lumbar PA glides G3-4 4' PF PF          Sacral rocking mob G3-4 4' PF 15 min  PF           Rotation mobs and closing mobs in R SL   PF 15 min                        Neuro Re-Ed             Bridge iso 3x10 3\" 3x10           Prone hip ext iso 20x 3\" 10x ea 3\"           Squat no UE use 2x10 2x10           NSP Marching   20x ea          NSP SLR   20x ea           NSP 90/90    10x2          SLS with TA activation   3x30\"           Ther Ex             Crossed trunk rotation 10x10\" L over RLE 10x5\" L            SKTC stretch 10x10\" ea            Active hamstring stretch 20x 3\" ea 5x15\" ea           Lumbar flexion stretch standing 10x10\" 10x10           SB 3 way roll out 10x10\" ea 5x10\"           SL ABD   2x10 ea with TA          Prone lying with heat 6'            RDL  2x10 2x10          Birddog  20x 20x           Ther Activity             TM walking BWD 5'             Pt education: pathoanatomy, nature of sxs, POC, HEP NS 3'            Gait Training  "                                      Modalities             ES  to conclude   10 min

## 2024-01-29 ENCOUNTER — TELEPHONE (OUTPATIENT)
Dept: UROLOGY | Facility: CLINIC | Age: 69
End: 2024-01-29

## 2024-01-29 NOTE — TELEPHONE ENCOUNTER
Spoke to the patient 1/29/24 to inform him of his appointment cancellation on 2/20/24 and the new date and time of 4/9/24 at 3 pm patient is ok with the change

## 2024-02-02 ENCOUNTER — OFFICE VISIT (OUTPATIENT)
Dept: PHYSICAL THERAPY | Facility: CLINIC | Age: 69
End: 2024-02-02
Payer: MEDICARE

## 2024-02-02 DIAGNOSIS — M51.37 DDD (DEGENERATIVE DISC DISEASE), LUMBOSACRAL: Primary | ICD-10-CM

## 2024-02-02 PROCEDURE — 97110 THERAPEUTIC EXERCISES: CPT | Performed by: PHYSICAL THERAPIST

## 2024-02-02 PROCEDURE — 97140 MANUAL THERAPY 1/> REGIONS: CPT | Performed by: PHYSICAL THERAPIST

## 2024-02-02 PROCEDURE — 97112 NEUROMUSCULAR REEDUCATION: CPT | Performed by: PHYSICAL THERAPIST

## 2024-02-02 NOTE — PROGRESS NOTES
PT Evaluation     Today's date: 2024  Patient name: Norman Coles  : 1955  MRN: [id number]  Referring provider: Hal Solorio MD  Dx:   Encounter Diagnosis     ICD-10-CM    1. DDD (degenerative disc disease), lumbosacral  M51.37 Ambulatory Referral to Comprehensive Spine PT          Assessment  Assessment details: Norman Coles is a 68 y.o. male seen in outpatient physical therapy at [facility] with complaints of L LBP.  He has been treated for decreased lumbopelvic range of motion, decreased strength, limited flexibility, poor postural awareness, poor body mechanics, altered gait pattern, poor balance, decreased tolerance to activity and decreased functional mobility due to DDD (degenerative disc disease), lumbosacral.  Re-evaluation was performed to assess if he would benefit from skilled PT services in order to address these deficits and reach maximum level of function.  Thank you for the referral!    Impairments: activity intolerance, impaired physical strength and pain with function    Symptom irritability: lowUnderstanding of Dx/Px/POC: excellent  Goals  STG  1. Independent with HEP in 2 weeks      Goal met  2. Decrease pain at worst by 50% in 2 weeks    Goal met    LTG  1. Increase lumbopelvic mobility in all planes in 4 weeks   Goal met  2. Return to full, unrestricted household chores in 4 weeks   Goal met      Plan  Patient has met all goals set at IE and will be discharged from skilled PT to home program today.   Treatment plan discussed with: patient        Subjective Evaluation    History of Present Illness  Mechanism of injury: PMH includes decompressive laminectomy L4-S1 ~ 20 years. Pt reports chronic history of LBP, recently while standing in line, pt twisted and tripped over his grandson and fell onto L hip. Pt was on steroid pack and his pain has scientifically improved. Pt wishes to get a input today in regards to an exercise program to avoid surgery and not limit his ability to enjoy  life. Pt reports inconsistent pain with coughing and sneezing. Pt reports he does not have a current exercise program.   Patient Goals  Patient goals for therapy: increased strength, return to sport/leisure activities, increased motion and decreased pain  Patient goal: learn comprehensive HEP  Pain  Current pain ratin  At best pain ratin  At worst pain ratin      2/10  Location: L PSIS  Quality: dull ache and pressure  Relieving factors: medications  Aggravating factors: standing  Progression: improved    Diagnostic Tests  X-ray: abnormal      Objective     Palpation   Left   No palpable tenderness to the erector spinae, iliopsoas and transverse abdominus.     Active Range of Motion     Lumbar   Flexion:  Restriction level: minimal  Extension:  Restriction level: moderate  Left lateral flexion:  Restriction level: moderate  Right lateral flexion:  Restriction level: moderate  Left rotation:  Restriction level: minimal  Right rotation:  Restriction level: minimal    Joint Play     Hypomobile: L2, L3, L4 and L5       Normal       Pain: L4 and L5        Pain free   Mechanical Assessment    Cervical      Thoracic      Lumbar    Standing flexion: repeated movements   Pain intensity: better  Pain level: decreased  Standing extension: repeated movements  Pain location: no change    Strength/Myotome Testing     Lumbar   Left   Heel walk: normal  Toe walk: normal    Right   Heel walk: normal  Toe walk: normal    Left Hip   Planes of Motion   Flexion: 5  Extension: 4+       5  Abduction: 5  Adduction: 5    Right Hip   Planes of Motion   Flexion: 5  Extension: 4+       5  Abduction: 5  Adduction: 5    Left Knee   Flexion: 5  Extension: 5    Right Knee   Flexion: 5  Extension: 5    Left Ankle/Foot   Dorsiflexion: 5  Plantar flexion: 5    Right Ankle/Foot   Dorsiflexion: 5  Plantar flexion: 5    Additional Strength Details  Bridge iso test:  19 sec      1 min        Muscle Activation   Patient unable to activate left  "transverse abdominals and right transverse abdominals.     Tests     Lumbar   Negative prone instability , Valsalva and SIJ compression.     Left   Positive passive SLR and slump test.     Negative    Negative crossed SLR.     Right   Negative crossed SLR, passive SLR and slump test.     Left Hip   Negative TOMMIE.     Right Hip   Negative TOMMIE.       EPOC: 2/4/24    Manuals 1/12 1/19 1/26 2/2         Lumbar PA glides G3-4 4' PF PF          Sacral rocking mob G3-4 4' PF 15 min  PF           Rotation mobs and closing mobs in R SL   PF 15 min           RE    NS         Neuro Re-Ed             Bridge iso 3x10 3\" 3x10           Prone hip ext iso 20x 3\" 10x ea 3\"           Squat no UE use 2x10 2x10  2x10 1lb KB         NSP Marching   20x ea 20x ea         NSP SLR   20x ea  20x         NSP 90/90    10x2 20x          SLS with TA activation   3x30\"           Ther Ex             Crossed trunk rotation 10x10\" L over RLE 10x5\" L   10x5\" L over R          SKTC stretch 10x10\" ea            Active hamstring stretch 20x 3\" ea 5x15\" ea           Lumbar flexion stretch standing 10x10\" 10x10           SB 3 way roll out 10x10\" ea 5x10\"  10x10\" ea         SL ABD   2x10 ea with TA          Prone lying with heat 6'            RDL  2x10 2x10          Birddog  20x 20x           Ther Activity             TM walking BWD 5'             Pt education: pathoanatomy, nature of sxs, POC, HEP NS 3'            Gait Training                                       Modalities             ES  to conclude   10 min                                  "

## 2024-02-04 DIAGNOSIS — I10 ESSENTIAL HYPERTENSION: ICD-10-CM

## 2024-02-04 RX ORDER — DOXAZOSIN 2 MG/1
TABLET ORAL
Qty: 90 TABLET | Refills: 1 | Status: SHIPPED | OUTPATIENT
Start: 2024-02-04

## 2024-02-21 ENCOUNTER — RA CDI HCC (OUTPATIENT)
Dept: OTHER | Facility: HOSPITAL | Age: 69
End: 2024-02-21

## 2024-02-21 ENCOUNTER — OFFICE VISIT (OUTPATIENT)
Dept: DERMATOLOGY | Facility: CLINIC | Age: 69
End: 2024-02-21
Payer: MEDICARE

## 2024-02-21 VITALS — WEIGHT: 227 LBS | TEMPERATURE: 97.5 F | BODY MASS INDEX: 29.95 KG/M2

## 2024-02-21 DIAGNOSIS — L57.0 KERATOSIS, ACTINIC: Primary | ICD-10-CM

## 2024-02-21 PROCEDURE — 17000 DESTRUCT PREMALG LESION: CPT | Performed by: DERMATOLOGY

## 2024-02-21 PROCEDURE — 17003 DESTRUCT PREMALG LES 2-14: CPT | Performed by: DERMATOLOGY

## 2024-02-21 NOTE — PROGRESS NOTES
"[facility] Dermatology Clinic Note     Patient Name: Norman Coles  Encounter Date: 10/26/2023     Have you been cared for by a [facility] Dermatologist in the last 3 years and, if so, which description applies to you?    Yes.  I have been here within the last 3 years, and my medical history has NOT changed since that time.  I am MALE/not capable of bearing children.    REVIEW OF SYSTEMS:  Have you recently had or currently have any of the following? No changes in my recent health.   PAST MEDICAL HISTORY:  Have you personally ever had or currently have any of the following?  If \"YES,\" then please provide more detail. No changes in my medical history.   HISTORY OF IMMUNOSUPPRESSION: Do you have a history of any of the following:  Systemic Immunosuppression such as Diabetes, Biologic or Immunotherapy, Chemotherapy, Organ Transplantation, Bone Marrow Transplantation?  No     Answering \"YES\" requires the addition of the dotphrase \"IMMUNOSUPPRESSED\" as the first diagnosis of the patient's visit.   FAMILY HISTORY:  Any \"first degree relatives\" (parent, brother, sister, or child) with the following?    No changes in my family's known health.   PATIENT EXPERIENCE:    Do you want the Dermatologist to perform a COMPLETE skin exam today including a clinical examination under the \"bra and underwear\" areas?  NO  If necessary, do we have your permission to call and leave a detailed message on your Preferred Phone number that includes your specific medical information?  Yes      Allergies   Allergen Reactions    Vancomycin Rash and Other (See Comments)     Peeling of skin    Ace Inhibitors Cough      Current Outpatient Medications:     doxazosin (CARDURA) 2 mg tablet, TAKE 1 TABLET BY MOUTH EVERYDAY AT BEDTIME, Disp: 90 tablet, Rfl: 1    fluticasone (FLONASE) 50 mcg/act nasal spray, 2 sprays as needed, Disp: , Rfl: 3    losartan-hydrochlorothiazide (HYZAAR) 100-25 MG per tablet, TAKE 1 TABLET DAILY, Disp: 90 tablet, Rfl: 0    " Multiple Vitamin (MULTIVITAMIN ADULT PO), Take by mouth, Disp: , Rfl:     Probiotic Product (PROBIOTIC PO), Take by mouth, Disp: , Rfl:     sildenafil (REVATIO) 20 mg tablet, 3 pills by mouth before sexual activity, Disp: 90 tablet, Rfl: 1    simvastatin (ZOCOR) 10 mg tablet, TAKE 1 TABLET BY MOUTH EVERY DAY, Disp: 90 tablet, Rfl: 0    Xarelto 20 MG tablet, TAKE 1 TABLET BY MOUTH EVERY DAY, Disp: 90 tablet, Rfl: 2    levalbuterol (XOPENEX HFA) 45 mcg/act inhaler, Inhale 1-2 puffs every 6 (six) hours as needed for wheezing (Patient not taking: Reported on 10/27/2023), Disp: 15 g, Rfl: 0    predniSONE 10 mg tablet, 30 mg by mouth daily for 3 days, then 20 mg by mouth daily for 3 days, then 10 mg by mouth daily for 3 days, then stop, Disp: 18 tablet, Rfl: 0          Whom besides the patient is providing clinical information about today's encounter?   NO ADDITIONAL HISTORIAN (patient alone provided history)    Physical Exam and Assessment/Plan by Diagnosis:    ACTINIC KERATOSIS    Physical Exam:  Anatomic Location Affected:  bilateral hands  Morphological Description:  Thin pink papule(s) with gritty scale       Assessment and Plan:  Based on a thorough discussion of this condition and the management approach to it (including a comprehensive discussion of the known risks, side effects and potential benefits of treatment), the patient (family) agrees to implement the following specific plan:  Treated Left eyebrow, left temple, Bilateral cheeks, left neck, and right forehead with cryotherapy 10/26/2023. Patient here for follow up today.  Treated right hand and forearm with LN2 today    PROCEDURE:  DESTRUCTION OF PRE-MALIGNANT LESIONS  After a thorough discussion of treatment options and risk/benefits/alternatives (including but not limited to local pain, scarring, dyspigmentation, blistering, and possible superinfection), verbal and written consent were obtained and the aforementioned lesions were treated on with  cryotherapy using liquid nitrogen x 2 cycles for 5-10 seconds. The patient tolerated the procedure well, and after-care instructions were provided.     TOTAL NUMBER of 10 pre-malignant lesions were treated today on the ANATOMIC LOCATION: bilateral hands     Patient instructions:  Your pre-cancerous lesions (called actinic keratosis) were treated with liquid nitrogen today. The treated areas will get more red, crusted over the next few days. There might be some blistering. Apply vaseline to the treated area for the next week to help it heal fully. Do not pick at the area. Return in 3-4 weeks for another round of liquid nitrogen treatment if lesion(s)  fails to fully resolve.    Scribe Attestation      I,:  Kristin Martinez am acting as a scribe while in the presence of the attending physician.:       I,:  Antonieta Mccarthy MD personally performed the services described in this documentation    as scribed in my presence.:

## 2024-02-21 NOTE — PATIENT INSTRUCTIONS
ACTINIC KERATOSIS    Physical Exam:  Anatomic Location Affected:  bilateral hands  Morphological Description:  Thin pink papule(s) with gritty scale       Assessment and Plan:  Based on a thorough discussion of this condition and the management approach to it (including a comprehensive discussion of the known risks, side effects and potential benefits of treatment), the patient (family) agrees to implement the following specific plan:  Treated Left eyebrow, left temple, Bilateral cheeks, left neck, and right forehead with cryotherapy 10/26/2023. Patient here for follow up today.  Treated right hand and forearm with LN2 today    PROCEDURE:  DESTRUCTION OF PRE-MALIGNANT LESIONS  After a thorough discussion of treatment options and risk/benefits/alternatives (including but not limited to local pain, scarring, dyspigmentation, blistering, and possible superinfection), verbal and written consent were obtained and the aforementioned lesions were treated on with cryotherapy using liquid nitrogen x 2 cycles for 5-10 seconds. The patient tolerated the procedure well, and after-care instructions were provided.     TOTAL NUMBER of 10 pre-malignant lesions were treated today on the ANATOMIC LOCATION: bilateral hands     Patient instructions:  Your pre-cancerous lesions (called actinic keratosis) were treated with liquid nitrogen today. The treated areas will get more red, crusted over the next few days. There might be some blistering. Apply vaseline to the treated area for the next week to help it heal fully. Do not pick at the area. Return in 3-4 weeks for another round of liquid nitrogen treatment if lesion(s)  fails to fully resolve.

## 2024-02-27 ENCOUNTER — TELEPHONE (OUTPATIENT)
Dept: FAMILY MEDICINE CLINIC | Facility: HOSPITAL | Age: 69
End: 2024-02-27

## 2024-02-27 NOTE — TELEPHONE ENCOUNTER
02/27/24 11:09 AM    Patient contacted (spoke with patient) to bring Advance Directive, POLST, or Living Will document to next scheduled pcp visit.    Thank you.  Abbey Pantoja MA  [facility]

## 2024-02-28 ENCOUNTER — OFFICE VISIT (OUTPATIENT)
Dept: FAMILY MEDICINE CLINIC | Facility: HOSPITAL | Age: 69
End: 2024-02-28
Payer: MEDICARE

## 2024-02-28 VITALS
SYSTOLIC BLOOD PRESSURE: 120 MMHG | DIASTOLIC BLOOD PRESSURE: 78 MMHG | HEIGHT: 73 IN | HEART RATE: 68 BPM | OXYGEN SATURATION: 94 % | BODY MASS INDEX: 29.82 KG/M2 | TEMPERATURE: 97.7 F | RESPIRATION RATE: 16 BRPM | WEIGHT: 225 LBS

## 2024-02-28 DIAGNOSIS — E78.49 OTHER HYPERLIPIDEMIA: ICD-10-CM

## 2024-02-28 DIAGNOSIS — M51.37 DDD (DEGENERATIVE DISC DISEASE), LUMBOSACRAL: Primary | ICD-10-CM

## 2024-02-28 DIAGNOSIS — Z86.711 HISTORY OF PULMONARY EMBOLISM: ICD-10-CM

## 2024-02-28 DIAGNOSIS — N52.8 OTHER MALE ERECTILE DYSFUNCTION: ICD-10-CM

## 2024-02-28 DIAGNOSIS — G47.33 OBSTRUCTIVE SLEEP APNEA: ICD-10-CM

## 2024-02-28 DIAGNOSIS — R39.11 BENIGN PROSTATIC HYPERPLASIA WITH URINARY HESITANCY: ICD-10-CM

## 2024-02-28 DIAGNOSIS — N40.1 BENIGN PROSTATIC HYPERPLASIA WITH URINARY HESITANCY: ICD-10-CM

## 2024-02-28 DIAGNOSIS — I10 ESSENTIAL HYPERTENSION: ICD-10-CM

## 2024-02-28 DIAGNOSIS — R73.01 IMPAIRED FASTING GLUCOSE: ICD-10-CM

## 2024-02-28 PROBLEM — R05.3 CHRONIC COUGH: Status: RESOLVED | Noted: 2023-08-28 | Resolved: 2024-02-28

## 2024-02-28 PROBLEM — S76.011A TEAR OF RIGHT GLUTEUS MEDIUS TENDON: Status: RESOLVED | Noted: 2022-08-31 | Resolved: 2024-02-28

## 2024-02-28 PROBLEM — R06.83 SNORING: Status: RESOLVED | Noted: 2022-01-26 | Resolved: 2024-02-28

## 2024-02-28 PROCEDURE — 99214 OFFICE O/P EST MOD 30 MIN: CPT | Performed by: INTERNAL MEDICINE

## 2024-02-28 RX ORDER — SILDENAFIL CITRATE 20 MG/1
TABLET ORAL
Qty: 90 TABLET | Refills: 1 | Status: SHIPPED | OUTPATIENT
Start: 2024-02-28

## 2024-02-28 NOTE — ASSESSMENT & PLAN NOTE
He has history of DVT and PE.  Wears a compression stocking on the left lower leg.  Denies lower leg edema, chest pain, shortness of breath.  Denies signs of GI/ bleeding.  Continue Xarelto  I will check his hemoglobin

## 2024-02-28 NOTE — PROGRESS NOTES
Assessment/Plan:    DDD (degenerative disc disease), lumbosacral  Feels betted, PT helped, pain is occasional now  Doesn't need to take meds  Continue exercises every day    Essential hypertension  Blood pressure is controlled.  Denies cough.  Patient will have PSA checked at the being of April.  I will check his electrolytes, renal function with the blood test.    Continue valsartan/HCTZ    Obstructive sleep apnea  Patient has sleep apnea.  Uses CPAP.  His sleep is much improved continue CPAP    Impaired fasting glucose  He had impaired fasting glucose on review of labs on August 30 last year.  I will recheck his fasting blood sugar and A1c    Benign prostatic hyperplasia with urinary hesitancy  Patient has BPH with decreased stream sometimes, it is intermittent.  Denies hematuria.  Continue doxazosin.  He will see urology in April and PSA will be checked prior to that    History of pulmonary embolism  He has history of DVT and PE.  Wears a compression stocking on the left lower leg.  Denies lower leg edema, chest pain, shortness of breath.  Denies signs of GI/ bleeding.  Continue Xarelto  I will check his hemoglobin    Other hyperlipidemia  Denies myalgias.  Continue simvastatin.  I will check his LFTs and lipids in April    Other male erectile dysfunction  Patient has erectile dysfunction.  Sildenafil helps.  I refilled his sildenafil     Diagnoses and all orders for this visit:    DDD (degenerative disc disease), lumbosacral    Essential hypertension  -     CBC and differential; Future  -     Comprehensive metabolic panel; Future    Impaired fasting glucose  -     Hemoglobin A1C; Future    Other hyperlipidemia  -     Lipid panel; Future    Benign prostatic hyperplasia with urinary hesitancy    History of pulmonary embolism    Other male erectile dysfunction  -     sildenafil (REVATIO) 20 mg tablet; 3 pills by mouth before sexual activity    Obstructive sleep apnea          Subjective:      Patient ID: Norman YA  "Sanket is a 68 y.o. male.      HPI    Patient presents for follow-up of chronic conditions as detailed in the assessment and plan.      The following portions of the patient's history were reviewed and updated as appropriate: current medications, past family history, past medical history, past social history, past surgical history and problem list.    Review of Systems      Objective:    /78   Pulse 68   Temp 97.7 °F (36.5 °C) (Tympanic)   Resp 16   Ht 6' 1\" (1.854 m)   Wt 102 kg (225 lb)   SpO2 94%   BMI 29.69 kg/m²      Physical Exam  Constitutional:       General: He is not in acute distress.     Appearance: He is not toxic-appearing.   Eyes:      Conjunctiva/sclera: Conjunctivae normal.   Cardiovascular:      Rate and Rhythm: Normal rate and regular rhythm.      Heart sounds: No murmur heard.     No gallop.   Pulmonary:      Effort: No respiratory distress.      Breath sounds: No wheezing or rales.   Musculoskeletal:         General: Tenderness (He had minimal left lower paralumbar tenderness palpation) present.   Skin:     General: Skin is warm.   Neurological:      Mental Status: He is alert and oriented to person, place, and time.      Cranial Nerves: No cranial nerve deficit.      Motor: No weakness.   Psychiatric:         Mood and Affect: Mood normal.         Thought Content: Thought content normal.           Depression Screening and Follow-up Plan: Patient was screened for depression during today's encounter. They screened negative with a PHQ-2 score of 0.        Hal Solorio MD  "

## 2024-02-28 NOTE — ASSESSMENT & PLAN NOTE
He had impaired fasting glucose on review of labs on August 30 last year.  I will recheck his fasting blood sugar and A1c

## 2024-02-28 NOTE — ASSESSMENT & PLAN NOTE
Feels betted, PT helped, pain is occasional now  Doesn't need to take meds  Continue exercises every day

## 2024-02-28 NOTE — ASSESSMENT & PLAN NOTE
Blood pressure is controlled.  Denies cough.  Patient will have PSA checked at the being of April.  I will check his electrolytes, renal function with the blood test.    Continue valsartan/HCTZ

## 2024-02-28 NOTE — ASSESSMENT & PLAN NOTE
Patient has BPH with decreased stream sometimes, it is intermittent.  Denies hematuria.  Continue doxazosin.  He will see urology in April and PSA will be checked prior to that

## 2024-03-06 ENCOUNTER — TELEPHONE (OUTPATIENT)
Dept: UROLOGY | Facility: CLINIC | Age: 69
End: 2024-03-06

## 2024-03-07 DIAGNOSIS — Z86.711 HISTORY OF PULMONARY EMBOLISM: ICD-10-CM

## 2024-03-07 DIAGNOSIS — E78.00 HYPERCHOLESTEREMIA: ICD-10-CM

## 2024-03-07 RX ORDER — SIMVASTATIN 10 MG
TABLET ORAL
Qty: 90 TABLET | Refills: 0 | Status: SHIPPED | OUTPATIENT
Start: 2024-03-07

## 2024-03-15 ENCOUNTER — LAB (OUTPATIENT)
Dept: LAB | Facility: CLINIC | Age: 69
End: 2024-03-15
Payer: MEDICARE

## 2024-03-15 DIAGNOSIS — E78.49 OTHER HYPERLIPIDEMIA: ICD-10-CM

## 2024-03-15 DIAGNOSIS — R73.01 IMPAIRED FASTING GLUCOSE: ICD-10-CM

## 2024-03-15 DIAGNOSIS — R97.20 ELEVATED PROSTATE SPECIFIC ANTIGEN (PSA): ICD-10-CM

## 2024-03-15 DIAGNOSIS — I10 ESSENTIAL HYPERTENSION: ICD-10-CM

## 2024-03-15 LAB
ALBUMIN SERPL BCP-MCNC: 4.2 G/DL (ref 3.5–5)
ALP SERPL-CCNC: 36 U/L (ref 34–104)
ALT SERPL W P-5'-P-CCNC: 17 U/L (ref 7–52)
ANION GAP SERPL CALCULATED.3IONS-SCNC: 8 MMOL/L (ref 4–13)
AST SERPL W P-5'-P-CCNC: 20 U/L (ref 13–39)
BASOPHILS # BLD AUTO: 0.06 THOUSANDS/ÂΜL (ref 0–0.1)
BASOPHILS NFR BLD AUTO: 1 % (ref 0–1)
BILIRUB SERPL-MCNC: 0.85 MG/DL (ref 0.2–1)
BUN SERPL-MCNC: 14 MG/DL (ref 5–25)
CALCIUM SERPL-MCNC: 9.6 MG/DL (ref 8.4–10.2)
CHLORIDE SERPL-SCNC: 102 MMOL/L (ref 96–108)
CHOLEST SERPL-MCNC: 141 MG/DL
CO2 SERPL-SCNC: 30 MMOL/L (ref 21–32)
CREAT SERPL-MCNC: 0.79 MG/DL (ref 0.6–1.3)
EOSINOPHIL # BLD AUTO: 0.17 THOUSAND/ÂΜL (ref 0–0.61)
EOSINOPHIL NFR BLD AUTO: 2 % (ref 0–6)
ERYTHROCYTE [DISTWIDTH] IN BLOOD BY AUTOMATED COUNT: 12.4 % (ref 11.6–15.1)
EST. AVERAGE GLUCOSE BLD GHB EST-MCNC: 120 MG/DL
GFR SERPL CREATININE-BSD FRML MDRD: 93 ML/MIN/1.73SQ M
GLUCOSE P FAST SERPL-MCNC: 120 MG/DL (ref 65–99)
HBA1C MFR BLD: 5.8 %
HCT VFR BLD AUTO: 45 % (ref 36.5–49.3)
HDLC SERPL-MCNC: 41 MG/DL
HGB BLD-MCNC: 15.1 G/DL (ref 12–17)
IMM GRANULOCYTES # BLD AUTO: 0.01 THOUSAND/UL (ref 0–0.2)
IMM GRANULOCYTES NFR BLD AUTO: 0 % (ref 0–2)
LDLC SERPL CALC-MCNC: 79 MG/DL (ref 0–100)
LYMPHOCYTES # BLD AUTO: 2.27 THOUSANDS/ÂΜL (ref 0.6–4.47)
LYMPHOCYTES NFR BLD AUTO: 30 % (ref 14–44)
MCH RBC QN AUTO: 32.3 PG (ref 26.8–34.3)
MCHC RBC AUTO-ENTMCNC: 33.6 G/DL (ref 31.4–37.4)
MCV RBC AUTO: 96 FL (ref 82–98)
MONOCYTES # BLD AUTO: 0.67 THOUSAND/ÂΜL (ref 0.17–1.22)
MONOCYTES NFR BLD AUTO: 9 % (ref 4–12)
NEUTROPHILS # BLD AUTO: 4.4 THOUSANDS/ÂΜL (ref 1.85–7.62)
NEUTS SEG NFR BLD AUTO: 58 % (ref 43–75)
NONHDLC SERPL-MCNC: 100 MG/DL
NRBC BLD AUTO-RTO: 0 /100 WBCS
PLATELET # BLD AUTO: 203 THOUSANDS/UL (ref 149–390)
PMV BLD AUTO: 12 FL (ref 8.9–12.7)
POTASSIUM SERPL-SCNC: 3.8 MMOL/L (ref 3.5–5.3)
PROT SERPL-MCNC: 6.2 G/DL (ref 6.4–8.4)
PSA SERPL-MCNC: 9.98 NG/ML (ref 0–4)
RBC # BLD AUTO: 4.68 MILLION/UL (ref 3.88–5.62)
SODIUM SERPL-SCNC: 140 MMOL/L (ref 135–147)
TRIGL SERPL-MCNC: 103 MG/DL
WBC # BLD AUTO: 7.58 THOUSAND/UL (ref 4.31–10.16)

## 2024-03-15 PROCEDURE — 80053 COMPREHEN METABOLIC PANEL: CPT

## 2024-03-15 PROCEDURE — 84153 ASSAY OF PSA TOTAL: CPT

## 2024-03-15 PROCEDURE — 80061 LIPID PANEL: CPT

## 2024-03-15 PROCEDURE — 85025 COMPLETE CBC W/AUTO DIFF WBC: CPT

## 2024-03-15 PROCEDURE — 83036 HEMOGLOBIN GLYCOSYLATED A1C: CPT

## 2024-03-15 PROCEDURE — 36415 COLL VENOUS BLD VENIPUNCTURE: CPT

## 2024-03-15 NOTE — RESULT ENCOUNTER NOTE
Call patient: Labs Show normal blood counts and his hemoglobin A1c decreased to 5.8 which means that his blood sugars have been better controlled compared to 6 months ago

## 2024-03-18 ENCOUNTER — TELEPHONE (OUTPATIENT)
Dept: UROLOGY | Facility: CLINIC | Age: 69
End: 2024-03-18

## 2024-03-18 DIAGNOSIS — R97.20 ELEVATED PSA: Primary | ICD-10-CM

## 2024-03-18 NOTE — TELEPHONE ENCOUNTER
PSA returned significantly elevated at 9.98.  Please have patient obtain repeat PSA this week.  Please advise him to withhold from any sexual activity before hand.  Please also inquire and whether he is having any symptoms indicative of a urinary tract infection.  Will plan to keep appointment as scheduled in early April.  If repeat PSA is still elevated at current level we will obtain a multiparametric MRI of the prostate before patient follows up

## 2024-03-18 NOTE — TELEPHONE ENCOUNTER
----- Message from Nivia Boyer PA-C sent at 3/16/2024  9:07 AM EDT -----    ----- Message -----  From: Lab, Background User  Sent: 3/15/2024   4:27 PM EDT  To: [facility] [place] [facility]

## 2024-03-18 NOTE — TELEPHONE ENCOUNTER
Called and spoke with Norman regarding elevated PSA results. Patient is out of town this week and will be returning on Friday. Plan for patient to have repeat PSA completed next week. He was advised on activities to avoid prior to blood work.

## 2024-03-19 DIAGNOSIS — I10 ESSENTIAL HYPERTENSION: ICD-10-CM

## 2024-03-19 RX ORDER — LOSARTAN POTASSIUM AND HYDROCHLOROTHIAZIDE 25; 100 MG/1; MG/1
TABLET ORAL
Qty: 90 TABLET | Refills: 0 | Status: SHIPPED | OUTPATIENT
Start: 2024-03-19

## 2024-04-01 ENCOUNTER — APPOINTMENT (OUTPATIENT)
Dept: LAB | Facility: CLINIC | Age: 69
End: 2024-04-01
Payer: MEDICARE

## 2024-04-01 DIAGNOSIS — R97.20 ELEVATED PSA: ICD-10-CM

## 2024-04-01 PROCEDURE — 36415 COLL VENOUS BLD VENIPUNCTURE: CPT

## 2024-04-01 PROCEDURE — 84153 ASSAY OF PSA TOTAL: CPT

## 2024-04-01 PROCEDURE — 84154 ASSAY OF PSA FREE: CPT

## 2024-04-02 LAB
PSA FREE MFR SERPL: 18.6 %
PSA FREE SERPL-MCNC: 1.04 NG/ML
PSA SERPL-MCNC: 5.6 NG/ML (ref 0–4)

## 2024-04-11 ENCOUNTER — OFFICE VISIT (OUTPATIENT)
Dept: UROLOGY | Facility: CLINIC | Age: 69
End: 2024-04-11
Payer: MEDICARE

## 2024-04-11 VITALS
OXYGEN SATURATION: 97 % | WEIGHT: 225 LBS | HEIGHT: 73 IN | DIASTOLIC BLOOD PRESSURE: 80 MMHG | HEART RATE: 76 BPM | BODY MASS INDEX: 29.82 KG/M2 | SYSTOLIC BLOOD PRESSURE: 126 MMHG

## 2024-04-11 DIAGNOSIS — R97.20 ELEVATED PSA: Primary | ICD-10-CM

## 2024-04-11 PROCEDURE — 99213 OFFICE O/P EST LOW 20 MIN: CPT | Performed by: PHYSICIAN ASSISTANT

## 2024-04-11 NOTE — PROGRESS NOTES
4/11/2024      Chief Complaint   Patient presents with   • Elevated PSA         Assessment and Plan    68 y.o. male managed by Dr Vega    1. Elevated PSA  -     PSA Total, Diagnostic; Future; Expected date: 07/11/2024      Norman is doing OK.  I reviewed his MRI images with him today, acceptable PSA density for 72 g gland fortunately no target lesion and low risk for clinically significant prostate cancer with PI-RADS 2.  Certainly want to continue monitoring the PSA to determine his baseline range moving forward, which was previously closer to 3-4.  For future PSA rises would reconsider nonfusion transperineal prostate biopsy for PSA over 7-8.  Recheck his PSA in 3 months, I will let him know the results he will also let me know if he sees any benefit with the 4 mg doxazosin for his LUTS.    History of Present Illness  Norman Coles is a 68 y.o. male here for evaluation of elevated PSA 6 month followup.    MRI 2023 large gland 72g no lesion, pirads 2. psa monitoring without biopsy.    psa now 5 (down from 9).    No hematuria.  Some minor voiding bother, slower stream.  He has been on doxazosin for hypertension for many years.  May consider doubling that to 4 mg to start and see how he tolerates it if there is benefit for his urination.  Alternate alpha-blocker altogether could be reconsidered such as Flomax.    Lab Results   Component Value Date    PSA 5.6 (H) 04/01/2024    PSA 9.98 (H) 03/15/2024    PSA 3.9 04/13/2023             Review of Systems   Constitutional: Negative.    Respiratory: Negative.     Cardiovascular: Negative.    Genitourinary:  Negative for decreased urine volume, difficulty urinating, dysuria, flank pain, frequency, hematuria and urgency.   Musculoskeletal: Negative.            AUA SYMPTOM SCORE      Flowsheet Row Most Recent Value   AUA SYMPTOM SCORE    How often have you had a sensation of not emptying your bladder completely after you finished urinating? 1 (P)    How often have you had  "to urinate again less than two hours after you finished urinating? 1 (P)    How often have you found you stopped and started again several times when you urinate? 1 (P)    How often have you found it difficult to postpone urination? 0 (P)    How often have you had a weak urinary stream? 3 (P)    How often have you had to push or strain to begin urination? 0 (P)    How many times did you most typically get up to urinate from the time you went to bed at night until the time you got up in the morning? 1 (P)    Quality of Life: If you were to spend the rest of your life with your urinary condition just the way it is now, how would you feel about that? 1 (P)    AUA SYMPTOM SCORE 7 (P)              Vitals  Vitals:    04/11/24 0854   BP: 126/80   BP Location: Left arm   Patient Position: Sitting   Cuff Size: Standard   Pulse: 76   SpO2: 97%   Weight: 102 kg (225 lb)   Height: 6' 1\" (1.854 m)       Physical Exam  Vitals and nursing note reviewed.   Constitutional:       General: He is not in acute distress.     Appearance: Normal appearance. He is well-developed. He is not diaphoretic.   HENT:      Head: Normocephalic and atraumatic.   Pulmonary:      Effort: Pulmonary effort is normal.      Comments: No cough or audible wheeze  Abdominal:      General: There is no distension.      Tenderness: There is no abdominal tenderness. There is no right CVA tenderness or left CVA tenderness.   Genitourinary:     Comments: Circumcised penis, normal phallus, orthotopic patent meatus.  Testes smooth descended bilaterally into the scrotum nontender with no palpable mass.  Digital rectal exam smooth prostate, without appreciable nodule, induration or asymmetry  Musculoskeletal:      Right lower leg: No edema.      Left lower leg: No edema.   Skin:     General: Skin is warm and dry.   Neurological:      Mental Status: He is alert and oriented to person, place, and time.      Gait: Gait normal.   Psychiatric:         Speech: Speech normal. "         Behavior: Behavior normal.           Past History  Past Medical History:   Diagnosis Date   • Actinic keratosis 2015   • Acute maxillary sinusitis    • Arthritis 2000   • Asthmatic bronchitis    • Benign prostatic hyperplasia    • Benign prostatic hyperplasia with urinary incontinence without sensory awareness    • Cancer ([facility]) 2015   • Cervical disc disease    • Cervicalgia    • Chest pain    • DVT (deep venous thrombosis) ([facility]) 2004   • Erectile dysfunction    • Fatigue    • Hypercholesterolemia    • Hyperlipidemia    • Hypertension    • IFG (impaired fasting glucose)    • Otitis media    • PE (pulmonary thromboembolism) ([facility]) 2004    on xarelto   • Pneumonia 1971   • Screening for colon cancer    • Screening for prostate cancer    • Skin tag    • Sleep apnea     12/21/21--states he is being worked up for sleep apnea   • Squamous cell skin cancer 2015 2017 2022   • Visual impairment      Social History     Socioeconomic History   • Marital status: /Civil Union     Spouse name: None   • Number of children: None   • Years of education: None   • Highest education level: None   Occupational History   • None   Tobacco Use   • Smoking status: Never   • Smokeless tobacco: Never   • Tobacco comments:     Second hand only   Vaping Use   • Vaping status: Never Used   Substance and Sexual Activity   • Alcohol use: Yes     Comment: Inconsistent   • Drug use: No   • Sexual activity: Yes     Partners: Female     Birth control/protection: None   Other Topics Concern   • None   Social History Narrative    Lives with wife.    Feels safe at home.    Has living will.    Sees dentist reg.     Good dental hygiene    Living situation: supportive and safe     Social Determinants of Health     Financial Resource Strain: Low Risk  (8/21/2023)    Overall Financial Resource Strain (CARDIA)    • Difficulty of Paying Living Expenses: Not hard at all   Food Insecurity: Not on file   Transportation Needs: No Transportation Needs  (8/21/2023)    PRAPARE - Transportation    • Lack of Transportation (Medical): No    • Lack of Transportation (Non-Medical): No   Physical Activity: Inactive (3/5/2021)    Exercise Vital Sign    • Days of Exercise per Week: 0 days    • Minutes of Exercise per Session: 0 min   Stress: No Stress Concern Present (3/5/2021)    [facility] of Occupational Health - Occupational Stress Questionnaire    • Feeling of Stress : Only a little   Social Connections: Moderately Isolated (3/5/2021)    Social Connection and Isolation Panel [NHANES]    • Frequency of Communication with Friends and Family: Twice a week    • Frequency of Social Gatherings with Friends and Family: Twice a week    • Attends Muslim Services: Never    • Active Member of Clubs or Organizations: No    • Attends Club or Organization Meetings: Never    • Marital Status:    Intimate Partner Violence: Not At Risk (3/5/2021)    Humiliation, Afraid, Rape, and Kick questionnaire    • Fear of Current or Ex-Partner: No    • Emotionally Abused: No    • Physically Abused: No    • Sexually Abused: No   Housing Stability: Not on file     Social History     Tobacco Use   Smoking Status Never   Smokeless Tobacco Never   Tobacco Comments    Second hand only     Family History   Problem Relation Age of Onset   • Coronary artery disease Mother    • Hypertrophic cardiomyopathy Mother    • Thyroid disease Mother    • Heart disease Mother    • Arthritis Mother         RA   • Coronary artery disease Sister    • Irritable bowel syndrome Sister    • Crohn's disease Sister    • Substance Abuse Sister    • Mental illness Sister    • Substance Abuse Brother    • Mental illness Brother    • Heart disease Family         cardiovascular disease, ischemic heart disease   • Cancer Family    • Cancer Father         jaw bone   • Hypertension Father    • Diabetes Maternal Aunt        The following portions of the patient's history were reviewed and updated as appropriate:  allergies, current medications, past medical history, past social history, past surgical history and problem list.    Results  No results found for this or any previous visit (from the past 1 hour(s)).]  Lab Results   Component Value Date    PSA 5.6 (H) 04/01/2024    PSA 9.98 (H) 03/15/2024    PSA 3.9 04/13/2023    PSA 4.3 (H) 08/25/2022     Lab Results   Component Value Date    GLUCOSE 93 06/21/2014    CALCIUM 9.6 03/15/2024     06/21/2014    K 3.8 03/15/2024    CO2 30 03/15/2024     03/15/2024    BUN 14 03/15/2024    CREATININE 0.79 03/15/2024     Lab Results   Component Value Date    WBC 7.58 03/15/2024    HGB 15.1 03/15/2024    HCT 45.0 03/15/2024    MCV 96 03/15/2024     03/15/2024

## 2024-05-03 ENCOUNTER — TELEPHONE (OUTPATIENT)
Dept: PULMONOLOGY | Facility: CLINIC | Age: 69
End: 2024-05-03

## 2024-05-03 NOTE — TELEPHONE ENCOUNTER
Called PT regarding scheduling an appt with Tg Whipple. Scheduled appt for 6/2024 for PT at the earliest slot available.

## 2024-06-01 DIAGNOSIS — E78.00 HYPERCHOLESTEREMIA: ICD-10-CM

## 2024-06-01 DIAGNOSIS — Z86.711 HISTORY OF PULMONARY EMBOLISM: ICD-10-CM

## 2024-06-01 DIAGNOSIS — I10 ESSENTIAL HYPERTENSION: ICD-10-CM

## 2024-06-01 RX ORDER — SIMVASTATIN 10 MG
10 TABLET ORAL DAILY
Qty: 90 TABLET | Refills: 1 | Status: SHIPPED | OUTPATIENT
Start: 2024-06-01

## 2024-06-01 RX ORDER — LOSARTAN POTASSIUM AND HYDROCHLOROTHIAZIDE 25; 100 MG/1; MG/1
1 TABLET ORAL DAILY
Qty: 90 TABLET | Refills: 1 | Status: SHIPPED | OUTPATIENT
Start: 2024-06-01

## 2024-06-05 ENCOUNTER — OFFICE VISIT (OUTPATIENT)
Age: 69
End: 2024-06-05
Payer: MEDICARE

## 2024-06-05 VITALS
TEMPERATURE: 98.2 F | WEIGHT: 224 LBS | HEIGHT: 73 IN | HEART RATE: 94 BPM | OXYGEN SATURATION: 92 % | SYSTOLIC BLOOD PRESSURE: 106 MMHG | DIASTOLIC BLOOD PRESSURE: 64 MMHG | BODY MASS INDEX: 29.69 KG/M2

## 2024-06-05 DIAGNOSIS — G47.34 NOCTURNAL HYPOXEMIA: ICD-10-CM

## 2024-06-05 DIAGNOSIS — Z86.711 HISTORY OF PULMONARY EMBOLISM: ICD-10-CM

## 2024-06-05 DIAGNOSIS — G47.33 OBSTRUCTIVE SLEEP APNEA: Primary | ICD-10-CM

## 2024-06-05 PROCEDURE — 99214 OFFICE O/P EST MOD 30 MIN: CPT | Performed by: PHYSICIAN ASSISTANT

## 2024-06-11 ENCOUNTER — OFFICE VISIT (OUTPATIENT)
Dept: DERMATOLOGY | Facility: CLINIC | Age: 69
End: 2024-06-11
Payer: MEDICARE

## 2024-06-11 VITALS — TEMPERATURE: 98 F

## 2024-06-11 DIAGNOSIS — D48.9 NEOPLASM OF UNCERTAIN BEHAVIOR: Primary | ICD-10-CM

## 2024-06-11 PROCEDURE — 88305 TISSUE EXAM BY PATHOLOGIST: CPT | Performed by: STUDENT IN AN ORGANIZED HEALTH CARE EDUCATION/TRAINING PROGRAM

## 2024-06-11 PROCEDURE — 11103 TANGNTL BX SKIN EA SEP/ADDL: CPT | Performed by: DERMATOLOGY

## 2024-06-11 PROCEDURE — 17000 DESTRUCT PREMALG LESION: CPT | Performed by: DERMATOLOGY

## 2024-06-11 PROCEDURE — 11102 TANGNTL BX SKIN SINGLE LES: CPT | Performed by: DERMATOLOGY

## 2024-06-11 PROCEDURE — 99214 OFFICE O/P EST MOD 30 MIN: CPT | Performed by: DERMATOLOGY

## 2024-06-11 NOTE — PROGRESS NOTES
"[facility] Dermatology Clinic Note     Patient Name: Norman Coles  Encounter Date: 6/11/24     Have you been cared for by a [facility] Dermatologist in the last 3 years and, if so, which description applies to you?    Yes.  I have been here within the last 3 years, and my medical history has NOT changed since that time.  I am MALE/not capable of bearing children.    REVIEW OF SYSTEMS:  Have you recently had or currently have any of the following? No changes in my recent health.   PAST MEDICAL HISTORY:  Have you personally ever had or currently have any of the following?  If \"YES,\" then please provide more detail. No changes in my medical history.   HISTORY OF IMMUNOSUPPRESSION: Do you have a history of any of the following:  Systemic Immunosuppression such as Diabetes, Biologic or Immunotherapy, Chemotherapy, Organ Transplantation, Bone Marrow Transplantation?  No     Answering \"YES\" requires the addition of the dotphrase \"IMMUNOSUPPRESSED\" as the first diagnosis of the patient's visit.   FAMILY HISTORY:  Any \"first degree relatives\" (parent, brother, sister, or child) with the following?    No changes in my family's known health.   PATIENT EXPERIENCE:    Do you want the Dermatologist to perform a COMPLETE skin exam today including a clinical examination under the \"bra and underwear\" areas?  Yes  If necessary, do we have your permission to call and leave a detailed message on your Preferred Phone number that includes your specific medical information?  Yes      Allergies   Allergen Reactions   • Vancomycin Rash and Other (See Comments)     Peeling of skin   • Ace Inhibitors Cough      Current Outpatient Medications:   •  doxazosin (CARDURA) 2 mg tablet, TAKE 1 TABLET BY MOUTH EVERYDAY AT BEDTIME, Disp: 90 tablet, Rfl: 1  •  fluticasone (FLONASE) 50 mcg/act nasal spray, 2 sprays as needed, Disp: , Rfl: 3  •  losartan-hydrochlorothiazide (HYZAAR) 100-25 MG per tablet, Take 1 tablet by mouth daily, Disp: 90 tablet, " Rfl: 1  •  Multiple Vitamin (MULTIVITAMIN ADULT PO), Take by mouth, Disp: , Rfl:   •  Probiotic Product (PROBIOTIC PO), Take by mouth, Disp: , Rfl:   •  rivaroxaban (Xarelto) 20 mg tablet, Take 1 tablet (20 mg total) by mouth daily, Disp: 90 tablet, Rfl: 1  •  sildenafil (REVATIO) 20 mg tablet, 3 pills by mouth before sexual activity, Disp: 90 tablet, Rfl: 1  •  simvastatin (ZOCOR) 10 mg tablet, Take 1 tablet (10 mg total) by mouth daily, Disp: 90 tablet, Rfl: 1          Whom besides the patient is providing clinical information about today's encounter?   NO ADDITIONAL HISTORIAN (patient alone provided history)    Physical Exam and Assessment/Plan by Diagnosis:    HISTORY OF SQUAMOUS CELL CARCINOMA     Physical Exam:  Anatomic Location Affected:  neck and forehead   Morphological Description of Scar:  well healed  Suspected Recurrence: no  Regional adenopathy: no    Additional History of Present Condition:  History of squamous cell carcinoma with no sign of recurrence.     Assessment and Plan:  Based on a thorough discussion of this condition and the management approach to it (including a comprehensive discussion of the known risks, side effects and potential benefits of treatment), the patient (family) agrees to implement the following specific plan:  Monitor changes  Continue routine skin exam  Sun protection with SPF 50 or higher    How can SCC be prevented?  The most important way to prevent SCC is to avoid sunburn. This is especially important in childhood and early life. Fair skinned individuals and those with a personal or family history of BCC should protect their skin from sun exposure daily, year-round and lifelong.  Stay indoors or under the shade in the middle of the day   Wear covering clothing   Apply high protection factor SPF50+ broad-spectrum sunscreens generously to exposed skin if outdoors   Avoid indoor tanning (sun beds, solaria)      What is the outlook for SCC?  Most SCC are cured by  "treatment. Cure is most likely if treatment is undertaken when the lesion is small. A small percent of SCC's can spread to lymph  nodes and long term monitoring is indicated.   They are also at increased risk of other skin cancers, especially melanoma. Regular self-skin examinations and long-term annual skin checks by an experienced health professional are recommended.    NEOPLASM OF UNCERTAIN BEHAVIOR OF SKIN    Physical Exam:  (Anatomic Location); (Size and Morphological Description); (Differential Diagnosis):  A. Left preauricular; 0.6 cm inflammatory papule; rule out SCC  B. Left cheek; 1.0 cm crusted scaling macule; rule out SCCIS  Pertinent Positives:  Pertinent Negatives:    Additional History of Present Condition:  Patient noticed a spot by his ear that showed up a couple weeks ago.     Assessment and Plan:  I have discussed with the patient that a sample of skin via a \"skin biopsy” would be potentially helpful to further make a specific diagnosis under the microscope.  Based on a thorough discussion of this condition and the management approach to it (including a comprehensive discussion of the known risks, side effects and potential benefits of treatment), the patient (family) agrees to implement the following specific plan:    Procedure:  Skin Biopsy.  After a thorough discussion of treatment options and risk/benefits/alternatives (including but not limited to local pain, scarring, dyspigmentation, blistering, possible superinfection, and inability to confirm a diagnosis via histopathology), verbal and written consent were obtained and portion of the rash was biopsied for tissue sample.  See below for consent that was obtained from patient and subsequent Procedure Note.    A. PROCEDURE TANGENTIAL (SHAVE) BIOPSY NOTE:    Performing Physician:   Anatomic Location; Clinical Description with size (cm); Pre-Op Diagnosis:   A. Left pellicular; 0.6 cm inflammatory papule; rule out SCC  Post-op diagnosis: " Same     Local anesthesia: 2% Xylocaine with epi     Topical anesthesia: None    Hemostasis: Aluminum chloride       After obtaining informed consent  at which time there was a discussion about the purpose of biopsy  and low risks of infection and bleeding.  The area was prepped and draped in the usual fashion. Anesthesia was obtained with 1% lidocaine with epinephrine. A shave biopsy to an appropriate sampling depth was obtained by Shave (Dermablade or 15 blade) The resulting wound was covered with surgical ointment and bandaged appropriately.     The patient tolerated the procedure well without complications and was without signs of functional compromise.      Specimen has been sent for review by Dermatopathology.    Standard post-procedure care has been explained and has been included in written form within the patient's copy of Informed Consent.    B. PROCEDURE TANGENTIAL (SHAVE) BIOPSY NOTE:    Performing Physician:   Anatomic Location; Clinical Description with size (cm); Pre-Op Diagnosis:   B. Left cheek; 1.0 cm crusted scaling macule; rule out SCCIS  Post-op diagnosis: Same     Local anesthesia: 2% Xylocaine with epi     Topical anesthesia: None    Hemostasis: Aluminum chloride       After obtaining informed consent  at which time there was a discussion about the purpose of biopsy  and low risks of infection and bleeding.  The area was prepped and draped in the usual fashion. Anesthesia was obtained with 1% lidocaine with epinephrine. A shave biopsy to an appropriate sampling depth was obtained by Shave (Dermablade or 15 blade) The resulting wound was covered with surgical ointment and bandaged appropriately.     The patient tolerated the procedure well without complications and was without signs of functional compromise.      Specimen has been sent for review by Dermatopathology.    Standard post-procedure care has been explained and has been included in written form within the patient's copy of  "Informed Consent.    INFORMED CONSENT DISCUSSION AND POST-OPERATIVE INSTRUCTIONS FOR PATIENT    I.  RATIONALE FOR PROCEDURE  I understand that a skin biopsy allows the Dermatologist to test a lesion or rash under the microscope to obtain a diagnosis.  It usually involves numbing the area with numbing medication and removing a small piece of skin; sometimes the area will be closed with sutures. In this specific procedure, sutures are not usually needed.  If any sutures are placed, then they are usually need to be removed in 2 weeks or less.    I understand that my Dermatologist recommends that a skin \"shave\" biopsy be performed today.  A local anesthetic, similar to the kind that a dentist uses when filling a cavity, will be injected with a very small needle into the skin area to be sampled.  The injected skin and tissue underneath \"will go to sleep” and become numb so no pain should be felt afterwards.  An instrument shaped like a tiny \"razor blade\" (shave biopsy instrument) will be used to cut a small piece of tissue and skin from the area so that a sample of tissue can be taken and examined more closely under the microscope.  A slight amount of bleeding will occur, but it will be stopped with direct pressure and a pressure bandage and any other appropriate methods.  I understands that a scar will form where the wound was created.  Surgical ointment will be applied to help protect the wound.  Sutures are not usually needed.    II.  RISKS AND POTENTIAL COMPLICATIONS   I understand the risks and potential complications of a skin biopsy include but are not limited to the following:  Bleeding  Infection  Pain  Scar/keloid  Skin discoloration  Incomplete Removal  Recurrence  Nerve Damage/Numbness/Loss of Function  Allergic Reaction to Anesthesia  Biopsies are diagnostic procedures and based on findings additional treatment or evaluation may be required  Loss or destruction of specimen resulting in no additional " "findings    My Dermatologist has explained to me the nature of the condition, the nature of the procedure, and the benefits to be reasonably expected compared with alternative approaches.  My Dermatologist has discussed the likelihood of major risks or complications of this procedure including the specific risks listed above, such as bleeding, infection, and scarring/keloid.  I understand that a scar is expected after this procedure.  I understand that my physician cannot predict if the scar will form a \"keloid,\" which extends beyond the borders of the wound that is created.  A keloid is a thick, painful, and bumpy scar.  A keloid can be difficult to treat, as it does not always respond well to therapy, which includes injecting cortisone directly into the keloid every few weeks.  While this usually reduces the pain and size of the scar, it does not eliminate it.      I understand that photographs may be taken before and after the procedure.  These will be maintained as part of the medical providers confidential records and may not be made available to me.  I further authorize the medical provider to use the photographs for teaching purposes or to illustrate scientific papers, books, or lectures if in his/her judgment, medical research, education, or science may benefit from its use.    I have had an opportunity to fully inquire about the risks and benefits of this procedure and its alternatives.   I have been given ample time and opportunity to ask questions and to seek a second opinion if I wished to do so.  I acknowledge that there have specifically been no guarantees as to the cosmetic results from the procedure.  I am aware that with any procedure there is always the possibility of an unexpected complication.    III. POST-PROCEDURAL CARE (WHAT YOU WILL NEED TO DO \"AFTER THE BIOPSY\" TO OPTIMIZE HEALING)    Keep the area clean and dry.  Try NOT to remove the bandage or get it wet for the first 24 hours.    Gently " "clean the area and apply surgical ointment (such as Vaseline petrolatum ointment, which is available \"over the counter\" and not a prescription) to the biopsy site for up to 2 weeks straight.  This acts to protect the wound from the outside world.      Sutures are not usually placed in this procedure.  If any sutures were placed, return for suture removal as instructed (generally 1 week for the face, 2 weeks for the body).      Take Acetaminophen (Tylenol) for discomfort, if no contraindications.  Ibuprofen or aspirin could make bleeding worse.    Call our office immediately for signs of infection: fever, chills, increased redness, warmth, tenderness, discomfort/pain, or pus or foul smell coming from the wound.    WHAT TO DO IF THERE IS ANY BLEEDING?  If a small amount of bleeding is noticed, place a clean cloth over the area and apply firm pressure for ten minutes.  Check the wound after 10 minutes of direct pressure.  If bleeding persists, try one more time for an additional 10 minutes of direct pressure on the area.  If the bleeding becomes heavier or does not stop after the second attempt, or if you have any other questions about this procedure, then please call your [facility]'s Dermatologist by calling [phone] (SKIN).     I hereby acknowledge that I have reviewed and verified the site with my Dermatologist and have requested and authorized my Dermatologist to proceed with the procedure.            SEBORRHEIC KERATOSES  - Relevant exam: Scattered over the trunk/extremities are waxy brown to black plaques and papules with stuck on appearance and consistent dermoscopy  - Exam and clinical history consistent with seborrheic keratoses  - Counseled that these are benign growths that do not require treatment    MELANOCYTIC NEVI  -Relevant exam: Scattered over the trunk/extremities are homogenously pigmented brown macules and papules. ELM performed and without concerning findings. No outliers unless otherwise noted " in today's note  - Exam and clinical history consistent with melanocytic nevi  - Counseled to return to clinic prior to scheduled appointment should any of these lesions change or should any new lesions of concern arise  - Counseled on use of sun protection daily. Reviewed latest FDA sunscreen guidelines, including use of broad spectrum (UVA and UVB blocking) sunscreen or sun protective clothing with SPF 30-50 every 2-3 hours and reapplied after exposure to water    LENTIGINES  OTHER SKIN CHANGES DUE TO CHRONIC EXPOSURE TO NONIONIZING RADIATION  - Relevant exam: Over sun exposed areas are brown macules. ELM performed and without concerning findings.  - Exam and clinical history consistent with lentigines.  - Counseled to return to clinic prior to scheduled appointment should any of these lesions change or should any new lesions of concern arise.  - Recommended use of sunscreen as above and below.    CHERRY ANGIOMAS  - Relevant exam: Scattered over the trunk/extremities are red papules  - Exam and clinical history consistent with cherry angiomas  - Educated that these are benign        ACTINIC KERATOSES  - Relevant exam: On the left cheek are scaly pink macules without palpable dermal component    - Exam and clinical history consistent with actinic keratoses  - Discussed that these lesions are considered premalignant with the potential to evolve into squamous cell carcinoma.   - Discussed treatment options, which may inclue liquid nitrogen destruction, topical immunotherapy including risks, benefits  - Patient counseled to return to the office in 4-6 weeks after completion of treatment for recheck if not resolved at which time retreatment or biopsy to rule out SCC will be determined based on clinic findings    OPTION 1:       PROCEDURE:  DESTRUCTION OF PRE-MALIGNANT LESIONS    - After a thorough discussion of treatment options and risk/benefits/alternatives (including but not limited to local pain, scarring,  dyspigmentation, blistering, and possible superinfection), verbal and written consent were obtained and the aforementioned lesions were treated on with cryotherapy using liquid nitrogen x 1 cycle for 5-10 seconds.    TOTAL NUMBER of 1 pre-malignant lesions were treated today on the ANATOMIC LOCATION: left cheek.     The patient tolerated the procedure well, and after-care instructions were provided.    Scribe Attestation    I,:  Dio Hung am acting as a scribe while in the presence of the attending physician.:       I,:  Ihsan Plasencia MD personally performed the services described in this documentation    as scribed in my presence.:

## 2024-06-14 DIAGNOSIS — C44.229 SQUAMOUS CELL CARCINOMA OF EAR, LEFT: Primary | ICD-10-CM

## 2024-06-14 PROCEDURE — 88305 TISSUE EXAM BY PATHOLOGIST: CPT | Performed by: STUDENT IN AN ORGANIZED HEALTH CARE EDUCATION/TRAINING PROGRAM

## 2024-06-14 RX ORDER — FLUOROURACIL 50 MG/G
CREAM TOPICAL 2 TIMES DAILY
Qty: 40 G | Refills: 0 | Status: SHIPPED | OUTPATIENT
Start: 2024-06-14

## 2024-06-14 NOTE — RESULT ENCOUNTER NOTE
DERMATOPATHOLOGY RESULT NOTE    Results reviewed by ordering physician.  Called patient to personally discuss results. Discussed results with patient.       Instructions for Clinical Derm Team:   (remember to route Result Note to appropriate staff):    None    Result & Plan by Specimen:    Specimen A: malignant  Plan: MOHs      Specimen B: indeterminate  Plan: prescription for 5FU BID for 2 to 4 weeks left cheek    inal Diagnosis  A. Skin, left preauricular, shave biopsy:    SQUAMOUS CELL CARCINOMA, WELL DIFFERENTIATED, INVASIVE; transected.       B. Skin, left cheek, shave biopsy:    Consistent with PROLIFERATIVE ACTINIC KERATOSIS with adnexal extension  (see note).    Note: Conservative removal/destruction (e.g., cryotherapy) of any residual/recurrent lesion is recommended to ensure against local persistence/recurrence.

## 2024-06-17 ENCOUNTER — TELEPHONE (OUTPATIENT)
Dept: DERMATOLOGY | Facility: CLINIC | Age: 69
End: 2024-06-17

## 2024-06-17 NOTE — LETTER
Norman Coles     1955    [id number] [place] [address]    Dear Norman Coles,    You are scheduled to have the MOHS procedure on August 1, 2024 at 7:30 am for left preauricular with Dr.Nadia Almendarez. Our office is located in [facility] our address is [address] [place] [address]. Once you arrive please check in with our front staff in [address] and they will escort you to the MOHS waiting room.  If you have someone bringing you to your appointment they may wait in the waiting room or accompany you in your visit.      Below you will find some pre-op instructions along with some information regarding the MOHS procedure.     If you have any questions please call our office at [phone].       Thank you,    [facility]         PRE-OPERATIVE INSTRUCTIONS - MOHS    Before your scheduled surgery, there are a number of important precautions and positive steps you should take to help prepare yourself for a successful treatment and speedy recovery.    Some of the steps, which are listed below, may seem unnecessary and inconvenient, but they are important. For example, when you stop smoking, you increase your ability to heal. Occasionally, there may be valid reasons, personal or medical, why you can't comply. In such cases, please call the office so we can discuss possible ways to overcome any obstacles you may be encountering.    If you have any questions about the surgery, or remember additional medical information that you forgot to mention to our staff, please contact the office prior to your surgery.    GENERAL INFORMATION REGARDING MOHS MICROGRAPHIC SURGERY    Mohs surgery is a specialized technique for the removal of skin cancer developed by Dr. Frederick Mohs over 50 years ago to improve the cure rates of skin cancer. Traditionally, skin cancers are treated by destructive methods (radiation, freezing, scraping, and  burning) or excision (cutting out the tissue with standards margins and sending it to an outside laboratory for testing). These methods all yield cure rates between 65%-94%. However, for cancers located in cosmetically sensitive areas, large tumors, or tumors unsuccessfully treated by other means, Mohs surgery offers a higher cure rate. In most cases, Mohs surgery provides you with a 99% cure rate for primary (previously untreated) basal cell cancer and a 95% cure rate for primary squamous cell cancer. In Mohs surgery, tissue is removed and processed in a way that we are able to check 100% of the margins, giving the highest cure rate for any method of treating skin cancers while providing maximal preservation of normal skin. This allows the surgeon to produce an optimal cosmetic result for the patient by maximizing the amount of tissue removed yielding as small a scar as possible    On the day of surgery, you will be given local anesthesia only (similar to what was given to you during your initial biopsy). You will remain awake. You will verify the location of the skin cancer prior to the onset of the surgery. Once the area is numb, the tissue containing the skin cancer will be removed, taking a small safety margin. This margin is usually smaller than what would be taken with a standard excision. Once the tissue is removed, it is marked and oriented. The first layer (“Stage I”) will be processed in our laboratory. The wound will be treated for bleeding and a bandage will be placed to keep you comfortable while you wait an approximate 45 minutes-1 hour (for the processing of the tissue) in your room. Your Mohs surgeon will examine the pathology in the lab, checking all the margins. If any tumor remains, you will need to take a second layer of skin (“Stage 2”). The area will be re-anesthetized and your Mohs surgeon will remove more skin only in the area where the tumor exists. This process will continue until all the  skin cancer is removed. Unfortunately, there is no method to predict how many layers or stages will be taken.    Once the tumor has been removed completely, we will discuss the best ways to close the defect. Most wounds may be closed with stitches. A larger wound may require a skin graft or a flap. In rare instances, especially for cancers around the eye or for larger cancers, we may work with another surgeon (oculoplastic, ENT, plastics) with special skills to assist with reconstruction.  If the surgery is coordinated with another specialist, you will have the Mohs portion of the procedure first and see the coordinated specialist after the skin cancer has been removed.  Always follow the pre-operative instructions of the surgeon doing the closure.      Medications: Please take all your normal medications the morning of your surgery. If you are a diabetic, please bring your insulin or medications with you, as well as a snack to avoid having low blood sugar during your day with us.    1) Blood Thinners    VERY IMPORTANT: We do NOT stop or hold prescribed blood thinners (such as Coumadin/Warfarin, Plavix, Eliquis, Pradaxa, Brilinta, Apixaban, Xarelto, Lovonox, Rivaroxaban, or Aggrenox) before Mohs surgery. Additionally, If you take aspirin because you have had a stroke, heart attack, heart disease, other condition, or your physician has prescribed you to take it, please continue your aspirin.    Although there is a risk of increased bruising and bleeding, we are still able to safely perform surgery while continuing these medications. Please NEVER stop your prescribed blood thinner without the managing doctor's (the doctor that prescribed the medication) permission or knowledge. If you have any concerns about not holding your blood thinner, please address these with your Mohs surgeon.    Most people should stop all non-steroidal anti-inflammatory medications (Motrin, Naproxen, Advil, Midol, Aleve, etc.) for 7 days  prior to your scheduled surgery and 2 days after (unless instructed otherwise after surgery).  You may take Tylenol for pain.     Vitamins and Supplements  Avoid taking any supplements with Vitamin E, Fish Oil, Gingko, Ginseng, and Garlic for 2 weeks before and 2 days after your surgery. These thin your blood.    Lidocaine Patches  Avoid wearing any over the counter or prescribed Lidocaine patches the day of the surgery.    Alcohol: Avoid drinking alcohol for 2 days prior to your surgery, and for 2 days afterwards (it thins the blood and causes more bruising and swelling).    Smoking: Try to STOP or reduce smoking significantly the week before your surgery, and especially the week afterwards (it greatly improves how well you heal). Tobacco smoke deprives the blood of oxygen, which is urgently needed by the wound during the healing process.    Contact Lenses: Do not wear them on the day of the surgery. Instead, wear glasses and bring your case, in case we need to remove them.    Clothing: Do not wear your nicest clothing on your surgery day. We recommend wearing a button down shirt that will not disrupt your post-operative dressing when changing later that night. Please avoid wearing jeans during the procedure to help prevent damage to our equipment.     Bathing: On the morning of your surgery, you may bathe or shower normally. If you get your hair done on a weekly basis, remember to get your hair washed the day before surgery.   - You will need to keep your surgical site dry for a minimum of 48 hours after surgery.    Makeup: If your surgery is on the face, please do not wear any makeup on the day of the surgery.    Jewelry: Please try to avoid wearing jewelry on the day of surgery.    Food: On the morning of surgery, have breakfast but limit your intake of caffeinated beverages. They are diuretic and may inconvenience you during surgery. If you are following up with another surgeon the same day as your Mohs  surgery, you must receive permission to eat breakfast from that surgeon.      What to bring with you on the day of your surgery:  Bring snacks - Since you could be at the office long, you may bring snacks and/or lunch with you. Some snacks and drinks are available at the office as well.   Bring a sweater - Bring a sweater or jacket that buttons or zips down the front and will not disturb your wound dressing during removal.  Bring something to do - You will be spending much of the day in our office. There will be 45-60 minute waiting periods  between layers/stages, and while there is a television with cable in every room, it is nice to have something to keep you occupied such as books, magazines, knitting, music, or work.     Planning Ahead:  Other Appointments - It is important to realize that no matter how small the skin cancer appears to be, looks can be deceiving. Since your surgery may last the entire day, you should not schedule any other appointments that day.  Special Occasions - Surgery often creates swelling and bruising. Also, the post-op dressing may be rather large and obvious. Keep this in mind as you arrange your social/work schedule. If an important event is already planned, please check with your referring physician or your Mohs surgeon to see if the surgery can be postponed.  Activity Limits after Surgery - If surgery was performed on your face, we recommend that you keep your activity level to a minimum for 2-3 days (the blood pressure elevation related to exercise can lead to bleeding). If you have stitches in an area that will be under tension or significant movement (neck, back, arms, legs), you will need to avoid heavy lifting (anything over 5 lbs) or exercise for at least 2 weeks and possibly longer. We also advise that you limit out of town travel for the first 7 days after surgery. You should also wait at least 7 days before going into a pool or the ocean.  Housework - Since you will need to  minimize activity after surgery, plan to do your groceries, laundry, gardening, and other heavy household chores prior to your surgery. Please make arrangements for assistance during the post-op period. If surgery is around your mouth area, you may need to eat soft foods, such as soup, milkshakes, or yogurt for 48 hours.    Purchasing bandage supplies: Prior to surgery, please purchase the following items to care for your surgical wound properly.  Cotton swabs (Q-tips)  Vaseline or Aquaphor  Telfa pads (or any non-stick dressing)  Paper tape or Hypafix tape  Gauze pads (3x3)    Transportation: It is often reassuring and comforting to have a  drive you to and from the surgery. He or she is welcome to wait in the office during the surgery. If you do not have a , you may drive to and from your procedure (unless stated otherwise). If the site being treated is near your eye, be aware that the final bandage may cause some obstruction of vision.     Rescheduling: If you need to reschedule your surgery, please notify the office as soon as possible.

## 2024-06-17 NOTE — TELEPHONE ENCOUNTER
Pre- operative Mohs Telephone Scheduling Note    Do you have a pacemaker, defibrillator, spinal or brain stimulator? no    Do you take antibiotics before skin or dental procedures? no  If yes, will likely require pre-operative antibiotics. Ask  the patient why they take the antibiotics (usually because of joint replacement).    Do you have a history of a joint replacements within the past 2 years? no   If yes, will likely require pre-operative antibiotics. Ask if orthopaedic surgeon has prescribed pre-operative antibiotics to take before procedures/dental work?    Do you take any OTC medications that thin your blood (Aspirin, Aleve, Ibuprofen) or supplements that thin your blood (fish oil, garlic, vitamin E, Ginko Biloba)? no    Do you take any prescribed medications that thin your blood (Coumadin, Plavix, Xarelto, Eliquis or another prescribed blood thinner)? yes: xarelto    Do you have an allergy to lidocaine or epinephrine? no    Do you have allergies to Iodine? no    Do you wear a lidocaine patch? no    Have you ever been diagnosed with HIV, AIDS, Hep B and Hep C? no    Do you use a cane, walker or wheelchair? no    Is the patient from a nursing home? no If yes, Is there any special accommodations that is needed for patient n/a    Do you smoke? no      If yes,  patient to try and stop 2 days before surgery and 7 days after the surgery. Minimizing smoking as much as possible during this time will improve healing and the cosmetic result after surgery.    Do you use supplemental oxygen? If so, how many liters and can you be off it for a short period of time? N/a    Date scheduled: August 1, 2024 at 7:30 with Dr. Almendarez    Coordination of Care with other provider (Oculoplastics, Plastics, ENT) required? no   IF YES, PLEASE FORWARD TO APPROPRIATE PERSONNEL TO HELP COORDINATE.    Are there remaining tumors to be scheduled? no    Was Prior Authorization obtained? No (please use [email or web address] to document prior  auth)

## 2024-07-05 ENCOUNTER — APPOINTMENT (OUTPATIENT)
Dept: LAB | Facility: CLINIC | Age: 69
End: 2024-07-05
Payer: MEDICARE

## 2024-07-05 DIAGNOSIS — R97.20 ELEVATED PSA: ICD-10-CM

## 2024-07-05 LAB — PSA SERPL-MCNC: 5.49 NG/ML (ref 0–4)

## 2024-07-05 PROCEDURE — 36415 COLL VENOUS BLD VENIPUNCTURE: CPT

## 2024-07-05 PROCEDURE — 84153 ASSAY OF PSA TOTAL: CPT

## 2024-07-08 ENCOUNTER — TELEPHONE (OUTPATIENT)
Dept: OTHER | Facility: HOSPITAL | Age: 69
End: 2024-07-08

## 2024-07-08 DIAGNOSIS — R97.20 ELEVATED PSA: Primary | ICD-10-CM

## 2024-07-08 DIAGNOSIS — N40.1 BENIGN PROSTATIC HYPERPLASIA WITH URINARY HESITANCY: Primary | ICD-10-CM

## 2024-07-08 DIAGNOSIS — R39.11 BENIGN PROSTATIC HYPERPLASIA WITH URINARY HESITANCY: Primary | ICD-10-CM

## 2024-07-08 RX ORDER — DOXAZOSIN MESYLATE 4 MG/1
4 TABLET ORAL
Qty: 100 TABLET | Refills: 2 | Status: SHIPPED | OUTPATIENT
Start: 2024-07-08

## 2024-07-08 NOTE — TELEPHONE ENCOUNTER
Contacted patient and made him aware of PSA results and DANUTA Connor's note. Patient was scheduled for follow up in April of 2025 and order was placed for him to have another PSA completed prior to that visit.

## 2024-07-08 NOTE — TELEPHONE ENCOUNTER
soniya pt- seen april for elevated psa 9 (baseline 3-4) has recovered to 5.6 and again 5.4 this week. MRIpreviously discussed also reassuring.    i am ok with the 5.4 for his size prostate  f/u 9 months with next psa    Lab Results   Component Value Date    PSA 5.491 (H) 07/05/2024    PSA 5.6 (H) 04/01/2024    PSA 9.98 (H) 03/15/2024

## 2024-07-28 ENCOUNTER — OFFICE VISIT (OUTPATIENT)
Dept: URGENT CARE | Facility: CLINIC | Age: 69
End: 2024-07-28
Payer: MEDICARE

## 2024-07-28 VITALS
DIASTOLIC BLOOD PRESSURE: 88 MMHG | OXYGEN SATURATION: 98 % | HEART RATE: 72 BPM | BODY MASS INDEX: 29.82 KG/M2 | SYSTOLIC BLOOD PRESSURE: 146 MMHG | WEIGHT: 225 LBS | HEIGHT: 73 IN | TEMPERATURE: 97.7 F | RESPIRATION RATE: 18 BRPM

## 2024-07-28 DIAGNOSIS — U07.1 COVID: Primary | ICD-10-CM

## 2024-07-28 LAB
SARS-COV-2 AG UPPER RESP QL IA: POSITIVE
VALID CONTROL: ABNORMAL

## 2024-07-28 PROCEDURE — G0463 HOSPITAL OUTPT CLINIC VISIT: HCPCS | Performed by: PHYSICIAN ASSISTANT

## 2024-07-28 PROCEDURE — 87811 SARS-COV-2 COVID19 W/OPTIC: CPT | Performed by: PHYSICIAN ASSISTANT

## 2024-07-28 PROCEDURE — 99213 OFFICE O/P EST LOW 20 MIN: CPT | Performed by: PHYSICIAN ASSISTANT

## 2024-07-28 RX ORDER — MOLNUPIRAVIR 200 MG/1
800 CAPSULE ORAL EVERY 12 HOURS
Qty: 40 CAPSULE | Refills: 0 | Status: SHIPPED | OUTPATIENT
Start: 2024-07-28 | End: 2024-08-02

## 2024-07-28 RX ORDER — BENZONATATE 100 MG/1
100 CAPSULE ORAL 3 TIMES DAILY PRN
Qty: 20 CAPSULE | Refills: 0 | Status: SHIPPED | OUTPATIENT
Start: 2024-07-28

## 2024-07-28 NOTE — PROGRESS NOTES
"[facility]        NAME: Norman Coles is a 68 y.o. male  : 1955    MRN: [id number]  DATE: 2024  TIME: 1:50 PM    /88 (BP Location: Right arm, Patient Position: Sitting)   Pulse 72   Temp 97.7 °F (36.5 °C) (Tympanic)   Resp 18   Ht 6' 1\" (1.854 m)   Wt 102 kg (225 lb)   SpO2 98%   BMI 29.69 kg/m²     Assessment and Plan   COVID [U07.1]  1. COVID  Poct Covid 19 Rapid Antigen Test    molnupiravir (Lagevrio) 200 mg capsule    benzonatate (TESSALON PERLES) 100 mg capsule            Patient Instructions       Follow up with PCP in 3-5 days.  Proceed to  ER if symptoms worsen.    Chief Complaint     Chief Complaint   Patient presents with    Cough     Started with scratchy throat and cough on Thursday, reports recent COVID exposure, home test negative, describes cough as non productive, denies fever         History of Present Illness       Cough  This is a new problem. The current episode started in the past 7 days. The problem has been waxing and waning. The problem occurs hourly. The cough is Non-productive. Associated symptoms include chest pain, headaches, nasal congestion, postnasal drip and a sore throat. Nothing aggravates the symptoms.       Review of Systems   Review of Systems   HENT:  Positive for postnasal drip and sore throat.    Respiratory:  Positive for cough.    Cardiovascular:  Positive for chest pain.   Neurological:  Positive for headaches.         Current Medications       Current Outpatient Medications:     benzonatate (TESSALON PERLES) 100 mg capsule, Take 1 capsule (100 mg total) by mouth 3 (three) times a day as needed for cough, Disp: 20 capsule, Rfl: 0    molnupiravir (Lagevrio) 200 mg capsule, Take 4 capsules (800 mg total) by mouth every 12 (twelve) hours for 5 days, Disp: 40 capsule, Rfl: 0    doxazosin (CARDURA) 4 mg tablet, Take 1 tablet (4 mg total) by mouth daily at bedtime, Disp: 100 tablet, Rfl: 2    fluorouracil (EFUDEX) 5 % cream, Apply topically 2 " (two) times a day Apply twice a day left cheek for 2 to 4 weeks., Disp: 40 g, Rfl: 0    fluticasone (FLONASE) 50 mcg/act nasal spray, 2 sprays as needed, Disp: , Rfl: 3    losartan-hydrochlorothiazide (HYZAAR) 100-25 MG per tablet, Take 1 tablet by mouth daily, Disp: 90 tablet, Rfl: 1    Multiple Vitamin (MULTIVITAMIN ADULT PO), Take by mouth, Disp: , Rfl:     Probiotic Product (PROBIOTIC PO), Take by mouth, Disp: , Rfl:     rivaroxaban (Xarelto) 20 mg tablet, Take 1 tablet (20 mg total) by mouth daily, Disp: 90 tablet, Rfl: 1    sildenafil (REVATIO) 20 mg tablet, 3 pills by mouth before sexual activity, Disp: 90 tablet, Rfl: 1    simvastatin (ZOCOR) 10 mg tablet, Take 1 tablet (10 mg total) by mouth daily, Disp: 90 tablet, Rfl: 1    Current Allergies     Allergies as of 07/28/2024 - Reviewed 07/28/2024   Allergen Reaction Noted    Vancomycin Rash and Other (See Comments) 01/21/2015    Ace inhibitors Cough 05/15/2017            The following portions of the patient's history were reviewed and updated as appropriate: allergies, current medications, past family history, past medical history, past social history, past surgical history and problem list.     Past Medical History:   Diagnosis Date    Actinic keratosis 2015    Acute maxillary sinusitis     Arthritis 2000    Asthmatic bronchitis     Benign prostatic hyperplasia     Benign prostatic hyperplasia with urinary incontinence without sensory awareness     Cancer ([facility]) 2015    Cervical disc disease     Cervicalgia     Chest pain     DVT (deep venous thrombosis) ([facility]) 2004    Erectile dysfunction     Fatigue     Hypercholesterolemia     Hyperlipidemia     Hypertension     IFG (impaired fasting glucose)     Otitis media     PE (pulmonary thromboembolism) ([facility]) 2004    on xarelto    Pneumonia 1971    Screening for colon cancer     Screening for prostate cancer     Skin tag     Sleep apnea     12/21/21--states he is being worked up for sleep apnea    Squamous cell skin  "cancer 2015 2017 2022    Visual impairment        Past Surgical History:   Procedure Laterality Date    BACK SURGERY  2004    CHOLECYSTECTOMY  2009    COLON SURGERY  2004    Polyps removed    COLONOSCOPY      EYE SURGERY  2016?    tprn retina    MOHS SURGERY  2015 2017 2022    [place] CLAVICULECTOMY PARTIAL Left 03/09/2021    Procedure: DISTAL CLAVICLE EXCISION;  Surgeon: Eliot Ibarra DO;  Location:  MAIN OR;  Service: Orthopedics    KY SURGICAL ARTHROSCOPY SHOULDER BICEPS TENODESIS Left 03/09/2021    Procedure: ARTHROSCOPIC BICEPS TENODESIS;  Surgeon: Eliot Ibarra DO;  Location:  MAIN OR;  Service: Orthopedics    [place] SURGICAL ARTHROSCOPY SHOULDER W/ROTATOR CUFF RPR Left 03/09/2021    Procedure: REPAIR ROTATOR CUFF  ARTHROSCOPIC;  Surgeon: Eliot Ibarra DO;  Location:  MAIN OR;  Service: Orthopedics    SKIN BIOPSY  2015 2016 2017 2022    SPINE SURGERY  2004    Tumor removed lower spine       Family History   Problem Relation Age of Onset    Coronary artery disease Mother     Hypertrophic cardiomyopathy Mother     Thyroid disease Mother     Heart disease Mother     Arthritis Mother         RA    Coronary artery disease Sister     Irritable bowel syndrome Sister     Crohn's disease Sister     Substance Abuse Sister     Mental illness Sister     Substance Abuse Brother     Mental illness Brother     Heart disease Family         cardiovascular disease, ischemic heart disease    Cancer Family     Cancer Father         jaw bone    Hypertension Father     Diabetes Maternal Aunt          Medications have been verified.        Objective   /88 (BP Location: Right arm, Patient Position: Sitting)   Pulse 72   Temp 97.7 °F (36.5 °C) (Tympanic)   Resp 18   Ht 6' 1\" (1.854 m)   Wt 102 kg (225 lb)   SpO2 98%   BMI 29.69 kg/m²        Physical Exam     Physical Exam  Vitals and nursing note reviewed.   Constitutional:       Appearance: Normal appearance. He is normal weight.      Comments: Pt on lagevrio 11/2023 " without any problems for covid  , no paxlovid because of xaralto    HENT:      Head: Normocephalic and atraumatic.      Right Ear: Tympanic membrane, ear canal and external ear normal.      Left Ear: Tympanic membrane and ear canal normal.      Nose: Rhinorrhea present.      Mouth/Throat:      Mouth: Mucous membranes are moist.   Eyes:      Extraocular Movements: Extraocular movements intact.   Cardiovascular:      Rate and Rhythm: Regular rhythm.      Pulses: Normal pulses.      Heart sounds: Normal heart sounds.   Pulmonary:      Effort: Pulmonary effort is normal.      Breath sounds: Normal breath sounds.   Abdominal:      Palpations: Abdomen is soft.   Musculoskeletal:         General: Normal range of motion.      Cervical back: Normal range of motion and neck supple.   Skin:     General: Skin is warm.      Capillary Refill: Capillary refill takes less than 2 seconds.   Neurological:      Mental Status: He is alert and oriented to person, place, and time.

## 2024-08-01 ENCOUNTER — PROCEDURE VISIT (OUTPATIENT)
Dept: DERMATOLOGY | Facility: CLINIC | Age: 69
End: 2024-08-01
Payer: MEDICARE

## 2024-08-01 VITALS
DIASTOLIC BLOOD PRESSURE: 72 MMHG | HEART RATE: 78 BPM | WEIGHT: 226 LBS | BODY MASS INDEX: 29.95 KG/M2 | TEMPERATURE: 98.3 F | OXYGEN SATURATION: 94 % | HEIGHT: 73 IN | SYSTOLIC BLOOD PRESSURE: 128 MMHG

## 2024-08-01 DIAGNOSIS — C44.229 SQUAMOUS CELL CARCINOMA OF EAR, LEFT: ICD-10-CM

## 2024-08-01 PROCEDURE — 12052 INTMD RPR FACE/MM 2.6-5.0 CM: CPT | Performed by: STUDENT IN AN ORGANIZED HEALTH CARE EDUCATION/TRAINING PROGRAM

## 2024-08-01 PROCEDURE — 17311 MOHS 1 STAGE H/N/HF/G: CPT | Performed by: STUDENT IN AN ORGANIZED HEALTH CARE EDUCATION/TRAINING PROGRAM

## 2024-08-01 NOTE — PROGRESS NOTES
MOHS Procedure Note    Patient: Norman Coles  : 1955  MRN: [id number]  Date: 2024    History of Present Illness: The patient is a 68 y.o. 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    Past Medical History:   Diagnosis Date    Actinic keratosis     Acute maxillary sinusitis     Arthritis 2000    Asthmatic bronchitis     Benign prostatic hyperplasia     Benign prostatic hyperplasia with urinary incontinence without sensory awareness     Cancer (HCC) 2015    Cervical disc disease     Cervicalgia     Chest pain     DVT (deep venous thrombosis) ([facility])     Erectile dysfunction     Fatigue     Hypercholesterolemia     Hyperlipidemia     Hypertension     IFG (impaired fasting glucose)     Otitis media     PE (pulmonary thromboembolism) ([facility])     on xarelto    Pneumonia 1971    Screening for colon cancer     Screening for prostate cancer     Skin tag     Sleep apnea     23--states he is being worked up for sleep apnea    Squamous cell skin cancer 2015    Visual impairment        Past Surgical History:   Procedure Laterality Date    BACK SURGERY  2004    CHOLECYSTECTOMY  2009    COLON SURGERY  2004    Polyps removed    COLONOSCOPY      EYE SURGERY  2016?    tprn retina    MOHS SURGERY  2015    ME CLAVICULECTOMY PARTIAL Left 2021    Procedure: DISTAL CLAVICLE EXCISION;  Surgeon: Eliot Ibarra DO;  Location:  MAIN OR;  Service: Orthopedics    ME SURGICAL ARTHROSCOPY SHOULDER BICEPS TENODESIS Left 2021    Procedure: ARTHROSCOPIC BICEPS TENODESIS;  Surgeon: Eliot Ibarra DO;  Location:  MAIN OR;  Service: Orthopedics    ME SURGICAL ARTHROSCOPY SHOULDER W/ROTATOR CUFF RPR Left 2021    Procedure: REPAIR ROTATOR CUFF  ARTHROSCOPIC;  Surgeon: Eliot Ibarra DO;  Location:  MAIN OR;  Service: Orthopedics    SKIN BIOPSY  2015    SPINE SURGERY  2004    Tumor removed lower spine          Current Outpatient Medications:     benzonatate (TESSALON PERLES) 100 mg capsule, Take 1 capsule (100 mg total) by mouth 3 (three) times a day as needed for cough, Disp: 20 capsule, Rfl: 0    doxazosin (CARDURA) 4 mg tablet, Take 1 tablet (4 mg total) by mouth daily at bedtime, Disp: 100 tablet, Rfl: 2    fluorouracil (EFUDEX) 5 % cream, Apply topically 2 (two) times a day Apply twice a day left cheek for 2 to 4 weeks., Disp: 40 g, Rfl: 0    fluticasone (FLONASE) 50 mcg/act nasal spray, 2 sprays as needed, Disp: , Rfl: 3    losartan-hydrochlorothiazide (HYZAAR) 100-25 MG per tablet, Take 1 tablet by mouth daily, Disp: 90 tablet, Rfl: 1    molnupiravir (Lagevrio) 200 mg capsule, Take 4 capsules (800 mg total) by mouth every 12 (twelve) hours for 5 days, Disp: 40 capsule, Rfl: 0    Multiple Vitamin (MULTIVITAMIN ADULT PO), Take by mouth, Disp: , Rfl:     Probiotic Product (PROBIOTIC PO), Take by mouth, Disp: , Rfl:     rivaroxaban (Xarelto) 20 mg tablet, Take 1 tablet (20 mg total) by mouth daily, Disp: 90 tablet, Rfl: 1    sildenafil (REVATIO) 20 mg tablet, 3 pills by mouth before sexual activity, Disp: 90 tablet, Rfl: 1    simvastatin (ZOCOR) 10 mg tablet, Take 1 tablet (10 mg total) by mouth daily, Disp: 90 tablet, Rfl: 1    Allergies   Allergen Reactions    Vancomycin Rash and Other (See Comments)     Peeling of skin    Ace Inhibitors Cough       Physical Exam:   Vitals:    08/01/24 0738   BP: 128/72   Pulse: 78   Temp: 98.3 °F (36.8 °C)   SpO2: 94%     General: Awake, Alert, Oriented x 3, Mood and affect appropriate  Respiratory: Respirations even and unlabored  Cardiovascular: Peripheral pulses intact; no edema  Musculoskeletal Exam: n/a    Skin: 0.9 cm x 0.8 cm pink papule, scaling on the left preauricular cheek    Assessment: Biopsy proven to be well differentiated and invasive squamous cell carcinoma.    Plan: Mohs    Time of H&P Completion: 6445    MOHS Procedure Timeout      Flowsheet Row Most  Recent Value   Timeout: 0755   Patient Identity Verified: Yes   Correct Site Verified: Yes   Correct Procedure Verified: Yes            MOHS Diagnosis/Indication/Location/ID      Flowsheet Row Most Recent Value   Pathology Type Squamous cell carcinoma   Anatomic Site left preauricular area   Indications for MOHS tumor location   MOHS ID [id number]            MOHS Site/Accession/Pre-Post      Flowsheet Row Most Recent Value   Original Site Identified (as submitted by referring clinician) Photo   Biopsy Accession/Specimen # (as submitted by referring clincian) [id number]   Pre-MOHS Size Length (cm) 0.9   Pre-MOHS Size Width (cm) 0.8   Post-MOHS Size-Length (cm) 1.4   Post MOHS Size-Width (cm) 1.5   Repair Type Intermediate layered closure   Suture Type Vicryl, Fast absorbing gut   Fast Absorbing Suture Size 5   Vicryl Suture Size 4   Final repair length (cm): 4   Anesthetic Used 1% Lidocaine with epinephrine  [buffered]          Cutaneous SCC Pathological staging     Staging form:      AJCC 8th edition T1 (Tumor <2cm greatest dimension)    [facility]   T1 (0 High risk factors, which include diameter >=2cm; PD; PNI >=0.1mm; invasion beyond SQ fat)       *features leading to selection of stage are highlighted above*      MOHS Tumor Stage 1 Information      Flowsheet Row Most Recent Value   Tissue Sections (blocks) 2   Microscopic Exam Section 1: No tumor identified in section.   Microscopic Exam Section 2: No tumor identified in section.   Tumor Clear After Stage I? Yes                        Patient identified, procedure verified, site identified and verified. Time out completed. Surgical removal of the lesion discussed with the patient (risks and benefits, including possibility of scarring, infection, recurrence or potential for further treatment)  I have specifically identified the site with the patient. I have discussed the fact that the patient will have a scar after the procedure regardless of granulation or repair with  sutures. I have discussed that the repair options can range from granulation in some cases to linear or curvilinear closures to larger flaps or grafts.  There are sometimes flaps or grafts used that require multiples stages of surgery and will not be completed today, rather be completed over a series of appointments. I have discussed that occasionally due to location, size or depth of the lesion I may recommend consultation with and transfer of care for further removal or the reconstruction to another provider such as ophthalmology surgery, plastic surgery, ENT surgery, or surgical oncology. There are cases in which other testing such as imaging with MRI or CT scan or testing of lymph nodes is recommended because of the nature/depth/location of tumor seen during the removal. There is a risk of injury to nerves causing temporary or permanent numbness or the inability to move muscles full such as the inability to lift eyebrows. Questions answered and verbal and written consent was obtained.    The tumor qualifies for Mohs based on AUC criteria. Dr. Almendarez served as the surgeon and pathologist during the procedure.    With the patient in the supine position and under adequate local anesthesia with 1% lidocaine with epinephrine 1:100,000, the defect was scrubbed with Chlorhexidine. Sterile drapes were placed from the sterile tray.  Because of the location of the surgical defect, an intermediate closure was judged to give the best possible cosmetic and functional result.  The edges of the defect were carefully debrided removing any dead or coagulated tissue.      Hemostasis was obtained by pinpoint electrocoagulation.  Careful planning of removal of redundant tissue at either end of the defect was drawn out so that the suture lines would fall in the optimal orientation with regard to the relaxed skin tension lines.  These were then removed with a #15 blade scalpel.  The wound was then approximated by a deep layer of  buried vertical mattress sutures and the cutaneous margins were approximated and closed by superficial sutures as noted above.  Estimated blood loss was less than 5 mL.      The patient tolerated the procedure well.  The wound was dressed with petrolatum, a non-stick pad, and a compression dressing.     Tasha Almendarez MD served as the surgeon and pathologist during the procedure.    Postoperative care: Wound care discussed at length.  I urged the patient to call us if any problems or question should arise.     Complications: none  Post-op medications: none  Patient condition after procedure: stable  Discharge plans: Plan for follow up as planned with general dermatologist for skin checks or sooner if needed for healing surgical site.     Scribe Attestation      I,:  Megan Pearson RN am acting as a scribe while in the presence of the attending physician.:       I,:  Tasha Almendarez MD personally performed the services described in this documentation    as scribed in my presence.:

## 2024-08-28 ENCOUNTER — RA CDI HCC (OUTPATIENT)
Dept: OTHER | Facility: HOSPITAL | Age: 69
End: 2024-08-28

## 2024-09-04 ENCOUNTER — OFFICE VISIT (OUTPATIENT)
Dept: FAMILY MEDICINE CLINIC | Facility: HOSPITAL | Age: 69
End: 2024-09-04
Payer: MEDICARE

## 2024-09-04 VITALS
DIASTOLIC BLOOD PRESSURE: 80 MMHG | SYSTOLIC BLOOD PRESSURE: 134 MMHG | BODY MASS INDEX: 30.35 KG/M2 | HEIGHT: 73 IN | TEMPERATURE: 97.2 F | WEIGHT: 229 LBS | RESPIRATION RATE: 16 BRPM | HEART RATE: 72 BPM | OXYGEN SATURATION: 97 %

## 2024-09-04 DIAGNOSIS — R73.01 IMPAIRED FASTING GLUCOSE: ICD-10-CM

## 2024-09-04 DIAGNOSIS — I10 ESSENTIAL HYPERTENSION: Primary | ICD-10-CM

## 2024-09-04 DIAGNOSIS — Z00.00 MEDICARE ANNUAL WELLNESS VISIT, SUBSEQUENT: Primary | ICD-10-CM

## 2024-09-04 PROCEDURE — G0438 PPPS, INITIAL VISIT: HCPCS | Performed by: INTERNAL MEDICINE

## 2024-09-04 NOTE — PATIENT INSTRUCTIONS
Medicare Preventive Visit Patient Instructions  Thank you for completing your Welcome to Medicare Visit or Medicare Annual Wellness Visit today. Your next wellness visit will be due in one year (9/5/2025).  The screening/preventive services that you may require over the next 5-10 years are detailed below. Some tests may not apply to you based off risk factors and/or age. Screening tests ordered at today's visit but not completed yet may show as past due. Also, please note that scanned in results may not display below.  Preventive Screenings:  Service Recommendations Previous Testing/Comments   Colorectal Cancer Screening  Colonoscopy    Fecal Occult Blood Test (FOBT)/Fecal Immunochemical Test (FIT)  Fecal DNA/Cologuard Test  Flexible Sigmoidoscopy Age: 45-75 years old   Colonoscopy: every 10 years (May be performed more frequently if at higher risk)  OR  FOBT/FIT: every 1 year  OR  Cologuard: every 3 years  OR  Sigmoidoscopy: every 5 years  Screening may be recommended earlier than age 45 if at higher risk for colorectal cancer. Also, an individualized decision between you and your healthcare provider will decide whether screening between the ages of 76-85 would be appropriate. Colonoscopy: 12/21/2021  FOBT/FIT: Not on file  Cologuard: Not on file  Sigmoidoscopy: Not on file    Screening Current     Prostate Cancer Screening Individualized decision between patient and health care provider in men between ages of 55-69   Medicare will cover every 12 months beginning on the day after your 50th birthday PSA: 5.491 ng/mL     Screening Current     Hepatitis C Screening Once for adults born between 1945 and 1965  More frequently in patients at high risk for Hepatitis C Hep C Antibody: Not on file        Diabetes Screening 1-2 times per year if you're at risk for diabetes or have pre-diabetes Fasting glucose: 120 mg/dL (3/15/2024)  A1C: 5.8 % (3/15/2024)  Screening Current   Cholesterol Screening Once every 5 years if you  don't have a lipid disorder. May order more often based on risk factors. Lipid panel: 03/15/2024  Screening Not Indicated  History Lipid Disorder      Other Preventive Screenings Covered by Medicare:  Abdominal Aortic Aneurysm (AAA) Screening: covered once if your at risk. You're considered to be at risk if you have a family history of AAA or a male between the age of 65-75 who smoking at least 100 cigarettes in your lifetime.  Lung Cancer Screening: covers low dose CT scan once per year if you meet all of the following conditions: (1) Age 55-77; (2) No signs or symptoms of lung cancer; (3) Current smoker or have quit smoking within the last 15 years; (4) You have a tobacco smoking history of at least 20 pack years (packs per day x number of years you smoked); (5) You get a written order from a healthcare provider.  Glaucoma Screening: covered annually if you're considered high risk: (1) You have diabetes OR (2) Family history of glaucoma OR (3)  aged 50 and older OR (4)  American aged 65 and older  Osteoporosis Screening: covered every 2 years if you meet one of the following conditions: (1) Have a vertebral abnormality; (2) On glucocorticoid therapy for more than 3 months; (3) Have primary hyperparathyroidism; (4) On osteoporosis medications and need to assess response to drug therapy.  HIV Screening: covered annually if you're between the age of 15-65. Also covered annually if you are younger than 15 and older than 65 with risk factors for HIV infection. For pregnant patients, it is covered up to 3 times per pregnancy.    Immunizations:  Immunization Recommendations   Influenza Vaccine Annual influenza vaccination during flu season is recommended for all persons aged >= 6 months who do not have contraindications   Pneumococcal Vaccine   * Pneumococcal conjugate vaccine = PCV13 (Prevnar 13), PCV15 (Vaxneuvance), PCV20 (Prevnar 20)  * Pneumococcal polysaccharide vaccine = PPSV23 (Pneumovax)  Adults 19-63 yo with certain risk factors or if 65+ yo  If never received any pneumonia vaccine: recommend Prevnar 20 (PCV20)  Give PCV20 if previously received 1 dose of PCV13 or PPSV23   Hepatitis B Vaccine 3 dose series if at intermediate or high risk (ex: diabetes, end stage renal disease, liver disease)   Respiratory syncytial virus (RSV) Vaccine - COVERED BY MEDICARE PART D  * RSVPreF3 (Arexvy) CDC recommends that adults 60 years of age and older may receive a single dose of RSV vaccine using shared clinical decision-making (SCDM)   Tetanus (Td) Vaccine - COST NOT COVERED BY MEDICARE PART B Following completion of primary series, a booster dose should be given every 10 years to maintain immunity against tetanus. Td may also be given as tetanus wound prophylaxis.   Tdap Vaccine - COST NOT COVERED BY MEDICARE PART B Recommended at least once for all adults. For pregnant patients, recommended with each pregnancy.   Shingles Vaccine (Shingrix) - COST NOT COVERED BY MEDICARE PART B  2 shot series recommended in those 19 years and older who have or will have weakened immune systems or those 50 years and older     Health Maintenance Due:      Topic Date Due   • Hepatitis C Screening  Never done   • Colorectal Cancer Screening  12/20/2026     Immunizations Due:      Topic Date Due   • COVID-19 Vaccine (10 - 2023-24 season) 09/01/2024   • Influenza Vaccine (1) 09/01/2024     Advance Directives   What are advance directives?  Advance directives are legal documents that state your wishes and plans for medical care. These plans are made ahead of time in case you lose your ability to make decisions for yourself. Advance directives can apply to any medical decision, such as the treatments you want, and if you want to donate organs.   What are the types of advance directives?  There are many types of advance directives, and each state has rules about how to use them. You may choose a combination of any of the  following:  Living will:  This is a written record of the treatment you want. You can also choose which treatments you do not want, which to limit, and which to stop at a certain time. This includes surgery, medicine, IV fluid, and tube feedings.   Durable power of  for healthcare (DPAHC):  This is a written record that states who you want to make healthcare choices for you when you are unable to make them for yourself. This person, called a proxy, is usually a family member or a friend. You may choose more than 1 proxy.  Do not resuscitate (DNR) order:  A DNR order is used in case your heart stops beating or you stop breathing. It is a request not to have certain forms of treatment, such as CPR. A DNR order may be included in other types of advance directives.  Medical directive:  This covers the care that you want if you are in a coma, near death, or unable to make decisions for yourself. You can list the treatments you want for each condition. Treatment may include pain medicine, surgery, blood transfusions, dialysis, IV or tube feedings, and a ventilator (breathing machine).  Values history:  This document has questions about your views, beliefs, and how you feel and think about life. This information can help others choose the care that you would choose.  Why are advance directives important?  An advance directive helps you control your care. Although spoken wishes may be used, it is better to have your wishes written down. Spoken wishes can be misunderstood, or not followed. Treatments may be given even if you do not want them. An advance directive may make it easier for your family to make difficult choices about your care.   Weight Management   Why it is important to manage your weight:  Being overweight increases your risk of health conditions such as heart disease, high blood pressure, type 2 diabetes, and certain types of cancer. It can also increase your risk for osteoarthritis, sleep apnea, and  other respiratory problems. Aim for a slow, steady weight loss. Even a small amount of weight loss can lower your risk of health problems.  How to lose weight safely:  A safe and healthy way to lose weight is to eat fewer calories and get regular exercise. You can lose up about 1 pound a week by decreasing the number of calories you eat by 500 calories each day.   Healthy meal plan for weight management:  A healthy meal plan includes a variety of foods, contains fewer calories, and helps you stay healthy. A healthy meal plan includes the following:  Eat whole-grain foods more often.  A healthy meal plan should contain fiber. Fiber is the part of grains, fruits, and vegetables that is not broken down by your body. Whole-grain foods are healthy and provide extra fiber in your diet. Some examples of whole-grain foods are whole-wheat breads and pastas, oatmeal, brown rice, and bulgur.  Eat a variety of vegetables every day.  Include dark, leafy greens such as spinach, kale, marely greens, and mustard greens. Eat yellow and orange vegetables such as carrots, sweet potatoes, and winter squash.   Eat a variety of fruits every day.  Choose fresh or canned fruit (canned in its own juice or light syrup) instead of juice. Fruit juice has very little or no fiber.  Eat low-fat dairy foods.  Drink fat-free (skim) milk or 1% milk. Eat fat-free yogurt and low-fat cottage cheese. Try low-fat cheeses such as mozzarella and other reduced-fat cheeses.  Choose meat and other protein foods that are low in fat.  Choose beans or other legumes such as split peas or lentils. Choose fish, skinless poultry (chicken or turkey), or lean cuts of red meat (beef or pork). Before you cook meat or poultry, cut off any visible fat.   Use less fat and oil.  Try baking foods instead of frying them. Add less fat, such as margarine, sour cream, regular salad dressing and mayonnaise to foods. Eat fewer high-fat foods. Some examples of high-fat foods  include french fries, doughnuts, ice cream, and cakes.  Eat fewer sweets.  Limit foods and drinks that are high in sugar. This includes candy, cookies, regular soda, and sweetened drinks.  Exercise:  Exercise at least 30 minutes per day on most days of the week. Some examples of exercise include walking, biking, dancing, and swimming. You can also fit in more physical activity by taking the stairs instead of the elevator or parking farther away from stores. Ask your healthcare provider about the best exercise plan for you.      © Copyright [employer] 2018 Information is for End User's use only and may not be sold, redistributed or otherwise used for commercial purposes. All illustrations and images included in CareNotes® are the copyrighted property of [employer] or [employer]    Medicare Preventive Visit Patient Instructions  Thank you for completing your Welcome to Medicare Visit or Medicare Annual Wellness Visit today. Your next wellness visit will be due in one year (9/5/2025).  The screening/preventive services that you may require over the next 5-10 years are detailed below. Some tests may not apply to you based off risk factors and/or age. Screening tests ordered at today's visit but not completed yet may show as past due. Also, please note that scanned in results may not display below.  Preventive Screenings:  Service Recommendations Previous Testing/Comments   Colorectal Cancer Screening  Colonoscopy    Fecal Occult Blood Test (FOBT)/Fecal Immunochemical Test (FIT)  Fecal DNA/Cologuard Test  Flexible Sigmoidoscopy Age: 45-75 years old   Colonoscopy: every 10 years (May be performed more frequently if at higher risk)  OR  FOBT/FIT: every 1 year  OR  Cologuard: every 3 years  OR  Sigmoidoscopy: every 5 years  Screening may be recommended earlier than age 45 if at higher risk for colorectal cancer. Also, an individualized decision between you and your healthcare provider will decide whether  screening between the ages of 76-85 would be appropriate. Colonoscopy: 12/21/2021  FOBT/FIT: Not on file  Cologuard: Not on file  Sigmoidoscopy: Not on file    Screening Current     Prostate Cancer Screening Individualized decision between patient and health care provider in men between ages of 55-69   Medicare will cover every 12 months beginning on the day after your 50th birthday PSA: 5.491 ng/mL     Screening Current     Hepatitis C Screening Once for adults born between 1945 and 1965  More frequently in patients at high risk for Hepatitis C Hep C Antibody: Not on file        Diabetes Screening 1-2 times per year if you're at risk for diabetes or have pre-diabetes Fasting glucose: 120 mg/dL (3/15/2024)  A1C: 5.8 % (3/15/2024)  Screening Current   Cholesterol Screening Once every 5 years if you don't have a lipid disorder. May order more often based on risk factors. Lipid panel: 03/15/2024  Screening Not Indicated  History Lipid Disorder      Other Preventive Screenings Covered by Medicare:  Abdominal Aortic Aneurysm (AAA) Screening: covered once if your at risk. You're considered to be at risk if you have a family history of AAA or a male between the age of 65-75 who smoking at least 100 cigarettes in your lifetime.  Lung Cancer Screening: covers low dose CT scan once per year if you meet all of the following conditions: (1) Age 55-77; (2) No signs or symptoms of lung cancer; (3) Current smoker or have quit smoking within the last 15 years; (4) You have a tobacco smoking history of at least 20 pack years (packs per day x number of years you smoked); (5) You get a written order from a healthcare provider.  Glaucoma Screening: covered annually if you're considered high risk: (1) You have diabetes OR (2) Family history of glaucoma OR (3)  aged 50 and older OR (4)  American aged 65 and older  Osteoporosis Screening: covered every 2 years if you meet one of the following conditions: (1) Have a  vertebral abnormality; (2) On glucocorticoid therapy for more than 3 months; (3) Have primary hyperparathyroidism; (4) On osteoporosis medications and need to assess response to drug therapy.  HIV Screening: covered annually if you're between the age of 15-65. Also covered annually if you are younger than 15 and older than 65 with risk factors for HIV infection. For pregnant patients, it is covered up to 3 times per pregnancy.    Immunizations:  Immunization Recommendations   Influenza Vaccine Annual influenza vaccination during flu season is recommended for all persons aged >= 6 months who do not have contraindications   Pneumococcal Vaccine   * Pneumococcal conjugate vaccine = PCV13 (Prevnar 13), PCV15 (Vaxneuvance), PCV20 (Prevnar 20)  * Pneumococcal polysaccharide vaccine = PPSV23 (Pneumovax) Adults 19-63 yo with certain risk factors or if 65+ yo  If never received any pneumonia vaccine: recommend Prevnar 20 (PCV20)  Give PCV20 if previously received 1 dose of PCV13 or PPSV23   Hepatitis B Vaccine 3 dose series if at intermediate or high risk (ex: diabetes, end stage renal disease, liver disease)   Respiratory syncytial virus (RSV) Vaccine - COVERED BY MEDICARE PART D  * RSVPreF3 (Arexvy) CDC recommends that adults 60 years of age and older may receive a single dose of RSV vaccine using shared clinical decision-making (SCDM)   Tetanus (Td) Vaccine - COST NOT COVERED BY MEDICARE PART B Following completion of primary series, a booster dose should be given every 10 years to maintain immunity against tetanus. Td may also be given as tetanus wound prophylaxis.   Tdap Vaccine - COST NOT COVERED BY MEDICARE PART B Recommended at least once for all adults. For pregnant patients, recommended with each pregnancy.   Shingles Vaccine (Shingrix) - COST NOT COVERED BY MEDICARE PART B  2 shot series recommended in those 19 years and older who have or will have weakened immune systems or those 50 years and older     Health  Maintenance Due:      Topic Date Due   • Hepatitis C Screening  Never done   • Colorectal Cancer Screening  12/20/2026     Immunizations Due:      Topic Date Due   • COVID-19 Vaccine (10 - 2023-24 season) 09/01/2024   • Influenza Vaccine (1) 09/01/2024     Advance Directives   What are advance directives?  Advance directives are legal documents that state your wishes and plans for medical care. These plans are made ahead of time in case you lose your ability to make decisions for yourself. Advance directives can apply to any medical decision, such as the treatments you want, and if you want to donate organs.   What are the types of advance directives?  There are many types of advance directives, and each state has rules about how to use them. You may choose a combination of any of the following:  Living will:  This is a written record of the treatment you want. You can also choose which treatments you do not want, which to limit, and which to stop at a certain time. This includes surgery, medicine, IV fluid, and tube feedings.   Durable power of  for healthcare (DPAHC):  This is a written record that states who you want to make healthcare choices for you when you are unable to make them for yourself. This person, called a proxy, is usually a family member or a friend. You may choose more than 1 proxy.  Do not resuscitate (DNR) order:  A DNR order is used in case your heart stops beating or you stop breathing. It is a request not to have certain forms of treatment, such as CPR. A DNR order may be included in other types of advance directives.  Medical directive:  This covers the care that you want if you are in a coma, near death, or unable to make decisions for yourself. You can list the treatments you want for each condition. Treatment may include pain medicine, surgery, blood transfusions, dialysis, IV or tube feedings, and a ventilator (breathing machine).  Values history:  This document has questions  about your views, beliefs, and how you feel and think about life. This information can help others choose the care that you would choose.  Why are advance directives important?  An advance directive helps you control your care. Although spoken wishes may be used, it is better to have your wishes written down. Spoken wishes can be misunderstood, or not followed. Treatments may be given even if you do not want them. An advance directive may make it easier for your family to make difficult choices about your care.   Weight Management   Why it is important to manage your weight:  Being overweight increases your risk of health conditions such as heart disease, high blood pressure, type 2 diabetes, and certain types of cancer. It can also increase your risk for osteoarthritis, sleep apnea, and other respiratory problems. Aim for a slow, steady weight loss. Even a small amount of weight loss can lower your risk of health problems.  How to lose weight safely:  A safe and healthy way to lose weight is to eat fewer calories and get regular exercise. You can lose up about 1 pound a week by decreasing the number of calories you eat by 500 calories each day.   Healthy meal plan for weight management:  A healthy meal plan includes a variety of foods, contains fewer calories, and helps you stay healthy. A healthy meal plan includes the following:  Eat whole-grain foods more often.  A healthy meal plan should contain fiber. Fiber is the part of grains, fruits, and vegetables that is not broken down by your body. Whole-grain foods are healthy and provide extra fiber in your diet. Some examples of whole-grain foods are whole-wheat breads and pastas, oatmeal, brown rice, and bulgur.  Eat a variety of vegetables every day.  Include dark, leafy greens such as spinach, kale, marely greens, and mustard greens. Eat yellow and orange vegetables such as carrots, sweet potatoes, and winter squash.   Eat a variety of fruits every day.  Choose  fresh or canned fruit (canned in its own juice or light syrup) instead of juice. Fruit juice has very little or no fiber.  Eat low-fat dairy foods.  Drink fat-free (skim) milk or 1% milk. Eat fat-free yogurt and low-fat cottage cheese. Try low-fat cheeses such as mozzarella and other reduced-fat cheeses.  Choose meat and other protein foods that are low in fat.  Choose beans or other legumes such as split peas or lentils. Choose fish, skinless poultry (chicken or turkey), or lean cuts of red meat (beef or pork). Before you cook meat or poultry, cut off any visible fat.   Use less fat and oil.  Try baking foods instead of frying them. Add less fat, such as margarine, sour cream, regular salad dressing and mayonnaise to foods. Eat fewer high-fat foods. Some examples of high-fat foods include french fries, doughnuts, ice cream, and cakes.  Eat fewer sweets.  Limit foods and drinks that are high in sugar. This includes candy, cookies, regular soda, and sweetened drinks.  Exercise:  Exercise at least 30 minutes per day on most days of the week. Some examples of exercise include walking, biking, dancing, and swimming. You can also fit in more physical activity by taking the stairs instead of the elevator or parking farther away from stores. Ask your healthcare provider about the best exercise plan for you.      © Copyright [employer] 2018 Information is for End User's use only and may not be sold, redistributed or otherwise used for commercial purposes. All illustrations and images included in CareNotes® are the copyrighted property of [employer] or [employer]

## 2024-09-04 NOTE — PROGRESS NOTES
Ambulatory Visit  Name: Norman Coles      : 1955      MRN: [id number]  Encounter Provider: Hal Solorio MD  Encounter Date: 2024   Encounter department: [facility] PRIMARY CARE [address]    Assessment & Plan   1. Medicare annual wellness visit, subsequent       Preventive health issues were discussed with patient, and age appropriate screening tests were ordered as noted in patient's After Visit Summary. Personalized health advice and appropriate referrals for health education or preventive services given if needed, as noted in patient's After Visit Summary.    History of Present Illness     HPI   Patient Care Team:  Hal Solorio MD as PCP - General  Hal Solorio MD    Review of Systems  Medical History Reviewed by provider this encounter:  Tobacco  Allergies  Meds  Problems  Med Hx  Surg Hx  Fam Hx       Annual Wellness Visit Questionnaire   Norman is here for his Subsequent Wellness visit.     Health Risk Assessment:   Patient rates overall health as very good. Patient feels that their physical health rating is same. Patient is very satisfied with their life. Eyesight was rated as same. Hearing was rated as same. Patient feels that their emotional and mental health rating is same. Patients states they are never, rarely angry. Patient states they are sometimes unusually tired/fatigued. Pain experienced in the last 7 days has been some. Patient's pain rating has been 3/10. Patient states that he has experienced no weight loss or gain in last 6 months.     Fall Risk Screening:   In the past year, patient has experienced: no history of falling in past year      Home Safety:  Patient does not have trouble with stairs inside or outside of their home. Patient has working smoke alarms and has working carbon monoxide detector. Home safety hazards include: none.     Nutrition:   Current diet is Regular and Limited junk food.     Medications:   Patient is currently taking over-the-counter  supplements. OTC medications include: see medication list. Patient is able to manage medications.     Activities of Daily Living (ADLs)/Instrumental Activities of Daily Living (IADLs):   Walk and transfer into and out of bed and chair?: Yes  Dress and groom yourself?: Yes    Bathe or shower yourself?: Yes    Feed yourself? Yes  Do your laundry/housekeeping?: Yes  Manage your money, pay your bills and track your expenses?: Yes  Make your own meals?: Yes    Do your own shopping?: Yes    Durable Medical Equipment Suppliers  [employer]    Previous Hospitalizations:   Any hospitalizations or ED visits within the last 12 months?: Yes    How many hospitalizations have you had in the last year?: 1-2    Hospitalization Comments: ER vist Oct 27 for calf injury/ swelling due to injury playing with grandchildren    Advance Care Planning:   Living will: Yes    Durable POA for healthcare: Yes    Advanced directive: Yes    Advanced directive counseling given: Yes    End of Life Decisions reviewed with patient: Yes    Provider agrees with end of life decisions: Yes      PREVENTIVE SCREENINGS      Cardiovascular Screening:    General: Screening Not Indicated, History Lipid Disorder, Risks and Benefits Discussed and Screening Current      Diabetes Screening:     General: Screening Current and Risks and Benefits Discussed      Colorectal Cancer Screening:     General: Screening Current and Risks and Benefits Discussed      Prostate Cancer Screening:    General: Screening Current and Risks and Benefits Discussed      Abdominal Aortic Aneurysm (AAA) Screening:    Risk factors include: age between 65-76 yo        Lung Cancer Screening:     General: Screening Not Indicated    Screening, Brief Intervention, and Referral to Treatment (SBIRT)    Screening  Typical number of drinks in a day: 0  Typical number of drinks in a week: 1  Interpretation: Low risk drinking behavior.    AUDIT-C Screenin) How often did you have a drink  "containing alcohol in the past year? 2 to 4 times a month  2) How many drinks did you have on a typical day when you were drinking in the past year? 1 to 2  3) How often did you have 6 or more drinks on one occasion in the past year? never    AUDIT-C Score: 2  Interpretation: Score 0-3 (male): Negative screen for alcohol misuse    Single Item Drug Screening:  How often have you used an illegal drug (including marijuana) or a prescription medication for non-medical reasons in the past year? never    Single Item Drug Screen Score: 0  Interpretation: Negative screen for possible drug use disorder    Brief Intervention  Alcohol & drug use screenings were reviewed. No concerns regarding substance use disorder identified.     Other Counseling Topics:   Regular weightbearing exercise.     Social Determinants of Health     Financial Resource Strain: Low Risk  (8/21/2023)    Overall Financial Resource Strain (CARDIA)    • Difficulty of Paying Living Expenses: Not hard at all   Food Insecurity: No Food Insecurity (9/1/2024)    Hunger Vital Sign    • Worried About Running Out of Food in the Last Year: Never true    • Ran Out of Food in the Last Year: Never true   Transportation Needs: No Transportation Needs (9/1/2024)    PRAPARE - Transportation    • Lack of Transportation (Medical): No    • Lack of Transportation (Non-Medical): No   Housing Stability: Low Risk  (9/1/2024)    Housing Stability Vital Sign    • Unable to Pay for Housing in the Last Year: No    • Number of Times Moved in the Last Year: 0    • Homeless in the Last Year: No   Utilities: Not At Risk (9/1/2024)    [facility] Utilities    • Threatened with loss of utilities: No     No results found.    Objective     /80 (BP Location: Left arm, Patient Position: Sitting)   Pulse 72   Temp (!) 97.2 °F (36.2 °C) (Tympanic)   Resp 16   Ht 6' 1\" (1.854 m)   Wt 104 kg (229 lb)   SpO2 97%   BMI 30.21 kg/m²     Physical Exam  Constitutional:       General: He is not in " acute distress.     Appearance: He is well-developed. He is not toxic-appearing.   HENT:      Head: Normocephalic.      Right Ear: Tympanic membrane normal. There is no impacted cerumen.      Left Ear: Tympanic membrane normal. There is no impacted cerumen.   Eyes:      Conjunctiva/sclera: Conjunctivae normal.   Cardiovascular:      Rate and Rhythm: Normal rate and regular rhythm.      Heart sounds: No murmur heard.     No gallop.   Pulmonary:      Effort: No respiratory distress.      Breath sounds: No wheezing or rales.   Abdominal:      General: Bowel sounds are normal.      Palpations: Abdomen is soft.      Tenderness: There is no abdominal tenderness.   Musculoskeletal:         General: No tenderness.      Cervical back: Neck supple.   Skin:     General: Skin is warm and dry.   Neurological:      Mental Status: He is alert and oriented to person, place, and time.      Cranial Nerves: No cranial nerve deficit.      Motor: No weakness.   Psychiatric:         Mood and Affect: Mood normal.

## 2024-11-15 DIAGNOSIS — I10 ESSENTIAL HYPERTENSION: ICD-10-CM

## 2024-11-15 RX ORDER — LOSARTAN POTASSIUM AND HYDROCHLOROTHIAZIDE 25; 100 MG/1; MG/1
1 TABLET ORAL DAILY
Qty: 90 TABLET | Refills: 1 | Status: SHIPPED | OUTPATIENT
Start: 2024-11-15

## 2024-11-24 DIAGNOSIS — E78.00 HYPERCHOLESTEREMIA: ICD-10-CM

## 2024-11-25 RX ORDER — SIMVASTATIN 10 MG
10 TABLET ORAL DAILY
Qty: 90 TABLET | Refills: 1 | Status: SHIPPED | OUTPATIENT
Start: 2024-11-25

## 2024-12-15 DIAGNOSIS — Z86.711 HISTORY OF PULMONARY EMBOLISM: ICD-10-CM

## 2024-12-16 ENCOUNTER — OFFICE VISIT (OUTPATIENT)
Age: 69
End: 2024-12-16
Payer: MEDICARE

## 2024-12-16 VITALS
TEMPERATURE: 97.7 F | WEIGHT: 231.2 LBS | HEIGHT: 73 IN | OXYGEN SATURATION: 96 % | DIASTOLIC BLOOD PRESSURE: 82 MMHG | BODY MASS INDEX: 30.64 KG/M2 | SYSTOLIC BLOOD PRESSURE: 138 MMHG | HEART RATE: 60 BPM

## 2024-12-16 DIAGNOSIS — Z86.711 HISTORY OF PULMONARY EMBOLISM: ICD-10-CM

## 2024-12-16 DIAGNOSIS — G47.33 OBSTRUCTIVE SLEEP APNEA: Primary | ICD-10-CM

## 2024-12-16 PROCEDURE — 99213 OFFICE O/P EST LOW 20 MIN: CPT | Performed by: PHYSICIAN ASSISTANT

## 2024-12-16 NOTE — ASSESSMENT & PLAN NOTE
Patient presenting for follow-up  Moderate SOL on home sleep study with COLLIN 16.8  They are using the CPAP and benefitting from it.  Better quality of sleep at night and more energy throughout the day.  Reviewed compliance report with the patient demonstrating residual AHI is 0.2/acceptable and they are compliant with use averaging over 6 hours nightly   Will continue CPAP at current pressure settings (auto 5-20)  Discussed regular cleaning and changing of the supplies  Patient is aware of consequences of untreated sleep apnea including increased risk for cardiac disease and stroke and therefore the need for compliance  His current mask has been leaking and keeping him and his wife awake at night. Will try a full face mask.    Orders:    PAP DME Resupply/Reorder

## 2024-12-17 LAB

## 2024-12-19 ENCOUNTER — OFFICE VISIT (OUTPATIENT)
Dept: DERMATOLOGY | Facility: CLINIC | Age: 69
End: 2024-12-19

## 2024-12-19 VITALS — WEIGHT: 229 LBS | TEMPERATURE: 98.7 F | BODY MASS INDEX: 30.21 KG/M2

## 2024-12-19 DIAGNOSIS — D48.5 NEOPLASM OF UNCERTAIN BEHAVIOR OF SKIN: ICD-10-CM

## 2024-12-19 DIAGNOSIS — D22.5 MULTIPLE BENIGN MELANOCYTIC NEVI OF BOTH UPPER EXTREMITIES, BOTH LOWER EXTREMITIES, AND TRUNK: ICD-10-CM

## 2024-12-19 DIAGNOSIS — Z85.828 HISTORY OF SCC (SQUAMOUS CELL CARCINOMA) OF SKIN: Primary | ICD-10-CM

## 2024-12-19 DIAGNOSIS — D22.62 MULTIPLE BENIGN MELANOCYTIC NEVI OF BOTH UPPER EXTREMITIES, BOTH LOWER EXTREMITIES, AND TRUNK: ICD-10-CM

## 2024-12-19 DIAGNOSIS — D22.72 MULTIPLE BENIGN MELANOCYTIC NEVI OF BOTH UPPER EXTREMITIES, BOTH LOWER EXTREMITIES, AND TRUNK: ICD-10-CM

## 2024-12-19 DIAGNOSIS — L82.1 SEBORRHEIC KERATOSES: ICD-10-CM

## 2024-12-19 DIAGNOSIS — D22.71 MULTIPLE BENIGN MELANOCYTIC NEVI OF BOTH UPPER EXTREMITIES, BOTH LOWER EXTREMITIES, AND TRUNK: ICD-10-CM

## 2024-12-19 DIAGNOSIS — D22.61 MULTIPLE BENIGN MELANOCYTIC NEVI OF BOTH UPPER EXTREMITIES, BOTH LOWER EXTREMITIES, AND TRUNK: ICD-10-CM

## 2024-12-19 DIAGNOSIS — L81.4 SOLAR LENTIGO: ICD-10-CM

## 2024-12-19 DIAGNOSIS — Z86.007 HISTORY OF SQUAMOUS CELL CARCINOMA IN SITU (SCCIS): ICD-10-CM

## 2024-12-19 DIAGNOSIS — Z12.83 SKIN EXAM, SCREENING FOR CANCER: ICD-10-CM

## 2024-12-19 DIAGNOSIS — D18.01 CHERRY ANGIOMA: ICD-10-CM

## 2024-12-19 DIAGNOSIS — L57.0 KERATOSIS, ACTINIC: ICD-10-CM

## 2024-12-19 PROCEDURE — 88305 TISSUE EXAM BY PATHOLOGIST: CPT | Performed by: PATHOLOGY

## 2024-12-19 RX ORDER — FLUOROURACIL 50 MG/G
CREAM TOPICAL 2 TIMES DAILY
Qty: 40 G | Refills: 2 | Status: CANCELLED | OUTPATIENT
Start: 2024-12-19

## 2024-12-19 NOTE — PATIENT INSTRUCTIONS
HISTORY OF SQUAMOUS CELL CARCINOMA      Assessment and Plan:  Based on a thorough discussion of this condition and the management approach to it (including a comprehensive discussion of the known risks, side effects and potential benefits of treatment), the patient (family) agrees to implement the following specific plan:  Monitor changes  Continue routine skin exam  Sun protection with SPF 50 or higher     How can SCC be prevented?  The most important way to prevent SCC is to avoid sunburn. This is especially important in childhood and early life. Fair skinned individuals and those with a personal or family history of BCC should protect their skin from sun exposure daily, year-round and lifelong.  Stay indoors or under the shade in the middle of the day   Wear covering clothing   Apply high protection factor SPF50+ broad-spectrum sunscreens generously to exposed skin if outdoors   Avoid indoor tanning (sun beds, solaria)      What is the outlook for SCC?  Most SCC are cured by treatment. Cure is most likely if treatment is undertaken when the lesion is small. A small percent of SCC's can spread to lymph  nodes and long term monitoring is indicated.   They are also at increased risk of other skin cancers, especially melanoma. Regular self-skin examinations and long-term annual skin checks by an experienced health professional are recommended.    ACTINIC KERATOSES  - Relevant exam: On the trunk, extremities, and face are scaly pink macules without palpable dermal component    - Exam and clinical history consistent with actinic keratoses  - Discussed that these lesions are considered premalignant with the potential to evolve into squamous cell carcinoma.   - Discussed that these are due to chronic sun exposure and therefore recommend use of sunscreen/sun protection to prevent further sun damage  - Discussed treatment options including risks, benefits  - Patient counseled to return to the office in 4-6 weeks after  "completion of treatment for recheck if not resolved at which time retreatment or biopsy to rule out SCC will be determined based on clinic findings    - The patient agreed to treatment with topical efudex 5% for 7 days BID to the face; 14 days BID to the extremities/trunk; 21 days BID to the scalp  - Common side effects for this treatment were discussed   - The expected risks of erythema, irritation, pruritus, photosensitivity, etc were reviewed. The patient was advised to stop use and contact the clinic for any significant side effects or concern for infection.   - A recheck is recommended 1-2 months after the completion of therapy for examination of any residual lesions, at which time further treatment such as cryotherapy or biopsy to rule out squamous cell carcinoma will be considered.      PROCEDURE:  DESTRUCTION OF PRE-MALIGNANT LESIONS    - After a thorough discussion of treatment options and risk/benefits/alternatives (including but not limited to local pain, scarring, dyspigmentation, blistering, and possible superinfection), verbal and written consent were obtained and the aforementioned lesions were treated on with cryotherapy using liquid nitrogen x 1 cycle for 5-10 seconds.    TOTAL NUMBER of 11 pre-malignant lesions were treated today on the ANATOMIC LOCATION: left cheek x1, forehead x2, bridge of nose x1, right cheek x1, left neck x1, chest x2, right foreharm x1, right hand x1, left hand x1.     The patient tolerated the procedure well, and after-care instructions were provided.    NEOPLASM OF UNCERTAIN BEHAVIOR OF SKIN    Assessment and Plan:  I have discussed with the patient that a sample of skin via a \"skin biopsy” would be potentially helpful to further make a specific diagnosis under the microscope.  Based on a thorough discussion of this condition and the management approach to it (including a comprehensive discussion of the known risks, side effects and potential benefits of treatment), the " "patient (family) agrees to implement the following specific plan:    Procedure:  Skin Biopsy.  After a thorough discussion of treatment options and risk/benefits/alternatives (including but not limited to local pain, scarring, dyspigmentation, blistering, possible superinfection, and inability to confirm a diagnosis via histopathology), verbal and written consent were obtained and portion of the rash was biopsied for tissue sample.  See below for consent that was obtained from patient and subsequent Procedure Note.    PROCEDURE TANGENTIAL (SHAVE) BIOPSY NOTE:    After obtaining informed consent  at which time there was a discussion about the purpose of biopsy  and low risks of infection and bleeding.  The area was prepped and draped in the usual fashion. Anesthesia was obtained with 1% lidocaine with epinephrine. A shave biopsy to an appropriate sampling depth was obtained by Shave (Dermablade or 15 blade) The resulting wound was covered with surgical ointment and bandaged appropriately.     The patient tolerated the procedure well without complications and was without signs of functional compromise.      Specimen has been sent for review by Dermatopathology.    Standard post-procedure care has been explained and has been included in written form within the patient's copy of Informed Consent.    INFORMED CONSENT DISCUSSION AND POST-OPERATIVE INSTRUCTIONS FOR PATIENT    I.  RATIONALE FOR PROCEDURE  I understand that a skin biopsy allows the Dermatologist to test a lesion or rash under the microscope to obtain a diagnosis.  It usually involves numbing the area with numbing medication and removing a small piece of skin; sometimes the area will be closed with sutures. In this specific procedure, sutures are not usually needed.  If any sutures are placed, then they are usually need to be removed in 2 weeks or less.    I understand that my Dermatologist recommends that a skin \"shave\" biopsy be performed today.  A local " "anesthetic, similar to the kind that a dentist uses when filling a cavity, will be injected with a very small needle into the skin area to be sampled.  The injected skin and tissue underneath \"will go to sleep” and become numb so no pain should be felt afterwards.  An instrument shaped like a tiny \"razor blade\" (shave biopsy instrument) will be used to cut a small piece of tissue and skin from the area so that a sample of tissue can be taken and examined more closely under the microscope.  A slight amount of bleeding will occur, but it will be stopped with direct pressure and a pressure bandage and any other appropriate methods.  I understands that a scar will form where the wound was created.  Surgical ointment will be applied to help protect the wound.  Sutures are not usually needed.    II.  RISKS AND POTENTIAL COMPLICATIONS   I understand the risks and potential complications of a skin biopsy include but are not limited to the following:  Bleeding  Infection  Pain  Scar/keloid  Skin discoloration  Incomplete Removal  Recurrence  Nerve Damage/Numbness/Loss of Function  Allergic Reaction to Anesthesia  Biopsies are diagnostic procedures and based on findings additional treatment or evaluation may be required  Loss or destruction of specimen resulting in no additional findings    My Dermatologist has explained to me the nature of the condition, the nature of the procedure, and the benefits to be reasonably expected compared with alternative approaches.  My Dermatologist has discussed the likelihood of major risks or complications of this procedure including the specific risks listed above, such as bleeding, infection, and scarring/keloid.  I understand that a scar is expected after this procedure.  I understand that my physician cannot predict if the scar will form a \"keloid,\" which extends beyond the borders of the wound that is created.  A keloid is a thick, painful, and bumpy scar.  A keloid can be difficult to " "treat, as it does not always respond well to therapy, which includes injecting cortisone directly into the keloid every few weeks.  While this usually reduces the pain and size of the scar, it does not eliminate it.      I understand that photographs may be taken before and after the procedure.  These will be maintained as part of the medical providers confidential records and may not be made available to me.  I further authorize the medical provider to use the photographs for teaching purposes or to illustrate scientific papers, books, or lectures if in his/her judgment, medical research, education, or science may benefit from its use.    I have had an opportunity to fully inquire about the risks and benefits of this procedure and its alternatives.   I have been given ample time and opportunity to ask questions and to seek a second opinion if I wished to do so.  I acknowledge that there have specifically been no guarantees as to the cosmetic results from the procedure.  I am aware that with any procedure there is always the possibility of an unexpected complication.    III. POST-PROCEDURAL CARE (WHAT YOU WILL NEED TO DO \"AFTER THE BIOPSY\" TO OPTIMIZE HEALING)    Keep the area clean and dry.  Try NOT to remove the bandage or get it wet for the first 24 hours.    Gently clean the area and apply surgical ointment (such as Vaseline petrolatum ointment, which is available \"over the counter\" and not a prescription) to the biopsy site for up to 2 weeks straight.  This acts to protect the wound from the outside world.      Sutures are not usually placed in this procedure.  If any sutures were placed, return for suture removal as instructed (generally 1 week for the face, 2 weeks for the body).      Take Acetaminophen (Tylenol) for discomfort, if no contraindications.  Ibuprofen or aspirin could make bleeding worse.    Call our office immediately for signs of infection: fever, chills, increased redness, warmth, tenderness, " "discomfort/pain, or pus or foul smell coming from the wound.    WHAT TO DO IF THERE IS ANY BLEEDING?  If a small amount of bleeding is noticed, place a clean cloth over the area and apply firm pressure for ten minutes.  Check the wound after 10 minutes of direct pressure.  If bleeding persists, try one more time for an additional 10 minutes of direct pressure on the area.  If the bleeding becomes heavier or does not stop after the second attempt, or if you have any other questions about this procedure, then please call your [facility] Dermatologist by calling [phone] (SKIN).     I hereby acknowledge that I have reviewed and verified the site with my Dermatologist and have requested and authorized my Dermatologist to proceed with the procedure.    CHERRY ANGIOMAS     Physical Exam:  Anatomic Location Affected:  Trunk and extremities  Morphological Description:  Scattered cherry red papules  Denies pain, itch, bleeding. No treatments tried. Present for years. Present constantly; no modifying factors which make it worse or better.     Assessment and Plan:  Based on a thorough discussion of this condition and the management approach to it (including a comprehensive discussion of the known risks, side effects and potential benefits of treatment), the patient (family) agrees to implement the following specific plan:  Reassure benign      SEBORRHEIC KERATOSIS; NON-INFLAMED     Physical Exam:  Anatomic Location Affected:  Trunk and extremities  Morphological Description:  Waxy, smooth to warty textured, yellow to brownish-grey to dark brown to blackish, discrete, \"stuck-on\" appearing papules.  Present for years. Denies pain, itch, bleeding.      Additional History of Present Condition:  Present constantly; no modifying factors which make it worse or better. No prior treatment.       Assessment and Plan:  Based on a thorough discussion of this condition and the management approach to it (including a comprehensive " "discussion of the known risks, side effects and potential benefits of treatment), the patient (family) agrees to implement the following specific plan:  Reassure benign  Use sun protection.  Apply SPF 30 or higher at least three times a day.  Wear sun protecting clothing and hats.      SOLAR LENTIGINES   OTHER SKIN CHANGES DUE TO CHRONIC EXPOSURE TO NONIONIZING RADIATION     Physical Exam:  Anatomic Location Affected:  Sun exposed areas of back, chest, arms, legs  Morphological Description:  Multiple scattered brown to tan evenly pigmented macules   Denies pain, itch, bleeding. No treatments tried. Present for months - years. Reports getting newer lesions with sun exposure.       Assessment and Plan:  Based on a thorough discussion of this condition and the management approach to it (including a comprehensive discussion of the known risks, side effects and potential benefits of treatment), the patient (family) agrees to implement the following specific plan:  Reassure benign  Use sun protection.  Apply SPF 30 or higher at least three times a day.  Wear sun protecting clothing and hats.       MULTIPLE MELANOCYTIC NEVI (\"Moles\")     Physical Exam:  Anatomic Location Affected: Trunk and extremities  Morphological Description:  Scattered, round to ovoid, symmetrical-appearing, even bordered, skin colored to dark brown macules/papules  Denies pain, itch, bleeding. No treatments tried. Present for years. Present constantly; no modifying factors which make it worse or better. Denies actively changing or growing moles.      Assessment and Plan:  Based on a thorough discussion of this condition and the management approach to it (including a comprehensive discussion of the known risks, side effects and potential benefits of treatment), the patient (family) agrees to implement the following specific plan:  Reassure benign  Monitor for changes  Use sun protection.  Apply SPF 30 or higher at least three times a day.  Wear sun " protecting clothing and hats.     Worrisome signs of skin malignancy discussed, questions answered. Regular self-skin check discussed. Advised to call or return to office if patient notices any spots of concern, rapidly growing/changing lesions, bleeding lesions, non-healing lesions. Advised regular SPF use.

## 2024-12-19 NOTE — PROGRESS NOTES
"[facility] Note     Patient Name: Norman Coles  Encounter Date: 12/19/2024     Have you been cared for by a [facility] Dermatologist in the last 3 years and, if so, which description applies to you?    Yes.  I have been here within the last 3 years, and my medical history has NOT changed since that time.  I am MALE/not capable of bearing children.    REVIEW OF SYSTEMS:  Have you recently had or currently have any of the following? No changes in my recent health.   PAST MEDICAL HISTORY:  Have you personally ever had or currently have any of the following?  If \"YES,\" then please provide more detail. No changes in my medical history.   HISTORY OF IMMUNOSUPPRESSION: Do you have a history of any of the following:  Systemic Immunosuppression such as Diabetes, Biologic or Immunotherapy, Chemotherapy, Organ Transplantation, Bone Marrow Transplantation or Prednisone?  No     Answering \"YES\" requires the addition of the dotphrase \"IMMUNOSUPPRESSED\" as the first diagnosis of the patient's visit.   FAMILY HISTORY:  Any \"first degree relatives\" (parent, brother, sister, or child) with the following?    No changes in my family's known health.   PATIENT EXPERIENCE:    Do you want the Dermatologist to perform a COMPLETE skin exam today including a clinical examination under the \"bra and underwear\" areas?  Yes  If necessary, do we have your permission to call and leave a detailed message on your Preferred Phone number that includes your specific medical information?  Yes      Allergies   Allergen Reactions   • Vancomycin Rash and Other (See Comments)     Peeling of skin   • Ace Inhibitors Cough      Current Outpatient Medications:   •  doxazosin (CARDURA) 4 mg tablet, Take 1 tablet (4 mg total) by mouth daily at bedtime, Disp: 100 tablet, Rfl: 2  •  fluticasone (FLONASE) 50 mcg/act nasal spray, 2 sprays as needed, Disp: , Rfl: 3  •  losartan-hydrochlorothiazide (HYZAAR) 100-25 MG per tablet, TAKE 1 TABLET BY " MOUTH EVERY DAY, Disp: 90 tablet, Rfl: 1  •  Multiple Vitamin (MULTIVITAMIN ADULT PO), Take by mouth, Disp: , Rfl:   •  Probiotic Product (PROBIOTIC PO), Take by mouth, Disp: , Rfl:   •  rivaroxaban (Xarelto) 20 mg tablet, Take 1 tablet (20 mg total) by mouth daily, Disp: 90 tablet, Rfl: 1  •  sildenafil (REVATIO) 20 mg tablet, 3 pills by mouth before sexual activity, Disp: 90 tablet, Rfl: 1  •  simvastatin (ZOCOR) 10 mg tablet, TAKE 1 TABLET BY MOUTH EVERY DAY, Disp: 90 tablet, Rfl: 1        Whom besides the patient is providing clinical information about today's encounter?   NO ADDITIONAL HISTORIAN (patient alone provided history)    Physical Exam and Assessment/Plan by Diagnosis:    HISTORY OF SQUAMOUS CELL CARCINOMA      Physical Exam:  Anatomic Location Affected:  neck and forehead, left preauricular   Morphological Description of Scar:  well healed  Suspected Recurrence: no  Regional adenopathy: no     Additional History of Present Condition:  History of squamous cell carcinoma with no sign of recurrence. Last Mohs done on 8/1/2024 by Dr. Almendarez to left preauricular     Assessment and Plan:  Based on a thorough discussion of this condition and the management approach to it (including a comprehensive discussion of the known risks, side effects and potential benefits of treatment), the patient (family) agrees to implement the following specific plan:  Monitor changes  Continue routine skin exam q 6 months  Sun protection with SPF 50 or higher     How can SCC be prevented?  The most important way to prevent SCC is to avoid sunburn. This is especially important in childhood and early life. Fair skinned individuals and those with a personal or family history of BCC should protect their skin from sun exposure daily, year-round and lifelong.  Stay indoors or under the shade in the middle of the day   Wear covering clothing   Apply high protection factor SPF50+ broad-spectrum sunscreens generously to exposed skin if  outdoors   Avoid indoor tanning (sun beds, solaria)      What is the outlook for SCC?  Most SCC are cured by treatment. Cure is most likely if treatment is undertaken when the lesion is small. A small percent of SCC's can spread to lymph  nodes and long term monitoring is indicated.   They are also at increased risk of other skin cancers, especially melanoma. Regular self-skin examinations and long-term annual skin checks by an experienced health professional are recommended.    ACTINIC KERATOSES  - Relevant exam: On the trunk, extremities, and face are scaly pink macules without palpable dermal component    - Exam and clinical history consistent with actinic keratoses  - Discussed that these lesions are considered premalignant with the potential to evolve into squamous cell carcinoma.   - Discussed that these are due to chronic sun exposure and therefore recommend use of sunscreen/sun protection to prevent further sun damage  - Discussed treatment options including risks, benefits  - Patient counseled to return to the office in 4-6 weeks after completion of treatment for recheck if not resolved at which time retreatment or biopsy to rule out SCC will be determined based on clinic findings    - The patient agreed to treatment with topical efudex 5% for 7 days BID to the face; 14 days BID to the extremities/trunk; 21 days BID to the scalp  - Common side effects for this treatment were discussed   - The expected risks of erythema, irritation, pruritus, photosensitivity, etc were reviewed. The patient was advised to stop use and contact the clinic for any significant side effects or concern for infection.   - A recheck is recommended 1-2 months after the completion of therapy for examination of any residual lesions, at which time further treatment such as cryotherapy or biopsy to rule out squamous cell carcinoma will be considered.      PROCEDURE:  DESTRUCTION OF PRE-MALIGNANT LESIONS    - After a thorough discussion  "of treatment options and risk/benefits/alternatives (including but not limited to local pain, scarring, dyspigmentation, blistering, and possible superinfection), verbal and written consent were obtained and the aforementioned lesions were treated on with cryotherapy using liquid nitrogen x 1 cycle for 5-10 seconds.    TOTAL NUMBER of 11 pre-malignant lesions were treated today on the ANATOMIC LOCATION: left cheek x1, forehead x2, bridge of nose x1, right cheek x1, left neck x1, chest x2, right foreharm x1, right hand x1, left hand x1.     The patient tolerated the procedure well, and after-care instructions were provided.    NEOPLASM OF UNCERTAIN BEHAVIOR OF SKIN    Physical Exam:  (Anatomic Location); (Size and Morphological Description); (Differential Diagnosis):  Right upper arm; 0.6 x 0.3 cm atrophic pink scaly macule; scar vs AK vs BCC      Additional History of Present Condition:  noted on exam. Atrophic pink macule. Patient was previously a patient at [facility]. Does not believe he ever had a biopsy or other procedure in this location    Assessment and Plan:  I have discussed with the patient that a sample of skin via a \"skin biopsy” would be potentially helpful to further make a specific diagnosis under the microscope.  Based on a thorough discussion of this condition and the management approach to it (including a comprehensive discussion of the known risks, side effects and potential benefits of treatment), the patient (family) agrees to implement the following specific plan:    Procedure:  Skin Biopsy.  After a thorough discussion of treatment options and risk/benefits/alternatives (including but not limited to local pain, scarring, dyspigmentation, blistering, possible superinfection, and inability to confirm a diagnosis via histopathology), verbal and written consent were obtained and portion of the rash was biopsied for tissue sample.  See below for consent that was obtained from patient and " "subsequent Procedure Note.    PROCEDURE TANGENTIAL (SHAVE) BIOPSY NOTE:    Performing Physician:  Shelly Cortez PA-C  Anatomic Location; Clinical Description with size (cm); Pre-Op Diagnosis:   Right upper arm; 0.6 x 0.3 cm atrophic pink scaly macule; scar vs AK vs BCC  Post-op diagnosis: Same     Local anesthesia: 1% xylocaine with epi      Topical anesthesia: None    Hemostasis: Aluminum chloride       After obtaining informed consent  at which time there was a discussion about the purpose of biopsy  and low risks of infection and bleeding.  The area was prepped and draped in the usual fashion. Anesthesia was obtained with 1% lidocaine with epinephrine. A shave biopsy to an appropriate sampling depth was obtained by Shave (Dermablade or 15 blade) The resulting wound was covered with surgical ointment and bandaged appropriately.     The patient tolerated the procedure well without complications and was without signs of functional compromise.      Specimen has been sent for review by Dermatopathology.    Standard post-procedure care has been explained and has been included in written form within the patient's copy of Informed Consent.    INFORMED CONSENT DISCUSSION AND POST-OPERATIVE INSTRUCTIONS FOR PATIENT    I.  RATIONALE FOR PROCEDURE  I understand that a skin biopsy allows the Dermatologist to test a lesion or rash under the microscope to obtain a diagnosis.  It usually involves numbing the area with numbing medication and removing a small piece of skin; sometimes the area will be closed with sutures. In this specific procedure, sutures are not usually needed.  If any sutures are placed, then they are usually need to be removed in 2 weeks or less.    I understand that my Dermatologist recommends that a skin \"shave\" biopsy be performed today.  A local anesthetic, similar to the kind that a dentist uses when filling a cavity, will be injected with a very small needle into the skin area to be sampled.  The injected " "skin and tissue underneath \"will go to sleep” and become numb so no pain should be felt afterwards.  An instrument shaped like a tiny \"razor blade\" (shave biopsy instrument) will be used to cut a small piece of tissue and skin from the area so that a sample of tissue can be taken and examined more closely under the microscope.  A slight amount of bleeding will occur, but it will be stopped with direct pressure and a pressure bandage and any other appropriate methods.  I understands that a scar will form where the wound was created.  Surgical ointment will be applied to help protect the wound.  Sutures are not usually needed.    II.  RISKS AND POTENTIAL COMPLICATIONS   I understand the risks and potential complications of a skin biopsy include but are not limited to the following:  Bleeding  Infection  Pain  Scar/keloid  Skin discoloration  Incomplete Removal  Recurrence  Nerve Damage/Numbness/Loss of Function  Allergic Reaction to Anesthesia  Biopsies are diagnostic procedures and based on findings additional treatment or evaluation may be required  Loss or destruction of specimen resulting in no additional findings    My Dermatologist has explained to me the nature of the condition, the nature of the procedure, and the benefits to be reasonably expected compared with alternative approaches.  My Dermatologist has discussed the likelihood of major risks or complications of this procedure including the specific risks listed above, such as bleeding, infection, and scarring/keloid.  I understand that a scar is expected after this procedure.  I understand that my physician cannot predict if the scar will form a \"keloid,\" which extends beyond the borders of the wound that is created.  A keloid is a thick, painful, and bumpy scar.  A keloid can be difficult to treat, as it does not always respond well to therapy, which includes injecting cortisone directly into the keloid every few weeks.  While this usually reduces the " "pain and size of the scar, it does not eliminate it.      I understand that photographs may be taken before and after the procedure.  These will be maintained as part of the medical providers confidential records and may not be made available to me.  I further authorize the medical provider to use the photographs for teaching purposes or to illustrate scientific papers, books, or lectures if in his/her judgment, medical research, education, or science may benefit from its use.    I have had an opportunity to fully inquire about the risks and benefits of this procedure and its alternatives.   I have been given ample time and opportunity to ask questions and to seek a second opinion if I wished to do so.  I acknowledge that there have specifically been no guarantees as to the cosmetic results from the procedure.  I am aware that with any procedure there is always the possibility of an unexpected complication.    III. POST-PROCEDURAL CARE (WHAT YOU WILL NEED TO DO \"AFTER THE BIOPSY\" TO OPTIMIZE HEALING)    Keep the area clean and dry.  Try NOT to remove the bandage or get it wet for the first 24 hours.    Gently clean the area and apply surgical ointment (such as Vaseline petrolatum ointment, which is available \"over the counter\" and not a prescription) to the biopsy site for up to 2 weeks straight.  This acts to protect the wound from the outside world.      Sutures are not usually placed in this procedure.  If any sutures were placed, return for suture removal as instructed (generally 1 week for the face, 2 weeks for the body).      Take Acetaminophen (Tylenol) for discomfort, if no contraindications.  Ibuprofen or aspirin could make bleeding worse.    Call our office immediately for signs of infection: fever, chills, increased redness, warmth, tenderness, discomfort/pain, or pus or foul smell coming from the wound.    WHAT TO DO IF THERE IS ANY BLEEDING?  If a small amount of bleeding is noticed, place a clean cloth " "over the area and apply firm pressure for ten minutes.  Check the wound after 10 minutes of direct pressure.  If bleeding persists, try one more time for an additional 10 minutes of direct pressure on the area.  If the bleeding becomes heavier or does not stop after the second attempt, or if you have any other questions about this procedure, then please call your [facility] Dermatologist by calling [phone] (SKIN).     I hereby acknowledge that I have reviewed and verified the site with my Dermatologist and have requested and authorized my Dermatologist to proceed with the procedure.    CHERRY ANGIOMAS     Physical Exam:  Anatomic Location Affected:  Trunk, scalp, and extremities  Morphological Description:  Scattered cherry red papules  Denies pain, itch, bleeding. No treatments tried. Present for years. Present constantly; no modifying factors which make it worse or better.     Assessment and Plan:  Based on a thorough discussion of this condition and the management approach to it (including a comprehensive discussion of the known risks, side effects and potential benefits of treatment), the patient (family) agrees to implement the following specific plan:  Reassure benign      SEBORRHEIC KERATOSIS; NON-INFLAMED     Physical Exam:  Anatomic Location Affected:  Trunk and extremities  Morphological Description:  Waxy, smooth to warty textured, yellow to brownish-grey to dark brown to blackish, discrete, \"stuck-on\" appearing papules.  Present for years. Denies pain, itch, bleeding.      Additional History of Present Condition:  Present constantly; no modifying factors which make it worse or better. No prior treatment.       Assessment and Plan:  Based on a thorough discussion of this condition and the management approach to it (including a comprehensive discussion of the known risks, side effects and potential benefits of treatment), the patient (family) agrees to implement the following specific plan:  Reassure " "benign  Use sun protection.  Apply SPF 30 or higher at least three times a day.  Wear sun protecting clothing and hats.      SOLAR LENTIGINES   OTHER SKIN CHANGES DUE TO CHRONIC EXPOSURE TO NONIONIZING RADIATION     Physical Exam:  Anatomic Location Affected:  Sun exposed areas of back, chest, arms, legs  Morphological Description:  Multiple scattered brown to tan evenly pigmented macules   Denies pain, itch, bleeding. No treatments tried. Present for months - years. Reports getting newer lesions with sun exposure.       Assessment and Plan:  Based on a thorough discussion of this condition and the management approach to it (including a comprehensive discussion of the known risks, side effects and potential benefits of treatment), the patient (family) agrees to implement the following specific plan:  Reassure benign  Use sun protection.  Apply SPF 30 or higher at least three times a day.  Wear sun protecting clothing and hats.       MULTIPLE MELANOCYTIC NEVI (\"Moles\")     Physical Exam:  Anatomic Location Affected: Trunk and extremities  Morphological Description:  Scattered, round to ovoid, symmetrical-appearing, even bordered, skin colored to dark brown macules/papules  Denies pain, itch, bleeding. No treatments tried. Present for years. Present constantly; no modifying factors which make it worse or better. Denies actively changing or growing moles.      Assessment and Plan:  Based on a thorough discussion of this condition and the management approach to it (including a comprehensive discussion of the known risks, side effects and potential benefits of treatment), the patient (family) agrees to implement the following specific plan:  Reassure benign  Monitor for changes  Use sun protection.  Apply SPF 30 or higher at least three times a day.  Wear sun protecting clothing and hats.     Worrisome signs of skin malignancy discussed, questions answered. Regular self-skin check discussed. Advised to call or return to " office if patient notices any spots of concern, rapidly growing/changing lesions, bleeding lesions, non-healing lesions. Advised regular SPF use.     Scribe Attestation    I,:  Kristin Martinez MA am acting as a scribe while in the presence of the attending physician.:       I,:  Shelly Cortez PA-C personally performed the services described in this documentation    as scribed in my presence.:

## 2024-12-23 ENCOUNTER — RESULTS FOLLOW-UP (OUTPATIENT)
Dept: DERMATOLOGY | Facility: CLINIC | Age: 69
End: 2024-12-23

## 2024-12-23 LAB
DME PARACHUTE DELIVERY DATE REQUESTED: NORMAL
DME PARACHUTE ITEM DESCRIPTION: NORMAL
DME PARACHUTE ORDER STATUS: NORMAL
DME PARACHUTE SUPPLIER NAME: NORMAL
DME PARACHUTE SUPPLIER PHONE: NORMAL

## 2024-12-23 PROCEDURE — 88305 TISSUE EXAM BY PATHOLOGIST: CPT | Performed by: PATHOLOGY

## 2024-12-23 NOTE — RESULT ENCOUNTER NOTE
DERMATOPATHOLOGY RESULT NOTE    Results reviewed by ordering provider  Called patient to personally discuss results. Discussed results with patient.       Instructions for Clinical Derm Team:   (remember to route Result Note to appropriate staff):    None    Result & Plan by Specimen:    Specimen A: benign  Plan: reassured, benign    Tissue Exam: [id number]  Order: [id number]   Status: Final result      Dx: Neoplasm of uncertain behavior of skin    Test Result Released: Yes (not seen)    View Follow-Up Encounter     Component  Ref Range & Units (hover)   Case Report  Surgical Pathology Report                         Case: [id number]                                  Authorizing Provider:  Shelly Cortez PA-C          Collected:           12/19/2024 0958              Ordering Location:     [facility]      Received:            12/19/2024 0958                                     [place]                                                                Pathologist:           Tylor Ramírez MD                                                      Specimen:    Skin, Other, right upper arm                                                            Final Diagnosis  A. Skin, right upper arm:  DERMATOFIBROMA    Electronically signed by Tylor Ramírez MD on 12/23/2024 at 1241 EST  Note   Interpretation performed at [facility] [address] [place] [address] [place] [address]    Additional Information   All reported additional testing was performed with appropriately reactive controls.  These tests were developed and their performance characteristics determined by [facility] or appropriate performing facility, though some tests may be performed on tissues which have not been validated for performance characteristics (such as staining performed on alcohol exposed cell blocks and decalcified tissues).  Results should be interpreted with caution and in the context of the patients' clinical  "condition. These tests may not be cleared or approved by the U.S. Food and Drug Administration, though the FDA has determined that such clearance or approval is not necessary. These tests are used for clinical purposes and they should not be regarded as investigational or for research. This laboratory has been approved by CLIA 88, designated as a high-complexity laboratory and is qualified to perform these tests.  .  Gross Description   A. The specimen is received in formalin, labeled with the patient's name and hospital number, and is designated \" skin right upper arm\".  It consists of a 0.3 x 0.3 x 0.1 cm white semitranslucent skin shave.  The presumed resection margin is inked green and the specimen is bisected.  Entirely submitted between sponges in cassette A1.      Note: The estimated total formalin fixation time based upon information provided by the submitting clinician and the standard processing schedule is under 72 hours.  [facility]  Clinical Information   ATTENTION:  DERMPATH GROUP    SPECIMEN A; Skin; Anatomic Location: right upper arm; Procedure/Protocol: Skin Specimen (submit in FORMALIN):Tangential Biopsy (includes shave, scoop, saucerization, curette) (CPT 15226; each additional tangential biopsy is CPT 40820)  69 y.o. year old  Male with a Morphological Description: 0.6 x 0.3 cm atrophic pink scaly macule  Differential Diagnosis and/or Specific Clinical Question: scar vs AK vs superficial BCC  Resulting Agency [facility]          Specimen Collected: 12/19/24  9:58 AM Last Resulted: 12/23/24 12:41 PM     Order Details       View Encounter       Lab and Collection Details       Routing       Result History    Scans on Order [id number]    Lab Result Document - Document on 12/23/2024 12:41 PM            "

## 2025-01-06 DIAGNOSIS — G47.33 OBSTRUCTIVE SLEEP APNEA: Primary | ICD-10-CM

## 2025-01-07 ENCOUNTER — TELEPHONE (OUTPATIENT)
Age: 70
End: 2025-01-07

## 2025-01-07 NOTE — TELEPHONE ENCOUNTER
Called Pt regarding Mask Fitting order. Previous messages (MyChart Msg) inquired about a different type of mask to try. Order placed at that time was a Resupply/Reorder, not a new mask. Provider Tg Whipple placed order for a Mask Fitting.   Informed Pt, via VM, that he can go to [facility], one is located in [place], or be scheduled to be seen by a liaison from [facility] in the [facility]. Mentioned that he can  the script if desired. Left office contact info to CB if needed.  Also replied to MyChart Msg and notified Pt of Mask Order    ----- Message from Tg Whipple PA-C sent at 1/6/2025  2:25 PM EST -----    Mask fitting ordered

## 2025-01-30 DIAGNOSIS — I10 ESSENTIAL HYPERTENSION: ICD-10-CM

## 2025-01-30 DIAGNOSIS — E78.00 HYPERCHOLESTEREMIA: ICD-10-CM

## 2025-01-30 DIAGNOSIS — N40.1 BENIGN PROSTATIC HYPERPLASIA WITH URINARY HESITANCY: ICD-10-CM

## 2025-01-30 DIAGNOSIS — R39.11 BENIGN PROSTATIC HYPERPLASIA WITH URINARY HESITANCY: ICD-10-CM

## 2025-01-30 RX ORDER — LOSARTAN POTASSIUM AND HYDROCHLOROTHIAZIDE 25; 100 MG/1; MG/1
1 TABLET ORAL DAILY
Qty: 90 TABLET | Refills: 3 | Status: SHIPPED | OUTPATIENT
Start: 2025-01-30 | End: 2025-01-30 | Stop reason: SDUPTHER

## 2025-01-30 RX ORDER — DOXAZOSIN 4 MG/1
4 TABLET ORAL
Qty: 100 TABLET | Refills: 3 | Status: SHIPPED | OUTPATIENT
Start: 2025-01-30 | End: 2025-01-30 | Stop reason: SDUPTHER

## 2025-01-30 RX ORDER — LOSARTAN POTASSIUM AND HYDROCHLOROTHIAZIDE 25; 100 MG/1; MG/1
1 TABLET ORAL DAILY
Qty: 90 TABLET | Refills: 3 | Status: SHIPPED | OUTPATIENT
Start: 2025-01-30

## 2025-01-30 RX ORDER — DOXAZOSIN 4 MG/1
4 TABLET ORAL
Qty: 100 TABLET | Refills: 3 | Status: SHIPPED | OUTPATIENT
Start: 2025-01-30

## 2025-01-30 RX ORDER — SIMVASTATIN 10 MG
10 TABLET ORAL DAILY
Qty: 90 TABLET | Refills: 3 | Status: SHIPPED | OUTPATIENT
Start: 2025-01-30

## 2025-01-30 RX ORDER — SIMVASTATIN 10 MG
10 TABLET ORAL DAILY
Qty: 90 TABLET | Refills: 3 | Status: SHIPPED | OUTPATIENT
Start: 2025-01-30 | End: 2025-01-30 | Stop reason: SDUPTHER

## 2025-02-03 DIAGNOSIS — Z86.711 HISTORY OF PULMONARY EMBOLISM: ICD-10-CM

## 2025-02-06 ENCOUNTER — OFFICE VISIT (OUTPATIENT)
Dept: URGENT CARE | Facility: CLINIC | Age: 70
End: 2025-02-06
Payer: MEDICARE

## 2025-02-06 VITALS — HEART RATE: 74 BPM | TEMPERATURE: 97.7 F | RESPIRATION RATE: 18 BRPM | OXYGEN SATURATION: 94 %

## 2025-02-06 DIAGNOSIS — J01.00 ACUTE MAXILLARY SINUSITIS, RECURRENCE NOT SPECIFIED: Primary | ICD-10-CM

## 2025-02-06 PROCEDURE — G0463 HOSPITAL OUTPT CLINIC VISIT: HCPCS

## 2025-02-06 PROCEDURE — 99213 OFFICE O/P EST LOW 20 MIN: CPT

## 2025-02-06 NOTE — PROGRESS NOTES
"  [facility]        NAME: Norman Coles is a 69 y.o. male  : 1955    MRN: [id number]  DATE: 2025  TIME: 2:05 PM    Assessment and Plan   Acute maxillary sinusitis, recurrence not specified [J01.00]  1. Acute maxillary sinusitis, recurrence not specified  amoxicillin-clavulanate (AUGMENTIN) 875-125 mg per tablet            Patient Instructions       Follow up with PCP in 3-5 days.  Proceed to  ER if symptoms worsen.    If tests have been performed at [facility], our office will contact you with results if changes need to be made to the care plan discussed with you at the visit.  You can review your full results on [facility] MyChart.    Chief Complaint     Chief Complaint   Patient presents with    Sinus Problem     Started with a congested cough that has since improved but now with worsening sinus pressure, large amounts of nasal drainage, and nasal congestion, denies fever          History of Present Illness       69-year-old male presents for worsening sinus complaints x 2 weeks.  At onset patient admits he was ill with \"a man cold \"with coughing and sinus complaints.  Did have symptomatic improvement but recently started worsening.  Using Mucinex DM without relief.  Denies known fevers.    Sinus Problem  Associated symptoms include congestion, coughing, ear pain, sinus pressure and a sore throat. Pertinent negatives include no chills.       Review of Systems   Review of Systems   Constitutional:  Negative for chills and fever.   HENT:  Positive for congestion, ear pain, postnasal drip, rhinorrhea, sinus pressure, sinus pain and sore throat.    Respiratory:  Positive for cough. Negative for choking.          Current Medications       Current Outpatient Medications:     amoxicillin-clavulanate (AUGMENTIN) 875-125 mg per tablet, Take 1 tablet by mouth every 12 (twelve) hours for 5 days, Disp: 10 tablet, Rfl: 0    doxazosin (CARDURA) 4 mg tablet, Take 1 tablet (4 mg total) by mouth daily at " bedtime, Disp: 100 tablet, Rfl: 3    fluticasone (FLONASE) 50 mcg/act nasal spray, 2 sprays as needed, Disp: , Rfl: 3    losartan-hydrochlorothiazide (HYZAAR) 100-25 MG per tablet, Take 1 tablet by mouth daily, Disp: 90 tablet, Rfl: 3    Multiple Vitamin (MULTIVITAMIN ADULT PO), Take by mouth, Disp: , Rfl:     Probiotic Product (PROBIOTIC PO), Take by mouth, Disp: , Rfl:     rivaroxaban (Xarelto) 20 mg tablet, Take 1 tablet (20 mg total) by mouth daily, Disp: 90 tablet, Rfl: 1    sildenafil (REVATIO) 20 mg tablet, 3 pills by mouth before sexual activity, Disp: 90 tablet, Rfl: 1    simvastatin (ZOCOR) 10 mg tablet, Take 1 tablet (10 mg total) by mouth daily, Disp: 90 tablet, Rfl: 3    Current Allergies     Allergies as of 02/06/2025 - Reviewed 02/06/2025   Allergen Reaction Noted    Vancomycin Rash and Other (See Comments) 01/21/2015    Ace inhibitors Cough 05/15/2017            The following portions of the patient's history were reviewed and updated as appropriate: allergies, current medications, past family history, past medical history, past social history, past surgical history and problem list.     Past Medical History:   Diagnosis Date    Actinic keratosis 2015    Acute maxillary sinusitis     Arthritis 2000    Asthmatic bronchitis     Benign prostatic hyperplasia     Benign prostatic hyperplasia with urinary incontinence without sensory awareness     Cancer (Formerly [facility]) 2015    Cervical disc disease     Cervicalgia     Chest pain     DVT (deep venous thrombosis) (Formerly [facility]) 2004    Erectile dysfunction     Fatigue     Hypercholesterolemia     Hyperlipidemia     Hypertension     IFG (impaired fasting glucose)     Otitis media     PE (pulmonary thromboembolism) (Formerly [facility]) 2004    on xarelto    Pneumonia 1971    Screening for colon cancer     Screening for prostate cancer     Skin tag     Sleep apnea     12/21/21--states he is being worked up for sleep apnea    Squamous cell skin cancer 2015 2017 2022    Squamous cell skin cancer  06/11/2024    left preauricular    Visual impairment        Past Surgical History:   Procedure Laterality Date    BACK SURGERY  2004    CHOLECYSTECTOMY  2009    COLON SURGERY  2004    Polyps removed    COLONOSCOPY      EYE SURGERY  2016?    tprn retina    MOHS SURGERY  2015 2017 2022    MOHS SURGERY Left 08/01/2024    SCC left preauricular- Dr. Almendarez    PA CLAVICULECTOMY PARTIAL Left 03/09/2021    Procedure: DISTAL CLAVICLE EXCISION;  Surgeon: Eliot Ibarra DO;  Location:  MAIN OR;  Service: Orthopedics    PA SURGICAL ARTHROSCOPY SHOULDER BICEPS TENODESIS Left 03/09/2021    Procedure: ARTHROSCOPIC BICEPS TENODESIS;  Surgeon: Eliot Ibarra DO;  Location:  MAIN OR;  Service: Orthopedics    PA SURGICAL ARTHROSCOPY SHOULDER W/ROTATOR CUFF RPR Left 03/09/2021    Procedure: REPAIR ROTATOR CUFF  ARTHROSCOPIC;  Surgeon: Eliot Ibarra DO;  Location:  MAIN OR;  Service: Orthopedics    SKIN BIOPSY  2015 2016 2017 2022    SPINE SURGERY  2004    Tumor removed lower spine       Family History   Problem Relation Age of Onset    Coronary artery disease Mother     Hypertrophic cardiomyopathy Mother     Thyroid disease Mother     Heart disease Mother     Arthritis Mother         RA    Coronary artery disease Sister     Irritable bowel syndrome Sister     Crohn's disease Sister     Substance Abuse Sister     Mental illness Sister     Substance Abuse Brother     Mental illness Brother     Heart disease Family         cardiovascular disease, ischemic heart disease    Cancer Family     Cancer Father         jaw bone    Hypertension Father     Diabetes Maternal Aunt          Medications have been verified.        Objective   Pulse 74   Temp 97.7 °F (36.5 °C) (Tympanic)   Resp 18   SpO2 94%   No LMP for male patient.       Physical Exam     Physical Exam  Vitals and nursing note reviewed.   Constitutional:       General: He is not in acute distress.     Appearance: He is not toxic-appearing.   HENT:      Head: Normocephalic  and atraumatic.      Comments: Maxillary sinus tender to palpation     Right Ear: Tympanic membrane, ear canal and external ear normal.      Left Ear: Tympanic membrane, ear canal and external ear normal.      Nose: Congestion present.      Mouth/Throat:      Mouth: Mucous membranes are moist.      Pharynx: Posterior oropharyngeal erythema present. No oropharyngeal exudate.      Comments: PND  Eyes:      Conjunctiva/sclera: Conjunctivae normal.   Cardiovascular:      Rate and Rhythm: Normal rate and regular rhythm.   Pulmonary:      Effort: Pulmonary effort is normal.      Breath sounds: Normal breath sounds.   Lymphadenopathy:      Cervical: No cervical adenopathy.   Neurological:      Mental Status: He is alert.   Psychiatric:         Mood and Affect: Mood normal.         Behavior: Behavior normal.

## 2025-02-21 ENCOUNTER — RESULTS FOLLOW-UP (OUTPATIENT)
Dept: FAMILY MEDICINE CLINIC | Facility: HOSPITAL | Age: 70
End: 2025-02-21

## 2025-02-21 ENCOUNTER — APPOINTMENT (OUTPATIENT)
Dept: LAB | Facility: CLINIC | Age: 70
End: 2025-02-21
Payer: MEDICARE

## 2025-02-21 DIAGNOSIS — I10 ESSENTIAL HYPERTENSION: ICD-10-CM

## 2025-02-21 DIAGNOSIS — R73.01 IMPAIRED FASTING GLUCOSE: ICD-10-CM

## 2025-02-21 LAB
ALBUMIN SERPL BCG-MCNC: 4.1 G/DL (ref 3.5–5)
ALP SERPL-CCNC: 39 U/L (ref 34–104)
ALT SERPL W P-5'-P-CCNC: 27 U/L (ref 7–52)
ANION GAP SERPL CALCULATED.3IONS-SCNC: 6 MMOL/L (ref 4–13)
AST SERPL W P-5'-P-CCNC: 33 U/L (ref 13–39)
BILIRUB SERPL-MCNC: 0.93 MG/DL (ref 0.2–1)
BUN SERPL-MCNC: 13 MG/DL (ref 5–25)
CALCIUM SERPL-MCNC: 10.1 MG/DL (ref 8.4–10.2)
CHLORIDE SERPL-SCNC: 101 MMOL/L (ref 96–108)
CHOLEST SERPL-MCNC: 162 MG/DL (ref ?–200)
CO2 SERPL-SCNC: 31 MMOL/L (ref 21–32)
CREAT SERPL-MCNC: 0.71 MG/DL (ref 0.6–1.3)
ERYTHROCYTE [DISTWIDTH] IN BLOOD BY AUTOMATED COUNT: 12.2 % (ref 11.6–15.1)
EST. AVERAGE GLUCOSE BLD GHB EST-MCNC: 134 MG/DL
GFR SERPL CREATININE-BSD FRML MDRD: 97 ML/MIN/1.73SQ M
GLUCOSE P FAST SERPL-MCNC: 116 MG/DL (ref 65–99)
HBA1C MFR BLD: 6.3 %
HCT VFR BLD AUTO: 43.5 % (ref 36.5–49.3)
HDLC SERPL-MCNC: 43 MG/DL
HGB BLD-MCNC: 15 G/DL (ref 12–17)
LDLC SERPL CALC-MCNC: 96 MG/DL (ref 0–100)
MCH RBC QN AUTO: 32.5 PG (ref 26.8–34.3)
MCHC RBC AUTO-ENTMCNC: 34.5 G/DL (ref 31.4–37.4)
MCV RBC AUTO: 94 FL (ref 82–98)
NONHDLC SERPL-MCNC: 119 MG/DL
PLATELET # BLD AUTO: 192 THOUSANDS/UL (ref 149–390)
PMV BLD AUTO: 11.5 FL (ref 8.9–12.7)
POTASSIUM SERPL-SCNC: 3.9 MMOL/L (ref 3.5–5.3)
PROT SERPL-MCNC: 6.5 G/DL (ref 6.4–8.4)
RBC # BLD AUTO: 4.62 MILLION/UL (ref 3.88–5.62)
SODIUM SERPL-SCNC: 138 MMOL/L (ref 135–147)
TRIGL SERPL-MCNC: 115 MG/DL (ref ?–150)
WBC # BLD AUTO: 7.72 THOUSAND/UL (ref 4.31–10.16)

## 2025-02-21 PROCEDURE — 83036 HEMOGLOBIN GLYCOSYLATED A1C: CPT

## 2025-02-21 PROCEDURE — 36415 COLL VENOUS BLD VENIPUNCTURE: CPT

## 2025-02-21 PROCEDURE — 85027 COMPLETE CBC AUTOMATED: CPT

## 2025-02-21 PROCEDURE — 80053 COMPREHEN METABOLIC PANEL: CPT

## 2025-02-21 PROCEDURE — 80061 LIPID PANEL: CPT

## 2025-03-04 ENCOUNTER — OFFICE VISIT (OUTPATIENT)
Dept: FAMILY MEDICINE CLINIC | Facility: HOSPITAL | Age: 70
End: 2025-03-04
Payer: MEDICARE

## 2025-03-04 VITALS
RESPIRATION RATE: 16 BRPM | WEIGHT: 224 LBS | HEART RATE: 68 BPM | TEMPERATURE: 97.4 F | SYSTOLIC BLOOD PRESSURE: 118 MMHG | BODY MASS INDEX: 29.69 KG/M2 | HEIGHT: 73 IN | OXYGEN SATURATION: 98 % | DIASTOLIC BLOOD PRESSURE: 78 MMHG

## 2025-03-04 DIAGNOSIS — I10 ESSENTIAL HYPERTENSION: Primary | ICD-10-CM

## 2025-03-04 DIAGNOSIS — R73.01 IMPAIRED FASTING GLUCOSE: ICD-10-CM

## 2025-03-04 DIAGNOSIS — R39.11 BENIGN PROSTATIC HYPERPLASIA WITH URINARY HESITANCY: ICD-10-CM

## 2025-03-04 DIAGNOSIS — Z86.711 HISTORY OF PULMONARY EMBOLISM: ICD-10-CM

## 2025-03-04 DIAGNOSIS — N40.1 BENIGN PROSTATIC HYPERPLASIA WITH URINARY HESITANCY: ICD-10-CM

## 2025-03-04 PROBLEM — Z86.718 HISTORY OF DVT IN ADULTHOOD: Status: RESOLVED | Noted: 2021-02-26 | Resolved: 2025-03-04

## 2025-03-04 PROCEDURE — G2211 COMPLEX E/M VISIT ADD ON: HCPCS | Performed by: INTERNAL MEDICINE

## 2025-03-04 PROCEDURE — 99214 OFFICE O/P EST MOD 30 MIN: CPT | Performed by: INTERNAL MEDICINE

## 2025-03-04 NOTE — ASSESSMENT & PLAN NOTE
Patient is impaired fasting glucose and A1c increased to 6.3 last month.  We discussed increasing physical activity to burn calories and lose weight and decreasing carb intake.  I gave patient instructions on diabetic diet.  I will recheck his A1c along with a urine microalbumin in September before our next appointment  Orders:  •  Hemoglobin A1C; Future

## 2025-03-04 NOTE — PATIENT INSTRUCTIONS
"Will consider adding proscar or avodart to doxazosin if your urinary symptoms get worse.    Patient Education     Diabetes and diet   The Basics   Written by the doctors and editors at [facility]   Why is diet important if I have diabetes? -- Diet is important because it is part of diabetes treatment. Many people need to change what they eat and how much they eat to help treat their diabetes. It is important for people to treat their diabetes so they:   Keep their blood sugar at goal   Prevent long-term problems, such as heart or kidney problems, that can happen in people with diabetes  Changing your diet can also help treat obesity, high blood pressure, and high cholesterol. These conditions can affect people with diabetes and can lead to future problems, such as heart attacks or strokes.  Who will help me change my diet? -- Your doctor or nurse will work with you to make a food plan to change your diet. They might also recommend that you work with a dietitian. A dietitian is an expert on food and eating.  Do I need to eat at the same times every day? -- When and how often you should eat depends, in part, on the diabetes medicines you take. For example:   People who take about the same amount of insulin at the same time each day (called a \"fixed regimen\") should eat meals at the same times. This is also true for people who take pills that increase insulin levels, such as sulfonylureas. Eating meals at the same time every day helps prevent low blood sugar.   People who adjust the dose and timing of their insulin each day (called a \"flexible regimen\") do not always have to eat meals at the same time. That's because they can time their insulin dose for before they plan to eat, and also adjust the dose for how much they plan to eat.   People who take medicines that don't usually cause low blood sugar, such as metformin, don't have to eat meals at the same time every day.  What do I need to think about when planning what " "to eat? -- Our bodies break down the food we eat into small pieces called carbohydrates, proteins, and fats.  When planning what to eat, people with diabetes need to think about:   Carbohydrates (or \"carbs\") - These are sugars the body uses for energy. They can raise your blood sugar level. Your doctor, nurse, or dietitian will tell you how many carbs you should eat at each meal or snack. Foods that have carbs include:   Bread, pasta, and rice   Vegetables and fruits   Dairy foods   Foods and drinks with added sugar  It is best to get your carbs from fruits, vegetables, whole grains, and low-fat milk. It is best to avoid drinks with added sugar, like soda, juices, and sports drinks.   Protein - Your doctor, nurse, or dietitian will tell you how much protein you should eat each day. It is best to eat lean meats, fish, eggs, beans, peas, soy products, nuts, and seeds. Avoid or limit processed meats like nice, hot dogs, and sausages.   Fats - The type of fat you eat is more important than the amount of fat. \"Saturated\" and \"trans\" fats can increase your risk for heart problems, like a heart attack.   Foods that have saturated fats include meat, butter, cheese, and ice cream.   Foods that have trans fats include processed food with \"partially hydrogenated oils\" on the ingredient list. This might include fried foods, store-bought cookies, muffins, pies, and cakes.  \"Monounsaturated\" and \"polyunsaturated\" fats are better for you. Foods with these types of fat include fish, avocado, olive oil, and nuts.   Calories - You need to eat a certain amount of calories each day to keep your weight the same. If you have excess body weight and want to lose weight, you need to eat fewer calories each day.   Fiber - Eating foods with a lot of fiber can help manage your blood sugar level. Foods that have a lot of fiber include apples, green beans, peas, beans, lentils, nuts, oatmeal, and whole grains.   Salt - People who have high " blood pressure should not eat foods that contain a lot of salt (also called sodium). People with high blood pressure should also eat healthy foods, such as fruits, vegetables, and low-fat dairy foods.   Alcohol and sugary drinks - Having more than 1 alcoholic drink (for females) or 2 drinks (for males) a day can raise blood sugar levels. Also, sugary drinks like fruit juice or soda can raise blood sugar levels.  How can I lose weight? -- You can:   Exercise - Try to get at least 30 minutes of physical activity a day, most days of the week. Even gentle exercise, like walking, is good for your health. Some people with diabetes need to change their medicine dose before they exercise. They might also need to check their blood sugar levels before and after exercising.   Eat fewer calories - Your doctor, nurse, or dietitian can tell you how many calories you should eat each day to lose weight.  If you are worried about your weight, size, or shape, talk with your doctor, nurse, or dietitian. They can help you make changes to improve your health.  Can I eat the same foods as my family? -- Yes. You do not need to eat special foods if you have diabetes. You and your family can eat the same foods. Changing your diet is mostly about eating healthy foods in healthy amounts.  What are the other parts of diabetes treatment? -- Besides changing your diet, the other parts of diabetes treatment are:   Exercise   Medicines  Some people with diabetes need to learn how to match their diet and exercise with their medicine dose. For example, people who use insulin might need to choose the dose of insulin they give themselves. To choose their dose, they need to think about:   What they plan to eat at the next meal   How much exercise they plan to do   What their blood sugar level is  If the diet and exercise do not match the medicine dose, a person's blood sugar level can get too low or too high. Blood sugar levels that are too low or too  high can cause problems.  All topics are updated as new evidence becomes available and our peer review process is complete.  This topic retrieved from [employer] on: Mar 27, 2024.  Topic 77503 Version 13.0  Release: 32.2.4 - C32.85  © 2024 UpToDate, Inc. and/or its affiliates. All rights reserved.  Consumer Information Use and Disclaimer   Disclaimer: This generalized information is a limited summary of diagnosis, treatment, and/or medication information. It is not meant to be comprehensive and should be used as a tool to help the user understand and/or assess potential diagnostic and treatment options. It does NOT include all information about conditions, treatments, medications, side effects, or risks that may apply to a specific patient. It is not intended to be medical advice or a substitute for the medical advice, diagnosis, or treatment of a health care provider based on the health care provider's examination and assessment of a patient's specific and unique circumstances. Patients must speak with a health care provider for complete information about their health, medical questions, and treatment options, including any risks or benefits regarding use of medications. This information does not endorse any treatments or medications as safe, effective, or approved for treating a specific patient. UpToDate, Inc. and its affiliates disclaim any warranty or liability relating to this information or the use thereof.The use of this information is governed by the Terms of Use, available at https://www.woltersJounceuwer.com/en/know/clinical-effectiveness-terms. 2024© UpToDate, Inc. and its affiliates and/or licensors. All rights reserved.  Copyright   © 2024 UpToDate, Inc. and/or its affiliates. All rights reserved.

## 2025-03-04 NOTE — ASSESSMENT & PLAN NOTE
Patient has BPH with decreased urine flow.  Denies dysuria, hematuria, urinary retention.  Discussed adding finasteride or Avodart to doxazosin but for now urination is adequate and will monitor patient on doxazosin only

## 2025-03-04 NOTE — ASSESSMENT & PLAN NOTE
Patient is history of DVT and PE.  He has been on Xarelto.  Denies any signs of GI or  bleeding.  CBC showed normal hemoglobin in February.  Continue Xarelto

## 2025-03-04 NOTE — ASSESSMENT & PLAN NOTE
Patient has hypertension.  Blood pressure is well-controlled today.  Denies chest pains, shortness of breath colitis.  His electrolytes and renal function were normal on February 21.  Continue losartan/HCTZ and doxazosin  Orders:  •  Albumin / creatinine urine ratio; Future  •  Comprehensive metabolic panel; Future

## 2025-03-04 NOTE — PROGRESS NOTES
"Name: Norman Coles      : 1955      MRN: [id number]  Encounter Provider: Hal Solorio MD  Encounter Date: 3/4/2025   Encounter department: [facility] PRIMARY CARE [address]  :  Assessment & Plan  Essential hypertension  Patient has hypertension.  Blood pressure is well-controlled today.  Denies chest pains, shortness of breath colitis.  His electrolytes and renal function were normal on .  Continue losartan/HCTZ and doxazosin  Orders:  •  Albumin / creatinine urine ratio; Future  •  Comprehensive metabolic panel; Future    Benign prostatic hyperplasia with urinary hesitancy  Patient has BPH with decreased urine flow.  Denies dysuria, hematuria, urinary retention.  Discussed adding finasteride or Avodart to doxazosin but for now urination is adequate and will monitor patient on doxazosin only       History of pulmonary embolism  Patient is history of DVT and PE.  He has been on Xarelto.  Denies any signs of GI or  bleeding.  CBC showed normal hemoglobin in February.  Continue Xarelto       Impaired fasting glucose  Patient is impaired fasting glucose and A1c increased to 6.3 last month.  We discussed increasing physical activity to burn calories and lose weight and decreasing carb intake.  I gave patient instructions on diabetic diet.  I will recheck his A1c along with a urine microalbumin in September before our next appointment  Orders:  •  Hemoglobin A1C; Future          Depression Screening and Follow-up Plan: Patient was screened for depression during today's encounter. They screened negative with a PHQ-2 score of 0.        History of Present Illness   HPI  Review of Systems    Objective   /78   Pulse 68   Temp (!) 97.4 °F (36.3 °C) (Tympanic)   Resp 16   Ht 6' 1\" (1.854 m)   Wt 102 kg (224 lb)   SpO2 98%   BMI 29.55 kg/m²      Physical Exam  Constitutional:       General: He is not in acute distress.     Appearance: He is well-developed. He is not toxic-appearing. "   HENT:      Head: Normocephalic.   Eyes:      Conjunctiva/sclera: Conjunctivae normal.   Cardiovascular:      Rate and Rhythm: Normal rate and regular rhythm.      Heart sounds: No murmur heard.  Pulmonary:      Effort: No respiratory distress.      Breath sounds: No wheezing or rales.   Abdominal:      General: Bowel sounds are normal.      Palpations: Abdomen is soft.      Tenderness: There is no abdominal tenderness.   Musculoskeletal:      Cervical back: Neck supple.   Skin:     General: Skin is warm and dry.      Comments: Patient had no lower extremity edema   Neurological:      Mental Status: He is alert and oriented to person, place, and time.      Cranial Nerves: No cranial nerve deficit.      Motor: No weakness.   Psychiatric:         Mood and Affect: Mood normal.

## 2025-04-08 ENCOUNTER — PATIENT MESSAGE (OUTPATIENT)
Dept: UROLOGY | Facility: CLINIC | Age: 70
End: 2025-04-08

## 2025-04-08 DIAGNOSIS — R97.20 ELEVATED PSA: Primary | ICD-10-CM

## 2025-04-10 ENCOUNTER — APPOINTMENT (OUTPATIENT)
Dept: LAB | Facility: CLINIC | Age: 70
End: 2025-04-10
Payer: MEDICARE

## 2025-04-10 DIAGNOSIS — R97.20 ELEVATED PSA: ICD-10-CM

## 2025-04-10 LAB — PSA SERPL-MCNC: 6.15 NG/ML (ref 0–4)

## 2025-04-15 ENCOUNTER — OFFICE VISIT (OUTPATIENT)
Dept: UROLOGY | Facility: CLINIC | Age: 70
End: 2025-04-15
Payer: MEDICARE

## 2025-04-15 VITALS
SYSTOLIC BLOOD PRESSURE: 142 MMHG | HEIGHT: 73 IN | DIASTOLIC BLOOD PRESSURE: 84 MMHG | OXYGEN SATURATION: 96 % | HEART RATE: 63 BPM | BODY MASS INDEX: 30.22 KG/M2 | WEIGHT: 228 LBS

## 2025-04-15 DIAGNOSIS — R97.20 ELEVATED PSA: Primary | ICD-10-CM

## 2025-04-15 DIAGNOSIS — N52.8 OTHER MALE ERECTILE DYSFUNCTION: ICD-10-CM

## 2025-04-15 PROCEDURE — 99213 OFFICE O/P EST LOW 20 MIN: CPT | Performed by: PHYSICIAN ASSISTANT

## 2025-04-15 RX ORDER — SILDENAFIL CITRATE 20 MG/1
TABLET ORAL
Qty: 90 TABLET | Refills: 1 | Status: SHIPPED | OUTPATIENT
Start: 2025-04-15

## 2025-04-15 NOTE — ASSESSMENT & PLAN NOTE
elevated psa several years, peaked at 9  no prior biopsy  MRI 2023: 72cc gland, no lesion, pirads 2 low risk for malignancy  psa density 0.08    f/u PSA 1 year    Lab Results   Component Value Date    PSA 6.150 (H) 04/10/2025    PSA 5.491 (H) 07/05/2024    PSA 5.6 (H) 04/01/2024       Orders:    PSA Total, Diagnostic; Future

## 2025-04-15 NOTE — PROGRESS NOTES
Name: Norman Coles      : 1955      MRN: [id number]  Encounter Provider: Geeta Orr PA-C  Encounter Date: 4/15/2025   Encounter department: [facility]  :  Assessment & Plan  Other male erectile dysfunction  using 60mg PRN  Orders:    sildenafil (REVATIO) 20 mg tablet; 3 pills by mouth before sexual activity    Elevated PSA  elevated psa several years, peaked at 9  no prior biopsy  MRI : 72cc gland, no lesion, pirads 2 low risk for malignancy  psa density 0.08    f/u PSA 1 year    Lab Results   Component Value Date    PSA 6.150 (H) 04/10/2025    PSA 5.491 (H) 2024    PSA 5.6 (H) 2024       Orders:    PSA Total, Diagnostic; Future        History of Present Illness   Norman Coles is a 69 y.o. male who presents annual follow-up elevated PSA.  72 cc gland no target lesion and PI-RADS 2 low risk.  Previous PSA baseline was closer to 3 or 4 but lately has been between 5 and 7.  Peaked at of 9.  Currently his PSA has come down to 5.4 and 6.1.    No personal or family history of prostate cancer.  Patient has not ever had a prostate biopsy.    On doxazosin for hypertension last visit we increased it to 4 mg for BPH with lower urinary tract bother with improvement. He has slow flow/slow to empty, but no significant bother. Happy overall.    He is compliant with CPAP for SOL. No nocturia, or rarely 1x.    On xarelto, never hematuria.    AUA SYMPTOM SCORE      Flowsheet Row Most Recent Value   AUA SYMPTOM SCORE    How often have you had a sensation of not emptying your bladder completely after you finished urinating? 1 (P)     How often have you had to urinate again less than two hours after you finished urinating? 1 (P)     How often have you found you stopped and started again several times when you urinate? 3 (P)     How often have you found it difficult to postpone urination? 0 (P)     How often have you had a weak urinary stream? 3 (P)     How often have you had to  "push or strain to begin urination? 1 (P)     How many times did you most typically get up to urinate from the time you went to bed at night until the time you got up in the morning? 1 (P)     Quality of Life: If you were to spend the rest of your life with your urinary condition just the way it is now, how would you feel about that? 2 (P)     AUA SYMPTOM SCORE 10 (P)            Review of Systems   Constitutional: Negative.    Respiratory: Negative.     Cardiovascular: Negative.    Genitourinary:  Negative for decreased urine volume, difficulty urinating, dysuria, flank pain, frequency, hematuria and urgency.   Musculoskeletal: Negative.           Objective   /84 (BP Location: Left arm, Patient Position: Sitting, Cuff Size: Adult)   Pulse 63   Ht 6' 1\" (1.854 m)   Wt 103 kg (228 lb)   SpO2 96%   BMI 30.08 kg/m²     Physical Exam  Vitals and nursing note reviewed.   Constitutional:       General: He is not in acute distress.     Appearance: He is well-developed. He is not diaphoretic.   HENT:      Head: Normocephalic and atraumatic.   Pulmonary:      Effort: Pulmonary effort is normal.   Musculoskeletal:      Right lower leg: No edema.      Left lower leg: No edema.   Skin:     General: Skin is warm.   Neurological:      Mental Status: He is alert and oriented to person, place, and time.           Results   Lab Results   Component Value Date    PSA 6.150 (H) 04/10/2025    PSA 5.491 (H) 07/05/2024    PSA 5.6 (H) 04/01/2024     Lab Results   Component Value Date    GLUCOSE 93 06/21/2014    CALCIUM 10.1 02/21/2025     06/21/2014    K 3.9 02/21/2025    CO2 31 02/21/2025     02/21/2025    BUN 13 02/21/2025    CREATININE 0.71 02/21/2025     Lab Results   Component Value Date    WBC 7.72 02/21/2025    HGB 15.0 02/21/2025    HCT 43.5 02/21/2025    MCV 94 02/21/2025     02/21/2025       Office Urine Dip  No results found for this or any previous visit (from the past hour).      "

## 2025-04-15 NOTE — ASSESSMENT & PLAN NOTE
using 60mg PRN  Orders:    sildenafil (REVATIO) 20 mg tablet; 3 pills by mouth before sexual activity

## 2025-04-17 ENCOUNTER — TELEPHONE (OUTPATIENT)
Age: 70
End: 2025-04-17

## 2025-06-30 ENCOUNTER — OFFICE VISIT (OUTPATIENT)
Dept: DERMATOLOGY | Facility: CLINIC | Age: 70
End: 2025-06-30
Payer: MEDICARE

## 2025-06-30 VITALS — BODY MASS INDEX: 29.82 KG/M2 | TEMPERATURE: 97.4 F | WEIGHT: 226 LBS

## 2025-06-30 DIAGNOSIS — L82.1 SEBORRHEIC KERATOSES: ICD-10-CM

## 2025-06-30 DIAGNOSIS — D22.62 MULTIPLE BENIGN MELANOCYTIC NEVI OF UPPER AND LOWER EXTREMITIES AND TRUNK: ICD-10-CM

## 2025-06-30 DIAGNOSIS — D22.71 MULTIPLE BENIGN MELANOCYTIC NEVI OF UPPER AND LOWER EXTREMITIES AND TRUNK: ICD-10-CM

## 2025-06-30 DIAGNOSIS — D22.5 MULTIPLE BENIGN MELANOCYTIC NEVI OF UPPER AND LOWER EXTREMITIES AND TRUNK: ICD-10-CM

## 2025-06-30 DIAGNOSIS — D18.01 CHERRY ANGIOMA: ICD-10-CM

## 2025-06-30 DIAGNOSIS — D22.61 MULTIPLE BENIGN MELANOCYTIC NEVI OF UPPER AND LOWER EXTREMITIES AND TRUNK: ICD-10-CM

## 2025-06-30 DIAGNOSIS — D22.72 MULTIPLE BENIGN MELANOCYTIC NEVI OF UPPER AND LOWER EXTREMITIES AND TRUNK: ICD-10-CM

## 2025-06-30 DIAGNOSIS — L57.0 ACTINIC KERATOSIS: ICD-10-CM

## 2025-06-30 DIAGNOSIS — L81.4 LENTIGINES: ICD-10-CM

## 2025-06-30 DIAGNOSIS — Z85.828 HISTORY OF SCC (SQUAMOUS CELL CARCINOMA) OF SKIN: Primary | ICD-10-CM

## 2025-06-30 PROCEDURE — 17004 DESTROY PREMAL LESIONS 15/>: CPT | Performed by: REGISTERED NURSE

## 2025-06-30 PROCEDURE — 99214 OFFICE O/P EST MOD 30 MIN: CPT | Performed by: REGISTERED NURSE

## 2025-06-30 NOTE — PROGRESS NOTES
"[facility] Note     Patient Name: Norman Coles  Encounter Date: 06/30/2025       Have you been cared for by a [facility] Dermatologist in the last 3 years and, if so, which description applies to you? Yes. I have been here within the last 3 years, and my medical history has NOT changed since that time. I am not of child-bearing potential.     REVIEW OF SYSTEMS:  Have you recently had or currently have any of the following? No changes in my recent health.   PAST MEDICAL HISTORY:  Have you personally ever had or currently have any of the following?  If \"YES,\" then please provide more detail. No changes in my medical history.   HISTORY OF IMMUNOSUPPRESSION: Do you have a history of any of the following:  Systemic Immunosuppression such as Diabetes, Biologic or Immunotherapy, Chemotherapy, Organ Transplantation, Bone Marrow Transplantation or Prednisone?  No     Answering \"YES\" requires the addition of the dotphrase \"IMMUNOSUPPRESSED\" as the first diagnosis of the patient's visit.   FAMILY HISTORY:  Any \"first degree relatives\" (parent, brother, sister, or child) with the following?    No changes in my family's known health.   PATIENT EXPERIENCE:    Do you want the Dermatologist to perform a COMPLETE skin exam today including a clinical examination under the \"underwear\" areas?  NO  If necessary, do we have your permission to call and leave a detailed message on your Preferred Phone number that includes your specific medical information?  Yes      Allergies[1] Current Medications[2]        Whom besides the patient is providing clinical information about today's encounter?   NO ADDITIONAL HISTORIAN (patient alone provided history)    Physical Exam and Assessment/Plan by Diagnosis:    HISTORY OF SQUAMOUS CELL CARCINOMA   Physical Exam:  Anatomic Location Affected:  neck and forehead, left preauricular   Morphological Description of Scar:  well healed  Suspected Recurrence: no  Regional adenopathy: no   " "  Additional History of Present Condition:  History of squamous cell carcinoma with no sign of recurrence. Last Mohs done on 8/1/2024 by Dr. Almendarez to left preauricular.     Assessment and Plan:  Based on a thorough discussion of this condition and the management approach to it (including a comprehensive discussion of the known risks, side effects and potential benefits of treatment), the patient (family) agrees to implement the following specific plan:  Monitor changes.  Continue routine skin exam every 6 months.  Sun protection with SPF 50 or higher.     CHERRY ANGIOMAS  Physical Exam:  Anatomic Location Affected:  Trunk and extremities  Morphological Description:  Scattered cherry red papules  Denies pain, itch, bleeding. No treatments tried. Present for years. Present constantly; no modifying factors which make it worse or better.     Assessment and Plan:  Based on a thorough discussion of this condition and the management approach to it (including a comprehensive discussion of the known risks, side effects and potential benefits of treatment), the patient (family) agrees to implement the following specific plan:  Reassure benign     SEBORRHEIC KERATOSIS; NON-INFLAMED  Physical Exam:  Anatomic Location Affected:  Trunk and extremities  Morphological Description:  Waxy, smooth to warty textured, yellow to brownish-grey to dark brown to blackish, discrete, \"stuck-on\" appearing papules.  Present for years. Denies pain, itch, bleeding.      Additional History of Present Condition:  Present constantly; no modifying factors which make it worse or better. No prior treatment.       Assessment and Plan:  Based on a thorough discussion of this condition and the management approach to it (including a comprehensive discussion of the known risks, side effects and potential benefits of treatment), the patient (family) agrees to implement the following specific plan:  Reassure benign  Use sun protection.  Apply SPF 30 or higher " "at least three times a day.  Wear sun protecting clothing and hats.     SOLAR LENTIGINES   OTHER SKIN CHANGES DUE TO CHRONIC EXPOSURE TO NONIONIZING RADIATION  Physical Exam:  Anatomic Location Affected:  Sun exposed areas of back, chest, arms, legs  Morphological Description:  Multiple scattered brown to tan evenly pigmented macules   Denies pain, itch, bleeding. No treatments tried. Present for months - years. Reports getting newer lesions with sun exposure.       Assessment and Plan:  Based on a thorough discussion of this condition and the management approach to it (including a comprehensive discussion of the known risks, side effects and potential benefits of treatment), the patient (family) agrees to implement the following specific plan:  Reassure benign  Use sun protection.  Apply SPF 30 or higher at least three times a day.  Wear sun protecting clothing and hats.        MULTIPLE MELANOCYTIC NEVI (\"Moles\")  Physical Exam:  Anatomic Location Affected: Trunk and extremities  Morphological Description:  Scattered, round to ovoid, symmetrical-appearing, even bordered, skin colored to dark brown macules/papules  Denies pain, itch, bleeding. No treatments tried. Present for years. Present constantly; no modifying factors which make it worse or better. Denies actively changing or growing moles.      Assessment and Plan:  Based on a thorough discussion of this condition and the management approach to it (including a comprehensive discussion of the known risks, side effects and potential benefits of treatment), the patient (family) agrees to implement the following specific plan:  Reassure benign  Monitor for changes  Use sun protection.  Apply SPF 30 or higher at least three times a day.  Wear sun protecting clothing and hats.     Worrisome signs of skin malignancy discussed, questions answered. Regular self-skin check discussed. Advised to call or return to office if patient notices any spots of concern, rapidly " growing/changing lesions, bleeding lesions, non-healing lesions. Advised regular SPF use.     ACTINIC KERATOSES  Physical Exam:  Anatomic Location Affected:  left cheek, right posterior neck, right forearm, right dorsal hand, left forearm and left dorsal hand  Morphological Description:  Thin pink papule(s) with gritty scale     Assessment and Plan:  Based on a thorough discussion of this condition and the management approach to it (including a comprehensive discussion of the known risks, side effects and potential benefits of treatment), the patient (family) agrees to implement the following specific plan:  Treated with cryotherapy today; written and verbal consent obtained     PROCEDURE:  DESTRUCTION OF PRE-MALIGNANT LESIONS  After a thorough discussion of treatment options and risk/benefits/alternatives (including but not limited to local pain, scarring, dyspigmentation, blistering, and possible superinfection), verbal and written consent were obtained and the aforementioned lesions were treated on with cryotherapy using liquid nitrogen x 2 cycles for 5-10 seconds. The patient tolerated the procedure well, and after-care instructions were provided.     TOTAL NUMBER of 16 pre-malignant lesions were treated today on the ANATOMIC LOCATION: 2 left cheek, 1 right posterior neck, 7 right forearm and right dorsal hand, 6 left forearm and left dorsal hand.     Patient instructions:  Your pre-cancerous lesions (called actinic keratosis) were treated with liquid nitrogen today. The treated areas will get more red, crusted over the next few days. There might be some blistering. Apply vaseline to the treated area for the next week to help it heal fully. Do not pick at the area. Return in 3-4 weeks for another round of liquid nitrogen treatment if lesion(s)  fails to fully resolve.      Scribe Attestation      I,:  Sheri Mccabe MA am acting as a scribe while in the presence of the attending physician.:       I,:  Ihsan  MD Ismael personally performed the services described in this documentation    as scribed in my presence.:                [1]   Allergies  Allergen Reactions    Vancomycin Rash and Other (See Comments)     Peeling of skin    Ace Inhibitors Cough   [2]   Current Outpatient Medications:     doxazosin (CARDURA) 4 mg tablet, Take 1 tablet (4 mg total) by mouth daily at bedtime, Disp: 100 tablet, Rfl: 3    fluticasone (FLONASE) 50 mcg/act nasal spray, 2 sprays as needed, Disp: , Rfl: 3    losartan-hydrochlorothiazide (HYZAAR) 100-25 MG per tablet, Take 1 tablet by mouth daily, Disp: 90 tablet, Rfl: 3    Multiple Vitamin (MULTIVITAMIN ADULT PO), Take by mouth, Disp: , Rfl:     rivaroxaban (Xarelto) 20 mg tablet, Take 1 tablet (20 mg total) by mouth daily, Disp: 90 tablet, Rfl: 1    sildenafil (REVATIO) 20 mg tablet, 3 pills by mouth before sexual activity, Disp: 90 tablet, Rfl: 1    simvastatin (ZOCOR) 10 mg tablet, Take 1 tablet (10 mg total) by mouth daily, Disp: 90 tablet, Rfl: 3    VITAMIN D, CHOLECALCIFEROL, PO, Take by mouth, Disp: , Rfl:

## 2025-07-05 ENCOUNTER — OFFICE VISIT (OUTPATIENT)
Dept: URGENT CARE | Facility: CLINIC | Age: 70
End: 2025-07-05
Payer: MEDICARE

## 2025-07-05 VITALS
DIASTOLIC BLOOD PRESSURE: 82 MMHG | HEART RATE: 70 BPM | RESPIRATION RATE: 18 BRPM | SYSTOLIC BLOOD PRESSURE: 150 MMHG | OXYGEN SATURATION: 96 % | TEMPERATURE: 96.7 F

## 2025-07-05 DIAGNOSIS — Z20.822 CLOSE EXPOSURE TO COVID-19 VIRUS: Primary | ICD-10-CM

## 2025-07-05 DIAGNOSIS — B02.9 HERPES ZOSTER WITHOUT COMPLICATION: ICD-10-CM

## 2025-07-05 LAB
SARS-COV-2 AG UPPER RESP QL IA: NEGATIVE
VALID CONTROL: NORMAL

## 2025-07-05 PROCEDURE — 99213 OFFICE O/P EST LOW 20 MIN: CPT | Performed by: PHYSICIAN ASSISTANT

## 2025-07-05 PROCEDURE — 87811 SARS-COV-2 COVID19 W/OPTIC: CPT | Performed by: PHYSICIAN ASSISTANT

## 2025-07-05 PROCEDURE — G0463 HOSPITAL OUTPT CLINIC VISIT: HCPCS | Performed by: PHYSICIAN ASSISTANT

## 2025-07-05 RX ORDER — VALACYCLOVIR HYDROCHLORIDE 1 G/1
1000 TABLET, FILM COATED ORAL 3 TIMES DAILY
Qty: 21 TABLET | Refills: 0 | Status: SHIPPED | OUTPATIENT
Start: 2025-07-05 | End: 2025-07-12

## 2025-07-05 NOTE — PROGRESS NOTES
[facility]  Name: Norman Coles      : 1955      MRN: [id number]  Encounter Provider: Kartik Cowan PA-C  Encounter Date: 2025   Encounter department: [facility] [place]  :  Assessment & Plan  Close exposure to COVID-19 virus    Orders:    Poct Covid 19 Rapid Antigen Test    Herpes zoster without complication    Orders:    valACYclovir (VALTREX) 1,000 mg tablet; Take 1 tablet (1,000 mg total) by mouth 3 (three) times a day for 7 days        Patient Instructions  Follow up with PCP in 3-5 days.  Proceed to  ER if symptoms worsen.    If tests are performed, our office will contact you with results only if changes need to made to the care plan discussed with you at the visit. [facility]    Chief Complaint:   Chief Complaint   Patient presents with    Headache     Patient with head and neck ache, L shoulder rash, R ear pain and fatigue since Thursday. Patient states he was exposed to people with covid, home test negative. Patient was a retest of covid.      History of Present Illness   Patient presents with fatigue, body aches, rash over the left shoulder and a little bit on the left forearm starting 2 days ago.  Did have an exposure to COVID but took an at home test that was negative.  Requesting another test be done.  Has some neck stiffness and bodyaches.  Denies neck pain, fevers, chest pains, shortness of breath, difficulty breathing, visual disturbances, headache.  The rash is not itchy.    Headache        Review of Systems   Constitutional:  Positive for fatigue. Negative for fever.   Eyes:  Negative for visual disturbance.   Respiratory:  Negative for shortness of breath.    Cardiovascular:  Negative for chest pain.   Musculoskeletal:  Positive for arthralgias, myalgias and neck stiffness. Negative for back pain and neck pain.   Skin:  Positive for rash.   Neurological:  Positive for headaches.     Past Medical History   Past Medical  History[1]  Past Surgical History[2]  Family History[3]  he reports that he has never smoked. He has never used smokeless tobacco. He reports current alcohol use. He reports that he does not currently use drugs.  Current Outpatient Medications   Medication Instructions    doxazosin (CARDURA) 4 mg, Oral, Daily at bedtime    fluticasone (FLONASE) 50 mcg/act nasal spray 2 sprays, As needed    losartan-hydrochlorothiazide (HYZAAR) 100-25 MG per tablet 1 tablet, Oral, Daily    Multiple Vitamin (MULTIVITAMIN ADULT PO) Take by mouth    rivaroxaban (XARELTO) 20 mg, Oral, Daily    sildenafil (REVATIO) 20 mg tablet 3 pills by mouth before sexual activity    simvastatin (ZOCOR) 10 mg, Oral, Daily    valACYclovir (VALTREX) 1,000 mg, Oral, 3 times daily    VITAMIN D, CHOLECALCIFEROL, PO Take by mouth   Allergies[4]     Objective   /82   Pulse 70   Temp (!) 96.7 °F (35.9 °C)   Resp 18   SpO2 96%      Physical Exam  Constitutional:       General: He is not in acute distress.     Appearance: Normal appearance. He is normal weight. He is not ill-appearing.   HENT:      Right Ear: Tympanic membrane, ear canal and external ear normal.      Left Ear: Tympanic membrane, ear canal and external ear normal.      Nose: Nose normal.      Mouth/Throat:      Mouth: Mucous membranes are moist.      Pharynx: Oropharynx is clear.     Cardiovascular:      Rate and Rhythm: Normal rate and regular rhythm.      Heart sounds: Normal heart sounds.   Pulmonary:      Effort: Pulmonary effort is normal. No respiratory distress.      Breath sounds: Normal breath sounds. No stridor. No wheezing, rhonchi or rales.     Musculoskeletal:      Cervical back: Normal range of motion. No tenderness.   Lymphadenopathy:      Cervical: No cervical adenopathy.     Skin:     Comments: Grouped erythematous vesicular rash densely scattered over the left anterior shoulder and a few small areas over the ulnar aspect of the left forearm.     Neurological:       "Mental Status: He is alert.         Portions of the record may have been created with voice recognition software.  Occasional wrong word or \"sound a like\" substitutions may have occurred due to the inherent limitations of voice recognition software.  Read the chart carefully and recognize, using context, where substitutions have occurred.       [1]   Past Medical History:  Diagnosis Date    Actinic keratosis 2015    Acute maxillary sinusitis     Arthritis 2000    Asthmatic bronchitis     Benign prostatic hyperplasia     Benign prostatic hyperplasia with urinary incontinence without sensory awareness     Cancer ([facility]) 2015    Cervical disc disease     Cervicalgia     Chest pain     DVT (deep venous thrombosis) ([facility]) 2004    Erectile dysfunction     Fatigue     Hypercholesterolemia     Hyperlipidemia     Hypertension     IFG (impaired fasting glucose)     Otitis media     PE (pulmonary thromboembolism) ([facility]) 2004    on xarelto    Pneumonia 1971    Screening for colon cancer     Screening for prostate cancer     Skin tag     Sleep apnea     12/21/21--states he is being worked up for sleep apnea    Squamous cell skin cancer 2015 2017 2022    Squamous cell skin cancer 06/11/2024    left preauricular    Visual impairment    [2]   Past Surgical History:  Procedure Laterality Date    BACK SURGERY  2004    CHOLECYSTECTOMY  2009    COLON SURGERY  2004    Polyps removed    COLONOSCOPY      EYE SURGERY  2016?    tprn retina    MOHS SURGERY  2015 2017 2022    MOHS SURGERY Left 08/01/2024    SCC left preauricular- Dr. Almendarez    MI CLAVICULECTOMY PARTIAL Left 03/09/2021    Procedure: DISTAL CLAVICLE EXCISION;  Surgeon: Eliot Ibarra DO;  Location:  MAIN OR;  Service: Orthopedics    MI SURGICAL ARTHROSCOPY SHOULDER BICEPS TENODESIS Left 03/09/2021    Procedure: ARTHROSCOPIC BICEPS TENODESIS;  Surgeon: Eliot Ibarra DO;  Location:  MAIN OR;  Service: Orthopedics    MI SURGICAL ARTHROSCOPY SHOULDER W/ROTATOR CUFF RPR Left " 03/09/2021    Procedure: REPAIR ROTATOR CUFF  ARTHROSCOPIC;  Surgeon: Eliot Ibarra DO;  Location: [facility] MAIN OR;  Service: Orthopedics    SKIN BIOPSY  2015 2016 2017 2022    SPINE SURGERY  2004    Tumor removed lower spine   [3]   Family History  Problem Relation Name Age of Onset    Coronary artery disease Mother Annette V     Hypertrophic cardiomyopathy Mother Annette V     Thyroid disease Mother Annette V     Heart disease Mother Annette V     Arthritis Mother Annette V         RA    Coronary artery disease Sister      Irritable bowel syndrome Sister      Crohn's disease Sister      Substance Abuse Sister      Mental illness Sister      Substance Abuse Brother      Mental illness Brother      Heart disease Family          cardiovascular disease, ischemic heart disease    Cancer Family      Cancer Father Father         jaw bone    Hypertension Father Father     Diabetes Maternal Aunt 2 aunts    [4]   Allergies  Allergen Reactions    Vancomycin Rash and Other (See Comments)     Peeling of skin    Ace Inhibitors Cough

## 2025-07-08 ENCOUNTER — DOCUMENTATION (OUTPATIENT)
Dept: ADMINISTRATIVE | Facility: OTHER | Age: 70
End: 2025-07-08

## 2025-07-08 NOTE — PROGRESS NOTES
07/08/25 11:12 AM    Patient was called after the Urgent Care visit . Home BP reading(s) was/were not taken. Patient has no symptoms., Patient has an upcoming appointment with their primary care provider on 7/9/25 to follow on an elevated Blood pressure.      Thank you.  Yusra John MA  [facility]            Blood pressure elevated  Appointment department: [facility]  Appointment provider: * No providers found *  Blood pressure  07/05/25 1442 150/82  07/05/25 1440 150/80

## 2025-07-09 ENCOUNTER — TELEMEDICINE (OUTPATIENT)
Dept: FAMILY MEDICINE CLINIC | Facility: HOSPITAL | Age: 70
End: 2025-07-09
Payer: MEDICARE

## 2025-07-09 VITALS — BODY MASS INDEX: 29.95 KG/M2 | WEIGHT: 226 LBS | HEIGHT: 73 IN | HEART RATE: 75 BPM

## 2025-07-09 DIAGNOSIS — U07.1 COVID-19 VIRUS INFECTION: Primary | ICD-10-CM

## 2025-07-09 DIAGNOSIS — B02.9 HERPES ZOSTER WITHOUT COMPLICATION: ICD-10-CM

## 2025-07-09 DIAGNOSIS — Z86.711 HISTORY OF PULMONARY EMBOLISM: ICD-10-CM

## 2025-07-09 PROCEDURE — G2211 COMPLEX E/M VISIT ADD ON: HCPCS | Performed by: INTERNAL MEDICINE

## 2025-07-09 PROCEDURE — 99213 OFFICE O/P EST LOW 20 MIN: CPT | Performed by: INTERNAL MEDICINE

## 2025-07-09 NOTE — PROGRESS NOTES
COVID-19 Outpatient Progress Note  Name: Norman Coles      : 1955      MRN: [id number]  Encounter Provider: Hal Solorio MD  Encounter Date: 2025   Encounter department: [facility] CARE [address]  :  Assessment & Plan  COVID-19 virus infection  Covid symptoms are mild, denies dyspnea, gi symptoms.  Discussed symptomatic treatment with flonase, tylenol up to 3000mg daily, vitamin c 1000mg and vitamin d 1000 units for 10 days.    Advised pt to socially distance from wife and wear a mask around her for 7 days.    Pt had side effects on molnupiravir and paxlovid is contraindicated due to being on xarelto.       History of pulmonary embolism  Continue xarelto       Herpes zoster without complication  He typical rash of herpes zoster on the left shoulder.   Advised pt to complete 7 days of valtrex 1000mg tid and told him that there was no interaction between valtrex and fluticasone nasal spray.  He doesn't have much discomfort but I asked him to let me know if he develops pain due to neuropathy.           Disposition:     Discussed symptom directed medication options with patient. Discussed vitamin D, vitamin C, and/or zinc supplementation with patient.          Recent Visits  No visits were found meeting these conditions.  Showing recent visits within past 7 days and meeting all other requirements  Today's Visits  Date Type Provider Dept   25 Telemedicine Hal Solorio MD [facility] [address]   Showing today's visits and meeting all other requirements  Future Appointments  No visits were found meeting these conditions.  Showing future appointments within next 150 days and meeting all other requirements    History of Present Illness     Subjective:   Norman Coles is a 69 y.o. male who is concerned about COVID-19. Patient's symptoms include nasal congestion, rhinorrhea and cough. Patient denies fever, chills, shortness of breath, abdominal pain, nausea, vomiting,  "diarrhea and headaches.     - Date of symptom onset: 7/8/2025      COVID-19 vaccination status: Fully vaccinated with booster    Exposure:   Contact with a person who is under investigation (PUI) for or who is positive for COVID-19 within the last 14 days?: Yes      Lab Results   Component Value Date    SARSCOV2 Negative 11/17/2021    SARSCOVAG Negative 07/05/2025       Review of Systems   Constitutional:  Negative for chills and fever.   HENT:  Positive for congestion and rhinorrhea.    Respiratory:  Positive for cough. Negative for shortness of breath.    Gastrointestinal:  Negative for abdominal pain, diarrhea, nausea and vomiting.   Neurological:  Negative for headaches.     Objective   Pulse 75   Ht 6' 1\" (1.854 m)   Wt 103 kg (226 lb)   BMI 29.82 kg/m²     Physical Exam  Constitutional:       General: He is not in acute distress.     Appearance: He is not toxic-appearing.   Pulmonary:      Effort: Pulmonary effort is normal. No respiratory distress.     Skin:     Findings: Rash present.     Neurological:      Mental Status: He is alert and oriented to person, place, and time.      Cranial Nerves: No cranial nerve deficit.      Motor: No weakness.         Administrative Statements   Encounter provider Hal Solorio MD    The Patient is located at Home and in the following state in which I hold an active license [place].    The patient was identified by name and date of birth. Norman Coles was informed that this is a telemedicine visit and that the visit is being conducted through the Epic Embedded platform. He agrees to proceed..  My office door was closed. No one else was in the room.  He acknowledged consent and understanding of privacy and security of the video platform. The patient has agreed to participate and understands they can discontinue the visit at any time.    I have spent a total time of 15 minutes in caring for this patient on the day of the visit/encounter including Risks and benefits of tx " options, Instructions for management, Documenting in the medical record, Reviewing/placing orders in the medical record (including tests, medications, and/or procedures), and Obtaining or reviewing history  , not including the time spent for establishing the audio/video connection.

## 2025-07-30 DIAGNOSIS — Z86.711 HISTORY OF PULMONARY EMBOLISM: ICD-10-CM

## 2025-07-30 RX ORDER — RIVAROXABAN 20 MG/1
20 TABLET, FILM COATED ORAL DAILY
Qty: 90 TABLET | Refills: 3 | Status: SHIPPED | OUTPATIENT
Start: 2025-07-30

## 2025-08-18 ENCOUNTER — DOCUMENTATION (OUTPATIENT)
Age: 70
End: 2025-08-18

## 2025-08-18 ENCOUNTER — OFFICE VISIT (OUTPATIENT)
Age: 70
End: 2025-08-18
Payer: MEDICARE

## 2025-08-18 VITALS
WEIGHT: 229 LBS | HEIGHT: 73 IN | SYSTOLIC BLOOD PRESSURE: 122 MMHG | OXYGEN SATURATION: 94 % | DIASTOLIC BLOOD PRESSURE: 76 MMHG | TEMPERATURE: 97 F | HEART RATE: 65 BPM | BODY MASS INDEX: 30.35 KG/M2

## 2025-08-18 DIAGNOSIS — Z86.711 HISTORY OF PULMONARY EMBOLISM: ICD-10-CM

## 2025-08-18 DIAGNOSIS — E66.9 OBESITY (BMI 30-39.9): ICD-10-CM

## 2025-08-18 DIAGNOSIS — I10 ESSENTIAL HYPERTENSION: ICD-10-CM

## 2025-08-18 DIAGNOSIS — G47.33 OBSTRUCTIVE SLEEP APNEA: Primary | ICD-10-CM

## 2025-08-18 PROCEDURE — 99214 OFFICE O/P EST MOD 30 MIN: CPT | Performed by: INTERNAL MEDICINE

## 2025-08-18 PROCEDURE — G2211 COMPLEX E/M VISIT ADD ON: HCPCS | Performed by: INTERNAL MEDICINE

## 2025-08-19 LAB
DME PARACHUTE DELIVERY DATE ACTUAL: NORMAL
DME PARACHUTE DELIVERY DATE REQUESTED: NORMAL
DME PARACHUTE ITEM DESCRIPTION: NORMAL
DME PARACHUTE ORDER STATUS: NORMAL
DME PARACHUTE SUPPLIER NAME: NORMAL
DME PARACHUTE SUPPLIER PHONE: NORMAL

## (undated) DEVICE — SUT VICRYL 2-0 SH 27 IN UNDYED J417H

## (undated) DEVICE — TUBING ARTHROSCOPY DUALWAVE OUTFLOW TUBE SET

## (undated) DEVICE — STERILE POLYISOPRENE POWDER-FREE SURGICAL GLOVES: Brand: PROTEXIS

## (undated) DEVICE — T-MAX DISPOSABLE FACE MASK 8 PER BOX

## (undated) DEVICE — OCCLUSIVE GAUZE STRIP,3% BISMUTH TRIBROMOPHENATE IN PETROLATUM BLEND: Brand: XEROFORM

## (undated) DEVICE — NEEDLE BLUNT 18 G X 1 1/2IN

## (undated) DEVICE — NEEDLE SUT SCORPION MULTIFIRE

## (undated) DEVICE — YANKAUER,OPEN TIP, NO VENT: Brand: ARGYLE

## (undated) DEVICE — SUT MONOCRYL 3-0 PS-2 27 IN Y427H

## (undated) DEVICE — INTENDED FOR TISSUE SEPARATION, AND OTHER PROCEDURES THAT REQUIRE A SHARP SURGICAL BLADE TO PUNCTURE OR CUT.: Brand: BARD-PARKER SAFETY BLADES SIZE 11, STERILE

## (undated) DEVICE — VAPR COOLPULSE 90 ELECTRODE 90 DEGREES SUCTION WITH INTEGRATED HANDPIECE: Brand: VAPR COOLPULSE

## (undated) DEVICE — INTENDED FOR TISSUE SEPARATION, AND OTHER PROCEDURES THAT REQUIRE A SHARP SURGICAL BLADE TO PUNCTURE OR CUT.: Brand: BARD-PARKER SAFETY BLADES SIZE 15, STERILE

## (undated) DEVICE — 3M™ STERI-DRAPE™ U-DRAPE 1015: Brand: STERI-DRAPE™

## (undated) DEVICE — 3M™ IOBAN™ 2 ANTIMICROBIAL INCISE DRAPE 6650EZ: Brand: IOBAN™ 2

## (undated) DEVICE — PAD GROUNDING ADULT

## (undated) DEVICE — CHLORAPREP HI-LITE 26ML ORANGE

## (undated) DEVICE — GLOVE SRG LF STRL BGL SKNSNS 8.5 PF

## (undated) DEVICE — TUBING SUCTION 5MM X 12 FT

## (undated) DEVICE — DRAPE STERI 1010 18IN X 12IN

## (undated) DEVICE — SYRINGE 3ML LL

## (undated) DEVICE — SCD SEQUENTIAL COMPRESSION COMFORT SLEEVE MEDIUM KNEE LENGTH: Brand: KENDALL SCD

## (undated) DEVICE — STERILE POLYISOPRENE POWDER-FREE SURGICAL GLOVES WITH EMOLLIENT COATING: Brand: PROTEXIS

## (undated) DEVICE — CANNULA BUTTON 8 X 30MM PASSPORT

## (undated) DEVICE — GAUZE SPONGES,16 PLY: Brand: CURITY

## (undated) DEVICE — 3M™ STERI-STRIP™ REINFORCED ADHESIVE SKIN CLOSURES, R1547, 1/2 IN X 4 IN (12 MM X 100 MM), 6 STRIPS/ENVELOPE: Brand: 3M™ STERI-STRIP™

## (undated) DEVICE — PACK PBDS SHOULDER ARTHROSCOPY RF

## (undated) DEVICE — 4-PORT MANIFOLD: Brand: NEPTUNE 2

## (undated) DEVICE — BLADE SHAVER DISSECTOR 4MM 13CM COOLCUT

## (undated) DEVICE — VIOLET BRAIDED (POLYGLACTIN 910), SYNTHETIC ABSORBABLE SUTURE: Brand: COATED VICRYL

## (undated) DEVICE — CANNULA 5.75 X 70MM BARREL SHAPED BOWL

## (undated) DEVICE — SUT MONOCRYL 4-0 PS-2 27 IN Y426H

## (undated) DEVICE — ABDOMINAL PAD: Brand: DERMACEA

## (undated) DEVICE — SUTURETAPE 1.3MM W/CLOSED LOOP BLUE/WHITE AR-7535

## (undated) DEVICE — NEEDLE FILTER 5 MICR 19G X 1.5IN

## (undated) DEVICE — STOCKINETTE,IMPERVIOUS,12X48,STERILE: Brand: MEDLINE

## (undated) DEVICE — CRADLE EXTREMITY UNIVERSAL CONTOURED

## (undated) DEVICE — ASTOUND STANDARD SURGICAL GOWN, XXL: Brand: CONVERTORS

## (undated) DEVICE — PASSER SUT CRESCENT HIP LENGTH SWIFTSTITCH

## (undated) DEVICE — TUBING ARTHROSCOPIC WAVE  MAIN PUMP

## (undated) DEVICE — BULB SYRINGE,IRRIGATION WITH PROTECTIVE CAP: Brand: DOVER

## (undated) DEVICE — 3M™ MICROFOAM™ SURGICAL TAPE 4 ROLLS/CARTON 6 CARTONS/CASE 1528-3: Brand: 3M™ MICROFOAM™

## (undated) DEVICE — STIRRUP STRAP ADULT DISP

## (undated) DEVICE — SHOULDER STABILIZATION KIT,                                    DISPOSABLE 12 PER BOX

## (undated) DEVICE — GLOVE SRG LF STRL BGL SKNSNS 6.5 PF

## (undated) DEVICE — BLADE SHAVER CUTTER BN 4MM 13CM COOLCUT

## (undated) DEVICE — PENCIL SMOKE EVAC TELESCOPING W/TUBING

## (undated) DEVICE — THIN OFFSET (9.0 X 0.38 X 25.0MM)

## (undated) DEVICE — KIT DISP F/ 2.9MM ROTATOR CUFF PUSHLOCK